# Patient Record
Sex: FEMALE | Race: BLACK OR AFRICAN AMERICAN | NOT HISPANIC OR LATINO | Employment: OTHER | URBAN - METROPOLITAN AREA
[De-identification: names, ages, dates, MRNs, and addresses within clinical notes are randomized per-mention and may not be internally consistent; named-entity substitution may affect disease eponyms.]

---

## 2017-04-03 ENCOUNTER — OFFICE VISIT (OUTPATIENT)
Dept: MEDICAL GROUP | Facility: CLINIC | Age: 57
End: 2017-04-03
Payer: COMMERCIAL

## 2017-04-03 VITALS
OXYGEN SATURATION: 97 % | DIASTOLIC BLOOD PRESSURE: 90 MMHG | RESPIRATION RATE: 16 BRPM | SYSTOLIC BLOOD PRESSURE: 132 MMHG | HEART RATE: 86 BPM | TEMPERATURE: 96.8 F

## 2017-04-03 DIAGNOSIS — J40 BRONCHITIS: ICD-10-CM

## 2017-04-03 PROCEDURE — 99213 OFFICE O/P EST LOW 20 MIN: CPT | Performed by: NURSE PRACTITIONER

## 2017-04-03 RX ORDER — IBUPROFEN 200 MG
200 TABLET ORAL EVERY 6 HOURS PRN
COMMUNITY
End: 2018-03-09

## 2017-04-03 RX ORDER — AZITHROMYCIN 250 MG/1
TABLET, FILM COATED ORAL
Qty: 6 TAB | Refills: 0 | Status: SHIPPED | OUTPATIENT
Start: 2017-04-03 | End: 2017-10-25

## 2017-04-03 ASSESSMENT — ENCOUNTER SYMPTOMS
COUGH: 1
CHILLS: 1
SHORTNESS OF BREATH: 0
WHEEZING: 0
SORE THROAT: 0
VOMITING: 0
NAUSEA: 0
SPUTUM PRODUCTION: 1
FEVER: 1
MYALGIAS: 0

## 2017-04-03 NOTE — PROGRESS NOTES
Chief Complaint   Patient presents with   • URI     dry cough/ chest congestion/ SOB/ chest pressure/ X 5 days       HISTORY OF PRESENT ILLNESS: Patient is a 57 y.o. female established patient who presents today due to five days hx of chest congestion, sob, dry coughing, chest congestion, chills and subjective fever. Pt is taking OTC dayquil, nyquil but not helping her symptoms. She feels symptoms are getting worse. She is not smoking. No sob or wheezing. No chronic lung disease.       Patient Active Problem List    Diagnosis Date Noted   • Obesity (BMI 35.0-39.9 without comorbidity) ([facility]) 11/22/2016   • History of colorectal cancer 03/02/2016   • Epigastric pain 04/04/2014   • Other and unspecified derangement of medial meniscus 03/08/2013   • Internal hemorrhoid 03/31/2011   • Vitamin d deficiency 08/05/2010   • History of anemia 08/05/2010   • Proteinuria 08/05/2010       Allergies:Morphine    Current Outpatient Prescriptions   Medication Sig Dispense Refill   • ibuprofen (MOTRIN) 200 MG Tab Take 200 mg by mouth every 6 hours as needed.     • maalox plus-benadryl-visc lidocaine (MAGIC MOUTHWASH) Take 5 mL by mouth every 6 hours as needed. 80 mL 0   • esomeprazole (NEXIUM) 40 MG delayed-release capsule      • guaifenesin DM sugar free (DIABETIC TUSSIN DM)  MG/5ML Liquid soln Take 10 mL by mouth every four hours as needed for Cough.     • hydrocodone-acetaminophen (NORCO) 5-325 MG Tab per tablet Take 1-2 Tabs by mouth every four hours as needed. 14 Tab 0   • alprazolam (XANAX) 0.25 MG Tab Take 1 Tab by mouth 2 times a day as needed for Sleep or Anxiety. 30 Tab 0   • ondansetron (ZOFRAN) 4 MG Tab tablet Take 1 Tab by mouth every 8 hours as needed for Nausea/Vomiting. 20 Tab 0   • loratadine (CLARITIN) 10 MG TABS Take 10 mg by mouth every day.       No current facility-administered medications for this visit.       Social History   Substance Use Topics   • Smoking status: Never Smoker    • Smokeless tobacco:  Never Used      Comment: significant 2nd hand exposure    • Alcohol Use: 0.6 oz/week     1 Standard drinks or equivalent per week       Family Status   Relation Status Death Age   • Father     • Maternal Grandmother     • Maternal Grandfather     • Paternal Grandmother     • Paternal Grandfather       Family History   Problem Relation Age of Onset   • Stroke Father      68yo   • Hypertension Father    • Heart Disease Father      70 yo   • Psychiatry Sister      Schizophrenia   • Alcohol/Drug Sister      Self medication   • Hypertension Brother    • Psychiatry Brother      Schizophrenia   • Cancer Paternal Aunt      Bone, liver   • Cancer Maternal Grandfather      Lung   • Diabetes Paternal Grandmother    • Hypertension Paternal Grandmother    • Stroke Paternal Grandmother      70 s         ROS:  Review of Systems   Constitutional: Positive for fever ( subjective fever) and chills.   HENT: Positive for congestion. Negative for ear pain and sore throat.    Respiratory: Positive for cough and sputum production. Negative for shortness of breath and wheezing.    Gastrointestinal: Negative for nausea and vomiting.   Musculoskeletal: Negative for myalgias.        Objective:     Blood pressure 132/90, pulse 86, temperature 36 °C (96.8 °F), resp. rate 16, SpO2 97 %.     Physical Exam:  Physical Exam   Constitutional: She is oriented to person, place, and time and well-developed, well-nourished, and in no distress.   HENT:   Head: Normocephalic and atraumatic.   Right Ear: Hearing, tympanic membrane and external ear normal.   Left Ear: Hearing, tympanic membrane and external ear normal.   Nose: Rhinorrhea present. Right sinus exhibits no maxillary sinus tenderness and no frontal sinus tenderness. Left sinus exhibits no maxillary sinus tenderness and no frontal sinus tenderness.   Mouth/Throat: No oropharyngeal exudate, posterior oropharyngeal edema or tonsillar abscesses.   Eyes:  Conjunctivae are normal.   Neck: Neck supple. No JVD present.   Cardiovascular: Normal rate.    Pulmonary/Chest: Effort normal. No respiratory distress. She has no wheezes. She has no rales.   Neurological: She is alert and oriented to person, place, and time.   Vitals reviewed.        Assessment/Plan:  1. Bronchitis  - can start taking abx if still not feeling better in the next 1-2 days: azithromycin (ZITHROMAX) 250 MG Tab; As directed  Dispense: 6 Tab; Refill: 0  - can continue OTC prn medications for symptoms management.   - Call if worsening symptoms. Pt verbalized understanding.

## 2017-05-30 ENCOUNTER — OFFICE VISIT (OUTPATIENT)
Dept: MEDICAL GROUP | Facility: PHYSICIAN GROUP | Age: 57
End: 2017-05-30
Payer: COMMERCIAL

## 2017-05-30 VITALS
SYSTOLIC BLOOD PRESSURE: 128 MMHG | RESPIRATION RATE: 16 BRPM | HEART RATE: 86 BPM | HEIGHT: 66 IN | WEIGHT: 260 LBS | BODY MASS INDEX: 41.78 KG/M2 | DIASTOLIC BLOOD PRESSURE: 70 MMHG | OXYGEN SATURATION: 96 % | TEMPERATURE: 98.4 F

## 2017-05-30 DIAGNOSIS — R10.13 EPIGASTRIC PAIN: ICD-10-CM

## 2017-05-30 PROCEDURE — 99213 OFFICE O/P EST LOW 20 MIN: CPT | Performed by: NURSE PRACTITIONER

## 2017-05-30 NOTE — Clinical Note
[facility]  Sofie Mathew M.D.  [address]  [place] [address]  Fax: [phone]   Authorization for Release/Disclosure of   Protected Health Information   Name: ADILENE GALLOWAY : 1960 SSN: [id number]   Address: [address] [place] [address]  [place] [address] Phone:    [phone] (home)    I authorize the entity listed below to release/disclose the PHI below to:   [facility]/Sofie Mathew M.D. and SLOANE Nix   Provider or Entity Name:   [facility]    Address   City, State, [facility]   Phone:      Fax:     Reason for request: continuity of care   Information to be released:    [  ] LAST COLONOSCOPY,  including any PATH REPORT and follow-up  [  ] LAST FIT/COLOGUARD RESULT [  ] LAST DEXA  [  ] LAST MAMMOGRAM  [  ] LAST PAP  [  ] LAST LABS [  ] RETINA EXAM REPORT  [  ] IMMUNIZATION RECORDS  [x] Release all info      [  ] Check here and initial the line next to each item to release ALL health information INCLUDING  _____ Care and treatment for drug and / or alcohol abuse  _____ HIV testing, infection status, or AIDS  _____ Genetic Testing    DATES OF SERVICE OR TIME PERIOD TO BE DISCLOSED: _____________  I understand and acknowledge that:  * This Authorization may be revoked at any time by you in writing, except if your health information has already been used or disclosed.  * Your health information that will be used or disclosed as a result of you signing this authorization could be re-disclosed by the recipient. If this occurs, your re-disclosed health information may no longer be protected by State or Federal laws.  * You may refuse to sign this Authorization. Your refusal will not affect your ability to obtain treatment.  * This Authorization becomes effective upon signing and will  on (date) __________.      If no date is indicated, this Authorization will  one (1) year from the signature date.    Name: Adilene Galloway    Signature:   Date:          5/30/2017       PLEASE FAX REQUESTED RECORDS BACK TO: [phone]

## 2017-06-02 NOTE — ASSESSMENT & PLAN NOTE
Has had issues with epigastric pain.  Had her lap band removed because of complications.  Is still having problems.  Recently had episodes of nausea, vomiting, and diarrhea.  Zofran didn't seem to help.  Is feeling much better today.  Discussed plan to avoid dairy and to gradually increase diet as tolerated.

## 2017-06-05 NOTE — PROGRESS NOTES
Chief Complaint   Patient presents with   • Hospital Follow-up       HISTORY OF PRESENT ILLNESS: Patient is a 57 y.o. female established patient who presents today to discuss the following issues:    Epigastric pain  Has had issues with epigastric pain.  Had her lap band removed because of complications.  Is still having problems.  Recently had episodes of nausea, vomiting, and diarrhea.  Zofran didn't seem to help.  Is feeling much better today.  Discussed plan to avoid dairy and to gradually increase diet as tolerated.      Patient Active Problem List    Diagnosis Date Noted   • Obesity (BMI 35.0-39.9 without comorbidity) ([facility]) 11/22/2016   • History of colorectal cancer 03/02/2016   • Epigastric pain 04/04/2014   • Other and unspecified derangement of medial meniscus 03/08/2013   • Internal hemorrhoid 03/31/2011   • Vitamin d deficiency 08/05/2010   • History of anemia 08/05/2010   • Proteinuria 08/05/2010       Allergies:Morphine    Current Outpatient Prescriptions   Medication Sig Dispense Refill   • ibuprofen (MOTRIN) 200 MG Tab Take 200 mg by mouth every 6 hours as needed.     • esomeprazole (NEXIUM) 40 MG delayed-release capsule      • guaifenesin DM sugar free (DIABETIC TUSSIN DM)  MG/5ML Liquid soln Take 10 mL by mouth every four hours as needed for Cough.     • ondansetron (ZOFRAN) 4 MG Tab tablet Take 1 Tab by mouth every 8 hours as needed for Nausea/Vomiting. 20 Tab 0   • loratadine (CLARITIN) 10 MG TABS Take 10 mg by mouth every day.     • azithromycin (ZITHROMAX) 250 MG Tab As directed 6 Tab 0   • maalox plus-benadryl-visc lidocaine (MAGIC MOUTHWASH) Take 5 mL by mouth every 6 hours as needed. 80 mL 0   • hydrocodone-acetaminophen (NORCO) 5-325 MG Tab per tablet Take 1-2 Tabs by mouth every four hours as needed. 14 Tab 0   • alprazolam (XANAX) 0.25 MG Tab Take 1 Tab by mouth 2 times a day as needed for Sleep or Anxiety. 30 Tab 0     No current facility-administered medications for this visit.        Social History   Substance Use Topics   • Smoking status: Never Smoker    • Smokeless tobacco: Never Used      Comment: significant 2nd hand exposure    • Alcohol Use: No       Family Status   Relation Status Death Age   • Father     • Maternal Grandmother     • Maternal Grandfather     • Paternal Grandmother     • Paternal Grandfather       Family History   Problem Relation Age of Onset   • Stroke Father      68yo   • Hypertension Father    • Heart Disease Father      70 yo   • Psychiatry Sister      Schizophrenia   • Alcohol/Drug Sister      Self medication   • Hypertension Brother    • Psychiatry Brother      Schizophrenia   • Cancer Paternal Aunt      Bone, liver   • Cancer Maternal Grandfather      Lung   • Diabetes Paternal Grandmother    • Hypertension Paternal Grandmother    • Stroke Paternal Grandmother      70 s       Review of Systems:   Constitutional: Negative for fever, chills, weight loss and malaise/fatigue.   HENT: Negative for ear pain, nosebleeds, congestion, sore throat and neck pain.    Eyes: Negative for blurred vision.   Respiratory: Negative for cough, sputum production, shortness of breath and wheezing.    Cardiovascular: Negative for chest pain, palpitations, orthopnea and leg swelling.   Gastrointestinal: Negative for heartburn, nausea, vomiting and abdominal pain.  Positive for resolving epigastric pain.  Genitourinary: Negative for dysuria, urgency and frequency.   Musculoskeletal: Negative for myalgias, joint pain, and back pain.  Skin: Negative for rash and itching.   Neurological: Negative for dizziness, tingling, tremors, sensory change, focal weakness and headaches.   Endo/Heme/Allergies: Does not bruise/bleed easily.   Psychiatric/Behavioral: Negative for depression, suicidal ideas and memory loss.  The patient is not nervous/anxious and does not have insomnia.    All other systems reviewed and are negative except as in  "HPI.    Exam:  Blood pressure 128/70, pulse 86, temperature 36.9 °C (98.4 °F), resp. rate 16, height 1.689 m (5' 6.5\"), weight 117.935 kg (260 lb), SpO2 96 %.  General:  Well nourished, well developed female in NAD  Head: Grossly normal.  Neck: Supple without JVD or bruit. Thyroid is not enlarged.  Pulmonary: Clear to ausculation. Normal effort. No rales, ronchi, or wheezing.  Cardiovascular: Regular rate and rhythm without murmur.   Extremities: No clubbing, cyanosis, or edema.  Skin: Intact with no obvious rashes or lesions.  Neuro: Grossly intact.  Psych: Alert and oriented x 3.  Mood and affect appropriate.    Medical decision-making and discussion: Evita is here today for follow-up of stomach issues.  A referral was sent to GI for further evaluation.  She will plan to follow-up here as needed.          Assessment/Plan:  1. Epigastric pain  REFERRAL TO GASTROENTEROLOGY       Return if symptoms worsen or fail to improve.    Please note that this dictation was created using voice recognition software. I have made every reasonable attempt to correct obvious errors, but I expect that there are errors of grammar and possibly content that I did not discover before finalizing the note.    "

## 2017-10-13 ENCOUNTER — APPOINTMENT (OUTPATIENT)
Dept: MEDICAL GROUP | Facility: PHYSICIAN GROUP | Age: 57
End: 2017-10-13
Payer: COMMERCIAL

## 2017-10-25 ENCOUNTER — OFFICE VISIT (OUTPATIENT)
Dept: MEDICAL GROUP | Facility: PHYSICIAN GROUP | Age: 57
End: 2017-10-25
Payer: COMMERCIAL

## 2017-10-25 VITALS
SYSTOLIC BLOOD PRESSURE: 122 MMHG | HEART RATE: 90 BPM | OXYGEN SATURATION: 97 % | RESPIRATION RATE: 16 BRPM | HEIGHT: 67 IN | BODY MASS INDEX: 34.69 KG/M2 | DIASTOLIC BLOOD PRESSURE: 72 MMHG | WEIGHT: 221 LBS | TEMPERATURE: 97 F

## 2017-10-25 DIAGNOSIS — Z13.6 SCREENING FOR CARDIOVASCULAR CONDITION: ICD-10-CM

## 2017-10-25 DIAGNOSIS — E66.9 OBESITY (BMI 30-39.9): ICD-10-CM

## 2017-10-25 DIAGNOSIS — Z12.39 SCREENING FOR BREAST CANCER: ICD-10-CM

## 2017-10-25 DIAGNOSIS — Z13.29 SCREENING FOR ENDOCRINE DISORDER: ICD-10-CM

## 2017-10-25 DIAGNOSIS — D64.9 ANEMIA, UNSPECIFIED TYPE: ICD-10-CM

## 2017-10-25 DIAGNOSIS — K31.9 GASTRIC DISORDER: ICD-10-CM

## 2017-10-25 PROCEDURE — 99214 OFFICE O/P EST MOD 30 MIN: CPT | Performed by: FAMILY MEDICINE

## 2017-10-25 ASSESSMENT — PATIENT HEALTH QUESTIONNAIRE - PHQ9: CLINICAL INTERPRETATION OF PHQ2 SCORE: 0

## 2017-10-25 NOTE — PROGRESS NOTES
Chief Complaint   Patient presents with   • Follow-Up     abdominal pain   • Labs Only       HISTORY OF PRESENT ILLNESS: Patient is a 57 y.o. female established patient here today for the following concerns:    1. Anemia, unspecified type  Due for follow up on anemia.  No dizziness or light headedness.      2. Screening for endocrine disorder  3. Screening for cardiovascular condition  Due for labs.     4. Gastric disorder  Here today for continued intermittent epigastric abdominal pain related to adhesions from the lap band and severe reflux.  Had endoscopy last year which showed no evidence of ulcer.  She has found small frequent meals that are simple to digest work better than high fiber or high protein.  This has been a struggle with her weight, but now has been able to lose 60 lbs.     5. Obesity (BMI 30-39.9)  Counseled.     6. Screening for breast cancer  Due for mammogram.      Past Medical, Social, and Family history reviewed and updated in EPIC    Allergies:Morphine    Current Outpatient Prescriptions   Medication Sig Dispense Refill   • ibuprofen (MOTRIN) 200 MG Tab Take 200 mg by mouth every 6 hours as needed.     • esomeprazole (NEXIUM) 40 MG delayed-release capsule      • alprazolam (XANAX) 0.25 MG Tab Take 1 Tab by mouth 2 times a day as needed for Sleep or Anxiety. 30 Tab 0   • ondansetron (ZOFRAN) 4 MG Tab tablet Take 1 Tab by mouth every 8 hours as needed for Nausea/Vomiting. 20 Tab 0   • loratadine (CLARITIN) 10 MG TABS Take 10 mg by mouth every day.       No current facility-administered medications for this visit.          ROS:  Review of Systems   Constitutional: Negative for fever, chills, weight loss and malaise/fatigue.   HENT: Negative for ear pain, nosebleeds, congestion, sore throat and neck pain.    Eyes: Negative for blurred vision.   Respiratory: Negative for cough, sputum production, shortness of breath and wheezing.    Cardiovascular: Negative for chest pain, palpitations,  and leg  "swelling.   Gastrointestinal: Negative for heartburn, nausea, vomiting, diarrhea and abdominal pain.   Genitourinary: Negative for dysuria, urgency and frequency.   Musculoskeletal: Negative for myalgias, back pain and joint pain.   Skin: Negative for rash and itching.   Neurological: Negative for dizziness, tingling, tremors, sensory change, focal weakness and headaches.   Endo/Heme/Allergies: Does not bruise/bleed easily.   Psychiatric/Behavioral: Negative for depression, anxiety, suicidal ideas, insomnia and memory loss.      Exam:  Blood pressure 122/72, pulse 90, temperature 36.1 °C (97 °F), resp. rate 16, height 1.689 m (5' 6.5\"), weight 100.2 kg (221 lb), SpO2 97 %.    General:  Well nourished, well developed in NAD  Head is grossly normal.  Neck: Supple without JVD   Pulmonary:  Normal effort.   Cardiovascular: Regular rate  Extremities: no clubbing, cyanosis, or edema.  Psych: affect appropriate      Please note that this dictation was created using voice recognition software. I have made every reasonable attempt to correct obvious errors, but I expect that there are errors of grammar and possibly content that I did not discover before finalizing the note.    Assessment/Plan:  1. Anemia, unspecified type  Check   - CBC WITH DIFFERENTIAL; Future  - IRON/TOTAL IRON BIND; Future  - FERRITIN; Future    2. Screening for endocrine disorder  Check   - TSH WITH REFLEX TO FT4; Future  - VITAMIN D,25 HYDROXY; Future    3. Screening for cardiovascular condition    - COMP METABOLIC PANEL; Future  - LIPID PROFILE; Future    4. Gastric disorder  Small frequent meals, continue PPI, follow up with GI as planned.     5. Obesity (BMI 30-39.9)    - Patient identified as having weight management issue.  Appropriate orders and counseling given.    6. Screening for breast cancer    - MA-MAMMO SCREENING BILAT W/SOFYA W/O CAD; Future    3-6 month follow up        "

## 2017-12-29 ENCOUNTER — OFFICE VISIT (OUTPATIENT)
Dept: MEDICAL GROUP | Facility: PHYSICIAN GROUP | Age: 57
End: 2017-12-29
Payer: COMMERCIAL

## 2017-12-29 VITALS
BODY MASS INDEX: 34.53 KG/M2 | DIASTOLIC BLOOD PRESSURE: 74 MMHG | HEART RATE: 82 BPM | SYSTOLIC BLOOD PRESSURE: 130 MMHG | WEIGHT: 220 LBS | RESPIRATION RATE: 12 BRPM | OXYGEN SATURATION: 97 % | TEMPERATURE: 98.2 F | HEIGHT: 67 IN

## 2017-12-29 DIAGNOSIS — J06.9 VIRAL URI WITH COUGH: ICD-10-CM

## 2017-12-29 LAB
FLUAV+FLUBV AG SPEC QL IA: NEGATIVE
INT CON NEG: NORMAL
INT CON POS: NORMAL

## 2017-12-29 PROCEDURE — 99213 OFFICE O/P EST LOW 20 MIN: CPT | Performed by: NURSE PRACTITIONER

## 2017-12-29 PROCEDURE — 87804 INFLUENZA ASSAY W/OPTIC: CPT | Performed by: NURSE PRACTITIONER

## 2017-12-29 RX ORDER — PROMETHAZINE HYDROCHLORIDE AND CODEINE PHOSPHATE 6.25; 1 MG/5ML; MG/5ML
5 SYRUP ORAL 4 TIMES DAILY PRN
Qty: 120 ML | Refills: 0 | Status: SHIPPED
Start: 2017-12-29 | End: 2018-01-08

## 2017-12-29 RX ORDER — AZITHROMYCIN 250 MG/1
TABLET, FILM COATED ORAL
Qty: 6 TAB | Refills: 0 | Status: SHIPPED | OUTPATIENT
Start: 2017-12-29 | End: 2018-01-03

## 2017-12-30 NOTE — PROGRESS NOTES
Subjective:     Chief Complaint   Patient presents with   • URI     x 1 week cough, fever, body aches       HPI  Evita Mckenna is a 57 y.o. female here today for persistent URI.   Symptoms started over a week ago and she is just not getting better.   She is coughing frequently, without sputum.   Body aches are present with weakness and dizziness.   Headache present.   Nasal congestion was so bad she was having a tough time breathing, but Sudafed has helped with this.   Sore throat has resolved.   Mild sob and dyspnea. No purulent sputum or wheezing    +chills, but no known fever.   She does have hx of cancer with decreased immune system   Treatments tried: Mucinex, DayQuil, NyQuil, Sudafed    The encounter diagnosis was Viral URI with cough.    Allergies: Morphine  Current medicines (including changes today)  Current Outpatient Prescriptions   Medication Sig Dispense Refill   • azithromycin (ZITHROMAX) 250 MG Tab 2 tabs by mouth day 1, 1 tab by mouth days 2-5 6 Tab 0   • promethazine-codeine (PHENERGAN-CODEINE) 6.25-10 MG/5ML Syrup Take 5 mL by mouth 4 times a day as needed for up to 10 days. 120 mL 0   • esomeprazole (NEXIUM) 40 MG delayed-release capsule      • ibuprofen (MOTRIN) 200 MG Tab Take 200 mg by mouth every 6 hours as needed.     • alprazolam (XANAX) 0.25 MG Tab Take 1 Tab by mouth 2 times a day as needed for Sleep or Anxiety. 30 Tab 0   • loratadine (CLARITIN) 10 MG TABS Take 10 mg by mouth every day.       No current facility-administered medications for this visit.        She  has a past medical history of Allergy; Anemia; Colorectal cancer (CMS-HCC) (April 2011); GERD (gastroesophageal reflux disease); and Migraine. She also has no past medical history of Addisons disease (CMS-HCC); Adrenal disorder (CMS-HCC); Anxiety; Blood transfusion; Breast cancer (CMS-HCC); CHF (congestive heart failure) (CMS-HCC); Clotting disorder (CMS-HCC); COPD; Cushings syndrome (CMS-HCC); Depression; Goiter; Heart  "attack; HIV (human immunodeficiency virus infection); Hyperlipidemia; IBD (inflammatory bowel disease); Kidney disease; Muscle disorder; Parathyroid disorder (CMS-HCC); Pituitary disease (CMS-[facility]); Substance abuse; Thyroid disease; or Ulcer (CMS-[facility]).      ROS  As stated in HPI and additionally  General: +chills, fatigue, myalgias.  no fever  HEENT: see HPI  Pulm: see HPI      Objective:     Blood pressure 130/74, pulse 82, temperature 36.8 °C (98.2 °F), resp. rate 12, height 1.689 m (5' 6.5\"), weight 99.8 kg (220 lb), SpO2 97 %. Body mass index is 34.98 kg/m².  Physical Exam:  General: Alert, oriented, appears fatigued and   Eye contact is good, speech goal directed, affect calm  CNs grossly intact.  HEENT: conjunctiva non-injected, sclera non-icteric, EOMs intact. No lid edema or eye drainage.   Pinna normal without skin lesions. TM pearly marc. Gross hearing intact.  Nasal turbinates without edema or drainage.   Oral mucous membranes pink and moist with no lesions. Oropharynx with erythema. No exudate.   Neck: Supple. No adenopathy or masses in the cervical or supraclavicular regions  Lungs: unlabored. clear to auscultation bilaterally with good excursion. Frequent dry cough present  CV: regular rate and rhythm.  Gait steady.     Influenza A/B negative     Assessment and Plan:   Assessment/Plan:  1. Viral URI with cough  Discussed likelihood of viral syndrome. Offered Medrol pack, but she has declined. Z-pack given on contingency basis that sx do not start to improve in 2 days. Continue OTC tx plus may use cough syrup.   - POCT Influenza A/B  - promethazine-codeine (PHENERGAN-CODEINE) 6.25-10 MG/5ML Syrup; Take 5 mL by mouth 4 times a day as needed for up to 10 days.  Dispense: 120 mL; Refill: 0       Follow up:  Return if symptoms worsen or fail to improve.    Educated in proper administration of medication(s) ordered today including safety, possible SE, risks, benefits, rationale and alternatives to therapy. "   Supportive care, differential diagnoses, and indications for immediate follow-up discussed with patient.    Pathogenesis of diagnosis discussed including typical length and natural progression.    Instructed to return to clinic or nearest emergency department for any change in condition, further concerns, or worsening of symptoms.  Patient states understanding of the plan of care and discharge instructions.      Please note that this dictation was created using voice recognition software. I have made every reasonable attempt to correct obvious errors, but I expect that there are errors of grammar and possibly content that I did not discover before finalizing the note.    Followup: Return if symptoms worsen or fail to improve. sooner should new symptoms or problems arise.

## 2018-03-07 ENCOUNTER — OFFICE VISIT (OUTPATIENT)
Dept: MEDICAL GROUP | Facility: MEDICAL CENTER | Age: 58
End: 2018-03-07
Payer: COMMERCIAL

## 2018-03-07 VITALS
DIASTOLIC BLOOD PRESSURE: 76 MMHG | TEMPERATURE: 98.7 F | RESPIRATION RATE: 16 BRPM | HEART RATE: 97 BPM | SYSTOLIC BLOOD PRESSURE: 118 MMHG | OXYGEN SATURATION: 97 % | HEIGHT: 67 IN | WEIGHT: 227 LBS | BODY MASS INDEX: 35.63 KG/M2

## 2018-03-07 DIAGNOSIS — R05.9 COUGH: ICD-10-CM

## 2018-03-07 PROCEDURE — 99213 OFFICE O/P EST LOW 20 MIN: CPT | Performed by: INTERNAL MEDICINE

## 2018-03-07 RX ORDER — PROMETHAZINE HYDROCHLORIDE AND CODEINE PHOSPHATE 6.25; 1 MG/5ML; MG/5ML
5 SYRUP ORAL 4 TIMES DAILY PRN
Qty: 120 ML | Refills: 0 | Status: SHIPPED | OUTPATIENT
Start: 2018-03-07 | End: 2018-03-09

## 2018-03-08 NOTE — ASSESSMENT & PLAN NOTE
This patient comes in today reporting that she had an upper respiratory infection in December. Since then she has had a persistent cough and has had occasional mucus. She denies pharyngitis or fever or myalgias. She denies significant dyspnea or wheezing.

## 2018-03-08 NOTE — PROGRESS NOTES
Subjective:     Chief Complaint   Patient presents with   • Cough     for 10 days     Evita Mckenna is a 58 y.o. female here today for evaluation of a persistent cough.    Cough  This patient comes in today reporting that she had an upper respiratory infection in December. Since then she has had a persistent cough and has had occasional mucus. She denies pharyngitis or fever or myalgias. She denies significant dyspnea or wheezing.       The encounter diagnosis was Cough.    Allergies: Morphine  Current medicines (including changes today)  Current Outpatient Prescriptions   Medication Sig Dispense Refill   • promethazine-codeine (PHENERGAN-CODEINE) 6.25-10 MG/5ML Syrup Take 5 mL by mouth 4 times a day as needed for up to 5 days. 120 mL 0   • ibuprofen (MOTRIN) 200 MG Tab Take 200 mg by mouth every 6 hours as needed.     • esomeprazole (NEXIUM) 40 MG delayed-release capsule      • alprazolam (XANAX) 0.25 MG Tab Take 1 Tab by mouth 2 times a day as needed for Sleep or Anxiety. 30 Tab 0   • loratadine (CLARITIN) 10 MG TABS Take 10 mg by mouth every day.       No current facility-administered medications for this visit.        She  has a past medical history of Allergy; Anemia; Colorectal cancer (CMS-HCC) (April 2011); GERD (gastroesophageal reflux disease); and Migraine. She also has no past medical history of Addisons disease (CMS-HCC); Adrenal disorder (CMS-HCC); Anxiety; Blood transfusion; Breast cancer (CMS-HCC); CHF (congestive heart failure) (CMS-HCC); Clotting disorder (CMS-HCC); COPD; Cushings syndrome (CMS-HCC); Depression; Goiter; Heart attack; HIV (human immunodeficiency virus infection); Hyperlipidemia; IBD (inflammatory bowel disease); Kidney disease; Muscle disorder; Parathyroid disorder (CMS-HCC); Pituitary disease (CMS-HCC); Substance abuse; Thyroid disease; or Ulcer (CMS-HCC).    ROS    Patient denies significant change in strength, weight or appetite.  No significant lightheadedness or  "headaches.  No change in vision, hearing, or swallowing.  No new dyspnea, coughing, chest pain, or palpitations other than the cough and congestion noted above.  No indigestion, abdominal pain, or change in bowel habits.  No change in urinating.  No new ankle swelling.       Objective:     PE:  /76   Pulse 97   Temp 37.1 °C (98.7 °F)   Resp 16   Ht 1.689 m (5' 6.5\")   Wt 103 kg (227 lb)   SpO2 97%   BMI 36.09 kg/m²    Neck is supple without significant lymphadenopathy or masses. Posterior pharynx is unremarkable. There is no percussion tenderness over the sinuses.  Lungs are clear with normal breath sounds without wheezes or rales .  Cardiovascular: peripheral circulation is satisfactory, heart sounds are unchanged and unremarkable.  Abdomen is soft, without masses or tenderness, with normal bowel sounds.  Extremities are without significant edema, cyanosis or deformity.      Assessment and Plan:   The following treatment plan was discussed  1. Cough  promethazine-codeine (PHENERGAN-CODEINE) 6.25-10 MG/5ML Syrup    Fluids, Mucinex, Delsym during the day, Phenergan with codeine at night for cough. Report any lack of improvement or worsening of symptoms. I think this is an irritative bronchitis leftover from the upper respiratory infection.        Followup: She will follow up regularly with Dr. Mathew and will report any lack of improvement or worsening of symptoms.         "

## 2018-03-09 ENCOUNTER — OFFICE VISIT (OUTPATIENT)
Dept: MEDICAL GROUP | Facility: PHYSICIAN GROUP | Age: 58
End: 2018-03-09
Payer: COMMERCIAL

## 2018-03-09 VITALS
HEART RATE: 94 BPM | DIASTOLIC BLOOD PRESSURE: 76 MMHG | RESPIRATION RATE: 18 BRPM | TEMPERATURE: 98.1 F | SYSTOLIC BLOOD PRESSURE: 120 MMHG | BODY MASS INDEX: 35.47 KG/M2 | HEIGHT: 67 IN | WEIGHT: 226 LBS | OXYGEN SATURATION: 97 %

## 2018-03-09 DIAGNOSIS — Z12.39 SCREENING FOR BREAST CANCER: ICD-10-CM

## 2018-03-09 DIAGNOSIS — J40 BRONCHITIS: ICD-10-CM

## 2018-03-09 PROCEDURE — 99214 OFFICE O/P EST MOD 30 MIN: CPT | Performed by: FAMILY MEDICINE

## 2018-03-09 RX ORDER — AZITHROMYCIN 250 MG/1
TABLET, FILM COATED ORAL
Qty: 6 TAB | Refills: 0 | Status: SHIPPED | OUTPATIENT
Start: 2018-03-09 | End: 2018-10-11

## 2018-03-09 NOTE — PROGRESS NOTES
Chief Complaint   Patient presents with   • Cough   • Nasal Congestion   • Body Aches     x 2 wks       HISTORY OF PRESENT ILLNESS: Patient is a 58 y.o. female established patient here today for the following concerns:    1. Bronchitis  Here today for follow up for cough, chest congestion, body aches and chills with subjective fevers for the last 2 weeks, not improving with OTC treatment.  Reports the codeine cough syrup makes her feel SOB so she stopped using it.  Taking dayquil.  No wheezing.  No hx of asthma or lung disease.     2. Screening for breast cancer  Due for mammogram.    Past Medical, Social, and Family history reviewed and updated in EPIC    Allergies:Morphine    Current Outpatient Prescriptions   Medication Sig Dispense Refill   • azithromycin (ZITHROMAX) 250 MG Tab As directed 6 Tab 0   • alprazolam (XANAX) 0.25 MG Tab Take 1 Tab by mouth 2 times a day as needed for Sleep or Anxiety. 30 Tab 0     No current facility-administered medications for this visit.          ROS:  Review of Systems   Constitutional: Negative for fever, chills, weight loss and malaise/fatigue.   HENT: Negative for ear pain, nosebleeds, congestion, sore throat and neck pain.    Eyes: Negative for blurred vision.   Respiratory: Negative for cough, sputum production, shortness of breath and wheezing.    Cardiovascular: Negative for chest pain, palpitations,  and leg swelling.   Gastrointestinal: Negative for heartburn, nausea, vomiting, diarrhea and abdominal pain.   Genitourinary: Negative for dysuria, urgency and frequency.   Musculoskeletal: Negative for myalgias, back pain and joint pain.   Skin: Negative for rash and itching.   Neurological: Negative for dizziness, tingling, tremors, sensory change, focal weakness and headaches.   Endo/Heme/Allergies: Does not bruise/bleed easily.   Psychiatric/Behavioral: Negative for depression, anxiety, suicidal ideas, insomnia and memory loss.      Exam:  Blood pressure 120/76, pulse 94,  "temperature 36.7 °C (98.1 °F), resp. rate 18, height 1.689 m (5' 6.5\"), weight 102.5 kg (226 lb), SpO2 97 %.    General:  Well nourished, well developed in NAD  Head is grossly normal.  Neck: Supple without JVD   Pulmonary:  Normal effort. CTAB no w/r/c  Cardiovascular: Regular rate  Extremities: no clubbing, cyanosis, or edema.  Psych: affect appropriate      Please note that this dictation was created using voice recognition software. I have made every reasonable attempt to correct obvious errors, but I expect that there are errors of grammar and possibly content that I did not discover before finalizing the note.    Assessment/Plan:  1. Bronchitis  start  - azithromycin (ZITHROMAX) 250 MG Tab; As directed  Dispense: 6 Tab; Refill: 0    2. Screening for breast cancer    - MA-SCREEN MAMMO W/CAD-BILAT; Future    Follow up if not improving.         "

## 2018-09-04 ENCOUNTER — OFFICE VISIT (OUTPATIENT)
Dept: URGENT CARE | Facility: PHYSICIAN GROUP | Age: 58
End: 2018-09-04
Payer: COMMERCIAL

## 2018-09-04 VITALS
WEIGHT: 240 LBS | RESPIRATION RATE: 16 BRPM | HEART RATE: 89 BPM | TEMPERATURE: 97.4 F | HEIGHT: 67 IN | DIASTOLIC BLOOD PRESSURE: 94 MMHG | BODY MASS INDEX: 37.67 KG/M2 | OXYGEN SATURATION: 98 % | SYSTOLIC BLOOD PRESSURE: 162 MMHG

## 2018-09-04 DIAGNOSIS — R00.2 HEART PALPITATIONS: ICD-10-CM

## 2018-09-04 DIAGNOSIS — I10 HTN (HYPERTENSION), BENIGN: ICD-10-CM

## 2018-09-04 PROCEDURE — 99214 OFFICE O/P EST MOD 30 MIN: CPT | Performed by: FAMILY MEDICINE

## 2018-09-04 RX ORDER — HYDROCHLOROTHIAZIDE 25 MG/1
25 TABLET ORAL DAILY
Qty: 30 TAB | Refills: 0 | Status: SHIPPED | OUTPATIENT
Start: 2018-09-04 | End: 2018-10-11

## 2018-09-04 RX ORDER — ACETAMINOPHEN 325 MG/1
650 TABLET ORAL EVERY 4 HOURS PRN
COMMUNITY
End: 2018-10-11

## 2018-09-04 ASSESSMENT — PAIN SCALES - GENERAL: PAINLEVEL: 5=MODERATE PAIN

## 2018-09-05 NOTE — PROGRESS NOTES
"Subjective:     Chief Complaint   Patient presents with   • Palpitations     chest [id number]              Palpitations   This is a recurrent problem. The current episode started 2 weeks ago. Episode frequency: daily.  The problem has been unchanged. On average, each episode lasts 5 minutes. The symptoms are aggravated by - nothing. Pertinent negatives include no chest fullness, chest pain, sob or coughing.  Pt  has tried rest for the symptoms. The treatment provided mild relief.   She denies depression or anxiety    Past Medical History:   Diagnosis Date   • Colorectal cancer (HCC) April 2011    Dr. Garcia, chemo and radiation.    • Allergy    • Anemia     Vaginal bleeding.   • GERD (gastroesophageal reflux disease)    • Migraine        Social History   Substance Use Topics   • Smoking status: Never Smoker   • Smokeless tobacco: Never Used      Comment: significant 2nd hand exposure    • Alcohol use No         Review of Systems:  Review of Systems   Constitutional: Negative for fever, chills.   HENT: no neck pain, headache or dizziness  Eyes: denies vision changes or discharge  Respiratory: no cough, congestion, SOB  Cardiovascular: denies   chest pain   Gastrointestinal: denies diarrhea, abdominal pain or constipation.  No blood in stool.  Musculoskeletal: denies back pain or joint pain    Skin: no itching or rash  Neurological: No numbness or tingling.   Psych - denies depression, anxiety   - Denies dysuria, frequency or discharge  10 point ROS otherwise negative, except per HPI         Objective:     Blood pressure (!) 162/94, pulse 89, temperature 36.3 °C (97.4 °F), resp. rate 16, height 1.689 m (5' 6.5\"), weight 108.9 kg (240 lb), SpO2 98 %.      Physical Exam   Constitutional: pt is oriented to person, place, and time. Pt appears well-developed. No distress.   HENT:   Head: Normocephalic and atraumatic.   Eyes: Conjunctivae and EOM are normal. Pupils are equal, round, and reactive to light. Right " conjunctiva is not injected. Left conjunctiva is not injected.      Cardiovascular: Normal rate, regular rhythm and normal heart sounds.  Exam reveals no friction rub.    No murmur heard.  Pulmonary/Chest: Effort normal and breath sounds normal. No respiratory distress. Pt has no wheezes or rales.   Neurological: pt is alert and oriented to person, place, and time. No cranial nerve deficit.   Skin: Skin is warm. Pt is not diaphoretic. No erythema.   Psychiatric: pt's mood appears normal.  Affect - euthymic  Nursing note and vitals reviewed.       EKG interpretation - normal sinus rhythm. No ST or QRS morphology changes. QT interval is normal. Axis is positive. No signs of ischemia.         Assessment/Plan:     1. Heart palpitations  EKG reviewed and was completely normal.   Labs ordered:    - CBC WITH DIFFERENTIAL; Future  - COMP METABOLIC PANEL; Future  - TSH; Future  - REFERRAL TO CARDIOLOGY    2. HTN (hypertension), benign   not at goal    Will start HCTZ    F/u with PCP in 1 wk      - CBC WITH DIFFERENTIAL; Future  - COMP METABOLIC PANEL; Future  - TSH; Future  - REFERRAL TO CARDIOLOGY  - hydroCHLOROthiazide (HYDRODIURIL) 25 MG Tab; Take 1 Tab by mouth every day.  Dispense: 30 Tab; Refill: 0

## 2018-09-05 NOTE — PATIENT INSTRUCTIONS
Hypertension  Hypertension, commonly called high blood pressure, is when the force of blood pumping through your arteries is too strong. Your arteries are the blood vessels that carry blood from your heart throughout your body. A blood pressure reading consists of a higher number over a lower number, such as 110/72. The higher number (systolic) is the pressure inside your arteries when your heart pumps. The lower number (diastolic) is the pressure inside your arteries when your heart relaxes. Ideally you want your blood pressure below 120/80.  Hypertension forces your heart to work harder to pump blood. Your arteries may become narrow or stiff. Having untreated or uncontrolled hypertension can cause heart attack, stroke, kidney disease, and other problems.  What increases the risk?  Some risk factors for high blood pressure are controllable. Others are not.  Risk factors you cannot control include:  · Race. You may be at higher risk if you are .  · Age. Risk increases with age.  · Gender. Men are at higher risk than women before age 45 years. After age 65, women are at higher risk than men.  Risk factors you can control include:  · Not getting enough exercise or physical activity.  · Being overweight.  · Getting too much fat, sugar, calories, or salt in your diet.  · Drinking too much alcohol.  What are the signs or symptoms?  Hypertension does not usually cause signs or symptoms. Extremely high blood pressure (hypertensive crisis) may cause headache, anxiety, shortness of breath, and nosebleed.  How is this diagnosed?  To check if you have hypertension, your health care provider will measure your blood pressure while you are seated, with your arm held at the level of your heart. It should be measured at least twice using the same arm. Certain conditions can cause a difference in blood pressure between your right and left arms. A blood pressure reading that is higher than normal on one occasion does  not mean that you need treatment. If it is not clear whether you have high blood pressure, you may be asked to return on a different day to have your blood pressure checked again. Or, you may be asked to monitor your blood pressure at home for 1 or more weeks.  How is this treated?  Treating high blood pressure includes making lifestyle changes and possibly taking medicine. Living a healthy lifestyle can help lower high blood pressure. You may need to change some of your habits.  Lifestyle changes may include:  · Following the DASH diet. This diet is high in fruits, vegetables, and whole grains. It is low in salt, red meat, and added sugars.  · Keep your sodium intake below 2,300 mg per day.  · Getting at least 30-45 minutes of aerobic exercise at least 4 times per week.  · Losing weight if necessary.  · Not smoking.  · Limiting alcoholic beverages.  · Learning ways to reduce stress.  Your health care provider may prescribe medicine if lifestyle changes are not enough to get your blood pressure under control, and if one of the following is true:  · You are 18-59 years of age and your systolic blood pressure is above 140.  · You are 60 years of age or older, and your systolic blood pressure is above 150.  · Your diastolic blood pressure is above 90.  · You have diabetes, and your systolic blood pressure is over 140 or your diastolic blood pressure is over 90.  · You have kidney disease and your blood pressure is above 140/90.  · You have heart disease and your blood pressure is above 140/90.  Your personal target blood pressure may vary depending on your medical conditions, your age, and other factors.  Follow these instructions at home:  · Have your blood pressure rechecked as directed by your health care provider.  · Take medicines only as directed by your health care provider. Follow the directions carefully. Blood pressure medicines must be taken as prescribed. The medicine does not work as well when you skip  doses. Skipping doses also puts you at risk for problems.  · Do not smoke.  · Monitor your blood pressure at home as directed by your health care provider.  Contact a health care provider if:  · You think you are having a reaction to medicines taken.  · You have recurrent headaches or feel dizzy.  · You have swelling in your ankles.  · You have trouble with your vision.  Get help right away if:  · You develop a severe headache or confusion.  · You have unusual weakness, numbness, or feel faint.  · You have severe chest or abdominal pain.  · You vomit repeatedly.  · You have trouble breathing.  This information is not intended to replace advice given to you by your health care provider. Make sure you discuss any questions you have with your health care provider.  Document Released: 12/18/2006 Document Revised: 05/25/2017 Document Reviewed: 10/10/2014  [employer] Interactive Patient Education © 2017 [employer]

## 2018-09-06 LAB
ALBUMIN SERPL-MCNC: 4 G/DL (ref 3.5–5.5)
ALBUMIN/GLOB SERPL: 1.1 {RATIO} (ref 1.2–2.2)
ALP SERPL-CCNC: 158 IU/L (ref 39–117)
ALT SERPL-CCNC: 20 IU/L (ref 0–32)
AST SERPL-CCNC: 18 IU/L (ref 0–40)
BASOPHILS # BLD AUTO: 0 X10E3/UL (ref 0–0.2)
BASOPHILS NFR BLD AUTO: 0 %
BILIRUB SERPL-MCNC: 0.5 MG/DL (ref 0–1.2)
BUN SERPL-MCNC: 13 MG/DL (ref 6–24)
BUN/CREAT SERPL: 20 (ref 9–23)
CALCIUM SERPL-MCNC: 9.7 MG/DL (ref 8.7–10.2)
CHLORIDE SERPL-SCNC: 103 MMOL/L (ref 96–106)
CO2 SERPL-SCNC: 23 MMOL/L (ref 20–29)
CREAT SERPL-MCNC: 0.64 MG/DL (ref 0.57–1)
EOSINOPHIL # BLD AUTO: 0 X10E3/UL (ref 0–0.4)
EOSINOPHIL NFR BLD AUTO: 1 %
ERYTHROCYTE [DISTWIDTH] IN BLOOD BY AUTOMATED COUNT: 13.5 % (ref 12.3–15.4)
GLOBULIN SER CALC-MCNC: 3.6 G/DL (ref 1.5–4.5)
GLUCOSE SERPL-MCNC: 88 MG/DL (ref 65–99)
HCT VFR BLD AUTO: 37.6 % (ref 34–46.6)
HGB BLD-MCNC: 12.7 G/DL (ref 11.1–15.9)
IF AFRICAN AMERICAN  100797: 115 ML/MIN/1.73
IF NON AFRICAN AMER 100791: 99 ML/MIN/1.73
IMM GRANULOCYTES # BLD: 0 X10E3/UL (ref 0–0.1)
IMM GRANULOCYTES NFR BLD: 0 %
IMMATURE CELLS  115398: ABNORMAL
LYMPHOCYTES # BLD AUTO: 1.2 X10E3/UL (ref 0.7–3.1)
LYMPHOCYTES NFR BLD AUTO: 41 %
MCH RBC QN AUTO: 26.5 PG (ref 26.6–33)
MCHC RBC AUTO-ENTMCNC: 33.8 G/DL (ref 31.5–35.7)
MCV RBC AUTO: 78 FL (ref 79–97)
MONOCYTES # BLD AUTO: 0.6 X10E3/UL (ref 0.1–0.9)
MONOCYTES NFR BLD AUTO: 20 %
MORPHOLOGY BLD-IMP: ABNORMAL
NEUTROPHILS # BLD AUTO: 1 X10E3/UL (ref 1.4–7)
NEUTROPHILS NFR BLD AUTO: 38 %
NRBC BLD AUTO-RTO: ABNORMAL %
PLATELET # BLD AUTO: 244 X10E3/UL (ref 150–379)
POTASSIUM SERPL-SCNC: 3.8 MMOL/L (ref 3.5–5.2)
PROT SERPL-MCNC: 7.6 G/DL (ref 6–8.5)
RBC # BLD AUTO: 4.8 X10E6/UL (ref 3.77–5.28)
SODIUM SERPL-SCNC: 140 MMOL/L (ref 134–144)
TSH SERPL DL<=0.005 MIU/L-ACNC: <0.006 UIU/ML (ref 0.45–4.5)
WBC # BLD AUTO: 2.8 X10E3/UL (ref 3.4–10.8)

## 2018-09-07 ENCOUNTER — TELEPHONE (OUTPATIENT)
Dept: URGENT CARE | Facility: PHYSICIAN GROUP | Age: 58
End: 2018-09-07

## 2018-09-18 ENCOUNTER — OFFICE VISIT (OUTPATIENT)
Dept: MEDICAL GROUP | Facility: PHYSICIAN GROUP | Age: 58
End: 2018-09-18
Payer: COMMERCIAL

## 2018-09-18 VITALS
BODY MASS INDEX: 38.45 KG/M2 | TEMPERATURE: 96.5 F | WEIGHT: 245 LBS | HEART RATE: 80 BPM | OXYGEN SATURATION: 98 % | HEIGHT: 67 IN | DIASTOLIC BLOOD PRESSURE: 88 MMHG | RESPIRATION RATE: 16 BRPM | SYSTOLIC BLOOD PRESSURE: 130 MMHG

## 2018-09-18 DIAGNOSIS — I10 ESSENTIAL HYPERTENSION: ICD-10-CM

## 2018-09-18 DIAGNOSIS — Z13.6 SCREENING FOR CARDIOVASCULAR CONDITION: ICD-10-CM

## 2018-09-18 DIAGNOSIS — E05.90 HYPERTHYROIDISM: ICD-10-CM

## 2018-09-18 DIAGNOSIS — R00.2 PALPITATIONS: ICD-10-CM

## 2018-09-18 PROCEDURE — 99214 OFFICE O/P EST MOD 30 MIN: CPT | Performed by: FAMILY MEDICINE

## 2018-09-21 ENCOUNTER — TELEPHONE (OUTPATIENT)
Dept: MEDICAL GROUP | Facility: PHYSICIAN GROUP | Age: 58
End: 2018-09-21

## 2018-09-21 ENCOUNTER — HOSPITAL ENCOUNTER (OUTPATIENT)
Dept: RADIOLOGY | Facility: MEDICAL CENTER | Age: 58
End: 2018-09-21
Attending: FAMILY MEDICINE
Payer: COMMERCIAL

## 2018-09-21 DIAGNOSIS — E05.90 HYPERTHYROIDISM: ICD-10-CM

## 2018-09-21 LAB
CHOLEST SERPL-MCNC: 166 MG/DL (ref 100–199)
HDLC SERPL-MCNC: 75 MG/DL
LABORATORY COMMENT REPORT: NORMAL
LDLC SERPL CALC-MCNC: 81 MG/DL (ref 0–99)
T3FREE SERPL-MCNC: 4.8 PG/ML (ref 2–4.4)
T4 FREE SERPL-MCNC: 1.51 NG/DL (ref 0.82–1.77)
THYROGLOB AB SERPL-ACNC: <1 IU/ML (ref 0–0.9)
THYROGLOB SERPL-MCNC: 23 NG/ML (ref 1.5–38.5)
THYROPEROXIDASE AB SERPL-ACNC: 15 IU/ML (ref 0–34)
TRIGL SERPL-MCNC: 48 MG/DL (ref 0–149)
TSH SERPL DL<=0.005 MIU/L-ACNC: <0.006 UIU/ML (ref 0.45–4.5)
VLDLC SERPL CALC-MCNC: 10 MG/DL (ref 5–40)

## 2018-09-21 PROCEDURE — 76536 US EXAM OF HEAD AND NECK: CPT

## 2018-09-21 RX ORDER — METHIMAZOLE 5 MG/1
10 TABLET ORAL 3 TIMES DAILY
Qty: 90 TAB | Refills: 1 | Status: SHIPPED | OUTPATIENT
Start: 2018-09-21 | End: 2018-11-20

## 2018-09-21 NOTE — TELEPHONE ENCOUNTER
1. Caller Name: Pt                      Call Back Number: [phone] (home)     2. Message: Pt is wondering which Thyroid abnormality you are referring to, since she would have to wait for a referral to find out more information. She has made a follow up with you it's not until 10/23/18.     3. Patient approves office to leave a detailed voicemail/MyChart message:** Able to leave a detailed message and or my-chart.

## 2018-09-21 NOTE — TELEPHONE ENCOUNTER
----- Message from Evita Mckenna sent at 9/21/2018  2:15 PM PDT -----  Regarding: Test Result Question  Contact: [phone]  Please leave me a message. What did you find about my thyroid? Please call and leave message  [phone].

## 2018-09-21 NOTE — TELEPHONE ENCOUNTER
she is hyperthyroid (overactive thyroid), I think this is causing her symptoms.  I am starting the methimazole

## 2018-09-21 NOTE — TELEPHONE ENCOUNTER
----- Message from Evita Mckenna sent at 9/21/2018  2:15 PM PDT -----  Regarding: Test Result Question  Contact: [phone]  Please leave me a message. What did you find about my thyroid? Please call and leave message  [phone].

## 2018-09-21 NOTE — TELEPHONE ENCOUNTER
----- Message from Sofie Mathew M.D. sent at 9/21/2018 11:42 AM PDT -----  Thyroid levels are abnormal.  I am placing a referral to endocrinology and will start methimazole to help block the thyroid a bit

## 2018-10-11 ENCOUNTER — OFFICE VISIT (OUTPATIENT)
Dept: ENDOCRINOLOGY | Facility: MEDICAL CENTER | Age: 58
End: 2018-10-11
Payer: COMMERCIAL

## 2018-10-11 VITALS
SYSTOLIC BLOOD PRESSURE: 122 MMHG | OXYGEN SATURATION: 100 % | HEART RATE: 90 BPM | DIASTOLIC BLOOD PRESSURE: 72 MMHG | WEIGHT: 242 LBS | BODY MASS INDEX: 38.89 KG/M2 | HEIGHT: 66 IN

## 2018-10-11 DIAGNOSIS — E05.90 HYPERTHYROIDISM: ICD-10-CM

## 2018-10-11 PROCEDURE — 99204 OFFICE O/P NEW MOD 45 MIN: CPT | Performed by: PHYSICIAN ASSISTANT

## 2018-10-11 NOTE — PROGRESS NOTES
New Patient Consult Note  Referred by: Sofie Mathew M.D.      HPI:  Evita Mkcenna is a  58 y.o. old patient who comes in today for evaluation and management of the followin. Hyperthyroidism: Patient was being evaluated for 2018 for hypertension and symptoms of palpations. Patient had symptoms off and on since . In  she was hospitalized for 24 hours to rule out chest pain and discharged without any further follow up.     In August when she was diagnosed with hyperthyroid she complained of symptoms of severe palpations, anterior chest pressure, shortness of breath and anxiety/panic attacks.  She was started on methimazole after undergoing thyroid ultrasound.  Since that time she has felt better with regards to her palpitations and anxiety.    She admits over the last several years to have thinning of her hair and recalls her hairdresser telling her to have her thyroid checked approximately 1 year ago.  She complains of symptoms of extreme fatigue and difficulty falling asleep and staying asleep.  She also has looser stools several times a day over the last year.  She complains of intermittent discomfort in the right side of her neck with swelling extending all the way into her ear.  She denies any difficulty wearing necklace and or collared shirts. Intermittent symptoms of tremors worse with resting tremors especially on the right side    Last year she lost 60 lbs and this year re gained 20 lbs. according to the patient she was making a conscious effort to lose weight by walking and drinking more fluids.  She had also decreased her beef/red meat intake which has improved her cholesterol.        Denies swelling, or joint pain. Patient has chronic knee and hip pain. No change in bladder function.   Always hunger but without change.     Patient denies symptoms of sensation of a throat mass, voice changes, hoarseness,  or difficulty swallowing, dysphonia, cough, enlarged cervical lymph nodes,  sweating, heat intolerance.      Patient denies any history of child head or neck radiation (before the age of 17) a family history of thyroid cancer (or familial syndromes associated with thryroid cancer such as MEN2, familial adenomatous polyposis, or Cowden's syndrome).     Endocrinology history:     Subclinical hyperthyroidism:   10/2018 methimazole 30 mg daily (started 9/21/2018)  Ultrasound 9/21/2018 the thyroid is heterogeneous vascularity is normal.  The right lobe of the thyroid measures 1.78 cm x 4.56 cm x 1.8 cm.  There is no discrete nodules.  The left thyroid lobe is not visualized.  The isthmus is measuring 0.35.  Mildly predominant node in the left of the neck.  There is absent and/or hypoplastic left lower lobe.    Labs 9/18/2018 TSH less than 0.006 free T4 1.51 free T3 4.8 thyroglobulin antibody less than 1.0 thyroglobulin 23.0  8/18/2016 TSH 1.840  9/29/2015 TSH 2.1-0 free T4 0.93  12/29/2012 TSH 1.530  8/2010 TSH 2.680    Borderline vitamin D deficiency: Patient is currently not on any vitamin D supplementation.    9/2015- 34.5  12/2012-13 0.4  [id number] 0.2    Obesity: Patient has been successful in the past of losing 60 pounds despite regaining 20.  We will continue to monitor her weight and discuss further options in the future after stabilization of her thyroid.    Family history of diabetes: Patient has family history of diabetes with her sisters and also her father.  Most recent laboratory work from 2016 and 2018 did not display any signs of impaired fasting glucose at this time.    Labs: 9/5/2018 glucose 88, total cholesterol 166, triglycerides 48, HDL 75, LDL 81,   8/18/2016 glucose 91, total cholesterol 191, triglycerides 59, HDL 74,           ROS:  Constitutional: No weight loss,  fever  HEENT: No difficulty with swallowing, change in voice,   Cardiac: See above  Resp: See above  GI: No abdominal pain, nausea, vomiting,   Neuro: No numbness or tinging in feet  Endo: No heat  or cold intolerance, no polyuria or polydipsia  All other systems were reviewed and were negative.    Past Medical History:  Patient Active Problem List    Diagnosis Date Noted   • Cough 03/07/2018   • Obesity (BMI 30-39.9) 10/25/2017   • Anemia    • Obesity (BMI 35.0-39.9 without comorbidity) 11/22/2016   • History of colorectal cancer 03/02/2016   • Epigastric pain 04/04/2014   • Other and unspecified derangement of medial meniscus 03/08/2013   • Internal hemorrhoid 03/31/2011   • Vitamin d deficiency 08/05/2010   • History of anemia 08/05/2010   • Proteinuria 08/05/2010       Past Surgical History:  Past Surgical History:   Procedure Laterality Date   • KNEE ARTHROSCOPY  3/8/2013    Performed by Kenji Meek M.D. at SUR[facility]ERY [facility]   • MEDIAL MENISCECTOMY  3/8/2013    Performed by Kenji Meek M.D. at [facility]   • JOINT INJECTION DIAGNOSTIC  3/8/2013    Performed by Kenji Meek M.D. at [facility]   • GASTRIC BANDING LAPAROSCOPIC  2005   • ABDOMINAL HYSTERECTOMY TOTAL  2003    For menorrhagia, no BSO   • HERNIA REPAIR      child       Allergies:  Morphine    Social History:  Social History     Social History   • Marital status: Single     Spouse name: N/A   • Number of children: N/A   • Years of education: N/A     Occupational History   • Not on file.     Social History Main Topics   • Smoking status: Never Smoker   • Smokeless tobacco: Never Used      Comment: significant 2nd hand exposure    • Alcohol use No   • Drug use: No   • Sexual activity: Yes     Partners: Female     Other Topics Concern   • Not on file     Social History Narrative    , no children.        Family History: Without family history of thyroid thyroid cancer  Family History   Problem Relation Age of Onset   • Stroke Father         70yo   • Hypertension Father    • Heart Disease Father         70 yo   • Cancer Maternal Grandfather         Lung   • Diabetes Paternal  "Grandmother    • Hypertension Paternal Grandmother    • Stroke Paternal Grandmother         70 s   • Psychiatry Sister         Schizophrenia   • Psychiatry Brother         Schizophrenia   • Cancer Paternal Aunt         Bone, liver   • Psychiatry Mother         dental / mental illness    • Lung Disease Mother         Copd    • Psychiatry Sister         schizoprenia    • Diabetes Sister    • Hypertension Sister    • Psychiatry Brother    • Heart Disease Brother    • Hypertension Brother    • Hypertension Brother    • Diabetes Brother        Medications:    Current Outpatient Prescriptions:   •  methimazole (TAPAZOLE) 5 MG Tab, Take 2 Tabs by mouth 3 times a day., Disp: 90 Tab, Rfl: 1  •  acetaminophen (TYLENOL) 325 MG Tab, Take 650 mg by mouth every four hours as needed., Disp: , Rfl:   •  hydroCHLOROthiazide (HYDRODIURIL) 25 MG Tab, Take 1 Tab by mouth every day., Disp: 30 Tab, Rfl: 0  •  azithromycin (ZITHROMAX) 250 MG Tab, As directed, Disp: 6 Tab, Rfl: 0  •  alprazolam (XANAX) 0.25 MG Tab, Take 1 Tab by mouth 2 times a day as needed for Sleep or Anxiety., Disp: 30 Tab, Rfl: 0    Labs: Reviewed (as above)     Physical Examination:  Vital signs: /72   Pulse 90   Ht 1.676 m (5' 6\")   Wt 109.8 kg (242 lb)   SpO2 100%   BMI 39.06 kg/m²  Body mass index is 39.06 kg/m².  General: No apparent distress, cooperative, no facial hair    Eyes: No scleral icterus or discharge, no nystagmus  ENMT: Normal on external inspection of nose, lips, normal thyroid exam  Neck: No abnormal masses on inspection or palpations. no bruits noted   Resp: Normal effort, clear to auscultation bilaterally without wheezes, rales or rhonchi  CVS: Regular rate and rhythm, S1 S2 normal, no murmur, rubs or gallops    Extremities: No edema.   Abdomen: abdominal obesity present  Neuro: Alert and oriented  Skin: No rash  Psych: Normal mood and affect, intact memory and able to make informed decisions    Assessment and Plan:    Subclinical " hyperthyroidism: We reviewed the etiology and pathophysiology of hyperthyroidism.  At this time I would like her to undergo a nuclear medicine thyroid uptake and scan.  She was instructed to get her labs done around the same time of her scan and follow-up in approximately 4 weeks time.  She will continue on the methimazole up until 5 days prior to the study.  All labs and ultrasounds were reviewed with the patient in great detail.    Borderline vitamin D deficiency: Patient is currently not on any vitamin D supplementation.  I will recheck a vitamin D level and also a B12 level to rule out etiologies pertaining to fatigue.  9/2015-34.5  12/2012-13 0.4  [id number] 0.2    Obesity: Patient has been successful in the past of losing 60 pounds despite regaining 20.  We will continue to monitor her weight and discuss further options in the future after stabilization of her thyroid.    Family history of diabetes: Patient has family history of diabetes with her sisters and also her father.  Most recent laboratory work from 2016 and 2018 did not display any signs of impaired fasting glucose at this time.    Labs: 9/5/2018 glucose 88, total cholesterol 166, triglycerides 48, HDL 75, LDL 81,   8/18/2016 glucose 91, total cholesterol 191, triglycerides 59, HDL 74,             Return in about 4 weeks (around 11/8/2018).     Thank you for allowing me to participate in the care of this patient.  If you have any questions or concerns please do not hesitate to contact me.    Ashley Santos P.A.-C.  10/11/18    CC:   Sofie Mathew M.D.    This note was created using voice recognition software (Dragon). The accuracy of the dictation is limited by the abilities of the software. I have reviewed the note prior to signing, however some errors in grammar and context are still possible. If you have any questions related to this note please do not hesitate to contact our office.

## 2018-10-22 ENCOUNTER — HOSPITAL ENCOUNTER (OUTPATIENT)
Dept: RADIOLOGY | Facility: MEDICAL CENTER | Age: 58
End: 2018-10-22
Attending: PHYSICIAN ASSISTANT
Payer: COMMERCIAL

## 2018-10-22 DIAGNOSIS — E05.90 HYPERTHYROIDISM: ICD-10-CM

## 2018-10-22 PROCEDURE — 78014 THYROID IMAGING W/BLOOD FLOW: CPT

## 2018-10-22 PROCEDURE — A9529 I131 IODIDE SOL, DX: HCPCS

## 2018-10-23 LAB
25(OH)D3+25(OH)D2 SERPL-MCNC: 20 NG/ML (ref 30–100)
T3 SERPL-MCNC: 231 NG/DL (ref 71–180)
T3FREE SERPL-MCNC: 4.9 PG/ML (ref 2–4.4)
T4 FREE SERPL-MCNC: 1.24 NG/DL (ref 0.82–1.77)
TSH RECEP AB SER-ACNC: 1.19 IU/L (ref 0–1.75)
VIT B12 SERPL-MCNC: >2000 PG/ML (ref 232–1245)

## 2018-10-24 ENCOUNTER — PATIENT MESSAGE (OUTPATIENT)
Dept: MEDICAL GROUP | Facility: PHYSICIAN GROUP | Age: 58
End: 2018-10-24

## 2018-10-25 ENCOUNTER — TELEPHONE (OUTPATIENT)
Dept: ENDOCRINOLOGY | Facility: MEDICAL CENTER | Age: 58
End: 2018-10-25

## 2018-10-26 NOTE — TELEPHONE ENCOUNTER
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     Discussed decreasing to 10 mg BID with food to see if that helps.     Will check with the pharmacy to see if can go back on old      Ashley

## 2018-11-06 ENCOUNTER — OFFICE VISIT (OUTPATIENT)
Dept: MEDICAL GROUP | Facility: PHYSICIAN GROUP | Age: 58
End: 2018-11-06
Payer: COMMERCIAL

## 2018-11-06 ENCOUNTER — HOSPITAL ENCOUNTER (OUTPATIENT)
Facility: MEDICAL CENTER | Age: 58
End: 2018-11-06
Attending: NURSE PRACTITIONER
Payer: COMMERCIAL

## 2018-11-06 VITALS
WEIGHT: 240 LBS | TEMPERATURE: 98.4 F | BODY MASS INDEX: 38.57 KG/M2 | SYSTOLIC BLOOD PRESSURE: 126 MMHG | HEART RATE: 84 BPM | DIASTOLIC BLOOD PRESSURE: 84 MMHG | HEIGHT: 66 IN | OXYGEN SATURATION: 96 %

## 2018-11-06 DIAGNOSIS — J02.9 ACUTE PHARYNGITIS, UNSPECIFIED ETIOLOGY: ICD-10-CM

## 2018-11-06 DIAGNOSIS — R05.9 COUGH: ICD-10-CM

## 2018-11-06 LAB
INT CON NEG: NEGATIVE
INT CON POS: POSITIVE
S PYO AG THROAT QL: NORMAL

## 2018-11-06 PROCEDURE — 87880 STREP A ASSAY W/OPTIC: CPT | Performed by: NURSE PRACTITIONER

## 2018-11-06 PROCEDURE — 87070 CULTURE OTHR SPECIMN AEROBIC: CPT

## 2018-11-06 PROCEDURE — 99214 OFFICE O/P EST MOD 30 MIN: CPT | Performed by: NURSE PRACTITIONER

## 2018-11-06 RX ORDER — BENZONATATE 100 MG/1
100 CAPSULE ORAL 3 TIMES DAILY PRN
Qty: 90 CAP | Refills: 0 | Status: SHIPPED | OUTPATIENT
Start: 2018-11-06 | End: 2018-12-22 | Stop reason: CLARIF

## 2018-11-06 RX ORDER — AMOXICILLIN 500 MG/1
500 CAPSULE ORAL 2 TIMES DAILY
Qty: 20 CAP | Refills: 0 | Status: SHIPPED | OUTPATIENT
Start: 2018-11-06 | End: 2018-11-16

## 2018-11-06 ASSESSMENT — PATIENT HEALTH QUESTIONNAIRE - PHQ9: CLINICAL INTERPRETATION OF PHQ2 SCORE: 0

## 2018-11-06 NOTE — ASSESSMENT & PLAN NOTE
This is a new problem for this patient that started about 2 weeks ago with chest congestion, and a dry productive cough with thick green/brown mucus.  She denies a sore throat, ear pain, or body aches. The patient reports having subjective fevers and chills last week.  The patient reports that yesterday she started having abdominal pain and vomiting x 1, but her stomach is very sensitive to medications.  She reports a decreased appetite and she is not sleeping well due to coughing and over-the-counter cough medicine wearing off after about 3-4 hours.  She has been exposed to strep throat at work with multiple coworkers positive.  She has been taking over-the-counter Mucinex DM, DayQuil, NyQuil, Echinacea, emergen-c with minimal symptom relief.

## 2018-11-06 NOTE — PROGRESS NOTES
CC:   Chief Complaint   Patient presents with   • Cough     congestion, sinus pressure  x 2 weeks       HISTORY OF THE PRESENT ILLNESS: Patient is a 58 y.o. female. This pleasant patient is here today to discuss congestion, cough, and sinus pressure times 2 weeks.    Health Maintenance: Deferred    Cough  This is a new problem for this patient that started about 2 weeks ago with chest congestion, and a dry productive cough with thick green/brown mucus.  She denies a sore throat, ear pain, or body aches. The patient reports having subjective fevers and chills last week.  The patient reports that yesterday she started having abdominal pain and vomiting x 1, but her stomach is very sensitive to medications.  She reports a decreased appetite and she is not sleeping well due to coughing and over-the-counter cough medicine wearing off after about 3-4 hours.  She has been exposed to strep throat at work with multiple coworkers positive.  She has been taking over-the-counter Mucinex DM, DayQuil, NyQuil, Echinacea, emergen-c with minimal symptom relief.     Allergies: Iodine and Morphine    Current Outpatient Prescriptions Ordered in [facility]   Medication Sig Dispense Refill   • amoxicillin (AMOXIL) 500 MG Cap Take 1 Cap by mouth 2 times a day for 10 days. 20 Cap 0   • benzonatate (TESSALON) 100 MG Cap Take 1 Cap by mouth 3 times a day as needed for Cough. 90 Cap 0   • methimazole (TAPAZOLE) 5 MG Tab Take 2 Tabs by mouth 3 times a day. (Patient not taking: Reported on 11/6/2018) 90 Tab 1     No current Epic-ordered facility-administered medications on file.        Past Medical History:   Diagnosis Date   • Allergy    • Anemia     Vaginal bleeding.   • Colorectal cancer (HCC) April 2011    Dr. Garcia, chemo and radiation.    • GERD (gastroesophageal reflux disease)    • Migraine        Past Surgical History:   Procedure Laterality Date   • KNEE ARTHROSCOPY  3/8/2013    Performed by Kenji Meek M.D. at [facility]   • MEDIAL MENISCECTOMY  3/8/2013    Performed by Kenji Meek M.D. at SURGERY [facility]   • JOINT INJECTION DIAGNOSTIC  3/8/2013    Performed by Kenji Meek M.D. at SURGERY [facility]   • GASTRIC BANDING LAPAROSCOPIC  2005   • ABDOMINAL HYSTERECTOMY TOTAL  2003    For menorrhagia, no BSO   • HERNIA REPAIR      child       Social History   Substance Use Topics   • Smoking status: Never Smoker   • Smokeless tobacco: Never Used      Comment: significant 2nd hand exposure    • Alcohol use No       Social History     Social History Narrative    , no children.        Family History   Problem Relation Age of Onset   • Stroke Father         70yo   • Hypertension Father    • Heart Disease Father         68 yo   • Cancer Maternal Grandfather         Lung   • Diabetes Paternal Grandmother    • Hypertension Paternal Grandmother    • Stroke Paternal Grandmother         70 s   • Psychiatry Sister         Schizophrenia   • Psychiatry Brother         Schizophrenia   • Cancer Paternal Aunt         Bone, liver   • Psychiatry Mother         dental / mental illness    • Lung Disease Mother         Copd    • Psychiatry Sister         schizoprenia    • Diabetes Sister    • Hypertension Sister    • Psychiatry Brother    • Heart Disease Brother    • Hypertension Brother    • Hypertension Brother    • Diabetes Brother        ROS:     - Constitutional: Subjective fever and chills last week.  Positive for sleep issues and fatigue.  Negative for childhood.  He does have unexpected weight change, night sweats, body aches, generalized weakness.     - HEENT: Positive for headaches, sinus congestion.  Negative for vision changes, hearing changes, ear pain, tinnitus, ear discharge, rhinorrhea, sneezing, sore throat, and neck pain.      - Respiratory: Positive for cough, sputum production, chest congestion.  Negative for shortness of breath, hemoptysis, dyspnea, wheezing, and crackles.      -  "Cardiovascular:  Negative for chest pain, palpitations, RUTLEDGE, paroxsymal nocturnal dyspnea, orthopnea, and bilateral lower extremity edema.     - Gastrointestinal: Positive for abdominal pain and nausea x1 yesterday.  Negative for heartburn, nausea, vomiting, abdominal pain, hematochezia, melena, diarrhea, constipation, and greasy/foul-smelling stools.     - Genitourinary:  Negative for dysuria, nocturia, polyuria, hematuria, pyuria, urinary urgency, urinary frequency, and urinary incontinence.     - Musculoskeletal:  Negative for myalgias, back pain, and joint pain.     - Skin:  Negative for rash, sores, lumps, itching, cyanotic skin color change.     - Neurological:  Negative for dizziness, tingling, tremors, focal sensory deficit, focal weakness and headaches.     - Endo/Heme/Allergies:  Does not bruise/bleed easily. Denies cold/heat intolerance.     - Psychiatric/Behavioral: Negative for depression, suicidal/homicidal ideation and memory loss.        Exam: Blood pressure 126/84, pulse 84, temperature 36.9 °C (98.4 °F), temperature source Temporal, height 1.676 m (5' 6\"), weight 108.9 kg (240 lb), SpO2 96 %. Body mass index is 38.74 kg/m².    General: Ill appearing mild distress and anxiety.  Normal appearing. No distress.  HEENT: Bilateral tonsils 3+, erythematous, no exudates.  Normocephalic. Eyes conjunctiva clear lids without ptosis, pupils equal and reactive to light accommodation, ears normal shape and contour, left ear canal clear right ear with cerumen impaction, tympanic membrane on the left is benign right tympanic membrane not visualized due to cerumen impaction, nasal mucosa erythematous, oropharynx is without erythema, edema or exudates.   Neck:  Supple without JVD or bruit. Thyroid is not enlarged.  Pulmonary:  Clear to ausculation.  Normal effort. No rales, ronchi, or wheezing.  Cardiovascular:  Regular rate and rhythm without murmur. Carotid and radial pulses are intact and equal " bilaterally.  Neurologic:  Grossly nonfocal  Lymph:  No cervical, supraclavicular or axillary lymph nodes are palpable  Skin:  Warm and dry.  No obvious lesions.  Musculoskeletal:  Normal gait. No extremity cyanosis, clubbing, or edema.  Psych:  Normal mood and affect. Alert and oriented x3. Judgment and insight is normal.    Assessment/Plan  1. Acute pharyngitis, unspecified etiology  2. Cough  The patient presents today with a 2-week history of chest congestion, dry productive cough with thick green/brown mucus, and one episode of vomiting.  She does report that she has been exposed to multiple people at work who are positive with strep.  She was reporting subjective fevers and chills last week but none this week.  POCT rapid strep was negative, will send throat culture.  Prescribed amoxicillin 500 mg caplets to be taken 2 times a day for 10 days and benzonatate 100 mg to be taken 3 times a day as needed for cough.  Advised the patient to use warm salt water gargles for throat pain and tonsil edema, get rest and stay hydrated, and use over-the-counter Tylenol/ibuprofen as needed for fever or body aches.  Advised her to return if symptoms worsen or do not get better.  - POCT Rapid Strep A  - CULTURE THROAT; Future  - amoxicillin (AMOXIL) 500 MG Cap; Take 1 Cap by mouth 2 times a day for 10 days.  Dispense: 20 Cap; Refill: 0  - benzonatate (TESSALON) 100 MG Cap; Take 1 Cap by mouth 3 times a day as needed for Cough.  Dispense: 90 Cap; Refill: 0  - Stay hydrated and get plenty of rest.  - Warm salt water gargles for throat pain.  - Use over-the-counter Tylenol/ibuprofen as needed for fever or body aches.    Educated in proper administration of medication(s) ordered today including safety, possible SE, risks, benefits, rationale and alternatives to therapy.   Supportive care, differential diagnoses, and indications for immediate follow-up discussed with patient.    Pathogenesis of diagnosis discussed including typical  length and natural progression.    Instructed to return to clinic or nearest emergency department for any change in condition, further concerns, or worsening of symptoms.  Patient states understanding of the plan of care and discharge instructions.    Return if symptoms worsen or fail to improve.    I have placed the below orders and discussed them with an approved delegating provider. The MA is performing the below orders under the direction of Dr. Castro.    Please note that this dictation was created using voice recognition software. I have made every reasonable attempt to correct obvious errors, but I expect that there are errors of grammar and possibly content that I did not discover before finalizing the note.

## 2018-11-08 LAB
BACTERIA SPEC RESP CULT: NORMAL
SIGNIFICANT IND 70042: NORMAL
SITE SITE: NORMAL
SOURCE SOURCE: NORMAL

## 2018-11-20 ENCOUNTER — OFFICE VISIT (OUTPATIENT)
Dept: ENDOCRINOLOGY | Facility: MEDICAL CENTER | Age: 58
End: 2018-11-20
Payer: COMMERCIAL

## 2018-11-20 VITALS
BODY MASS INDEX: 39.37 KG/M2 | HEART RATE: 91 BPM | WEIGHT: 245 LBS | SYSTOLIC BLOOD PRESSURE: 128 MMHG | OXYGEN SATURATION: 97 % | DIASTOLIC BLOOD PRESSURE: 84 MMHG | HEIGHT: 66 IN

## 2018-11-20 DIAGNOSIS — E55.9 VITAMIN D DEFICIENCY: ICD-10-CM

## 2018-11-20 DIAGNOSIS — E05.90 HYPERTHYROIDISM: ICD-10-CM

## 2018-11-20 PROCEDURE — 99214 OFFICE O/P EST MOD 30 MIN: CPT | Performed by: PHYSICIAN ASSISTANT

## 2018-11-20 RX ORDER — METHIMAZOLE 5 MG/1
5 TABLET ORAL 2 TIMES DAILY
Qty: 30 TAB | Refills: 3
Start: 2018-11-20 | End: 2018-12-18 | Stop reason: SDUPTHER

## 2018-11-20 NOTE — PROGRESS NOTES
Endocrinology Clinic Progress Note    CC:     HPI:  Evita Mckenna is a 58 y.o. old patient who comes in today for routine follow up of her labs and thyroid uptake and scan.  Unfortunately the last several months has not been very good for Evita.  Her mother has passed and she has been having increase in stress unable to sleep, increase in tremor, increase in sadness and irregular mood swings.    After her last evaluation she was prescribed methimazole 10 mg 3 times a day however was unable to tolerate the medication secondary to increase in reflux.  Patient has symptoms of difficulty with foods and digestion after lap band. After her mother's death she decided to discontinue medications completely.  She has not resume medications.     She was seen in urgent care on the 6 secondary to pharyngitis and upper respiratory illness.  She was started on amoxicillin however her strep was negative.  Chart was reviewed     1. Hyperthyroidism  2. Vitamin D deficiency    ROS:  Constitutional: see above.  Denies chest pain, palpitations, shortness of breath, lightheadedness and/or dizziness.    Subclinical hyperthyroidism:   11/201/18- start on Methimazole 5 mg TID repeat labs now and then again 4 weeks   10/2018- unable to tolerate Methimazole at 10 mg TID.   10/2018 methimazole 30 mg daily (started 9/21/2018)    Thyroid Ultrasound   9/21/2018 the thyroid is heterogeneous vascularity is normal.  The right lobe of the thyroid measures 1.78 cm x 4.56 cm x 1.8 cm.  There is no discrete nodules.  The left thyroid lobe is not visualized.  The isthmus is measuring 0.35.  Mildly predominant node in the left of the neck.  There is absent and/or hypoplastic left lower lobe.     Labs   10/19/2018- FT3 4.9, T3 231, Ft4- 1.24   9/18/2018 TSH less than 0.006 free T4 1.51 free T3 4.8 thyroglobulin antibody less than 1.0 thyroglobulin 23.0  8/18/2016 TSH 1.840  9/29/2015 TSH 2.1-0 free T4 0.93  12/29/2012 TSH 1.530  8/2010 TSH  "2.680    Thyroid Uptake and Scan: 10/22/18-   1.  Abnormally high iodine uptake being 40.3% at 5 hours  2.  Homogeneous uptake throughout the right lobe  3.  Left lobe is probably absent and there is probably hypertrophy of the isthmus with associated uptake       Borderline vitamin D deficiency: Patient is currently not on any vitamin D supplementation.    10/19/2018- Vitamin D 20.0 - started on OTC Vitamin D gtt 5000 IU daily if tolerated encouraged dietary supplementation.     9/2015- 34.5  12/2012-13 0.4  8/[id number] 0.2     Obesity: Patient has been successful in the past of losing 60 pounds despite regaining 20.  We will continue to monitor her weight and discuss further options in the future after stabilization of her thyroid.     Family history of diabetes: Patient has family history of diabetes with her sisters and also her father.  Most recent laboratory work from 2016 and 2018 did not display any signs of impaired fasting glucose at this time.     Labs: 9/5/2018 glucose 88, total cholesterol 166, triglycerides 48, HDL 75, LDL 81,              8/18/2016 glucose 91, total cholesterol 191, triglycerides 59, HDL 74,     Medications:    Current Outpatient Prescriptions:   •  benzonatate, 100 mg, Oral, TID PRN (Patient not taking: Reported on 11/20/2018), Not Taking  •  methimazole, 10 mg, Oral, TID (Patient not taking: Reported on 11/6/2018), Not Taking    EXAM:  Vital signs: /84 (BP Location: Right arm, Patient Position: Sitting, BP Cuff Size: Adult)   Pulse 91   Ht 1.676 m (5' 6\")   Wt 111.1 kg (245 lb)   SpO2 97%   BMI 39.54 kg/m²   General: No apparent distress, cooperative  Eyes: No scleral icterus, no discharge  Resp: Normal effort  Extremities: No lower extremity edema + tremor bilateral  Psych: Alert and oriented, normal mood and affect    Assessment and Plan:    1. Hyperthyroidism  Reviewed Thyroid uptake and scan. Last lab draw was from 10/119/2018. Unable to tolerated Tapazole " 10 mg TID. D/c all together secondary to her death of her mother.  She will restart methimazole at 5 mg daily and increase if tolerated to 10 mg daily.  She will repeat labs in approximately 4 weeks time.     We discussed alternatives to treatment with methimazole including PT and surgery.    2. Vitamin D deficiency  Is not currently on vitamin D replacement.  Would recommend oral vitamin D drops sublingual.  She was also encouraged to take vitamin D by dietary supplementation.      Return in about 4 weeks (around 12/18/2018).    Thank you for allowing me to participate in the care of this patient.    Ashley Santos P.A.-C.    CC:   Sofie Mathew M.D.    This note was created using voice recognition software (Dragon). The accuracy of the dictation is limited by the abilities of the software. I have reviewed the note prior to signing, however some errors in grammar and context are still possible. If you have any questions related to this note please do not hesitate to contact our office.

## 2018-11-27 LAB
T3FREE SERPL-MCNC: 4.8 PG/ML (ref 2–4.4)
T4 FREE SERPL-MCNC: 1.31 NG/DL (ref 0.82–1.77)
TSH SERPL DL<=0.005 MIU/L-ACNC: <0.006 UIU/ML (ref 0.45–4.5)

## 2018-12-18 ENCOUNTER — OFFICE VISIT (OUTPATIENT)
Dept: ENDOCRINOLOGY | Facility: MEDICAL CENTER | Age: 58
End: 2018-12-18
Payer: COMMERCIAL

## 2018-12-18 VITALS
HEIGHT: 66 IN | WEIGHT: 245 LBS | DIASTOLIC BLOOD PRESSURE: 78 MMHG | HEART RATE: 88 BPM | SYSTOLIC BLOOD PRESSURE: 121 MMHG | BODY MASS INDEX: 39.37 KG/M2 | OXYGEN SATURATION: 99 %

## 2018-12-18 DIAGNOSIS — E05.90 HYPERTHYROIDISM: ICD-10-CM

## 2018-12-18 DIAGNOSIS — E66.9 OBESITY (BMI 30-39.9): ICD-10-CM

## 2018-12-18 DIAGNOSIS — E55.9 VITAMIN D DEFICIENCY: ICD-10-CM

## 2018-12-18 PROCEDURE — 99214 OFFICE O/P EST MOD 30 MIN: CPT | Performed by: PHYSICIAN ASSISTANT

## 2018-12-18 RX ORDER — METHIMAZOLE 5 MG/1
5 TABLET ORAL 2 TIMES DAILY
Qty: 30 TAB | Refills: 3 | Status: SHIPPED | OUTPATIENT
Start: 2018-12-18 | End: 2018-12-27

## 2018-12-19 NOTE — PROGRESS NOTES
Endocrinology Clinic Progress Note  PCP: Sofie Mathew M.D.    HPI:  Evita Mckenna is a 58 y.o. old patient who comes in today for review of endocrine problems.    1. Hyperthyroidism    2. Vitamin D deficiency    3. Obesity (BMI 30-39.9)    Evita is feeling much better than previous with regards to grieving after the loss of her mother.  She however has not been very good with regards to taking her methimazole and or her vitamin D supplementation.  On average she is taking methimazole once daily.  She continues with intermittent symptoms of palpitations however without any lightheadedness and/or dizziness.  She admits to decrease in appetite however is drinking plenty of fluids.  She has not noticed any weight loss however is frustrated with her weight gain.    Previously she was on methimazole initially and felt well it was when they changed the  that she noticed increased symptoms of GI distress.  After last visit she was going to try small dose and titrate up.     She is requesting a new prescription to take to a different pharmacy to see if they have another brand.      Endocrine history to date:   Subclinical hyperthyroidism:   12/18/18- on average Methimazole 1 Tablet daily  11/201/18- start on Methimazole 5 mg TID repeat labs now and then again 4 weeks   10/2018- unable to tolerate Methimazole at 10 mg TID.   10/2018 methimazole 30 mg daily (started 9/21/2018)     Thyroid Ultrasound   9/21/2018 the thyroid is heterogeneous vascularity is normal.  The right lobe of the thyroid measures 1.78 cm x 4.56 cm x 1.8 cm.  There is no discrete nodules.  The left thyroid lobe is not visualized.  The isthmus is measuring 0.35.  Mildly predominant node in the left of the neck.  There is absent and/or hypoplastic left lower lobe.     Labs:  11/26/2018- <0.006 FT3 4.8, FT4 1.31 (Methimazole 5mg daily)   10/19/2018- FT3 4.9, T3 231, Ft4- 1.24   9/18/2018 TSH less than 0.006 free T4 1.51 free T3 4.8  thyroglobulin antibody less than 1.0 thyroglobulin 23.0  8/18/2016 TSH 1.840  9/29/2015 TSH 2.1-0 free T4 0.93  12/29/2012 TSH 1.530  8/2010 TSH 2.680     Thyroid Uptake and Scan: 10/22/18-   1.  Abnormally high iodine uptake being 40.3% at 5 hours  2.  Homogeneous uptake throughout the right lobe  3.  Left lobe is probably absent and there is probably hypertrophy of the isthmus with associated uptake        Borderline vitamin D deficiency: Patient is currently not on any vitamin D supplementation.    12/18-has not been taking vitamin D supplementation  10/19/2018- Vitamin D 20.0 - started on OTC Vitamin D gtt 5000 IU daily if tolerated encouraged dietary supplementation.    9/2015- 34.5  12/2012-13 0.4  8/[id number] 0.2     Obesity: Patient has been successful in the past of losing 60 pounds despite regaining 20.  We will continue to monitor her weight and discuss further options in the future after stabilization of her thyroid.  1218 weight 245 pounds     Family history of diabetes: Patient has family history of diabetes with her sisters and also her father.  Most recent laboratory work from 2016 and 2018 did not display any signs of impaired fasting glucose at this time.     Labs: 9/5/2018 glucose 88, total cholesterol 166, triglycerides 48, HDL 75, LDL 81,              8/18/2016 glucose 91, total cholesterol 191, triglycerides 59, HDL 74,        ROS:  Constitutional: No weight loss,  fever  HEENT: No difficulty with swallowing, change in voice, or swelling in throat area   Cardiac: No chest pain,   Resp: No shortness of breath  GI: No abdominal pain, positive GERD  Neuro: No numbness or tinging in feet  Endo: No heat or cold intolerance, no polyuria or polydipsia      Past Medical History:  Patient Active Problem List    Diagnosis Date Noted   • Hyperthyroidism 12/18/2018   • Cough 03/07/2018   • Obesity (BMI 30-39.9) 10/25/2017   • Anemia    • Obesity (BMI 35.0-39.9 without comorbidity) 11/22/2016   •  "History of colorectal cancer 03/02/2016   • Epigastric pain 04/04/2014   • Other and unspecified derangement of medial meniscus 03/08/2013   • Internal hemorrhoid 03/31/2011   • Vitamin D deficiency 08/05/2010   • History of anemia 08/05/2010   • Proteinuria 08/05/2010       Medications:    Current Outpatient Prescriptions:   •  methimazole (TAPAZOLE) 5 MG Tab, Take 1 Tab by mouth 2 Times a Day., Disp: 30 Tab, Rfl: 3  •  benzonatate (TESSALON) 100 MG Cap, Take 1 Cap by mouth 3 times a day as needed for Cough., Disp: 90 Cap, Rfl: 0    Labs: Reviewed as above     Physical Examination:  Vital signs: /78 (BP Location: Right arm, Patient Position: Sitting)   Pulse 88   Ht 1.676 m (5' 6\")   Wt 111.1 kg (245 lb)   SpO2 99%   BMI 39.54 kg/m²  Body mass index is 39.54 kg/m².  General: No apparent distress, cooperative, laughing more than previous  Eyes: No scleral icterus, no discharge, normal eyelids  Neck: No abnormal masses on inspection, normal thyroid exam  Resp: Normal effort, clear to auscultation bilaterally  CVS: Regular rate and rhythm, S1 S2 normal, no murmur  Extremities: No lower extremity edema  Abdomen: abdominal obesity present  Musculoskeletal: Normal digits and nails  Skin: No rash on visible skin  Psych: Alert and oriented, normal mood and affect, intact memory and able to make informed decisions.    Assessment and Plan:    1. Hyperthyroidism  Again we reviewed treatment options with regards to hyperthyroidism.  She would like to be more focused on her therapy over the next several months.  She is agreed to take the prescription to a different pharmacy along with her previous prescription to see if she can get a different .  We also discussed referral to a surgeon and also radioactive ablation with regards to hyperthyroidism.  - methimazole (TAPAZOLE) 5 MG Tab; Take 1 Tab by mouth 2 Times a Day.  Dispense: 30 Tab; Refill: 3    2. Vitamin D deficiency  I have encouraged vitamin D " supplementation    3. Obesity (BMI 30-39.9)  Continue to work on the above.      Return in about 4 weeks (around 1/15/2019).    Thank you for allowing me to participate in the care of this patient.  If you have any questions or concerns please do not hesitate to contact me.    Ashley Santos P.A.-C.    CC:   Sofie Mathew M.D.    This note was created using voice recognition software (Dragon). The accuracy of the dictation is limited by the abilities of the software. I have reviewed the note prior to signing, however some errors in grammar and context are still possible. If you have any questions related to this note please do not hesitate to contact our office.

## 2018-12-22 ENCOUNTER — APPOINTMENT (OUTPATIENT)
Dept: RADIOLOGY | Facility: MEDICAL CENTER | Age: 58
End: 2018-12-22
Attending: EMERGENCY MEDICINE
Payer: COMMERCIAL

## 2018-12-22 ENCOUNTER — HOSPITAL ENCOUNTER (EMERGENCY)
Facility: MEDICAL CENTER | Age: 58
End: 2018-12-22
Attending: EMERGENCY MEDICINE
Payer: COMMERCIAL

## 2018-12-22 VITALS
HEART RATE: 90 BPM | HEIGHT: 66 IN | WEIGHT: 246.91 LBS | BODY MASS INDEX: 39.68 KG/M2 | TEMPERATURE: 98.1 F | DIASTOLIC BLOOD PRESSURE: 68 MMHG | SYSTOLIC BLOOD PRESSURE: 142 MMHG | OXYGEN SATURATION: 95 % | RESPIRATION RATE: 19 BRPM

## 2018-12-22 DIAGNOSIS — R11.0 NAUSEA: ICD-10-CM

## 2018-12-22 DIAGNOSIS — R10.13 EPIGASTRIC PAIN: ICD-10-CM

## 2018-12-22 DIAGNOSIS — T88.7XXA MEDICATION SIDE EFFECTS: ICD-10-CM

## 2018-12-22 LAB
ALBUMIN SERPL BCP-MCNC: 3.9 G/DL (ref 3.2–4.9)
ALBUMIN/GLOB SERPL: 0.9 G/DL
ALP SERPL-CCNC: 189 U/L (ref 30–99)
ALT SERPL-CCNC: 28 U/L (ref 2–50)
ANION GAP SERPL CALC-SCNC: 7 MMOL/L (ref 0–11.9)
APPEARANCE UR: CLEAR
AST SERPL-CCNC: 46 U/L (ref 12–45)
BACTERIA #/AREA URNS HPF: ABNORMAL /HPF
BASOPHILS # BLD AUTO: 0 % (ref 0–1.8)
BASOPHILS # BLD: 0 K/UL (ref 0–0.12)
BILIRUB SERPL-MCNC: 0.6 MG/DL (ref 0.1–1.5)
BILIRUB UR QL STRIP.AUTO: NEGATIVE
BUN SERPL-MCNC: 11 MG/DL (ref 8–22)
CALCIUM SERPL-MCNC: 9.5 MG/DL (ref 8.4–10.2)
CHLORIDE SERPL-SCNC: 102 MMOL/L (ref 96–112)
CO2 SERPL-SCNC: 26 MMOL/L (ref 20–33)
COLOR UR: YELLOW
CREAT SERPL-MCNC: 0.53 MG/DL (ref 0.5–1.4)
EOSINOPHIL # BLD AUTO: 0.02 K/UL (ref 0–0.51)
EOSINOPHIL NFR BLD: 0.4 % (ref 0–6.9)
EPI CELLS #/AREA URNS HPF: ABNORMAL /HPF
ERYTHROCYTE [DISTWIDTH] IN BLOOD BY AUTOMATED COUNT: 39.9 FL (ref 35.9–50)
GLOBULIN SER CALC-MCNC: 4.5 G/DL (ref 1.9–3.5)
GLUCOSE SERPL-MCNC: 92 MG/DL (ref 65–99)
GLUCOSE UR STRIP.AUTO-MCNC: NEGATIVE MG/DL
HCT VFR BLD AUTO: 42.1 % (ref 37–47)
HGB BLD-MCNC: 13.3 G/DL (ref 12–16)
IMM GRANULOCYTES # BLD AUTO: 0.01 K/UL (ref 0–0.11)
IMM GRANULOCYTES NFR BLD AUTO: 0.2 % (ref 0–0.9)
KETONES UR STRIP.AUTO-MCNC: NEGATIVE MG/DL
LEUKOCYTE ESTERASE UR QL STRIP.AUTO: NEGATIVE
LIPASE SERPL-CCNC: 28 U/L (ref 7–58)
LYMPHOCYTES # BLD AUTO: 0.99 K/UL (ref 1–4.8)
LYMPHOCYTES NFR BLD: 21.7 % (ref 22–41)
MCH RBC QN AUTO: 26 PG (ref 27–33)
MCHC RBC AUTO-ENTMCNC: 31.6 G/DL (ref 33.6–35)
MCV RBC AUTO: 82.2 FL (ref 81.4–97.8)
MICRO URNS: ABNORMAL
MONOCYTES # BLD AUTO: 0.42 K/UL (ref 0–0.85)
MONOCYTES NFR BLD AUTO: 9.2 % (ref 0–13.4)
MUCOUS THREADS #/AREA URNS HPF: ABNORMAL /HPF
NEUTROPHILS # BLD AUTO: 3.12 K/UL (ref 2–7.15)
NEUTROPHILS NFR BLD: 68.5 % (ref 44–72)
NITRITE UR QL STRIP.AUTO: NEGATIVE
NRBC # BLD AUTO: 0 K/UL
NRBC BLD-RTO: 0 /100 WBC
PH UR STRIP.AUTO: 6 [PH]
PLATELET # BLD AUTO: 238 K/UL (ref 164–446)
PMV BLD AUTO: 8.7 FL (ref 9–12.9)
POTASSIUM SERPL-SCNC: 3.3 MMOL/L (ref 3.6–5.5)
PROT SERPL-MCNC: 8.4 G/DL (ref 6–8.2)
PROT UR QL STRIP: NEGATIVE MG/DL
RBC # BLD AUTO: 5.12 M/UL (ref 4.2–5.4)
RBC # URNS HPF: ABNORMAL /HPF
RBC UR QL AUTO: ABNORMAL
SODIUM SERPL-SCNC: 135 MMOL/L (ref 135–145)
SP GR UR STRIP.AUTO: 1.01
UNIDENT CRYS URNS QL MICRO: ABNORMAL /HPF
WBC # BLD AUTO: 4.6 K/UL (ref 4.8–10.8)

## 2018-12-22 PROCEDURE — 81001 URINALYSIS AUTO W/SCOPE: CPT

## 2018-12-22 PROCEDURE — 74177 CT ABD & PELVIS W/CONTRAST: CPT

## 2018-12-22 PROCEDURE — 80053 COMPREHEN METABOLIC PANEL: CPT

## 2018-12-22 PROCEDURE — 83690 ASSAY OF LIPASE: CPT

## 2018-12-22 PROCEDURE — 85025 COMPLETE CBC W/AUTO DIFF WBC: CPT

## 2018-12-22 PROCEDURE — 36415 COLL VENOUS BLD VENIPUNCTURE: CPT

## 2018-12-22 PROCEDURE — 700117 HCHG RX CONTRAST REV CODE 255: Performed by: EMERGENCY MEDICINE

## 2018-12-22 PROCEDURE — 700111 HCHG RX REV CODE 636 W/ 250 OVERRIDE (IP): Performed by: EMERGENCY MEDICINE

## 2018-12-22 PROCEDURE — 96375 TX/PRO/DX INJ NEW DRUG ADDON: CPT

## 2018-12-22 PROCEDURE — 96374 THER/PROPH/DIAG INJ IV PUSH: CPT

## 2018-12-22 PROCEDURE — 99284 EMERGENCY DEPT VISIT MOD MDM: CPT

## 2018-12-22 RX ORDER — HYDROMORPHONE HYDROCHLORIDE 1 MG/ML
1 INJECTION, SOLUTION INTRAMUSCULAR; INTRAVENOUS; SUBCUTANEOUS ONCE
Status: COMPLETED | OUTPATIENT
Start: 2018-12-22 | End: 2018-12-22

## 2018-12-22 RX ORDER — ONDANSETRON 2 MG/ML
4 INJECTION INTRAMUSCULAR; INTRAVENOUS ONCE
Status: COMPLETED | OUTPATIENT
Start: 2018-12-22 | End: 2018-12-22

## 2018-12-22 RX ADMIN — IOHEXOL 50 ML: 240 INJECTION, SOLUTION INTRATHECAL; INTRAVASCULAR; INTRAVENOUS; ORAL at 12:15

## 2018-12-22 RX ADMIN — ONDANSETRON 4 MG: 2 INJECTION INTRAMUSCULAR; INTRAVENOUS at 12:05

## 2018-12-22 RX ADMIN — IOHEXOL 100 ML: 350 INJECTION, SOLUTION INTRAVENOUS at 13:20

## 2018-12-22 RX ADMIN — HYDROMORPHONE HYDROCHLORIDE 1 MG: 1 INJECTION, SOLUTION INTRAMUSCULAR; INTRAVENOUS; SUBCUTANEOUS at 12:05

## 2018-12-22 ASSESSMENT — PAIN SCALES - GENERAL: PAINLEVEL_OUTOF10: 4

## 2018-12-22 NOTE — ED PROVIDER NOTES
ED Provider Note    CHIEF COMPLAINT  Chief Complaint   Patient presents with   • Abdominal Pain   • Nausea       HPI  Evita Mckenna is a 58 y.o. female who presents with abdominal pain epigastric pain.  The patient states that she has hyperthyroidism and they been started on a medication called Tapazole.  She took it previously it seemed to irritate her stomach some.  So she changed pharmacies at a different brand name.  She took a pill that gave her severe epigastric burning pain through her whole abdomen.  Quite severe doubled over when I saw her.  She has had a abdominal surgeries in the past including hysterectomy cancer and upper abdominal surgery as well.  Nothing really made her better or worse.  The pain initially severe.  However seems to be getting better with time    REVIEW OF SYSTEMS  CONSTITUTIONAL:  Denies fever, chills  EYES:  Denies photophobia or discharge   ENT:  Denies sore throat,   CARDIOVASCULAR:  Denies chest pain, palpitations or swelling  RESPIRATORY:  Denies cough or shortness of breath or difficulty breathing  GI: Positive epigastric and bilateral right upper and left upper abdominal pains.  Nausea but no vomiting diarrhea mUSCULOSKELETAL:  Denies weakness joint swelling or back pain  SKIN:  No rash  ALLERGIC: No itchy rashes.  NEUROLOGIC:  Denies headache  PSYCHIATRIC:  Denies depression    PAST MEDICAL HISTORY  Past Medical History:   Diagnosis Date   • Allergy    • Anemia     Vaginal bleeding.   • Colorectal cancer (HCC) April 2011    Dr. Garcia, chemo and radiation.    • GERD (gastroesophageal reflux disease)    • Hyperthyroidism 12/18/2018   • Migraine        FAMILY HISTORY  Family History   Problem Relation Age of Onset   • Stroke Father         70yo   • Hypertension Father    • Heart Disease Father         68 yo   • Cancer Maternal Grandfather         Lung   • Diabetes Paternal Grandmother    • Hypertension Paternal Grandmother    • Stroke Paternal Grandmother         70 s   •  "Psychiatry Sister         Schizophrenia   • Psychiatry Brother         Schizophrenia   • Cancer Paternal Aunt         Bone, liver   • Psychiatry Mother         dental / mental illness    • Lung Disease Mother         Copd    • Psychiatry Sister         schizoprenia    • Diabetes Sister    • Hypertension Sister    • Psychiatry Brother    • Heart Disease Brother    • Hypertension Brother    • Hypertension Brother    • Diabetes Brother        SOCIAL HISTORY   reports that she has never smoked. She has never used smokeless tobacco. She reports that she does not drink alcohol or use drugs.    SURGICAL HISTORY  Past Surgical History:   Procedure Laterality Date   • KNEE ARTHROSCOPY  3/8/2013    Performed by Kenji Meek M.D. at SURGERY [facility]   • MEDIAL MENISCECTOMY  3/8/2013    Performed by Kenji Meek M.D. at [facility]   • JOINT INJECTION DIAGNOSTIC  3/8/2013    Performed by Kenji Meek M.D. at [facility]   • GASTRIC BANDING LAPAROSCOPIC  2005   • ABDOMINAL HYSTERECTOMY TOTAL  2003    For menorrhagia, no BSO   • HERNIA REPAIR      child       CURRENT MEDICATIONS  Home Medications     Reviewed by Shyanne Ramirez (Pharmacy Tech) on 12/22/18 at 1121  Med List Status: Complete   Medication Last Dose Status   methimazole (TAPAZOLE) 5 MG Tab 12/21/2018 Active                ALLERGIES  Allergies   Allergen Reactions   • Iodine    • Morphine Itching       PHYSICAL EXAM  VITAL SIGNS: /68   Pulse 90   Temp 36.7 °C (98.1 °F) (Temporal)   Resp 19   Ht 1.676 m (5' 6\")   Wt 112 kg (246 lb 14.6 oz)   SpO2 95%   BMI 39.85 kg/m²      Constitutional: Patient is awake alert person place and time. No acute respiratory distress Well developed, Well nourished, Non-toxic appearance.   HENT: Normocephalic, Atraumatic,  Bilateral external ears normal, Oropharynx pink moist with no exudates, Nose patent.   Eyes: Sclera and conjunctiva clear, No discharge.   Neck: "  Supple no nuchal rigidity, no thyromegaly or mass. Non-tender  Lymphatic: No supraclavicular, axillary or inguinal lymph nodes.   Cardiovascular: Heart is regular rate and rhythm no murmur, rub or thrill.   Thorax & Lungs: Chest is symmetrical, with good breath sounds. No wheezing or crackles. No respiratory distress, No chest tenderness.   Abdomen:  Soft, positive tenderness epigastric left and right upper quadrants.  Multiple well-healed scars.  Skin: Warm, Dry, no petechia, purpura or rash.   Back: Non tender with palpation, No CVA tenderness.   Extremities: No  edema.  Non tender.   Musculoskeletal: Good range of motion upper and lower extremities.    Neurologic: Alert & oriented  Strength is symmetrical in the upper lower extremities.       LABS:  Lab Results   Component Value Date    WBC 4.6 (L) 12/22/2018    WBC 3.5 (L) 12/29/2012    HEMOGLOBIN 13.3 12/22/2018    HEMATOCRIT 42.1 12/22/2018    PLATELETCT 238 12/22/2018    SODIUM 135 12/22/2018    POTASSIUM 3.3 (L) 12/22/2018    CHLORIDE 102 12/22/2018    CO2 26 12/22/2018    BUN 11 12/22/2018    GLUCOSE 92 12/22/2018    CHOLSTRLTOT 166 09/18/2018    CHOLSTRLTOT 178 04/01/2014    LDL 81 09/18/2018    LDL 98 04/01/2014    ALTSGPT 28 12/22/2018    ASTSGOT 46 (H) 12/22/2018    TSH <0.006 (L) 11/26/2018    INR 1.05 03/31/2014    HBA1C 6.0 (H) 12/29/2012       RADIOLOGY/PROCEDURES  CT-ABDOMEN-PELVIS WITH   Final Result         1. No acute abnormality identified in the abdomen or pelvis.                  COURSE & MEDICAL DECISION MAKING  Pertinent Labs & Imaging studies reviewed. (See chart for details)    1405.  Recheck.  She is feeling fine.  No more abdominal pain.  A long discussion with her.  I believe at this time that it certainly could be the medication she takes since when she takes it and gives her the severe pain within an hour.  She will stop the Tapazole at this time and call her endocrinologist for close follow-up as an outpatient.  She states that  since she cannot tolerate the medicine she may require thyroid surgery.  These kinds of problems with her thyroid been going on for several months.  They have been trying the Tapazole but is obvious to me that she cannot tolerate this well.  This time I do not believe she had a surgical abdomen.  Close follow-up is very important.    Patient states that every time she takes 1 of her Tapazole pills she gets severe epigastric and upper abdominal pain.  This time she came in with much worse pain.  Give some pain medicines here.  CAT scan of the abdomen shows no obvious abnormalities.  Her blood work looks pretty good.  She has hyperthyroid previously.  This point time I believe she stable to go home she will need close follow-up with her endocrinologist and primary care doctors.  He needs to call them today to talk to them about what medicine she might be using if the Tapazole does not work.    Stable for discharge    FINAL IMPRESSION    1. Nausea    2. Epigastric pain    3. Medication side effects            PLAN  1.  Follow-up primary care physicians within 3 days  2.  Call your endocrinologist for any more pain  3. Return to the emergency department for increased pains, fevers, vomiting or change in condition.  Electronically signed by: Salazar Chan, 12/22/2018 2:12 PM

## 2018-12-22 NOTE — ED TRIAGE NOTES
Pt presents complaining of acute onset of epigastric pain accompanied by recurring nausea since approximately 0200 this AM.

## 2018-12-22 NOTE — ED NOTES
Reviewed discharge instructions w/ pt, verbalized understanding to information provided including follow up care, denied questions/concerns.  Pt ambulated from ED w/ family member.

## 2018-12-27 ENCOUNTER — OFFICE VISIT (OUTPATIENT)
Dept: ENDOCRINOLOGY | Facility: MEDICAL CENTER | Age: 58
End: 2018-12-27
Payer: COMMERCIAL

## 2018-12-27 VITALS
DIASTOLIC BLOOD PRESSURE: 70 MMHG | WEIGHT: 245 LBS | HEART RATE: 91 BPM | BODY MASS INDEX: 39.37 KG/M2 | HEIGHT: 66 IN | SYSTOLIC BLOOD PRESSURE: 118 MMHG | OXYGEN SATURATION: 98 %

## 2018-12-27 DIAGNOSIS — E66.9 OBESITY (BMI 35.0-39.9 WITHOUT COMORBIDITY): ICD-10-CM

## 2018-12-27 DIAGNOSIS — E66.9 OBESITY (BMI 30-39.9): ICD-10-CM

## 2018-12-27 DIAGNOSIS — E05.90 HYPERTHYROIDISM: ICD-10-CM

## 2018-12-27 PROCEDURE — 99213 OFFICE O/P EST LOW 20 MIN: CPT | Performed by: PHYSICIAN ASSISTANT

## 2018-12-27 RX ORDER — PROPYLTHIOURACIL 50 MG/1
50 TABLET ORAL 3 TIMES DAILY
Qty: 90 TAB | Refills: 0 | Status: SHIPPED | OUTPATIENT
Start: 2018-12-27 | End: 2019-04-17 | Stop reason: SDUPTHER

## 2018-12-27 RX ORDER — PROPRANOLOL HYDROCHLORIDE 10 MG/1
10 TABLET ORAL 3 TIMES DAILY
Qty: 90 TAB | Refills: 11 | Status: SHIPPED | OUTPATIENT
Start: 2018-12-27 | End: 2019-05-28

## 2018-12-27 NOTE — PROGRESS NOTES
Endocrinology Clinic Progress Note    CC:     HPI:  Evita Mckenna is a 58 y.o. old patient who comes in today for routine follow up.     1. Hyperthyroidism  Patient has had a rough couple of weeks.  According the patient she started taking methimazole 5 mg after getting it at a different pharmacy as previously she tolerated a different .  She noted later that evening severe GI pain and gastritis.  She was evaluated by the emergency room and had a CT of her belly.  CT was negative and reviewed today.  She is currently is feeling better with regards to gastritis but has intermittent symptoms of tremor and anxiety.  She is tearful today during evaluation and she is frustrated and is fearful that she will have every rare side effects related to medications as this is what occurred during her chemotherapy with regards to her breast cancer.        ROS:  Constitutional: No further fevers chills abdominal pain diarrhea and/or vomiting.  See above positive: palpitations, tremor, anxiety, irritability, emotional lability.      Subclinical hyperthyroidism:   12/27/18- replaced methimazole with PTU side effects discussed   12/22/18- went to ER secondary to Gastritis related to Methimazole.   12/18/18- on average Methimazole 1 Tablet daily  11/201/18- start on Methimazole 5 mg TID repeat labs now and then again 4 weeks   10/2018- unable to tolerate Methimazole at 10 mg TID.   10/2018 methimazole 30 mg daily (started 9/21/2018)     Thyroid Ultrasound   9/21/2018 the thyroid is heterogeneous vascularity is normal.  The right lobe of the thyroid measures 1.78 cm x 4.56 cm x 1.8 cm.  There is no discrete nodules.  The left thyroid lobe is not visualized.  The isthmus is measuring 0.35.  Mildly predominant node in the left of the neck.  There is absent and/or hypoplastic left lower lobe.     Labs:  ER labs 12/22/2018 WBC within normal limits, Chemistry within normal limits, ALT 28, AST slightly elevated at 46, TSH  "less than 0.006 free T4 1.31 free T3 4.8    11/26/2018- <0.006 FT3 4.8, FT4 1.31 (Methimazole 5mg daily)   10/19/2018- FT3 4.9, T3 231, Ft4- 1.24   9/18/2018 TSH less than 0.006 free T4 1.51 free T3 4.8 thyroglobulin antibody less than 1.0 thyroglobulin 23.0  8/18/2016 TSH 1.840  9/29/2015 TSH 2.1-0 free T4 0.93  12/29/2012 TSH 1.530  8/2010 TSH 2.680     Thyroid Uptake and Scan: 10/22/18-   1.  Abnormally high iodine uptake being 40.3% at 5 hours  2.  Homogeneous uptake throughout the right lobe  3.  Left lobe is probably absent and there is probably hypertrophy of the isthmus with associated uptake        Borderline vitamin D deficiency: Patient is currently not on any vitamin D supplementation.    12/18-has not been taking vitamin D supplementation  10/19/2018- Vitamin D 20.0 - started on OTC Vitamin D gtt 5000 IU daily if tolerated encouraged dietary supplementation.    9/2015- 34.5  12/2012-13 0.4  [id number] 0.2     Obesity: Patient has been successful in the past of losing 60 pounds despite regaining 20.  We will continue to monitor her weight and discuss further options in the future after stabilization of her thyroid.  1218 weight 245 pounds     Family history of diabetes: Patient has family history of diabetes with her sisters and also her father.  Most recent laboratory work from 2016 and 2018 did not display any signs of impaired fasting glucose at this time.     Labs: 9/5/2018 glucose 88, total cholesterol 166, triglycerides 48, HDL 75, LDL 81,              8/18/2016 glucose 91, total cholesterol 191, triglycerides 59, HDL 74,   Medications:    Current Outpatient Prescriptions:   •  propylthiouracil, 50 mg, Oral, TID  •  propranolol, 10 mg, Oral, TID    EXAM:  Vital signs: /70 (BP Location: Left arm, Patient Position: Sitting)   Pulse 91   Ht 1.676 m (5' 6\")   Wt 111.1 kg (245 lb)   SpO2 98%   BMI 39.54 kg/m²   General: No apparent distress, cooperative, tearful at times     Eyes: " No scleral icterus, no discharge  Resp: Normal effort  Extremities: No lower extremity edema  Psych: Alert and oriented, normal mood and affect    Assessment and Plan:    1. Hyperthyroidism  We discussed and reviewed etiology and treatment with regards to hyperthyroidism.  At this time she is unable to tolerate even 5 mg of methimazole without increase in symptoms.  She is currently not taking a beta-blocker therefore I will give her one to control symptomatology.  We did review and discuss side effects with regards to beta-blocker therapy.  She is also willing to try PTU but is very reluctant as she seem to have a lot of side effects with alternative treatments with regards to her breast cancer.  I do believe this brings back a lot of memories with regards to her breast cancer and she has had a rough year with regards to losing her mother and lack of support system.  I have also put a referral in to have her evaluated by the surgeon just for consultation and will discuss case with Dr. Bearden if she is unable to tolerate PTU.       - propylthiouracil (PTU) 50 MG Tab; Take 1 Tab by mouth 3 times a day.  Dispense: 90 Tab; Refill: 0  - REFERRAL TO GENERAL SURGERY    No Follow-up on file.    Thank you for allowing me to participate in the care of this patient.    Ashley Santos P.A.-C.    CC:   Sofie Mathew M.D.    This note was created using voice recognition software (Dragon). The accuracy of the dictation is limited by the abilities of the software. I have reviewed the note prior to signing, however some errors in grammar and context are still possible. If you have any questions related to this note please do not hesitate to contact our office.

## 2019-01-22 ENCOUNTER — TELEPHONE (OUTPATIENT)
Dept: ENDOCRINOLOGY | Facility: MEDICAL CENTER | Age: 59
End: 2019-01-22

## 2019-01-22 ENCOUNTER — APPOINTMENT (OUTPATIENT)
Dept: ENDOCRINOLOGY | Facility: MEDICAL CENTER | Age: 59
End: 2019-01-22
Payer: COMMERCIAL

## 2019-01-22 NOTE — TELEPHONE ENCOUNTER
----- Message from Evita Mckenna sent at 1/21/2019  9:28 PM PST -----  Regarding: Non-Urgent Medical Question  Contact: [phone]  Hello,    I called to re-schedule. I have a meeting and need to change. Please call [phone].    Thank you

## 2019-01-22 NOTE — TELEPHONE ENCOUNTER
Phone Number Called: [phone] (home)       Message: lm to call to reschedule    Left Message for patient to call back: yes

## 2019-01-31 ENCOUNTER — OFFICE VISIT (OUTPATIENT)
Dept: ENDOCRINOLOGY | Facility: MEDICAL CENTER | Age: 59
End: 2019-01-31
Payer: COMMERCIAL

## 2019-01-31 VITALS
DIASTOLIC BLOOD PRESSURE: 62 MMHG | HEIGHT: 66 IN | SYSTOLIC BLOOD PRESSURE: 110 MMHG | RESPIRATION RATE: 16 BRPM | WEIGHT: 245 LBS | BODY MASS INDEX: 39.37 KG/M2 | OXYGEN SATURATION: 100 % | HEART RATE: 99 BPM

## 2019-01-31 DIAGNOSIS — E55.9 VITAMIN D DEFICIENCY: ICD-10-CM

## 2019-01-31 DIAGNOSIS — E66.9 OBESITY (BMI 30-39.9): ICD-10-CM

## 2019-01-31 DIAGNOSIS — E05.90 HYPERTHYROIDISM: ICD-10-CM

## 2019-01-31 PROCEDURE — 99213 OFFICE O/P EST LOW 20 MIN: CPT | Performed by: PHYSICIAN ASSISTANT

## 2019-02-01 NOTE — PROGRESS NOTES
Endocrinology Clinic Progress Note    CC:     HPI:  Evita Mckenna is a 59 y.o. old patient who comes in today for routine follow up.     1. Hyperthyroidism  Since her last evaluation she has been followed by Dr. Austin and is scheduled to be seen in the next 3 months.  According to the patient she underwent evaluation and repeat ultrasound.  He was not impressed and did express that she is not a surgical candidate at this time and he would recommend another trial of PTU and/or methimazole.  At this time I do not have his records but will attempt to get them.    She continues with symptoms of fatigue and emotional lability, GI distress palpitations and tremor at times.    Currently not on PTU but would like to start on the weekend when she is off.    Patient has not had any recent laboratory results.    2. Vitamin D deficiency  Patient has not been consistent with taking her vitamin D replacement.    3. Obesity (BMI 30-39.9)    Endocrinology history   subclinical hyperthyroidism:   According to the patient she has not tried PTU yet.  She will start on 1 dose daily and titrate up 1/2019 12/27/18- replaced methimazole with PTU side effects discussed   12/22/18- went to ER secondary to Gastritis related to Methimazole.   12/18/18- on average Methimazole 1 Tablet daily  11/201/18- start on Methimazole 5 mg TID repeat labs now and then again 4 weeks   10/2018- unable to tolerate Methimazole at 10 mg TID.   10/2018 methimazole 30 mg daily (started 9/21/2018)     Thyroid Ultrasound   9/21/2018 the thyroid is heterogeneous vascularity is normal.  The right lobe of the thyroid measures 1.78 cm x 4.56 cm x 1.8 cm.  There is no discrete nodules.  The left thyroid lobe is not visualized.  The isthmus is measuring 0.35.  Mildly predominant node in the left of the neck.  There is absent and/or hypoplastic left lower lobe.     Labs:  ER labs 12/22/2018 WBC within normal limits, Chemistry within normal limits, ALT 28, AST  "slightly elevated at 46, TSH less than 0.006 free T4 1.31 free T3 4.8     11/26/2018- <0.006 FT3 4.8, FT4 1.31 (Methimazole 5mg daily)   10/19/2018- FT3 4.9, T3 231, Ft4- 1.24   9/18/2018 TSH less than 0.006 free T4 1.51 free T3 4.8 thyroglobulin antibody less than 1.0 thyroglobulin 23.0  8/18/2016 TSH 1.840  9/29/2015 TSH 2.1-0 free T4 0.93  12/29/2012 TSH 1.530  8/2010 TSH 2.680     Thyroid Uptake and Scan: 10/22/18-   1.  Abnormally high iodine uptake being 40.3% at 5 hours  2.  Homogeneous uptake throughout the right lobe  3.  Left lobe is probably absent and there is probably hypertrophy of the isthmus with associated uptake        Borderline vitamin D deficiency: Patient is currently not on any vitamin D supplementation.    12/18-has not been taking vitamin D supplementation  10/19/2018- Vitamin D 20.0 - started on OTC Vitamin D gtt 5000 IU daily if tolerated encouraged dietary supplementation.    9/2015- 34.5  12/2012-13 0.4  8/[id number] 0.2     Obesity: Patient has been successful in the past of losing 60 pounds despite regaining 20.  We will continue to monitor her weight and discuss further options in the future after stabilization of her thyroid.  1218 weight 245 pounds     Family history of diabetes: Patient has family history of diabetes with her sisters and also her father.  Most recent laboratory work from 2016 and 2018 did not display any signs of impaired fasting glucose at this time.     Labs: 9/5/2018 glucose 88, total cholesterol 166, triglycerides 48, HDL 75, LDL 81,              8/18/2016 glucose 91, total cholesterol 191, triglycerides 59, HDL 74,   Medications:    ROS:  Constitutional: Patient denies any fevers chills lightheadedness and/or dizziness.    Medications: Currently not taking PTU    EXAM:  Vital signs: /62 (BP Location: Right arm, Patient Position: Sitting, BP Cuff Size: Large adult)   Pulse 99   Resp 16   Ht 1.676 m (5' 6\")   Wt 111.1 kg (245 lb)   SpO2 100%  "  BMI 39.54 kg/m²   General: No apparent distress, cooperative, she appears rushed today  Eyes: No scleral icterus, no discharge  Resp: Normal effort  Psych: Alert and oriented, normal mood and affect    Assessment and Plan:    1. Hyperthyroidism  Unfortunately I do not have the consultation from Dr. Austin the general surgeon.  He did repeat an ultrasound in his office which also I do not have in my position today.  At this time we did review again the treatment options with regards to hyperthyroidism.  We discussed methimazole which unfortunately she is unable to tolerate, PTU (side effects were discussed in detail), radioactive ablation, and or thyroidectomy.    She is scheduled to follow-up with Dr. Austin in 3 months.  I will look forward to reading his consultation in the next few days.  In the interim she would like to try use of PTU but do it on her terms on the weekend when should she experience side effects she is not at work.    2. Vitamin D deficiency  We will continue to monitor    3. Obesity (BMI 30-39.9)  We will continue to monitor.  Current weight is 245 pounds    Return in about 4 weeks (around 2/28/2019).    Thank you for allowing me to participate in the care of this patient.    Ashley Santos P.A.-C.    CC:   Sofie Mathew M.D.    This note was created using voice recognition software (Dragon). The accuracy of the dictation is limited by the abilities of the software. I have reviewed the note prior to signing, however some errors in grammar and context are still possible. If you have any questions related to this note please do not hesitate to contact our office.

## 2019-03-25 ENCOUNTER — OFFICE VISIT (OUTPATIENT)
Dept: MEDICAL GROUP | Facility: PHYSICIAN GROUP | Age: 59
End: 2019-03-25
Payer: COMMERCIAL

## 2019-03-25 VITALS
OXYGEN SATURATION: 99 % | DIASTOLIC BLOOD PRESSURE: 86 MMHG | SYSTOLIC BLOOD PRESSURE: 130 MMHG | TEMPERATURE: 98.6 F | RESPIRATION RATE: 16 BRPM | HEART RATE: 90 BPM

## 2019-03-25 DIAGNOSIS — M25.569 KNEE PAIN, UNSPECIFIED CHRONICITY, UNSPECIFIED LATERALITY: ICD-10-CM

## 2019-03-25 PROCEDURE — 99214 OFFICE O/P EST MOD 30 MIN: CPT | Performed by: INTERNAL MEDICINE

## 2019-03-25 NOTE — ASSESSMENT & PLAN NOTE
She has been having pain behind her right knee radiating down to her heel. It makes her feel like she's stepping on something with her heel. Has throbbing of her right calf at night. Pain starts anteriomedial of her knee and radiates down her leg. Denies recent trauma to her knee, long car rides, plane trips. She has tried any medications for this. Has a sharp shooting pain when she applies pressure to her heel that radiates up her leg.

## 2019-03-25 NOTE — PROGRESS NOTES
PRIMARY CARE CLINIC ACUTE VISIT  Chief Complaint   Patient presents with   • Knee Pain     Rt x 2 weeks      History of Present Illness     Evita is a pleasant 58 yo female patient of Dr. Mathew presenting for the following:     Knee pain  She has been having pain behind her right knee radiating down to her heel. It makes her feel like she's stepping on something with her heel. Has throbbing of her right calf at night. Pain starts anteriomedial of her knee and radiates down her leg. Denies recent trauma to her knee, long car rides, plane trips. She has tried any medications for this. Has a sharp shooting pain when she applies pressure to her heel that radiates up her leg.     Current Outpatient Prescriptions   Medication Sig Dispense Refill   • propylthiouracil (PTU) 50 MG Tab Take 1 Tab by mouth 3 times a day. 90 Tab 0   • propranolol (INDERAL) 10 MG Tab Take 1 Tab by mouth 3 times a day. 90 Tab 11     No current facility-administered medications for this visit.      Past Medical History:   Diagnosis Date   • Allergy    • Anemia     Vaginal bleeding.   • Colorectal cancer (HCC) April 2011    Dr. Garcia, chemo and radiation.    • GERD (gastroesophageal reflux disease)    • Hyperthyroidism 12/18/2018   • Migraine      Past Surgical History:   Procedure Laterality Date   • KNEE ARTHROSCOPY  3/8/2013    Performed by Kenji Meek M.D. at SUR[facility]ERY [facility]   • MEDIAL MENISCECTOMY  3/8/2013    Performed by Kenji Meek M.D. at [facility]   • JOINT INJECTION DIAGNOSTIC  3/8/2013    Performed by Kenji Meek M.D. at [facility]   • GASTRIC BANDING LAPAROSCOPIC  2005   • ABDOMINAL HYSTERECTOMY TOTAL  2003    For menorrhagia, no BSO   • HERNIA REPAIR      child     Social History   Substance Use Topics   • Smoking status: Never Smoker   • Smokeless tobacco: Never Used      Comment: significant 2nd hand exposure    • Alcohol use No     Social History     Social  History Narrative    , no children.      Family History   Problem Relation Age of Onset   • Stroke Father         68yo   • Hypertension Father    • Heart Disease Father         70 yo   • Cancer Maternal Grandfather         Lung   • Diabetes Paternal Grandmother    • Hypertension Paternal Grandmother    • Stroke Paternal Grandmother         70 s   • Psychiatry Sister         Schizophrenia   • Psychiatry Brother         Schizophrenia   • Cancer Paternal Aunt         Bone, liver   • Psychiatry Mother         dental / mental illness    • Lung Disease Mother         Copd    • Psychiatry Sister         schizoprenia    • Diabetes Sister    • Hypertension Sister    • Psychiatry Brother    • Heart Disease Brother    • Hypertension Brother    • Hypertension Brother    • Diabetes Brother      Family Status   Relation Status   • Fa    • MGMo    • MGFa    • PGMo    • PGFa    • Sis Alive   • Bro Alive   • PAunt (Not Specified)   • Mo Alive   • Sis Alive   • Bro Alive   • Bro Alive     Allergies: Iodine and Morphine    ROS  As per HPI above. All other systems reviewed and negative.        Objective   Blood pressure 130/86, pulse 90, temperature 37 °C (98.6 °F), resp. rate 16, SpO2 99 %. There is no height or weight on file to calculate BMI.    General: alert and oriented, pleasant, cooperative  HEENT: Normocephalic, atraumatic.   Extremities: pain with pressure to right lower extremity. Negative anterior/posterior drawer and varus/valgus testing of right lower extremity   Psychiatric: appropriate mood and affect. Good insight and appropriate judgment     Assessment and Plan   The following treatment plan was discussed     1. Knee pain, unspecified chronicity, unspecified laterality  Rule out DVT first, advised to try ibuprofen and mild heat to see if this helps relieve her symptoms.   - US-EXTREMITY VENOUS LOWER UNILAT RIGHT; Future    Healthcare maintenance     Health Maintenance Due    Topic Date Due   • IMM DTaP/Tdap/Td Vaccine (1 - Tdap) 01/05/1980   • IMM ZOSTER VACCINES (1 of 2) 01/05/2011   • MAMMOGRAM  10/13/2016   • IMM INFLUENZA (1) 09/01/2018       Return if symptoms worsen or fail to improve.    Neri Solano MD  Internal Medicine  [facility]

## 2019-03-28 ENCOUNTER — HOSPITAL ENCOUNTER (OUTPATIENT)
Dept: RADIOLOGY | Facility: MEDICAL CENTER | Age: 59
End: 2019-03-28
Attending: INTERNAL MEDICINE
Payer: COMMERCIAL

## 2019-03-28 ENCOUNTER — PATIENT MESSAGE (OUTPATIENT)
Dept: MEDICAL GROUP | Facility: PHYSICIAN GROUP | Age: 59
End: 2019-03-28

## 2019-03-28 DIAGNOSIS — M25.569 KNEE PAIN, UNSPECIFIED CHRONICITY, UNSPECIFIED LATERALITY: ICD-10-CM

## 2019-03-28 DIAGNOSIS — M25.561 RIGHT KNEE PAIN, UNSPECIFIED CHRONICITY: ICD-10-CM

## 2019-03-28 DIAGNOSIS — M79.604 RIGHT LEG PAIN: ICD-10-CM

## 2019-03-28 PROCEDURE — 93971 EXTREMITY STUDY: CPT | Mod: RT

## 2019-04-05 ENCOUNTER — HOSPITAL ENCOUNTER (OUTPATIENT)
Dept: RADIOLOGY | Facility: MEDICAL CENTER | Age: 59
End: 2019-04-05
Attending: INTERNAL MEDICINE
Payer: COMMERCIAL

## 2019-04-05 DIAGNOSIS — M25.561 RIGHT KNEE PAIN, UNSPECIFIED CHRONICITY: ICD-10-CM

## 2019-04-05 DIAGNOSIS — M79.604 RIGHT LEG PAIN: ICD-10-CM

## 2019-04-05 PROCEDURE — 73562 X-RAY EXAM OF KNEE 3: CPT | Mod: RT

## 2019-04-05 PROCEDURE — 73610 X-RAY EXAM OF ANKLE: CPT | Mod: RT

## 2019-04-10 ENCOUNTER — OFFICE VISIT (OUTPATIENT)
Dept: URGENT CARE | Facility: PHYSICIAN GROUP | Age: 59
End: 2019-04-10
Payer: COMMERCIAL

## 2019-04-10 VITALS
DIASTOLIC BLOOD PRESSURE: 82 MMHG | BODY MASS INDEX: 38.25 KG/M2 | OXYGEN SATURATION: 96 % | SYSTOLIC BLOOD PRESSURE: 130 MMHG | HEART RATE: 104 BPM | TEMPERATURE: 98.3 F | RESPIRATION RATE: 18 BRPM | WEIGHT: 237 LBS

## 2019-04-10 DIAGNOSIS — J02.9 SORE THROAT: ICD-10-CM

## 2019-04-10 DIAGNOSIS — R52 GENERALIZED BODY ACHES: ICD-10-CM

## 2019-04-10 DIAGNOSIS — J02.9 PHARYNGITIS, UNSPECIFIED ETIOLOGY: ICD-10-CM

## 2019-04-10 DIAGNOSIS — R50.9 FEVER, UNSPECIFIED FEVER CAUSE: ICD-10-CM

## 2019-04-10 LAB
FLUAV+FLUBV AG SPEC QL IA: NORMAL
INT CON NEG: NEGATIVE
INT CON NEG: NEGATIVE
INT CON POS: POSITIVE
INT CON POS: POSITIVE
S PYO AG THROAT QL: NORMAL

## 2019-04-10 PROCEDURE — 99214 OFFICE O/P EST MOD 30 MIN: CPT | Performed by: NURSE PRACTITIONER

## 2019-04-10 PROCEDURE — 87880 STREP A ASSAY W/OPTIC: CPT | Performed by: NURSE PRACTITIONER

## 2019-04-10 PROCEDURE — 87804 INFLUENZA ASSAY W/OPTIC: CPT | Performed by: NURSE PRACTITIONER

## 2019-04-10 RX ORDER — AMOXICILLIN 500 MG/1
500 CAPSULE ORAL 2 TIMES DAILY
Qty: 20 CAP | Refills: 0 | Status: SHIPPED | OUTPATIENT
Start: 2019-04-10 | End: 2019-04-17

## 2019-04-11 ASSESSMENT — ENCOUNTER SYMPTOMS
CHILLS: 0
DIZZINESS: 0
TINGLING: 0
WEAKNESS: 0
COUGH: 0
FEVER: 0
NERVOUS/ANXIOUS: 0
CONSTIPATION: 0
HEADACHES: 0
ABDOMINAL PAIN: 0
ORTHOPNEA: 0
MYALGIAS: 1
PALPITATIONS: 0
NECK PAIN: 1
SENSORY CHANGE: 0
SHORTNESS OF BREATH: 0
VOMITING: 0
SORE THROAT: 1
DIARRHEA: 0
WHEEZING: 0
EYE DISCHARGE: 0
EYE REDNESS: 0
NAUSEA: 0

## 2019-04-11 NOTE — PROGRESS NOTES
Subjective:      Evita Mckenna is a 59 y.o. female who presents with Sore Throat (sore throat, fevers, bodyaches x2 days)            HPI  Sore throat, dry scratchy throat, hurts to swallow, no cough, body aches. Chills today. H/o hyperthyroid, no meds, cannot tolerate meds.     PMH:  has a past medical history of Allergy; Anemia; Colorectal cancer (HCC) (April 2011); GERD (gastroesophageal reflux disease); Hyperthyroidism (12/18/2018); and Migraine. She also has no past medical history of Addisons disease (HCC); Adrenal disorder (HCC); Anxiety; Blood transfusion; Breast cancer (HCC); CHF (congestive heart failure) (HCC); Clotting disorder (HCC); COPD; Cushings syndrome (HCC); Depression; Goiter; Heart attack (HCC); HIV (human immunodeficiency virus infection); Hyperlipidemia; IBD (inflammatory bowel disease); Kidney disease; Muscle disorder; Parathyroid disorder (HCC); Pituitary disease (HCC); Substance abuse (HCC); or Ulcer.  MEDS:   Current Outpatient Prescriptions:   •  propylthiouracil (PTU) 50 MG Tab, Take 1 Tab by mouth 3 times a day., Disp: 90 Tab, Rfl: 0  •  propranolol (INDERAL) 10 MG Tab, Take 1 Tab by mouth 3 times a day., Disp: 90 Tab, Rfl: 11  ALLERGIES:   Allergies   Allergen Reactions   • Iodine    • Morphine Itching     SURGHX:   Past Surgical History:   Procedure Laterality Date   • KNEE ARTHROSCOPY  3/8/2013    Performed by Kenji Meek M.D. at [facility]   • MEDIAL MENISCECTOMY  3/8/2013    Performed by Kenji Meek M.D. at [facility]   • JOINT INJECTION DIAGNOSTIC  3/8/2013    Performed by Kenji Meek M.D. at [facility]   • GASTRIC BANDING LAPAROSCOPIC  2005   • ABDOMINAL HYSTERECTOMY TOTAL  2003    For menorrhagia, no BSO   • HERNIA REPAIR      child     SOCHX:  reports that she has never smoked. She has never used smokeless tobacco. She reports that she does not drink alcohol or use drugs.  FH: Family history was  reviewed, no pertinent findings to report    Review of Systems   Constitutional: Negative for chills, fever and malaise/fatigue.   HENT: Positive for sore throat. Negative for congestion and ear pain.    Eyes: Negative for discharge and redness.   Respiratory: Negative for cough, shortness of breath and wheezing.    Cardiovascular: Negative for chest pain, palpitations and orthopnea.   Gastrointestinal: Negative for abdominal pain, constipation, diarrhea, nausea and vomiting.   Musculoskeletal: Positive for myalgias and neck pain.   Skin: Negative for itching and rash.   Neurological: Negative for dizziness, tingling, sensory change, weakness and headaches.   Endo/Heme/Allergies: Negative for environmental allergies.   Psychiatric/Behavioral: The patient is not nervous/anxious.    All other systems reviewed and are negative.         Objective:     /82 (BP Location: Left arm, Patient Position: Sitting, BP Cuff Size: Adult)   Pulse (!) 104   Temp 36.8 °C (98.3 °F) (Temporal)   Resp 18   Wt 107.5 kg (237 lb)   SpO2 96%   BMI 38.25 kg/m²      Physical Exam   Constitutional: She is oriented to person, place, and time. Vital signs are normal. She appears well-developed and well-nourished. She is active and cooperative.  Non-toxic appearance. She does not have a sickly appearance. She does not appear ill. No distress.   HENT:   Head: Normocephalic.   Right Ear: Tympanic membrane, external ear and ear canal normal.   Left Ear: Tympanic membrane, external ear and ear canal normal.   Nose: No mucosal edema, rhinorrhea or sinus tenderness.   Mouth/Throat: Uvula is midline. Mucous membranes are dry. No uvula swelling. Posterior oropharyngeal edema and posterior oropharyngeal erythema present. Tonsils are 1+ on the right. Tonsils are 1+ on the left. No tonsillar exudate.   Eyes: Pupils are equal, round, and reactive to light. Conjunctivae and EOM are normal.   Neck: Trachea normal and normal range of motion. Neck  supple. No spinous process tenderness and no muscular tenderness present. No neck rigidity. No edema, no erythema and normal range of motion present. No thyroid mass and no thyromegaly present.   Cardiovascular: Normal rate, regular rhythm and normal heart sounds.    Pulmonary/Chest: Effort normal and breath sounds normal. No accessory muscle usage. No tachypnea. No respiratory distress.   Musculoskeletal: Normal range of motion.   Neurological: She is alert and oriented to person, place, and time. She has normal strength. No cranial nerve deficit or sensory deficit. Coordination and gait normal. GCS eye subscore is 4. GCS verbal subscore is 5. GCS motor subscore is 6.   Skin: Skin is warm and dry. She is not diaphoretic.   Psychiatric: She has a normal mood and affect. Her speech is normal and behavior is normal. Judgment and thought content normal. She is not actively hallucinating. Cognition and memory are normal. She is attentive.   Vitals reviewed.              Assessment/Plan:     1. Sore throat    - POCT Rapid Strep A  - amoxicillin (AMOXIL) 500 MG Cap; Take 1 Cap by mouth 2 times a day for 10 days.  Dispense: 20 Cap; Refill: 0  - POCT Influenza A/B    2. Pharyngitis, unspecified etiology    3. Fever, unspecified fever cause    - amoxicillin (AMOXIL) 500 MG Cap; Take 1 Cap by mouth 2 times a day for 10 days.  Dispense: 20 Cap; Refill: 0  - POCT Influenza A/B    4. Generalized body aches    - amoxicillin (AMOXIL) 500 MG Cap; Take 1 Cap by mouth 2 times a day for 10 days.  Dispense: 20 Cap; Refill: 0  - POCT Influenza A/B    Increase water intake  May use Ibuprofen/Tylenol prn for any fever, body aches or throat pain  May take long acting antihistamine for seasonal allergy symptoms prn  May use saline nasal spray/shey pot for nasal congestion prn  May use Nasacort/Flonase prn for nasal congestion  May use throat lozenges for throat discomfort  May gargle with salt water prn for throat discomfort  May drink  smoothies for nutrition if too painful to swallow solid foods  Monitor for any sinus pain/pressure with sinus congestion with thick mucus production and HA, cough, SOB, fever- need re-evaluation  Keep appt with PCP next week for hyperthyroid evaluation  MyChart for flu test result

## 2019-04-17 ENCOUNTER — OFFICE VISIT (OUTPATIENT)
Dept: MEDICAL GROUP | Facility: PHYSICIAN GROUP | Age: 59
End: 2019-04-17
Payer: COMMERCIAL

## 2019-04-17 VITALS
TEMPERATURE: 96.6 F | HEIGHT: 66 IN | DIASTOLIC BLOOD PRESSURE: 72 MMHG | OXYGEN SATURATION: 96 % | SYSTOLIC BLOOD PRESSURE: 120 MMHG | RESPIRATION RATE: 16 BRPM | WEIGHT: 230 LBS | HEART RATE: 96 BPM | BODY MASS INDEX: 36.96 KG/M2

## 2019-04-17 DIAGNOSIS — E05.90 HYPERTHYROIDISM: ICD-10-CM

## 2019-04-17 DIAGNOSIS — E87.6 HYPOKALEMIA: ICD-10-CM

## 2019-04-17 DIAGNOSIS — M54.16 LUMBAR RADICULOPATHY, RIGHT: ICD-10-CM

## 2019-04-17 PROCEDURE — 99214 OFFICE O/P EST MOD 30 MIN: CPT | Performed by: FAMILY MEDICINE

## 2019-04-17 RX ORDER — MELOXICAM 15 MG/1
15 TABLET ORAL DAILY
Qty: 30 TAB | Refills: 0 | Status: SHIPPED | OUTPATIENT
Start: 2019-04-17 | End: 2019-05-28

## 2019-04-17 RX ORDER — METHIMAZOLE 5 MG/1
TABLET ORAL
COMMUNITY
Start: 2019-01-18 | End: 2019-01-31

## 2019-04-17 RX ORDER — PROPYLTHIOURACIL 50 MG/1
50 TABLET ORAL 3 TIMES DAILY
Qty: 90 TAB | Refills: 0 | Status: SHIPPED | OUTPATIENT
Start: 2019-04-17 | End: 2019-05-28 | Stop reason: SDUPTHER

## 2019-04-17 ASSESSMENT — PATIENT HEALTH QUESTIONNAIRE - PHQ9: CLINICAL INTERPRETATION OF PHQ2 SCORE: 0

## 2019-04-17 NOTE — PROGRESS NOTES
Chief Complaint   Patient presents with   • Leg Pain     rt leg pain       HISTORY OF PRESENT ILLNESS: Patient is a 59 y.o. female established patient here today for the following concerns:    1. Hyperthyroidism  Hx of hyperthyroidism, she has not started the PTU because when she took the Methimazole it caused nausea and abdominal pains.  Still getting palpitations.      2. Lumbar radiculopathy, right  She is here today for follow up on right leg pain that is described as burning that starts just below the right knee and radiates down laterally and into the calf.  There is no associated swelling.  She finds that when she flexes the calf muscle the pain increases.  She reports no new back pains.  No injuries.  She did have xrays done and US to r/o DVT.  They did identify a needle in the foot that she has known about for a couple decades now, but does not find the pain to be in that location.  She does have hx of hypokalemia.  She is due for recheck and reports she has been trying to eat foods high in potassium.   Reports that the pain also gets worse when she tries to exercise on her elliptical .     3. Hypokalemia  Due for recheck.  Denies muscle cramping, but still gets pain in the leg with flexion of the right calf.       Past Medical, Social, and Family history reviewed and updated in EPIC    Allergies:Iodine and Morphine    Current Outpatient Prescriptions   Medication Sig Dispense Refill   • meloxicam (MOBIC) 15 MG tablet Take 1 Tab by mouth every day. 30 Tab 0   • propylthiouracil (PTU) 50 MG Tab Take 1 Tab by mouth 3 times a day. 90 Tab 0   • propranolol (INDERAL) 10 MG Tab Take 1 Tab by mouth 3 times a day. (Patient not taking: Reported on 4/17/2019) 90 Tab 11     No current facility-administered medications for this visit.          ROS:  Review of Systems   Constitutional: Negative for fever, chills, weight loss and malaise/fatigue.   HENT: Negative for ear pain, nosebleeds, congestion, sore throat  "and neck pain.    Eyes: Negative for blurred vision.   Respiratory: Negative for cough, sputum production, shortness of breath and wheezing.    Cardiovascular: Negative for chest pain, palpitations,  and leg swelling.   Gastrointestinal: Negative for heartburn, nausea, vomiting, diarrhea and abdominal pain.   Genitourinary: Negative for dysuria, urgency and frequency.   Musculoskeletal: Negative for myalgias, back pain and joint pain.   Skin: Negative for rash and itching.   Neurological: Negative for dizziness, tingling, tremors, +sensory change,no focal weakness and headaches.   Endo/Heme/Allergies: Does not bruise/bleed easily.   Psychiatric/Behavioral: Negative for depression, anxiety, suicidal ideas, insomnia and memory loss.      Exam:  /72   Pulse 96   Temp 35.9 °C (96.6 °F)   Resp 16   Ht 1.676 m (5' 6\")   Wt 104.3 kg (230 lb)   SpO2 96%     General:  Well nourished, well developed in NAD  Head is grossly normal.  Neck: Supple without JVD   Pulmonary:  Normal effort.   Cardiovascular: Regular rate  Extremities: no clubbing, cyanosis, or edema, non-tender to palpation.  Sensation is \"different\" in the affected spot.    MSK: full ROM of the lumbar spine.  Strength 5/5 in bilateral lower legs.  Psych: affect appropriate      Please note that this dictation was created using voice recognition software. I have made every reasonable attempt to correct obvious errors, but I expect that there are errors of grammar and possibly content that I did not discover before finalizing the note.    Assessment/Plan:  1. Hyperthyroidism  Advised to recheck labs, and start PTU per recs from endo.  Keep follow up appointment with them next month.    Discussed the reason to treat hyperthyroidism and possible adverse effects of untreated hyperthyroidism.   - TSH; Future  - FREE THYROXINE; Future  - TRIIDOTHYRONINE; Future  - propylthiouracil (PTU) 50 MG Tab; Take 1 Tab by mouth 3 times a day.  Dispense: 90 Tab; Refill: " 0    2. Lumbar radiculopathy, right  Suspect the leg pain is actually radicular   - REFERRAL TO PHYSICAL THERAPY Reason for Therapy: Eval/Treat/Report  - meloxicam (MOBIC) 15 MG tablet; Take 1 Tab by mouth every day.  Dispense: 30 Tab; Refill: 0    3. Hypokalemia  - Basic Metabolic Panel; Future    1 month follow up

## 2019-04-18 LAB
BUN SERPL-MCNC: 9 MG/DL (ref 6–24)
BUN/CREAT SERPL: 16 (ref 9–23)
CALCIUM SERPL-MCNC: 10.1 MG/DL (ref 8.7–10.2)
CHLORIDE SERPL-SCNC: 103 MMOL/L (ref 96–106)
CO2 SERPL-SCNC: 23 MMOL/L (ref 20–29)
CREAT SERPL-MCNC: 0.55 MG/DL (ref 0.57–1)
GLUCOSE SERPL-MCNC: 89 MG/DL (ref 65–99)
POTASSIUM SERPL-SCNC: 3.9 MMOL/L (ref 3.5–5.2)
SODIUM SERPL-SCNC: 140 MMOL/L (ref 134–144)
T3 SERPL-MCNC: 331 NG/DL (ref 71–180)
T4 FREE SERPL-MCNC: 2.12 NG/DL (ref 0.82–1.77)
TSH SERPL DL<=0.005 MIU/L-ACNC: <0.006 UIU/ML (ref 0.45–4.5)

## 2019-04-24 ENCOUNTER — HOSPITAL ENCOUNTER (OUTPATIENT)
Dept: RADIOLOGY | Facility: MEDICAL CENTER | Age: 59
End: 2019-04-24
Attending: FAMILY MEDICINE
Payer: COMMERCIAL

## 2019-04-24 DIAGNOSIS — Z12.31 VISIT FOR SCREENING MAMMOGRAM: ICD-10-CM

## 2019-04-24 PROCEDURE — 77063 BREAST TOMOSYNTHESIS BI: CPT

## 2019-05-01 ENCOUNTER — PATIENT MESSAGE (OUTPATIENT)
Dept: MEDICAL GROUP | Facility: PHYSICIAN GROUP | Age: 59
End: 2019-05-01

## 2019-05-09 ENCOUNTER — OFFICE VISIT (OUTPATIENT)
Dept: MEDICAL GROUP | Facility: PHYSICIAN GROUP | Age: 59
End: 2019-05-09
Payer: COMMERCIAL

## 2019-05-09 VITALS
HEIGHT: 66 IN | BODY MASS INDEX: 37.12 KG/M2 | OXYGEN SATURATION: 98 % | DIASTOLIC BLOOD PRESSURE: 78 MMHG | RESPIRATION RATE: 16 BRPM | SYSTOLIC BLOOD PRESSURE: 118 MMHG | TEMPERATURE: 97.3 F | HEART RATE: 88 BPM | WEIGHT: 231 LBS

## 2019-05-09 DIAGNOSIS — Z13.29 SCREENING FOR ENDOCRINE DISORDER: ICD-10-CM

## 2019-05-09 DIAGNOSIS — E05.00 GRAVES DISEASE: ICD-10-CM

## 2019-05-09 DIAGNOSIS — J01.90 SUBACUTE SINUSITIS, UNSPECIFIED LOCATION: ICD-10-CM

## 2019-05-09 DIAGNOSIS — R05.9 COUGH: ICD-10-CM

## 2019-05-09 DIAGNOSIS — J30.9 ALLERGIC RHINITIS, UNSPECIFIED SEASONALITY, UNSPECIFIED TRIGGER: ICD-10-CM

## 2019-05-09 DIAGNOSIS — Z13.0 SCREENING FOR DEFICIENCY ANEMIA: ICD-10-CM

## 2019-05-09 DIAGNOSIS — Z13.6 SCREENING FOR CARDIOVASCULAR CONDITION: ICD-10-CM

## 2019-05-09 PROCEDURE — 99214 OFFICE O/P EST MOD 30 MIN: CPT | Performed by: FAMILY MEDICINE

## 2019-05-09 RX ORDER — FLUTICASONE PROPIONATE 50 MCG
2 SPRAY, SUSPENSION (ML) NASAL DAILY
Qty: 16 G | Refills: 11 | Status: ON HOLD | OUTPATIENT
Start: 2019-05-09 | End: 2020-01-08

## 2019-05-09 RX ORDER — TRIAMCINOLONE ACETONIDE 40 MG/ML
40 INJECTION, SUSPENSION INTRA-ARTICULAR; INTRAMUSCULAR ONCE
Status: COMPLETED | OUTPATIENT
Start: 2019-05-09 | End: 2019-05-10

## 2019-05-09 RX ORDER — CEFDINIR 300 MG/1
300 CAPSULE ORAL 2 TIMES DAILY
Qty: 20 CAP | Refills: 0 | Status: SHIPPED | OUTPATIENT
Start: 2019-05-09 | End: 2019-06-04 | Stop reason: SDUPTHER

## 2019-05-09 NOTE — PROGRESS NOTES
Chief Complaint   Patient presents with   • Cough       HISTORY OF PRESENT ILLNESS: Patient is a 59 y.o. female established patient here today for the following concerns:      Here today with concerns over cough that is been ongoing for the last several weeks.  Not associated with any fevers or chills.  Initially treated last month for possible sinus infection with amoxicillin.  She has been using over-the-counter cough syrups, Mucinex without much relief.  She reports tickling cough very rarely productive.  Some hoarseness of her voice.  Not using any allergy medicines at this time.    In addition reports that she has been successfully using the PTU 3 times per day tolerating it without much side effect.  She is due for repeat lab work and also requests that we check her other labs including cholesterol and vitamin levels that she is previously been deficient.        Past Medical, Social, and Family history reviewed and updated in EPIC    Allergies:Iodine and Morphine    Current Outpatient Prescriptions   Medication Sig Dispense Refill   • fluticasone (FLONASE) 50 MCG/ACT nasal spray Spray 2 Sprays in nose every day. Each Nostril 16 g 11   • cefdinir (OMNICEF) 300 MG Cap Take 1 Cap by mouth 2 times a day for 10 days. 20 Cap 0   • propylthiouracil (PTU) 50 MG Tab Take 1 Tab by mouth 3 times a day. 90 Tab 0   • meloxicam (MOBIC) 15 MG tablet Take 1 Tab by mouth every day. (Patient not taking: Reported on 5/9/2019) 30 Tab 0   • propranolol (INDERAL) 10 MG Tab Take 1 Tab by mouth 3 times a day. (Patient not taking: Reported on 4/17/2019) 90 Tab 11     Current Facility-Administered Medications   Medication Dose Route Frequency Provider Last Rate Last Dose   • triamcinolone acetonide (KENALOG-40) injection 40 mg  40 mg Intramuscular Once Sofie Mathew M.D.             ROS:  Review of Systems   Constitutional: Negative for fever, chills, weight loss and malaise/fatigue.   HENT: Negative for ear pain, nosebleeds,  "congestion, sore throat and neck pain.    Eyes: Negative for blurred vision.   Respiratory: Negative for cough, sputum production, shortness of breath and wheezing.    Cardiovascular: Negative for chest pain, palpitations,  and leg swelling.   Gastrointestinal: Negative for heartburn, nausea, vomiting, diarrhea and abdominal pain.   Genitourinary: Negative for dysuria, urgency and frequency.   Musculoskeletal: Negative for myalgias, back pain and joint pain.   Skin: Negative for rash and itching.   Neurological: Negative for dizziness, tingling, tremors, sensory change, focal weakness and headaches.   Endo/Heme/Allergies: Does not bruise/bleed easily.   Psychiatric/Behavioral: Negative for depression, anxiety, suicidal ideas, insomnia and memory loss.      Exam:  /78   Pulse 88   Temp 36.3 °C (97.3 °F)   Resp 16   Ht 1.676 m (5' 6\")   Wt 104.8 kg (231 lb)   SpO2 98%     General:  Well nourished, well developed in NAD  Head is grossly normal.  HEENT: Right TM erythematous, left TM clear, significant PND. Nasal mucosa edematous with purulent nasal discharge.  Sinus tenderness on palpation.   Neck: Supple without JVD   Pulmonary:  Normal effort.  Clear to oscillation bilaterally no wheezing rhonchi rales  Cardiovascular: Regular rate and rhythm no murmurs  Extremities: no clubbing, cyanosis, or edema.  Psych: affect appropriate      Please note that this dictation was created using voice recognition software. I have made every reasonable attempt to correct obvious errors, but I expect that there are errors of grammar and possibly content that I did not discover before finalizing the note.    Assessment/Plan:  1. Cough  Suspect secondary to allergic rhinitis and postnasal drainage.  - triamcinolone acetonide (KENALOG-40) injection 40 mg; 1 mL by Intramuscular route Once.  - fluticasone (FLONASE) 50 MCG/ACT nasal spray; Spray 2 Sprays in nose every day. Each Nostril  Dispense: 16 g; Refill: 11  - cefdinir " (OMNICEF) 300 MG Cap; Take 1 Cap by mouth 2 times a day for 10 days.  Dispense: 20 Cap; Refill: 0    2. Allergic rhinitis, unspecified seasonality, unspecified trigger  - triamcinolone acetonide (KENALOG-40) injection 40 mg; 1 mL by Intramuscular route Once.  - fluticasone (FLONASE) 50 MCG/ACT nasal spray; Spray 2 Sprays in nose every day. Each Nostril  Dispense: 16 g; Refill: 11    3. Subacute sinusitis, unspecified location  - cefdinir (OMNICEF) 300 MG Cap; Take 1 Cap by mouth 2 times a day for 10 days.  Dispense: 20 Cap; Refill: 0    4. Screening for endocrine disorder  - HEMOGLOBIN A1C; Future  - VITAMIN D,25 HYDROXY; Future  - VITAMIN B12; Future  - IRON/TOTAL IRON BIND; Future  - FERRITIN; Future    5. Screening for deficiency anemia  - CBC WITH DIFFERENTIAL; Future    6. Screening for cardiovascular condition  - Comp Metabolic Panel; Future  - Lipid Profile; Future    7. Graves disease  Continue PTU recheck levels follow-up with endocrinology as planned  - TSH; Future  - FREE THYROXINE; Future  - TRIIDOTHYRONINE; Future    No Follow-up on file.

## 2019-05-10 RX ADMIN — TRIAMCINOLONE ACETONIDE 40 MG: 40 INJECTION, SUSPENSION INTRA-ARTICULAR; INTRAMUSCULAR at 11:00

## 2019-05-11 LAB
25(OH)D3+25(OH)D2 SERPL-MCNC: 46.1 NG/ML (ref 30–100)
ALBUMIN SERPL-MCNC: 3.8 G/DL (ref 3.5–5.5)
ALBUMIN/GLOB SERPL: 0.9 {RATIO} (ref 1.2–2.2)
ALP SERPL-CCNC: 153 IU/L (ref 39–117)
ALT SERPL-CCNC: 14 IU/L (ref 0–32)
AST SERPL-CCNC: 17 IU/L (ref 0–40)
BASOPHILS # BLD AUTO: 0 X10E3/UL (ref 0–0.2)
BASOPHILS NFR BLD AUTO: 0 %
BILIRUB SERPL-MCNC: 0.4 MG/DL (ref 0–1.2)
BUN SERPL-MCNC: 10 MG/DL (ref 6–24)
BUN/CREAT SERPL: 18 (ref 9–23)
CALCIUM SERPL-MCNC: 9.7 MG/DL (ref 8.7–10.2)
CHLORIDE SERPL-SCNC: 104 MMOL/L (ref 96–106)
CHOLEST SERPL-MCNC: 188 MG/DL (ref 100–199)
CO2 SERPL-SCNC: 22 MMOL/L (ref 20–29)
CREAT SERPL-MCNC: 0.57 MG/DL (ref 0.57–1)
EOSINOPHIL # BLD AUTO: 0 X10E3/UL (ref 0–0.4)
EOSINOPHIL NFR BLD AUTO: 1 %
ERYTHROCYTE [DISTWIDTH] IN BLOOD BY AUTOMATED COUNT: 14.1 % (ref 12.3–15.4)
FERRITIN SERPL-MCNC: 420 NG/ML (ref 15–150)
GLOBULIN SER CALC-MCNC: 4.2 G/DL (ref 1.5–4.5)
GLUCOSE SERPL-MCNC: 90 MG/DL (ref 65–99)
HBA1C MFR BLD: 5.6 % (ref 4.8–5.6)
HCT VFR BLD AUTO: 36.9 % (ref 34–46.6)
HDLC SERPL-MCNC: 77 MG/DL
HGB BLD-MCNC: 12.1 G/DL (ref 11.1–15.9)
IMM GRANULOCYTES # BLD AUTO: 0 X10E3/UL (ref 0–0.1)
IMM GRANULOCYTES NFR BLD AUTO: 0 %
IMMATURE CELLS  115398: ABNORMAL
IRON SATN MFR SERPL: 35 % (ref 15–55)
IRON SERPL-MCNC: 77 UG/DL (ref 27–159)
LABORATORY COMMENT REPORT: ABNORMAL
LDLC SERPL CALC-MCNC: 102 MG/DL (ref 0–99)
LYMPHOCYTES # BLD AUTO: 1.5 X10E3/UL (ref 0.7–3.1)
LYMPHOCYTES NFR BLD AUTO: 41 %
MCH RBC QN AUTO: 26.5 PG (ref 26.6–33)
MCHC RBC AUTO-ENTMCNC: 32.8 G/DL (ref 31.5–35.7)
MCV RBC AUTO: 81 FL (ref 79–97)
MONOCYTES # BLD AUTO: 0.5 X10E3/UL (ref 0.1–0.9)
MONOCYTES NFR BLD AUTO: 14 %
MORPHOLOGY BLD-IMP: ABNORMAL
NEUTROPHILS # BLD AUTO: 1.6 X10E3/UL (ref 1.4–7)
NEUTROPHILS NFR BLD AUTO: 44 %
NRBC BLD AUTO-RTO: ABNORMAL %
PLATELET # BLD AUTO: 273 X10E3/UL (ref 150–379)
POTASSIUM SERPL-SCNC: 3.8 MMOL/L (ref 3.5–5.2)
PROT SERPL-MCNC: 8 G/DL (ref 6–8.5)
RBC # BLD AUTO: 4.56 X10E6/UL (ref 3.77–5.28)
SODIUM SERPL-SCNC: 138 MMOL/L (ref 134–144)
T3 SERPL-MCNC: 174 NG/DL (ref 71–180)
T4 FREE SERPL-MCNC: 1.25 NG/DL (ref 0.82–1.77)
TIBC SERPL-MCNC: 219 UG/DL (ref 250–450)
TRIGL SERPL-MCNC: 46 MG/DL (ref 0–149)
TSH SERPL DL<=0.005 MIU/L-ACNC: <0.006 UIU/ML (ref 0.45–4.5)
UIBC SERPL-MCNC: 142 UG/DL (ref 131–425)
VIT B12 SERPL-MCNC: 258 PG/ML (ref 232–1245)
VLDLC SERPL CALC-MCNC: 9 MG/DL (ref 5–40)
WBC # BLD AUTO: 3.7 X10E3/UL (ref 3.4–10.8)

## 2019-05-15 ENCOUNTER — TELEPHONE (OUTPATIENT)
Dept: MEDICAL GROUP | Facility: PHYSICIAN GROUP | Age: 59
End: 2019-05-15

## 2019-05-15 NOTE — TELEPHONE ENCOUNTER
----- Message from Sofie Mathew M.D. sent at 5/14/2019  5:22 PM PDT -----  Improving numbers.  Continue PTU.  Will discuss remaining flags at follow up

## 2019-05-20 ENCOUNTER — OFFICE VISIT (OUTPATIENT)
Dept: ENDOCRINOLOGY | Facility: MEDICAL CENTER | Age: 59
End: 2019-05-20
Payer: COMMERCIAL

## 2019-05-20 VITALS
WEIGHT: 232 LBS | HEIGHT: 66 IN | OXYGEN SATURATION: 96 % | HEART RATE: 73 BPM | DIASTOLIC BLOOD PRESSURE: 80 MMHG | SYSTOLIC BLOOD PRESSURE: 110 MMHG | BODY MASS INDEX: 37.28 KG/M2

## 2019-05-20 DIAGNOSIS — E66.9 OBESITY (BMI 35.0-39.9 WITHOUT COMORBIDITY): ICD-10-CM

## 2019-05-20 DIAGNOSIS — E05.90 HYPERTHYROIDISM: ICD-10-CM

## 2019-05-20 DIAGNOSIS — E55.9 VITAMIN D DEFICIENCY: ICD-10-CM

## 2019-05-20 PROCEDURE — 99214 OFFICE O/P EST MOD 30 MIN: CPT | Performed by: PHYSICIAN ASSISTANT

## 2019-05-20 NOTE — PROGRESS NOTES
"Endocrinology Clinic Progress Note    CC:     HPI:  Evita Mckenna is a 59 y.o. old patient who comes in today for evaluation of the followin. Vitamin D deficiency  Patient is on Vitamin D replacement   5/10/19-vitamin D 46.1-on vitamin D supplementation with sublingual drops and multivitamin      2. Obesity (BMI 35.0-39.9 without comorbidity)  Weight 232 today   Patient is watching diet now.   She is down 10 lbs since last evaluation     3. Hyperthyroidism  PTU 50 mg TID  (started consist for the last 3-4 weeks). Patient has not had any side effects.     Denies palpations, shaking, dizziness.     Patient still complains of fatigue all the time \"but improved\". She is concentrating on weight. She feels better without above symptoms any longer. Since her last evaluation she has been seen multiple times secondary to upper respiratory infection otitis, bronchitis etc. she is now feeling better however continues with a dry cough on occasion.    5/10/2019- TSH <0.006, FT4 1.25, T3 174 - PTU 50mg TID     Endocrinology history to date:   Subclinical hyperthyroidism:   - started PTU 50 mg TID   18- replaced methimazole with PTU side effects discussed   18- went to ER secondary to Gastritis related to Methimazole.   18- on average Methimazole 1 Tablet daily  - start on Methimazole 5 mg TID repeat labs now and then again 4 weeks   10/2018- unable to tolerate Methimazole at 10 mg TID.   10/2018 methimazole 30 mg daily (started 2018)     Thyroid Ultrasound   2018 the thyroid is heterogeneous vascularity is normal.  The right lobe of the thyroid measures 1.78 cm x 4.56 cm x 1.8 cm.  There is no discrete nodules.  The left thyroid lobe is not visualized.  The isthmus is measuring 0.35.  Mildly predominant node in the left of the neck.  There is absent and/or hypoplastic left lower lobe.     Labs:  ER labs 2018 WBC within normal limits, Chemistry within normal limits, ALT " 28, AST slightly elevated at 46, TSH less than 0.006 free T4 1.31 free T3 4.8       5/10/2019- TSH <0.006, FT4 1.25, T3 174 - PTU 50mg TID   4/17/2019 TSH less than 0.006, free T4 2.12, T3 331 started PTU shortly after   11/26/2018- <0.006 FT3 4.8, FT4 1.31 (Methimazole 5mg daily)   10/19/2018- FT3 4.9, T3 231, Ft4- 1.24   9/18/2018 TSH less than 0.006 free T4 1.51 free T3 4.8 thyroglobulin antibody less than 1.0 thyroglobulin 23.0  8/18/2016 TSH 1.840  9/29/2015 TSH 2.1-0 free T4 0.93  12/29/2012 TSH 1.530  8/2010 TSH 2.680     Thyroid Uptake and Scan: 10/22/18-   1.  Abnormally high iodine uptake being 40.3% at 5 hours  2.  Homogeneous uptake throughout the right lobe  3.  Left lobe is probably absent and there is probably hypertrophy of the isthmus with associated uptake        Borderline vitamin D deficiency:   5/10/19-vitamin D 46.1-on vitamin D supplementation with sublingual drops and multivitamin  12/18-has not been taking vitamin D supplementation  10/19/2018- Vitamin D 20.0 - started on OTC Vitamin D gtt 5000 IU daily if tolerated encouraged dietary supplementation.    9/2015- 34.5  12/2012-13 0.4  [id number]/[id number] 0.2     Obesity: Patient has been successful in the past of losing 60 pounds despite regaining 20.  We will continue to monitor her weight and discuss further options in the future after stabilization of her thyroid.  5/19 weight 232 pounds  12/18 weight 245 pounds    5/10/2019 vitamin B 258- start supplement      Family history of diabetes: Patient has family history of diabetes with her sisters and also her father.  Most recent laboratory work from 2016 and 2018 did not display any signs of impaired fasting glucose at this time.     Labs:   5/10/2019 glucose 90, EGFR within normal limits,Total cholesterol 188, triglycerides 46, HDL 77, ,  9/5/2018 glucose 88, total cholesterol 166, triglycerides 48, HDL 75, LDL 81,  8/18/2016 glucose 91, total cholesterol 191, triglycerides 59, HDL 74,        Medications:  ROS:  Constitutional: No weight gain,  fever  HEENT: No difficulty with swallowing, change in voice, or swelling in throat area   Cardiac: No chest pain, palpitations, or racing heart  Resp: No shortness of breath  GI: No abdominal pain, nausea, vomiting, or diarrhea   Neuro: No numbness or tinging in feet  Endo: No heat or cold intolerance, no polyuria or polydipsia  All other detailed ROS is otherwise negative       Past Medical History:  Patient Active Problem List    Diagnosis Date Noted   • Knee pain 03/25/2019   • Hyperthyroidism 12/18/2018   • Obesity (BMI 30-39.9) 10/25/2017   • Anemia    • Obesity (BMI 35.0-39.9 without comorbidity) 11/22/2016   • History of colorectal cancer 03/02/2016   • Epigastric pain 04/04/2014   • Other and unspecified derangement of medial meniscus 03/08/2013   • Internal hemorrhoid 03/31/2011   • Vitamin D deficiency 08/05/2010   • History of anemia 08/05/2010   • Proteinuria 08/05/2010       Family Medical History:   Family History   Problem Relation Age of Onset   • Stroke Father         68yo   • Hypertension Father    • Heart Disease Father         70 yo   • Cancer Maternal Grandfather         Lung   • Diabetes Paternal Grandmother    • Hypertension Paternal Grandmother    • Stroke Paternal Grandmother         70 s   • Psychiatry Sister         Schizophrenia   • Psychiatry Brother         Schizophrenia   • Cancer Paternal Aunt         Bone, liver   • Psychiatry Mother         dental / mental illness    • Lung Disease Mother         Copd    • Psychiatry Sister         schizoprenia    • Diabetes Sister    • Hypertension Sister    • Psychiatry Brother    • Heart Disease Brother    • Hypertension Brother    • Hypertension Brother    • Diabetes Brother        Social History:   Social History     Social History   • Marital status: Single     Spouse name: N/A   • Number of children: N/A   • Years of education: N/A     Occupational History   • Not on file.  "    Social History Main Topics   • Smoking status: Never Smoker   • Smokeless tobacco: Never Used      Comment: significant 2nd hand exposure    • Alcohol use No   • Drug use: No   • Sexual activity: Yes     Partners: Female     Other Topics Concern   • Not on file     Social History Narrative    , no children.        Medications:    Current Outpatient Prescriptions:   •  fluticasone, 2 Spray, Nasal, DAILY, Taking  •  propylthiouracil, 50 mg, Oral, TID, Taking  •  meloxicam, 15 mg, Oral, DAILY (Patient not taking: Reported on 5/20/2019), Not Taking  •  propranolol, 10 mg, Oral, TID (Patient not taking: Reported on 5/20/2019), Not Taking    EXAM:  Vital signs: /80 (BP Location: Left arm, Patient Position: Sitting, BP Cuff Size: Large adult)   Pulse 73   Ht 1.676 m (5' 6\")   Wt 105.2 kg (232 lb)   SpO2 96%   BMI 37.45 kg/m²    General: No apparent distress, cooperative  Eyes: No scleral icterus, no discharge  Neck: Neg thyroid enlargement   Resp: Normal effort without any audible wheeze  Cardiology: RRR without murmur rub or gallop  Extremities: No lower extremity edema  Psych: Alert and oriented, normal mood and affect    Assessment and Plan:  1. Vitamin D deficiency  Chronic continue vitamin replacement    2. Obesity (BMI 35.0-39.9 without comorbidity)  Patient has been slowly losing weight.  She attributes this to diet and exercise she has been walking more.    3. Hyperthyroidism  I am happy to hear that she is feeling better.  Her numbers are improving slowly now that she is on the PTU and tolerating without any difficulties.  We again reviewed the side effect profile with regards to PTU including agranulocytosis and black box warning of liver failure.  Patient previously did not tolerate methimazole.   She is no longer needing to take the propanolol as previous secondary to decrease in symptoms.  At this point we will recheck her labs in approximately 4 weeks time.    Return in about 4 weeks " (around 6/17/2019).    Thank you for allowing me to participate in the care of this patient.    Ashley Santos P.A.-C.    CC:   Sofie Mathew M.D.    This note was created using voice recognition software (Dragon). The accuracy of the dictation is limited by the abilities of the software. I have reviewed the note prior to signing, however some errors in grammar and context are still possible. If you have any questions related to this note please do not hesitate to contact our office.

## 2019-05-28 ENCOUNTER — OFFICE VISIT (OUTPATIENT)
Dept: MEDICAL GROUP | Facility: PHYSICIAN GROUP | Age: 59
End: 2019-05-28
Payer: COMMERCIAL

## 2019-05-28 VITALS
HEIGHT: 66 IN | BODY MASS INDEX: 36.48 KG/M2 | HEART RATE: 90 BPM | RESPIRATION RATE: 16 BRPM | OXYGEN SATURATION: 97 % | DIASTOLIC BLOOD PRESSURE: 86 MMHG | WEIGHT: 227 LBS | TEMPERATURE: 97.8 F | SYSTOLIC BLOOD PRESSURE: 122 MMHG

## 2019-05-28 DIAGNOSIS — E05.90 HYPERTHYROIDISM: ICD-10-CM

## 2019-05-28 PROBLEM — E66.9 OBESITY (BMI 30-39.9): Status: RESOLVED | Noted: 2017-10-25 | Resolved: 2019-05-28

## 2019-05-28 PROCEDURE — 99214 OFFICE O/P EST MOD 30 MIN: CPT | Performed by: FAMILY MEDICINE

## 2019-05-28 RX ORDER — PROPYLTHIOURACIL 50 MG/1
50 TABLET ORAL 3 TIMES DAILY
Qty: 90 TAB | Refills: 1 | Status: SHIPPED | OUTPATIENT
Start: 2019-05-28 | End: 2019-08-23 | Stop reason: SDUPTHER

## 2019-05-28 NOTE — PROGRESS NOTES
Chief Complaint   Patient presents with   • Obesity     fv labs       HISTORY OF PRESENT ILLNESS: Patient is a 59 y.o. female established patient here today for the following concerns:    1. Hyperthyroidism  Here today for follow up on Graves disease.  She has bee on PTU for some time now.  She reports feeling more like herself lately.  Less palpitations, less hair loss and less anxiety.  She has been adherent to the PTU.  Most recent labs show return of her Free hormones to in range.  TSH is still suppressed and Ferritin is still quite high suggesting ongoing thyroid process.      2. BMI 36.0-36.9,adult  Continues to work on slow weight reduction.       Past Medical, Social, and Family history reviewed and updated in EPIC    Allergies:Iodine and Morphine    Current Outpatient Prescriptions   Medication Sig Dispense Refill   • propylthiouracil (PTU) 50 MG Tab Take 1 Tab by mouth 3 times a day. 90 Tab 1   • fluticasone (FLONASE) 50 MCG/ACT nasal spray Spray 2 Sprays in nose every day. Each Nostril 16 g 11     No current facility-administered medications for this visit.          ROS:  Review of Systems   Constitutional: Negative for fever, chills, weight loss and malaise/fatigue.   HENT: Negative for ear pain, nosebleeds, congestion, sore throat and neck pain.    Eyes: Negative for blurred vision.   Respiratory: Negative for cough, sputum production, shortness of breath and wheezing.    Cardiovascular: Negative for chest pain, palpitations,  and leg swelling.   Gastrointestinal: Negative for heartburn, nausea, vomiting, diarrhea and abdominal pain.   Genitourinary: Negative for dysuria, urgency and frequency.   Musculoskeletal: Negative for myalgias, back pain and joint pain.   Skin: Negative for rash and itching.   Neurological: Negative for dizziness, tingling, tremors, sensory change, focal weakness and headaches.   Endo/Heme/Allergies: Does not bruise/bleed easily.   Psychiatric/Behavioral: Negative for  "depression, anxiety, suicidal ideas, insomnia and memory loss.      Exam:  /86   Pulse 90   Temp 36.6 °C (97.8 °F)   Resp 16   Ht 1.676 m (5' 6\")   Wt 103 kg (227 lb)   SpO2 97%     General:  Well nourished, well developed in NAD  Head is grossly normal.  Neck: Supple without JVD   Pulmonary:  Normal effort.   Cardiovascular: Regular rate  Extremities: no clubbing, cyanosis, or edema.  Psych: affect appropriate      Please note that this dictation was created using voice recognition software. I have made every reasonable attempt to correct obvious errors, but I expect that there are errors of grammar and possibly content that I did not discover before finalizing the note.    Assessment/Plan:  1. Hyperthyroidism  Continue PTU, keep follow up with Endo, recheck labs just prior.   - TSH; Future  - FREE THYROXINE; Future  - TRIIDOTHYRONINE; Future  - propylthiouracil (PTU) 50 MG Tab; Take 1 Tab by mouth 3 times a day.  Dispense: 90 Tab; Refill: 1    2. BMI 36.0-36.9,adult  - Patient identified as having weight management issue.  Appropriate orders and counseling given.            "

## 2019-06-04 DIAGNOSIS — J01.90 SUBACUTE SINUSITIS, UNSPECIFIED LOCATION: ICD-10-CM

## 2019-06-04 DIAGNOSIS — R05.9 COUGH: ICD-10-CM

## 2019-06-04 RX ORDER — CEFDINIR 300 MG/1
300 CAPSULE ORAL 2 TIMES DAILY
Qty: 20 CAP | Refills: 0 | Status: SHIPPED | OUTPATIENT
Start: 2019-06-04 | End: 2019-06-14

## 2019-06-15 ENCOUNTER — OFFICE VISIT (OUTPATIENT)
Dept: URGENT CARE | Facility: CLINIC | Age: 59
End: 2019-06-15
Payer: COMMERCIAL

## 2019-06-15 ENCOUNTER — APPOINTMENT (OUTPATIENT)
Dept: RADIOLOGY | Facility: MEDICAL CENTER | Age: 59
End: 2019-06-15
Attending: EMERGENCY MEDICINE
Payer: COMMERCIAL

## 2019-06-15 ENCOUNTER — HOSPITAL ENCOUNTER (EMERGENCY)
Facility: MEDICAL CENTER | Age: 59
End: 2019-06-15
Attending: EMERGENCY MEDICINE
Payer: COMMERCIAL

## 2019-06-15 VITALS
HEART RATE: 112 BPM | HEIGHT: 66 IN | TEMPERATURE: 97.2 F | BODY MASS INDEX: 36.48 KG/M2 | SYSTOLIC BLOOD PRESSURE: 114 MMHG | DIASTOLIC BLOOD PRESSURE: 70 MMHG | WEIGHT: 227 LBS | OXYGEN SATURATION: 97 %

## 2019-06-15 VITALS
HEART RATE: 91 BPM | OXYGEN SATURATION: 96 % | HEIGHT: 66 IN | DIASTOLIC BLOOD PRESSURE: 84 MMHG | SYSTOLIC BLOOD PRESSURE: 124 MMHG | BODY MASS INDEX: 37.2 KG/M2 | RESPIRATION RATE: 18 BRPM | TEMPERATURE: 97.7 F | WEIGHT: 231.48 LBS

## 2019-06-15 DIAGNOSIS — S86.911A STRAIN OF RIGHT KNEE AND LEG, INITIAL ENCOUNTER: ICD-10-CM

## 2019-06-15 DIAGNOSIS — M79.661 RIGHT CALF PAIN: ICD-10-CM

## 2019-06-15 DIAGNOSIS — M25.561 ACUTE PAIN OF RIGHT KNEE: ICD-10-CM

## 2019-06-15 DIAGNOSIS — M79.89 PAIN AND SWELLING OF RIGHT LOWER LEG: ICD-10-CM

## 2019-06-15 DIAGNOSIS — M79.661 PAIN AND SWELLING OF RIGHT LOWER LEG: ICD-10-CM

## 2019-06-15 PROCEDURE — 99284 EMERGENCY DEPT VISIT MOD MDM: CPT

## 2019-06-15 PROCEDURE — 99213 OFFICE O/P EST LOW 20 MIN: CPT | Performed by: PHYSICIAN ASSISTANT

## 2019-06-15 PROCEDURE — 93971 EXTREMITY STUDY: CPT | Mod: RT

## 2019-06-15 PROCEDURE — 73562 X-RAY EXAM OF KNEE 3: CPT | Mod: RT

## 2019-06-15 PROCEDURE — 700102 HCHG RX REV CODE 250 W/ 637 OVERRIDE(OP): Performed by: EMERGENCY MEDICINE

## 2019-06-15 PROCEDURE — A9270 NON-COVERED ITEM OR SERVICE: HCPCS | Performed by: EMERGENCY MEDICINE

## 2019-06-15 RX ORDER — ACETAMINOPHEN 500 MG
1000 TABLET ORAL ONCE
Status: COMPLETED | OUTPATIENT
Start: 2019-06-15 | End: 2019-06-15

## 2019-06-15 RX ADMIN — ACETAMINOPHEN 1000 MG: 500 TABLET, FILM COATED ORAL at 19:14

## 2019-06-15 ASSESSMENT — ENCOUNTER SYMPTOMS
SORE THROAT: 0
TINGLING: 0
NAUSEA: 0
VOMITING: 0
SHORTNESS OF BREATH: 0
SENSORY CHANGE: 0
NUMBNESS: 0
BLURRED VISION: 0
WEAKNESS: 0
FEVER: 0
PALPITATIONS: 0
CHILLS: 0

## 2019-06-16 ENCOUNTER — PATIENT MESSAGE (OUTPATIENT)
Dept: MEDICAL GROUP | Facility: PHYSICIAN GROUP | Age: 59
End: 2019-06-16

## 2019-06-16 DIAGNOSIS — R29.898 WEAKNESS OF LOWER EXTREMITY, UNSPECIFIED LATERALITY: ICD-10-CM

## 2019-06-16 NOTE — PROGRESS NOTES
Subjective:      Evita Mckenna is a 59 y.o. female who presents with Knee Pain (x1 day. Rt knee pain.)      Knee Pain   This is a new problem. The current episode started today. The problem occurs constantly. The problem has been gradually worsening. Pertinent negatives include no chest pain, chills, fever, nausea, numbness, sore throat, vomiting or weakness. The symptoms are aggravated by standing and walking. She has tried nothing for the symptoms.       Review of Systems   Constitutional: Negative for chills and fever.   HENT: Negative for sore throat.    Eyes: Negative for blurred vision.   Respiratory: Negative for shortness of breath.    Cardiovascular: Negative for chest pain and palpitations.   Gastrointestinal: Negative for nausea and vomiting.   Musculoskeletal: Positive for joint pain (Right knee/calf).   Neurological: Negative for tingling, sensory change, weakness and numbness.       PMH:  has a past medical history of Allergy; Anemia; Colorectal cancer (HCC) (April 2011); GERD (gastroesophageal reflux disease); Hyperthyroidism (12/18/2018); and Migraine. She also has no past medical history of Addisons disease (HCC); Adrenal disorder (HCC); Anxiety; Blood transfusion; Breast cancer (HCC); CHF (congestive heart failure) (HCC); Clotting disorder (HCC); COPD; Cushings syndrome (HCC); Depression; Goiter; Heart attack (HCC); HIV (human immunodeficiency virus infection); Hyperlipidemia; IBD (inflammatory bowel disease); Kidney disease; Muscle disorder; Parathyroid disorder (HCC); Pituitary disease (HCC); Substance abuse (HCC); or Ulcer.  MEDS:   Current Outpatient Prescriptions:   •  Multiple Vitamin (MULTIVITAMINS PO), Take  by mouth., Disp: , Rfl:   •  propylthiouracil (PTU) 50 MG Tab, Take 1 Tab by mouth 3 times a day., Disp: 90 Tab, Rfl: 1  •  fluticasone (FLONASE) 50 MCG/ACT nasal spray, Spray 2 Sprays in nose every day. Each Nostril, Disp: 16 g, Rfl: 11  ALLERGIES:   Allergies   Allergen Reactions  "  • Iodine    • Morphine Itching     SURGHX:   Past Surgical History:   Procedure Laterality Date   • KNEE ARTHROSCOPY  3/8/2013    Performed by Kenji Meek M.D. at SURGERY [facility]   • MEDIAL MENISCECTOMY  3/8/2013    Performed by Kenji Meek M.D. at SURGERY [facility]   • JOINT INJECTION DIAGNOSTIC  3/8/2013    Performed by Kenji Meek M.D. at [facility]   • GASTRIC BANDING LAPAROSCOPIC  2005   • ABDOMINAL HYSTERECTOMY TOTAL  2003    For menorrhagia, no BSO   • HERNIA REPAIR      child     SOCHX:  reports that she has never smoked. She has never used smokeless tobacco. She reports that she does not drink alcohol or use drugs.  FH: Family history was reviewed, no pertinent findings to report     Objective:     /70   Pulse (!) 112   Temp 36.2 °C (97.2 °F)   Ht 1.676 m (5' 6\")   Wt 103 kg (227 lb)   SpO2 97%   BMI 36.64 kg/m²      Physical Exam   Constitutional: She is oriented to person, place, and time. She appears well-developed and well-nourished.   HENT:   Head: Normocephalic and atraumatic.   Right Ear: External ear normal.   Left Ear: External ear normal.   Eyes: Pupils are equal, round, and reactive to light. Conjunctivae are normal.   Cardiovascular: Normal rate, regular rhythm and normal heart sounds.    No murmur heard.  Pulmonary/Chest: Effort normal and breath sounds normal. She has no wheezes.   Musculoskeletal:   Right calf is moderately to severely TTP especially proximally.  Right knee exhibits minimal TTP in the anterior aspect.  Limited AROM of right knee due to pain.  Right lower leg is mildly swollen as compared to the left lower leg.   Neurological: She is alert and oriented to person, place, and time.   Skin: Skin is warm and dry. Capillary refill takes less than 2 seconds.   Psychiatric: She has a normal mood and affect. Her behavior is normal. Judgment normal.          Assessment/Plan:     1. Right calf pain    2. Acute " pain of right knee    3. Pain and swelling of right lower leg      Discussed with patient that while this could be some, muscle tear the fact that she has tenderness to palpation in her calf and the fact that the pain in her calf increases with walking means that I cannot rule out a blood clot.  Advised her to go to the ER for ultrasound of her right lower extremity.  Patient agrees, her friend is going to drive her.

## 2019-06-16 NOTE — ED NOTES
Discharge instructions given and discussed. Pt educated to come back to ER for new or worsening symptoms and follow up with PCP and ortho as instructed. Pt verbalized understanding. VSS. Pt  Discharged in stable condition.

## 2019-06-16 NOTE — ED TRIAGE NOTES
Pt ambulates to triage with slow limping gait, alert and oriented.    Chief Complaint   Patient presents with   • Leg Pain     pt c/o continued left leg pain that radiated from knee to lower leg as she reached for something at the grocery store a few hours ago. UC directed her to ED.       Pt states she took 2 ibuprofen at home about an hour ago with no relief. Pt states she has had similar pain in left leg recently and had imaging done.

## 2019-06-16 NOTE — ED PROVIDER NOTES
"ED Provider Note    CHIEF COMPLAINT  Chief Complaint   Patient presents with   • Leg Pain     pt c/o continued left leg pain that radiated from knee to lower leg as she reached for something at the grocery store a few hours ago. UC directed her to ED.       HPI  Evita Mckenna is a 59 y.o. female who presents from urgent care for evaluation of right leg pain, sent here to rule out DVT.  She injured her leg, she states that she stepped on her leg awkwardly and has pain from her knee down to her ankle.  No history of DVT.  No swelling, no weakness or numbness or tingling.  No other injuries and no other complaints.    REVIEW OF SYSTEMS  Negative for fever, weakness, numbness.    PAST MEDICAL HISTORY   has a past medical history of Allergy; Anemia; Colorectal cancer (HCC) (April 2011); GERD (gastroesophageal reflux disease); Hyperthyroidism (12/18/2018); and Migraine.    SOCIAL HISTORY  Social History     Social History Main Topics   • Smoking status: Never Smoker   • Smokeless tobacco: Never Used      Comment: significant 2nd hand exposure    • Alcohol use No   • Drug use: No   • Sexual activity: Yes     Partners: Female       SURGICAL HISTORY   has a past surgical history that includes abdominal hysterectomy total (2003); gastric banding laparoscopic (2005); hernia repair; knee arthroscopy (3/8/2013); medial meniscectomy (3/8/2013); and joint injection diagnostic (3/8/2013).    CURRENT MEDICATIONS  I personally reviewed the medication list in the charting documentation.     ALLERGIES  Allergies   Allergen Reactions   • Iodine    • Morphine Itching       PHYSICAL EXAM  VITAL SIGNS: /84   Pulse (!) 107   Temp 37.1 °C (98.7 °F) (Temporal)   Resp 18   Ht 1.676 m (5' 6\")   Wt 105 kg (231 lb 7.7 oz)   SpO2 96%   BMI 37.36 kg/m²   Constitutional: Alert in no apparent distress.  HENT: No signs of trauma.   Eyes: Conjunctiva normal, Non-icteric.   Chest: Normal nonlabored respirations  Skin: No erythema, No " rash.   Musculoskeletal: Inspection of lower extremities reveals no asymmetry, there is no asymmetric edema or erythema.  She does have tenderness of the right calf but neurovascularly intact distally.  Neurologic: Alert, No focal deficits noted.   Psychiatric: Affect normal, Judgment normal.    DIAGNOSTIC STUDIES / PROCEDURES    RADIOLOGY  US-EXTREMITY VENOUS LOWER UNILAT RIGHT   Final Result         1. No evidence of right lower extremity deep venous thrombosis.      DX-KNEE 3 VIEWS RIGHT   Final Result      No evidence of acute fracture or dislocation.      Moderate degenerative changes as above described.                  COURSE & MEDICAL DECISION MAKING  Pertinent Labs & Imaging studies reviewed. (See chart for details)    Encounter Summary: This is a 59 y.o. female with a right leg injury sustained earlier today, sent here by urgent care for a rule out of a DVT, this seems unlikely but will rule it out with a duplex ultrasound.  Additionally when x-ray her knee at the seems of the pain began at the lateral aspect of the knee.  In the absence of any abnormalities on the x-ray or duplex study, the patient will be discharged to follow-up with her primary care physician I will go over strict return instructions.      DISPOSITION: Discharge Home      FINAL IMPRESSION  1. Strain of right knee and leg, initial encounter        This dictation was created using voice recognition software. The accuracy of the dictation is limited to the abilities of the software. I expect there may be some errors of grammar and possibly content. The nursing notes were reviewed and certain aspects of this information were incorporated into this note.    Electronically signed by: Gil Huffman, 6/15/2019 7:00 PM

## 2019-06-19 ENCOUNTER — PATIENT MESSAGE (OUTPATIENT)
Dept: MEDICAL GROUP | Facility: PHYSICIAN GROUP | Age: 59
End: 2019-06-19

## 2019-06-19 DIAGNOSIS — F40.240 CLAUSTROPHOBIA: ICD-10-CM

## 2019-06-19 RX ORDER — DIAZEPAM 10 MG/1
10 TABLET ORAL
Qty: 1 TAB | Refills: 0 | Status: SHIPPED
Start: 2019-06-19 | End: 2019-06-20

## 2019-06-19 NOTE — TELEPHONE ENCOUNTER
From: Evita Mckenna  To: Sofie Mathew M.D.  Sent: 6/19/2019 2:47 PM PDT  Subject: Test Result Question     I am scheduled for an MRI on July 1st. I am also claustrophobic. They told me I will need some Xanax or Valium to calm me. Ok please prescribe. And can I take this with my PTU? I don't want any issues. They told me I have to get my whole body in the machine. I hope I can do this. I am very nervous thinking about this. Am I able to do the CT? Will that work ? It's open right? Sorry just get anxious since I had a PET scan years ago.     ----- Message -----  From: Sofie Mathew M.D.  Sent: 6/18/19, 2:38 PM  To: Evita Mckenna  Subject: RE: Test Result Question    Mason Jama. Lets get an MRI on the lumbar back. This is a nerve issue coming from the low back. Please call [phone] to get it scheduled.     Sofie    ----- Message -----   From: Evita Mckenna   Sent: 6/16/2019 5:20 AM PDT   To: Sofie Mathew M.D.  Subject: Test Result Question    My leg gave out while shopping. I couldn't put any weight on it. Went to emergency . They did same test...ultrasound & X-ray . ok we know what it is not. I left there in more pain then when I came in. I can't really put weight on it. I have sharp pains going up right outside and middle back of leg from thigh to ankle. Emergency  gave me 1000 mg of Tylenol and sent me on my way. I had to get a wheel chair out. This is not good. Thoughts or recommendations? Appreciate it.

## 2019-06-24 ENCOUNTER — PATIENT MESSAGE (OUTPATIENT)
Dept: MEDICAL GROUP | Facility: PHYSICIAN GROUP | Age: 59
End: 2019-06-24

## 2019-06-25 NOTE — PATIENT COMMUNICATION
Team please call Radiology and see what is holding up authorization.  In my request for MRI - I ordered MRI of the lumbar spine for leg weakness and pain.

## 2019-06-25 NOTE — PATIENT COMMUNICATION
Patient was seen 4/17/19 for the same issue and conservative measures were being instituted.  Please inform Kaitlin that she can use those notes for authorization as well.    Sofie

## 2019-06-25 NOTE — PATIENT COMMUNICATION
I called scheduling for imaging.  Was transferred to Kaitlin ([phone]), who is working on the authorization for the MRI.  She states that the MRI's usually take about 3-7 business days for authorizations.  The insurances usually want clinicals.  They usually also look that the patient has had 6 weeks of consecutive care.  Physical therapy, etc...  Kaitlin is waiting for the notes for her upcoming visit so she can sent the clinical notes to the insurance.  Depending on what the insurance says, they might approve it right away, or they might ask for a peer to peer.  She will not know until the insurance responds.  Once the patient has been seen and notes have been completed, please advise.  We will need to contact Kaitlin at ext: [phone] and let her know so she can send the clinicals over to the insurance.  Thank you.

## 2019-06-25 NOTE — TELEPHONE ENCOUNTER
From: Evita Mckenna  To: Sofie Mathew M.D.  Sent: 6/24/2019 2:38 PM PDT  Subject: Test Result Question    SHAHAB Solomon [facility] and am being told I may not get MRI cause the emergency Dr coded this as knee pain chief complaint. And the test showed no issues and you want them to look at my back and not the leg or knee. I told the Dr on staff I was having pain from up and down my leg from my ankle to my thigh. He didn't listen. So now you have to see me and all over again about this same issue. They said my insurance will then need 5 days for approval so it may be postponed or denied. Hmm I not sure what else to do. I have seen Dr MERCER, you, urgent care and emc with no resolution. Just venting something wrong with the system.    ----- Message -----  From: Sofie Mathew M.D.  Sent: 6/19/19, 2:55 PM  To: Evita Mckenna  Subject: RE: Test Result Question    You got it. I'm sending in a valium for it. You can take it with the PTU.    Sofie    ----- Message -----   From: Evita Mckenna   Sent: 6/19/2019 2:47 PM PDT   To: Sofie Mathew M.D.  Subject: Test Result Question     I am scheduled for an MRI on July 1st. I am also claustrophobic. They told me I will need some Xanax or Valium to calm me. Ok please prescribe. And can I take this with my PTU? I don't want any issues. They told me I have to get my whole body in the machine. I hope I can do this. I am very nervous thinking about this. Am I able to do the CT? Will that work ? It's open right? Sorry just get anxious since I had a PET scan years ago.     ----- Message -----  From: Sofie Mathew M.D.  Sent: 6/18/19, 2:38 PM  To: Evita Mckenna  Subject: RE: Test Result Question    Mason Jama. Lets get an MRI on the lumbar back. This is a nerve issue coming from the low back. Please call [phone] to get it scheduled.     Sofie    ----- Message -----   From: Evita Mckenna   Sent: 6/16/2019 5:20 AM PDT   To: Sofie Mathew M.D.  Subject: Test Result  Question    My leg gave out while shopping. I couldn't put any weight on it. Went to emergency . They did same test...ultrasound & X-ray . ok we know what it is not. I left there in more pain then when I came in. I can't really put weight on it. I have sharp pains going up right outside and middle back of leg from thigh to ankle. Emergency Dr gave me 1000 mg of Tylenol and sent me on my way. I had to get a wheel chair out. This is not good. Thoughts or recommendations? Appreciate it.

## 2019-06-27 ENCOUNTER — OFFICE VISIT (OUTPATIENT)
Dept: MEDICAL GROUP | Facility: PHYSICIAN GROUP | Age: 59
End: 2019-06-27
Payer: COMMERCIAL

## 2019-06-27 ENCOUNTER — TELEPHONE (OUTPATIENT)
Dept: MEDICAL GROUP | Facility: PHYSICIAN GROUP | Age: 59
End: 2019-06-27

## 2019-06-27 VITALS
OXYGEN SATURATION: 95 % | BODY MASS INDEX: 36.48 KG/M2 | TEMPERATURE: 97.3 F | SYSTOLIC BLOOD PRESSURE: 114 MMHG | RESPIRATION RATE: 16 BRPM | HEIGHT: 66 IN | DIASTOLIC BLOOD PRESSURE: 62 MMHG | WEIGHT: 227 LBS | HEART RATE: 92 BPM

## 2019-06-27 DIAGNOSIS — M54.16 LUMBAR RADICULOPATHY, CHRONIC: ICD-10-CM

## 2019-06-27 DIAGNOSIS — M79.604 PAIN OF RIGHT LOWER EXTREMITY: ICD-10-CM

## 2019-06-27 DIAGNOSIS — E05.90 HYPERTHYROIDISM: ICD-10-CM

## 2019-06-27 DIAGNOSIS — M79.604 RIGHT LEG PAIN: ICD-10-CM

## 2019-06-27 DIAGNOSIS — M54.16 LUMBAR RADICULOPATHY: ICD-10-CM

## 2019-06-27 PROCEDURE — 99214 OFFICE O/P EST MOD 30 MIN: CPT | Performed by: FAMILY MEDICINE

## 2019-06-27 NOTE — TELEPHONE ENCOUNTER
Kaitlin (ext: [phone]) at imaging notified.  She states that it is pending. The records from today have been submitted and she is hoping to hear something back today.  Just an FYI, it can take up to 5 business days.

## 2019-06-27 NOTE — PROGRESS NOTES
Chief Complaint   Patient presents with   • Leg Pain     rt leg pain       HISTORY OF PRESENT ILLNESS: Patient is a 59 y.o. female established patient here today for the following concerns:    1. Right leg pain  2. Lumbar radiculopathy, chronic    Here today with nearly 1 year of right sided leg pain described as aching, burning and shooting in the posterior thigh, lateral lower leg and down to the ankle.  Some right sided low back pain at times.  Feels like the leg will give out.  No specific injuries to it.  Had US down twice now to r/o DVT, which has been ruled out.  She had xray of the knee which has been shown to have some mild OA.  She reports the right leg has been feeling weak.  Worsens going from sitting to standing.  Now getting some cramping sensation into the medial thigh on the right as well.  No saddle anesthesia or bowel or bladder incontinence/changes.  Taking ibuprofen with minimal relief.     Still working on hyperthyroidism.  On PTU, last check numbers were coming back down.      Past Medical, Social, and Family history reviewed and updated in EPIC    Allergies:Iodine and Morphine    Current Outpatient Prescriptions   Medication Sig Dispense Refill   • Multiple Vitamin (MULTIVITAMINS PO) Take  by mouth.     • propylthiouracil (PTU) 50 MG Tab Take 1 Tab by mouth 3 times a day. 90 Tab 1   • fluticasone (FLONASE) 50 MCG/ACT nasal spray Spray 2 Sprays in nose every day. Each Nostril 16 g 11     No current facility-administered medications for this visit.          ROS:  Review of Systems   Constitutional: Negative for fever, chills, weight loss and malaise/fatigue.   HENT: Negative for ear pain, nosebleeds, congestion, sore throat and neck pain.    Eyes: Negative for blurred vision.   Respiratory: Negative for cough, sputum production, shortness of breath and wheezing.    Cardiovascular: Negative for chest pain, palpitations,  and leg swelling.   Gastrointestinal: Negative for heartburn, nausea,  "vomiting, diarrhea and abdominal pain.   Genitourinary: Negative for dysuria, urgency and frequency.   Musculoskeletal: Negative for myalgias, +back pain and + joint pain.   Skin: Negative for rash and itching.   Neurological: Negative for dizziness, tingling, tremors, sensory change, +focal weakness and no headaches.   Endo/Heme/Allergies: Does not bruise/bleed easily.   Psychiatric/Behavioral: Negative for depression, anxiety, suicidal ideas, insomnia and memory loss.      Exam:  /62   Pulse 92   Temp 36.3 °C (97.3 °F)   Resp 16   Ht 1.676 m (5' 6\")   Wt 103 kg (227 lb)   SpO2 95%     General:  Well nourished, well developed in NAD  Head is grossly normal.  Neck: Supple without JVD   Pulmonary:  Normal effort.   Cardiovascular: Regular rate  Extremities: no clubbing, cyanosis, or edema.  Psych: affect appropriate  MSK: non-tender to palpation of the right leg.  No swelling appreciated.  No palpable cords.  + straight leg testing on the right.      Please note that this dictation was created using voice recognition software. I have made every reasonable attempt to correct obvious errors, but I expect that there are errors of grammar and possibly content that I did not discover before finalizing the note.    Assessment/Plan:  1. Right leg pain  2. Lumbar radiculopathy, chronic  Suspect lumbar radiculopathy, possible L4-L5 disc herniation  Will obtain MRI of the lumbar spine as she has failed conservative management with anti-inflammatories, home PT, and time.   Offered gabapentin, politely declines until we understand better where the issue might be.     3. Hyperthyroidism  Recheck thyroid levels in the next 4-6 weeks.         "

## 2019-06-27 NOTE — TELEPHONE ENCOUNTER
----- Message from Sofie Mathew M.D. sent at 6/27/2019  7:50 AM PDT -----  Please forward today's notes to imaging auth for her MRI of the lumbar spine.

## 2019-07-01 ENCOUNTER — APPOINTMENT (OUTPATIENT)
Dept: RADIOLOGY | Facility: MEDICAL CENTER | Age: 59
End: 2019-07-01
Attending: FAMILY MEDICINE
Payer: COMMERCIAL

## 2019-07-02 NOTE — TELEPHONE ENCOUNTER
1. Caller Name: Evita       Call Back Number: [phone] (home)         Patient approves a detailed voicemail message: N\A    Spoke with pt, notified them that they are being referred to physiatry since insurance denied MRI. Pt stated they are perfectly fine with that as they get claustrophobic while in the MRI machine.

## 2019-07-11 LAB
T3 SERPL-MCNC: 144 NG/DL (ref 71–180)
T4 FREE SERPL-MCNC: 0.9 NG/DL (ref 0.82–1.77)
TSH SERPL DL<=0.005 MIU/L-ACNC: 0.01 UIU/ML (ref 0.45–4.5)

## 2019-07-12 ENCOUNTER — APPOINTMENT (OUTPATIENT)
Dept: ENDOCRINOLOGY | Facility: MEDICAL CENTER | Age: 59
End: 2019-07-12
Payer: COMMERCIAL

## 2019-07-12 NOTE — PROGRESS NOTES
"Endocrinology Clinic Progress Note    CC:     HPI:  Evita Mckenna is a 59 y.o. old patient who comes in today for evaluation of the followin. Vitamin D deficiency  Patient is on Vitamin D replacement   5/10/19-vitamin D 46.1-on vitamin D supplementation with sublingual drops and multivitamin      2. Obesity (BMI 35.0-39.9 without comorbidity)  Weight 232 today   Patient is watching diet now.   She is down 10 lbs since last evaluation     3. Hyperthyroidism  PTU 50 mg TID  (started consist for the last 3-4 weeks). Patient has not had any side effects.     Denies palpations, shaking, dizziness.     Patient still complains of fatigue all the time \"but improved\". She is concentrating on weight. She feels better without above symptoms any longer. Since her last evaluation she has been seen multiple times secondary to upper respiratory infection otitis, bronchitis etc. she is now feeling better however continues with a dry cough on occasion.    5/10/2019- TSH <0.006, FT4 1.25, T3 174 - PTU 50mg TID     Endocrinology history to date:   Subclinical hyperthyroidism:   - started PTU 50 mg TID   18- replaced methimazole with PTU side effects discussed   18- went to ER secondary to Gastritis related to Methimazole.   18- on average Methimazole 1 Tablet daily  - start on Methimazole 5 mg TID repeat labs now and then again 4 weeks   10/2018- unable to tolerate Methimazole at 10 mg TID.   10/2018 methimazole 30 mg daily (started 2018)     Thyroid Ultrasound   2018 the thyroid is heterogeneous vascularity is normal.  The right lobe of the thyroid measures 1.78 cm x 4.56 cm x 1.8 cm.  There is no discrete nodules.  The left thyroid lobe is not visualized.  The isthmus is measuring 0.35.  Mildly predominant node in the left of the neck.  There is absent and/or hypoplastic left lower lobe.     Labs:  ER labs 2018 WBC within normal limits, Chemistry within normal limits, ALT " 28, AST slightly elevated at 46, TSH less than 0.006 free T4 1.31 free T3 4.8       5/10/2019- TSH <0.006, FT4 1.25, T3 174 - PTU 50mg TID   4/17/2019 TSH less than 0.006, free T4 2.12, T3 331 started PTU shortly after   11/26/2018- <0.006 FT3 4.8, FT4 1.31 (Methimazole 5mg daily)   10/19/2018- FT3 4.9, T3 231, Ft4- 1.24   9/18/2018 TSH less than 0.006 free T4 1.51 free T3 4.8 thyroglobulin antibody less than 1.0 thyroglobulin 23.0  8/18/2016 TSH 1.840  9/29/2015 TSH 2.1-0 free T4 0.93  12/29/2012 TSH 1.530  8/2010 TSH 2.680     Thyroid Uptake and Scan: 10/22/18-   1.  Abnormally high iodine uptake being 40.3% at 5 hours  2.  Homogeneous uptake throughout the right lobe  3.  Left lobe is probably absent and there is probably hypertrophy of the isthmus with associated uptake        Borderline vitamin D deficiency:   5/10/19-vitamin D 46.1-on vitamin D supplementation with sublingual drops and multivitamin  12/18-has not been taking vitamin D supplementation  10/19/2018- Vitamin D 20.0 - started on OTC Vitamin D gtt 5000 IU daily if tolerated encouraged dietary supplementation.    9/2015- 34.5  12/2012-13 0.4  8/[id number] 0.2     Obesity: Patient has been successful in the past of losing 60 pounds despite regaining 20.  We will continue to monitor her weight and discuss further options in the future after stabilization of her thyroid.  5/19 weight 232 pounds  12/18 weight 245 pounds    5/10/2019 vitamin B 258- start supplement      Family history of diabetes: Patient has family history of diabetes with her sisters and also her father.  Most recent laboratory work from 2016 and 2018 did not display any signs of impaired fasting glucose at this time.     Labs:   5/10/2019 glucose 90, EGFR within normal limits,Total cholesterol 188, triglycerides 46, HDL 77, ,  9/5/2018 glucose 88, total cholesterol 166, triglycerides 48, HDL 75, LDL 81,  8/18/2016 glucose 91, total cholesterol 191, triglycerides 59, HDL 74,        Medications:  ROS:  Constitutional: No weight gain,  fever  HEENT: No difficulty with swallowing, change in voice, or swelling in throat area   Cardiac: No chest pain, palpitations, or racing heart  Resp: No shortness of breath  GI: No abdominal pain, nausea, vomiting, or diarrhea   Neuro: No numbness or tinging in feet  Endo: No heat or cold intolerance, no polyuria or polydipsia  All other detailed ROS is otherwise negative       Past Medical History:  Patient Active Problem List    Diagnosis Date Noted   • Knee pain 03/25/2019   • Hyperthyroidism 12/18/2018   • History of colorectal cancer 03/02/2016   • Epigastric pain 04/04/2014   • Other and unspecified derangement of medial meniscus 03/08/2013   • Internal hemorrhoid 03/31/2011   • Vitamin D deficiency 08/05/2010   • History of anemia 08/05/2010   • Proteinuria 08/05/2010       Family Medical History:   Family History   Problem Relation Age of Onset   • Stroke Father         70yo   • Hypertension Father    • Heart Disease Father         68 yo   • Cancer Maternal Grandfather         Lung   • Diabetes Paternal Grandmother    • Hypertension Paternal Grandmother    • Stroke Paternal Grandmother         70 s   • Psychiatry Sister         Schizophrenia   • Psychiatry Brother         Schizophrenia   • Cancer Paternal Aunt         Bone, liver   • Psychiatry Mother         dental / mental illness    • Lung Disease Mother         Copd    • Psychiatry Sister         schizoprenia    • Diabetes Sister    • Hypertension Sister    • Psychiatry Brother    • Heart Disease Brother    • Hypertension Brother    • Hypertension Brother    • Diabetes Brother        Social History:   Social History     Social History   • Marital status: Single     Spouse name: N/A   • Number of children: N/A   • Years of education: N/A     Occupational History   • Not on file.     Social History Main Topics   • Smoking status: Never Smoker   • Smokeless tobacco: Never Used      Comment:  significant 2nd hand exposure    • Alcohol use No   • Drug use: No   • Sexual activity: Yes     Partners: Female     Other Topics Concern   • Not on file     Social History Narrative    , no children.        Medications:    Current Outpatient Prescriptions:   •  Multiple Vitamin (MULTIVITAMINS PO), Take  by mouth., Taking  •  propylthiouracil, 50 mg, Oral, TID, Taking  •  fluticasone, 2 Spray, Nasal, DAILY, Taking    EXAM:  Vital signs: There were no vitals taken for this visit.  General: No apparent distress, cooperative  Eyes: No scleral icterus, no discharge  Neck: Neg thyroid enlargement   Resp: Normal effort without any audible wheeze  Cardiology: RRR without murmur rub or gallop  Extremities: No lower extremity edema  Psych: Alert and oriented, normal mood and affect    Assessment and Plan:  1. Vitamin D deficiency  Chronic continue vitamin replacement    2. Obesity (BMI 35.0-39.9 without comorbidity)  Patient has been slowly losing weight.  She attributes this to diet and exercise she has been walking more.    3. Hyperthyroidism  I am happy to hear that she is feeling better.  Her numbers are improving slowly now that she is on the PTU and tolerating without any difficulties.  We again reviewed the side effect profile with regards to PTU including agranulocytosis and black box warning of liver failure.  Patient previously did not tolerate methimazole.   She is no longer needing to take the propanolol as previous secondary to decrease in symptoms.  At this point we will recheck her labs in approximately 4 weeks time.    No Follow-up on file.    Thank you for allowing me to participate in the care of this patient.    Ashley Santos P.A.-C.    CC:   Sofie Mathew M.D.    This note was created using voice recognition software (Dragon). The accuracy of the dictation is limited by the abilities of the software. I have reviewed the note prior to signing, however some errors in grammar and context are  still possible. If you have any questions related to this note please do not hesitate to contact our office.

## 2019-07-19 ENCOUNTER — TELEPHONE (OUTPATIENT)
Dept: MEDICAL GROUP | Facility: PHYSICIAN GROUP | Age: 59
End: 2019-07-19

## 2019-07-19 DIAGNOSIS — M54.16 LUMBAR RADICULOPATHY, RIGHT: ICD-10-CM

## 2019-07-19 NOTE — TELEPHONE ENCOUNTER
----- Message from Evita Mckenna sent at 2019 10:20 PM PDT -----  Regarding: Procedure Question  Contact: [phone]  Andrew Solomon will you resubmit my physical therapy referral. Per the office it has . Thank you.

## 2019-07-22 ENCOUNTER — OFFICE VISIT (OUTPATIENT)
Dept: PHYSICAL MEDICINE AND REHAB | Facility: MEDICAL CENTER | Age: 59
End: 2019-07-22
Payer: COMMERCIAL

## 2019-07-22 VITALS
HEIGHT: 66 IN | SYSTOLIC BLOOD PRESSURE: 122 MMHG | BODY MASS INDEX: 37.63 KG/M2 | TEMPERATURE: 97.2 F | WEIGHT: 234.13 LBS | OXYGEN SATURATION: 97 % | DIASTOLIC BLOOD PRESSURE: 68 MMHG | HEART RATE: 82 BPM

## 2019-07-22 DIAGNOSIS — M25.571 RIGHT ANKLE PAIN, UNSPECIFIED CHRONICITY: ICD-10-CM

## 2019-07-22 DIAGNOSIS — M54.50 LUMBOSACRAL PAIN: ICD-10-CM

## 2019-07-22 DIAGNOSIS — G62.9 NEUROPATHY: ICD-10-CM

## 2019-07-22 DIAGNOSIS — M79.2 NERVE PAIN: ICD-10-CM

## 2019-07-22 DIAGNOSIS — M79.5 FOREIGN BODY (FB) IN SOFT TISSUE: ICD-10-CM

## 2019-07-22 DIAGNOSIS — M79.671 RIGHT FOOT PAIN: ICD-10-CM

## 2019-07-22 DIAGNOSIS — M25.561 RIGHT KNEE PAIN, UNSPECIFIED CHRONICITY: ICD-10-CM

## 2019-07-22 DIAGNOSIS — M79.18 MYOFASCIAL PAIN: ICD-10-CM

## 2019-07-22 DIAGNOSIS — M54.16 LUMBAR RADICULITIS: ICD-10-CM

## 2019-07-22 DIAGNOSIS — M17.10 ARTHRITIS OF KNEE: ICD-10-CM

## 2019-07-22 PROCEDURE — 99204 OFFICE O/P NEW MOD 45 MIN: CPT | Performed by: PHYSICAL MEDICINE & REHABILITATION

## 2019-07-22 RX ORDER — IBUPROFEN 200 MG
200 TABLET ORAL EVERY 6 HOURS PRN
Status: ON HOLD | COMMUNITY
End: 2020-01-08

## 2019-07-22 ASSESSMENT — ENCOUNTER SYMPTOMS
PHOTOPHOBIA: 0
CHILLS: 0
ORTHOPNEA: 0
VOMITING: 0
PALPITATIONS: 0
EYE PAIN: 0
BACK PAIN: 1
FEVER: 0
TINGLING: 1
SENSORY CHANGE: 1
HEMOPTYSIS: 0
MYALGIAS: 1
SPUTUM PRODUCTION: 0

## 2019-07-22 ASSESSMENT — PATIENT HEALTH QUESTIONNAIRE - PHQ9: CLINICAL INTERPRETATION OF PHQ2 SCORE: 0

## 2019-07-22 NOTE — PROGRESS NOTES
Subjective:      Evita Mckenna is a 59 y.o. female who presents with New Patient    Chief complaint: Right lower limb pain      HPI Ms. Mckenna notes the onset of right lower limb pain in approximately 2/2019 without antecedent trauma or event.  The patient has a remote injury to the right foot, needle injury to the calcaneal region, retained foreign body, residual pain in the region    Regarding today's visit:    The patient notes ongoing pain in the right knee, right leg, right foot/ankle region, neuropathic component, worse with activity    The patient notes intermittent lumbosacral pain, without overt radicular component    The patient notes intermittent right hip area pain, controlled    The patient has had prior treatment with medications, including NSAIDs.  She has tried exercise and activity modification.  No acute changes with bowel/bladder noted.  No acute changes with strength noted.  The ongoing pain limits her ability to function, including walking tolerance.  She is inquiring about additional treatment options      MEDICAL RECORDS REVIEW/DATA REVIEW: Reviewed in epic.    Records Reviewed: Reviewed referring provider notes.     I reviewed medications.  Tried ibuprofen/NSAIDs    I reviewed  profile 7/22/2019    I reviewed diagnostic studies:     I reviewed radiographs.     Reviewed right ankle x-ray 4/2019, I reviewed images and report, see below, showed mild arthritis/tendinopathy, linear metallic density plantar calcaneal region    Reviewed right knee x-rays 6/2019, I reviewed images and report, showed moderate knee arthritis    Review right lower extremity venous ultrasound 6/2019, unremarkable.    Reviewed CT abdomen/pelvis 12/2018, unremarkable    I reviewed lab studies.  Reviewed labs 7/2019, including TFTs.  Reviewed labs 5/2019, CMP, CBC, vitamin D, A1c 5.6, B12, unremarkable    I reviewed medical issues.     I reviewed family history: No neuromuscular disorders noted.    I reviewed social  issues.  Credit       4/5/2019 5:32 PM    HISTORY/REASON FOR EXAM:  Atraumatic Pain/Swelling/Deformity.  RIGHT apical pain.  Known foreign body in the heel.    TECHNIQUE/EXAM DESCRIPTION AND NUMBER OF VIEWS:  3 views of the RIGHT ankle.    COMPARISON: None.    FINDINGS:  No focal soft tissue swelling.  Mortise is congruent.  No fracture or dislocation.  Linear metallic density projecting over the plantar aspect of calcaneus and adjacent soft tissues.  Plantar calcaneal enthesophyte present.  No soft tissue gas demonstrated.   Impression       1.  No fracture or dislocation of RIGHT ankle.  2.  Linear metallic density in the heel, potentially partially embedded in the calcaneus, consistent with known foreign body.       PAST MEDICAL HISTORY:   Past Medical History:   Diagnosis Date   • Allergy    • Anemia     Vaginal bleeding.   • Colorectal cancer (HCC) April 2011    Dr. Garcia, chemo and radiation.    • GERD (gastroesophageal reflux disease)    • Hyperthyroidism 12/18/2018   • Migraine        PAST SURGICAL HISTORY:    Past Surgical History:   Procedure Laterality Date   • KNEE ARTHROSCOPY  3/8/2013    Performed by Kenji Meek M.D. at [facility]   • MEDIAL MENISCECTOMY  3/8/2013    Performed by Kenji Meek M.D. at [facility]   • JOINT INJECTION DIAGNOSTIC  3/8/2013    Performed by Kenji Meek M.D. at [facility]   • GASTRIC BANDING LAPAROSCOPIC  2005   • ABDOMINAL HYSTERECTOMY TOTAL  2003    For menorrhagia, no BSO   • HERNIA REPAIR      child       ALLERGIES:  Iodine and Morphine    MEDICATIONS:    Outpatient Encounter Prescriptions as of 7/22/2019   Medication Sig Dispense Refill   • ibuprofen (MOTRIN) 200 MG Tab Take 200 mg by mouth every 6 hours as needed.     • Multiple Vitamin (MULTIVITAMINS PO) Take  by mouth.     • propylthiouracil (PTU) 50 MG Tab Take 1 Tab by mouth 3 times a day. 90 Tab 1   • fluticasone (FLONASE)  "50 MCG/ACT nasal spray Spray 2 Sprays in nose every day. Each Nostril 16 g 11     No facility-administered encounter medications on file as of 7/22/2019.        SOCIAL HISTORY:    Social History     Social History   • Marital status: Single     Spouse name: N/A   • Number of children: N/A   • Years of education: N/A     Social History Main Topics   • Smoking status: Never Smoker   • Smokeless tobacco: Never Used      Comment: significant 2nd hand exposure    • Alcohol use No   • Drug use: No   • Sexual activity: Yes     Partners: Female     Other Topics Concern   •  Service No   • Blood Transfusions No   • Caffeine Concern No   • Occupational Exposure No   • Hobby Hazards No   • Sleep Concern Yes   • Stress Concern No   • Weight Concern Yes   • Special Diet No   • Back Care No   • Exercise Yes   • Bike Helmet No   • Seat Belt Yes   • Self-Exams Yes     Social History Narrative    , no children.        Review of Systems   Constitutional: Negative for chills and fever.   HENT: Negative for ear pain and tinnitus.    Eyes: Negative for photophobia and pain.   Respiratory: Negative for hemoptysis and sputum production.    Cardiovascular: Negative for palpitations and orthopnea.   Gastrointestinal: Negative for vomiting.   Genitourinary: Negative for frequency and urgency.   Musculoskeletal: Positive for back pain, joint pain and myalgias.   Skin: Negative.    Neurological: Positive for tingling and sensory change.   All other systems reviewed and are negative.        Objective:     /68   Pulse 82   Temp 36.2 °C (97.2 °F) (Temporal)   Ht 1.676 m (5' 6\") Comment: verbal  Wt 106.2 kg (234 lb 2.1 oz)   SpO2 97%   BMI 37.79 kg/m²      Physical Exam   Constitutional: Awake, alert, no acute distress  HEENT: Normocephalic atraumatic, neck supple, no JVD noted,  no meningeal signs noted  Lymphadenopathy: no cervical, supraclavicular, or inguinal lymphadenopathy noted  Cardiovascular: Intact distal " pulses, including at ankles, no lower limb swelling noted  Pulmonary: No tachypnea noted, no accessory muscle use noted, no dyspnea noted  Abdominal: Soft, nontender, exhibits no distension, no peritoneal signs, no HSM  Musculoskeletal:   Lumbosacral: Mild tenderness with palpation lumbosacral region, mild pain with range of motion testing, trigger points noted, negative straight leg testing  Hip: Minimal tenderness, minimal pain with range of motion testing  Knee/leg/ankle: Tender with palpation about right knee, including along joint line, tender with palpation right ankle, also plantar calcaneal region, no signs of infection, pain with range of motion testing  Neurological: oriented. Cranial nerves grossly intact, strength non-focal.  Normal tone.  Sensation intact distally. Reflexes 1+ in lower limbs, Gait mildly antalgic, reciprocal, no upper motor neuron signs evident  Skin: Skin is intact. no rashes or lesions noted  Psychiatric: normal mood and affect. speech is normal and behavior is normal.        Assessment/Plan:         ASSESSMENT:    1. Persistent right lower limb pain, nerve pain, right foot/ankle pain, sprain strain, arthritis/tendinopathy, history of right foot/calcaneal foreign body/needle, consider focal neuropathy versus lumbar radiculopathy.     - CT-FOOT W/O PLUS RECONS RIGHT; evaluate status of right foot/calcaneal foreign body/needle, associated soft tissue pathology contributing to the ongoing pain  - Request authorization for right lower limb electrodiagnostic testing, evaluate for neuropathy versus radiculopathy  - Reviewed orthotic/footwear    2.  Right knee pain, sprain strain, moderate knee arthritis    3.  Lumbosacral pain, sprain strain    - DX-LUMBAR SPINE-2 OR 3 VIEWS; Future      4.  Right hip pain, sprain strain, relatively symptomatically controlled    5.  Comorbid medical issues, including history of colorectal cancer, treated, thyroid disorder, with care per primary care  provider      DISCUSSION/PLAN:    - I discussed management options. I reviewed symptomatic care    - I reviewed home exercise program, with medical precautions    - Initiate physical therapy, ordered by primary care provider.    - The patient can consider complementary trials with acupuncture, superficial massage therapy, or TENS unit    - I reviewed medication monitoring.  I reviewed medication adjustments.    - The patient can consider trial with Lidoderm patch or over-the-counter equivalent, if no contraindication    - I reviewed risks, side effects, and interactions of medications, including over-the-counter medications.  I reviewed further symptomatic medications.    - I reviewed additional diagnostic options, including further/advanced imaging, electrodiagnostic testing, vascular studies, and further lab screen    - I reviewed additional therapeutic options, including injection/interventional therapy and additional consultative input    - Return to the department after the above-noted diagnostic studies or an as-needed basis      Please note that this dictation was created using voice recognition software. I have made every reasonable attempt to correct obvious errors but there may be errors of grammar and content that I may have overlooked prior to finalization of this note.

## 2019-07-25 ENCOUNTER — TELEPHONE (OUTPATIENT)
Dept: PHYSICAL MEDICINE AND REHAB | Facility: MEDICAL CENTER | Age: 59
End: 2019-07-25

## 2019-07-25 ENCOUNTER — OFFICE VISIT (OUTPATIENT)
Dept: PHYSICAL MEDICINE AND REHAB | Facility: MEDICAL CENTER | Age: 59
End: 2019-07-25
Payer: COMMERCIAL

## 2019-07-25 VITALS
BODY MASS INDEX: 37.88 KG/M2 | TEMPERATURE: 97.2 F | SYSTOLIC BLOOD PRESSURE: 128 MMHG | DIASTOLIC BLOOD PRESSURE: 68 MMHG | OXYGEN SATURATION: 95 % | WEIGHT: 235.67 LBS | HEIGHT: 66 IN | HEART RATE: 80 BPM

## 2019-07-25 DIAGNOSIS — G57.01 SCIATIC NEUROPATHY, RIGHT: ICD-10-CM

## 2019-07-25 DIAGNOSIS — R20.2 PARESTHESIA: ICD-10-CM

## 2019-07-25 DIAGNOSIS — M79.604 PAIN IN RIGHT LEG: ICD-10-CM

## 2019-07-25 PROCEDURE — 95909 NRV CNDJ TST 5-6 STUDIES: CPT | Performed by: PHYSICAL MEDICINE & REHABILITATION

## 2019-07-25 PROCEDURE — 95886 MUSC TEST DONE W/N TEST COMP: CPT | Performed by: PHYSICAL MEDICINE & REHABILITATION

## 2019-07-25 ASSESSMENT — PAIN SCALES - GENERAL: PAINLEVEL: 4=SLIGHT-MODERATE PAIN

## 2019-07-25 NOTE — TELEPHONE ENCOUNTER
I left msg to see if she wanted to reschedule with Dr. العلي after she has had CT and Xray done. I let her know she can keep appt for tomorrow to go over EMG with Dr. العلي, but it may be a more beneficial appt. To wait until she has had imaging done too. Either way is ok.

## 2019-07-25 NOTE — Clinical Note
This was a difficult study.  She tried, but we had trouble with tolerance of the study.  See comments.  I would be happy to discuss with you.

## 2019-07-25 NOTE — PROGRESS NOTES
"    [facility]  Sports and Spine, Physiatry, EMG  [facility]  [address]  [place] [address]    Test Date:  2019    Patient: Evita Mckenna : 1960 Physician: Josias Sarkar MD   Sex: Female Height: 5' 6\" Ref Phys: Kwame العلي MD   MRN#: [id number] Weight: 106.9 kg Technician: N/A     Patient Complaints:  Right leg pain    Evita presents for electrodiagnostic evaluation of right leg pain.  She reports that this started in 2019.  She started having some pain in the right calf and ankle.  This has made it difficult for her to exercise as much as she was, working on weight loss.  Symptoms are unchanged from Dr. العلي' visit on 2019     PMH/PSH/Meds/SH are unchanged from previous visit with Dr. العلي on 2019    Past medical history is negative for diabetes mellitus, thryoid disease, cervical or lumbar spine surgery, or known history of cancer.    FH is negative for known history of neuromuscular disease.    Exam: Unchanged from previous visit with Dr. العلي 2019      NCV & EMG Findings:  Evaluation of the right Dp Br Fibular motor nerve showed no response (Fib Head).  The right fibular motor nerve showed reduced amplitude (0.2 mV).  The right tibial motor nerve showed no response (Ankle).  The right superficial fibular sensory nerve showed no response (14 cm).  All remaining nerves (as indicated in the following tables) were within normal limits.      Needle evaluation of the right biceps femoris (short head) muscle showed slightly increased spontaneous activity and moderately increased polyphasic potentials.  All remaining muscles (as indicated in the following table) showed no evidence of electrical instability.      Impression/Recommendations:  Abnormal, limited study  1. Today's electrodiagnostic studies were limited by patient body habitus and tolerance.  2. Given the above, there are electrodiagnostic findings that could suggest sciatic neuropathy " with conduction block greater than axonal loss.  It is difficult to know how much technical challenges contribute to nerve conduction abnormalities.  3. Clinically, it is difficult to make other comments with confidence due to the limitations of the study.  Recommend that she follow-up with Dr. العلي and pursue other diagnostic studies to further evaluate patient's symptoms.    ___________________________  Josias Sarkar MD  Physical Medicine and Rehabilitation  Interventional Spine and Sports Physiatry  [facility]          Nerve Conduction Studies  Anti Sensory Summary Table     Stim Site NR Peak (ms) Norm Peak (ms) P-T Amp (µV) Norm P-T Amp Site1 Site2 Delta-P (ms) Dist (cm) Nico (m/s) Norm Nico (m/s)   Right Sup Fibular Anti Sensory (Ant Lat Mall)   14 cm NR  <4.4  >5.0 14 cm Ant Lat Mall  14.0  >32     Motor Summary Table     Stim Site NR Onset (ms) Norm Onset (ms) O-P Amp (mV) Norm O-P Amp Site1 Site2 Delta-0 (ms) Dist (cm) Nico (m/s) Norm Nico (m/s)   Right Dp Br Fibular Motor (AntTibialis)   Fib Head NR  <4.2           Right Fibular Motor (Ext Dig Brev)   Ankle    5.5 <6.1 0.2 >2.5         Left Tibial Motor (Abd Messina Brev)   Ankle    4.1 <6.1 5.6 >3.0         Right Tibial Motor (Abd Messina Brev)   Ankle NR  <6.1  >3.0           EMG+     Side Muscle Nerve Root Ins Act Fibs Psw Amp Dur Poly Recrt Int Pat Other Comment   Right AntTibialis Dp Br Fibular L4-5 Nml Nml Nml Nml Nml 0 Nml Nml None    Right Gastroc Tibial S1-2 Nml Nml Nml Nml Nml 0 Nml Nml None    Right VastusMed Femoral L2-4 Nml Nml Nml Nml Nml 0 Nml Nml None    Right RectFemoris Femoral L2-4 Nml Nml Nml Nml Nml 0 Nml Nml None    Right BicepsFemS Sciatic L5-S1 Nml 1+ 1+ Nml Nml 2+ Nml Nml None    Right Lumbo Parasp Up Rami L1-2 Nml Nml Nml      None    Right Lumbo Parasp Mid Rami L3-4 Nml Nml Nml      None    Right Lumbo Parasp Low Rami L5-S1 Nml Nml Nml      None    Right GluteusMed SupGluteal L5-S1 Nml Nml Nml Nml Nml 0 Nml Nml None                       Waveforms:

## 2019-07-31 ENCOUNTER — PHYSICAL THERAPY (OUTPATIENT)
Dept: PHYSICAL THERAPY | Facility: REHABILITATION | Age: 59
End: 2019-07-31
Attending: FAMILY MEDICINE
Payer: COMMERCIAL

## 2019-07-31 DIAGNOSIS — M54.16 LUMBAR RADICULOPATHY, RIGHT: ICD-10-CM

## 2019-07-31 PROCEDURE — 97161 PT EVAL LOW COMPLEX 20 MIN: CPT

## 2019-07-31 PROCEDURE — 97110 THERAPEUTIC EXERCISES: CPT

## 2019-07-31 SDOH — ECONOMIC STABILITY: GENERAL: QUALITY OF LIFE: FAIR

## 2019-07-31 ASSESSMENT — ENCOUNTER SYMPTOMS
PAIN SCALE: 2
PAIN SCALE AT LOWEST: 2
PAIN SCALE AT HIGHEST: 10

## 2019-07-31 NOTE — OP THERAPY EVALUATION
Outpatient Physical Therapy  INITIAL EVALUATION    [facility] [place]  [address], [address]  [place] [address]  Phone:  [phone]  Fax:  [phone]    Date of Evaluation: 2019    Patient: Evita Mckenna  YOB: 1960  MRN: [id number]     Referring Provider: Sofie Mathew M.D.  [address]  [address]  [place] [address]   Referring Diagnosis Lumbar radiculopathy, right [M54.16]     Time Calculation  Start time: 1100  Stop time: 1200 Time Calculation (min): 60 minutes       Physical Therapy Occurrence Codes    Date of onset of impairment:  19   Date physical therapy care plan established or reviewed:  19   Date physical therapy treatment started:  19          Chief Complaint: Back Problem and Leg Problem    Visit Diagnoses     ICD-10-CM   1. Lumbar radiculopathy, right M54.16         Subjective:   History of Present Illness:     Date of onset:  2019  Quality of life:  Fair  Prior level of function:  Ongoing pain since feb. but has noted of and on pain for years  Pain:     Current pain ratin    At best pain ratin    At worst pain rating:  10  Diagnostic Tests:     X-ray: abnormal    Activities of Daily Living:     Patient reported ADL status: Limited with fitness activities  Limited with ambulation tolerance  Limited with travel due to sitting tolerance    Patient Goals:     Patient goals for therapy:  Increased motion, decreased pain and increased strength    Patient is a 59 y.o. female that presents to therapy with R LE extremity pain. States that symptoms were insidious in onset. Reports the pain quality to be sharp/dull, constant and are primarily notes pain in the back of the R knee and into the leg in general. Reports that symptoms now worsening. States that aggravating factors are turning, prolonged upright activity, stepping down. States that easng factors are meds, supine. Denies red flags.    Past Medical History:    Diagnosis Date   • Allergy    • Anemia     Vaginal bleeding.   • Colorectal cancer (HCC) April 2011    Dr. Garcia, chemo and radiation.    • GERD (gastroesophageal reflux disease)    • Hyperthyroidism 12/18/2018   • Migraine      Past Surgical History:   Procedure Laterality Date   • KNEE ARTHROSCOPY  3/8/2013    Performed by Kenji Meek M.D. at SURGERY [facility]   • MEDIAL MENISCECTOMY  3/8/2013    Performed by Kenji Meek M.D. at [facility]   • JOINT INJECTION DIAGNOSTIC  3/8/2013    Performed by Kenji Meek M.D. at [facility]   • GASTRIC BANDING LAPAROSCOPIC  2005   • ABDOMINAL HYSTERECTOMY TOTAL  2003    For menorrhagia, no BSO   • HERNIA REPAIR      child     Social History     Tobacco Use   • Smoking status: Never Smoker   • Smokeless tobacco: Never Used   • Tobacco comment: significant 2nd hand exposure    Substance Use Topics   • Alcohol use: No     Alcohol/week: 0.6 oz     Types: 1 Standard drinks or equivalent per week     Family and Occupational History     Socioeconomic History   • Marital status: Single     Spouse name: Not on file   • Number of children: Not on file   • Years of education: Not on file   • Highest education level: Not on file   Occupational History   • Not on file       Objective     Neurological Testing     Reflexes   Left   Patellar (L4): normal (2+)  Achilles (S1): normal (2+)  Ankle clonus reflex: negative  Babinski sign: negative    Right   Patellar (L4): normal (2+)  Achilles (S1): normal (2+)  Ankle clonus reflex: negative  Babinski sign: negative    Myotome testing   Lumbar (left)   L1 (hip flexors): 5  L2 (hip flexors): 5  L3 (knee extensors): 5  L4 (ankle dorsiflexors): 5  L5 (great toe extension): 5  S1 (ankle plantar flexors): 5    Lumbar (right)   L1 (hip flexors): 3  L2 (hip flexors): 3  L3 (knee extensors): 3+ (P)  L4 (ankle dorsiflexors): 4  L5 (great toe extension): 4  S1 (ankle plantar flexors): 2+  (P)    Dermatome testing   Lumbar (left)   All left lumbar dermatomes intact    Lumbar (right)   All right lumbar dermatomes intact    Active Range of Motion     Lumbar   Flexion: Lumbar active flexion: 105deg.  Extension: Lumbar active extension: 23deg.  Left lateral flexion: Left lateral lumbar spine flexion: 23deg.  Right lateral flexion: Right lateral lumbar spine flexion: 26deg.  Left Knee   Flexion: 115 degrees   Extension: 0 degrees     Right Knee   Flexion: 98 degrees   Extension: 0 degrees     Passive Range of Motion   Left Knee   Flexion: 120 degrees   Extension: 0 degrees     Right Knee   Flexion: 101 degrees   Extension: 0 degrees     Joint Play   Spine     Central PA [place]        T12: hypomobile       L1: hypomobile       L2: hypomobile       L3: hypomobile       L4: hypomobile       L5: hypomobile        Strength:      Left Hip   Planes of Motion   Flexion: 5  Extension: 5  Abduction: 4  Adduction: 4+    Right Hip   Planes of Motion   Flexion: 4-  Extension: 4-  Abduction: 3+  Adduction: 4-    Left Knee   Flexion: 5    Right Knee   Flexion: 3+    Tests     Left Pelvic Girdle/Sacrum   Negative: sacral rotation.     Right Pelvic Girdle/Sacrum   Negative: sacral rotation.     Left Hip   Negative SI distraction.   SLR: Negative.     Right Hip   Negative SI compression and SI distraction.   SLR: Negative.     Left Knee   Negative anterior Lachman, posterior drawer, valgus stress test at 0 degrees, valgus stress test at 30 degrees, varus stress test at 0 degrees and varus stress test at 30 degrees.     Right Knee   Negative anterior Lachman, posterior drawer, valgus stress test at 0 degrees, valgus stress test at 30 degrees, varus stress test at 0 degrees and varus stress test at 30 degrees.     Additional Tests Details  Rep motion testing not done  Testing limited due to sensitivity on the lateral aspect of R knee    General Comments     Knee Comments  Significant hypersensitivity on the lateral aspect of  the R knee        Therapeutic Exercises (CPT 88761):     1. Basic TrA handout issued    2. Basic STM edu on lateral aspect of knee      Time-based treatments/modalities:  Therapeutic exercise minutes (CPT 97290): 10 minutes       Assessment, Response and Plan:   Impairments: abnormal muscle tone, abnormal or restricted ROM, activity intolerance, impaired functional mobility, impaired physical strength and pain with function    Assessment details:  Patient presents with signs and symptoms consistent with a possible lumbar radicular patten with ongoing knee issues secondary to OA and poor biomechancis. Patient limitations include weakness, decreased ROM, and pain. Patient demonstrated significant sensitivity to non-noxious stimulation on the outer aspect of the R knee. Patient will benefit from skilled therapy to improve the aforementioned deficits and decrease further functional decline. Further assessment will be done next visit on LE mechanics.   Prognosis comment:  Fair to poor  Goals:   Short Term Goals:   1) Patient's hip abd strength will improve by a half muscle grade to facilitate improved gait.  2) Patient's condition will improve ambulation >500ft without an increase in symptoms.  Short term goal time span:  2-4 weeks      Long Term Goals:    1) Patient's symptoms will improve to allow for improved sitting tolerance >40min.  2) Patient's LBDI will improve by 10 to demonstrate functional improvement  Long term goal time span:  6-8 weeks    Plan:   Therapy options:  Physical therapy treatment to continue  Planned therapy interventions:  E Stim Unattended (CPT 12633), Manual Therapy (CPT 60488), Neuromuscular Re-education (CPT 07477), Therapeutic Exercise (CPT 78565) and Hot or Cold Pack Therapy (CPT 78223)  Frequency:  2x week  Duration in weeks:  8  Discussed with:  Patient      Functional Assessment Used  PT Functional Assessment Tool Used: LBDI  PT Functional Assessment Score: 38     Referring provider  co-signature:  I have reviewed this plan of care and my co-signature certifies the need for services.  Certification Dates:   From 07/31/19   To 09/25/19    Physician Signature: ________________________________ Date: ______________

## 2019-08-02 ENCOUNTER — HOSPITAL ENCOUNTER (OUTPATIENT)
Dept: RADIOLOGY | Facility: MEDICAL CENTER | Age: 59
End: 2019-08-02
Attending: PHYSICAL MEDICINE & REHABILITATION
Payer: COMMERCIAL

## 2019-08-02 DIAGNOSIS — M79.2 NERVE PAIN: ICD-10-CM

## 2019-08-02 DIAGNOSIS — M79.5 FOREIGN BODY (FB) IN SOFT TISSUE: ICD-10-CM

## 2019-08-02 DIAGNOSIS — M25.571 RIGHT ANKLE PAIN, UNSPECIFIED CHRONICITY: ICD-10-CM

## 2019-08-02 DIAGNOSIS — M79.671 RIGHT FOOT PAIN: ICD-10-CM

## 2019-08-02 DIAGNOSIS — M54.50 LUMBOSACRAL PAIN: ICD-10-CM

## 2019-08-02 PROCEDURE — 72100 X-RAY EXAM L-S SPINE 2/3 VWS: CPT

## 2019-08-02 PROCEDURE — 73700 CT LOWER EXTREMITY W/O DYE: CPT | Mod: RT

## 2019-08-05 ENCOUNTER — OFFICE VISIT (OUTPATIENT)
Dept: PHYSICAL MEDICINE AND REHAB | Facility: MEDICAL CENTER | Age: 59
End: 2019-08-05
Payer: COMMERCIAL

## 2019-08-05 VITALS
TEMPERATURE: 97.7 F | DIASTOLIC BLOOD PRESSURE: 65 MMHG | BODY MASS INDEX: 37.77 KG/M2 | WEIGHT: 235 LBS | HEART RATE: 89 BPM | OXYGEN SATURATION: 79 % | HEIGHT: 66 IN | SYSTOLIC BLOOD PRESSURE: 118 MMHG

## 2019-08-05 DIAGNOSIS — M79.671 RIGHT FOOT PAIN: ICD-10-CM

## 2019-08-05 DIAGNOSIS — G62.9 NEUROPATHY: ICD-10-CM

## 2019-08-05 DIAGNOSIS — R26.9 IMPAIRED GAIT: ICD-10-CM

## 2019-08-05 DIAGNOSIS — M47.816 LUMBAR SPONDYLOSIS: ICD-10-CM

## 2019-08-05 DIAGNOSIS — M54.50 LUMBOSACRAL PAIN: ICD-10-CM

## 2019-08-05 DIAGNOSIS — S90.851D FOREIGN BODY IN RIGHT FOOT, SUBSEQUENT ENCOUNTER: ICD-10-CM

## 2019-08-05 DIAGNOSIS — M25.559 ARTHRALGIA OF HIP, UNSPECIFIED LATERALITY: ICD-10-CM

## 2019-08-05 PROCEDURE — 99214 OFFICE O/P EST MOD 30 MIN: CPT | Performed by: PHYSICAL MEDICINE & REHABILITATION

## 2019-08-05 ASSESSMENT — ENCOUNTER SYMPTOMS
SENSORY CHANGE: 1
CHILLS: 0
BACK PAIN: 1
TINGLING: 1
MYALGIAS: 1
FEVER: 0
PALPITATIONS: 0
VOMITING: 0
SPUTUM PRODUCTION: 0
ORTHOPNEA: 0
PHOTOPHOBIA: 0
EYE PAIN: 0
HEMOPTYSIS: 0

## 2019-08-05 ASSESSMENT — PATIENT HEALTH QUESTIONNAIRE - PHQ9: CLINICAL INTERPRETATION OF PHQ2 SCORE: 0

## 2019-08-05 NOTE — PROGRESS NOTES
Subjective:      Evita Mckenna presents with Follow-Up        HPI Ms. Mckenna returns to the office today for follow-up evaluation of spinal/joint/musculoskeletal/nerve pain.    Regarding patient's history:    The patient notes the onset of right lower limb pain in approximately 2/2019 without antecedent trauma or event.      The patient has a remote injury to the right foot, needle injury to the calcaneal region, retained foreign body, residual pain in the region, associated impaired gait, chronically    Regarding today's visit:    Since I last saw her, the patient has started physical therapy, reviewed records, further sessions remaining.    The patient notes ongoing pain on the plantar calcaneal aspect of the right foot, worse with activities, limiting walking tolerance     The patient notes right knee area pain, also right leg area pain, neuropathic component    The patient notes intermittent right hip area pain    The patient notes intermittent lumbosacral pain, without overt radicular component    The patient has had prior treatment with medications, including NSAIDs.  She is working with physical therapy.  No acute changes with bowel/bladder noted.  No acute changes with strength noted.  The ongoing pain limits her ability to function, including walking tolerance.  She returns today to review results from recent diagnostic studies and to further discuss management options      MEDICAL RECORDS REVIEW/DATA REVIEW: Reviewed in epic.    I reviewed medications.  Tried ibuprofen/NSAIDs    I reviewed  profile 7/22/2019    I reviewed diagnostic studies:     I reviewed radiographs.     Reviewed CT right foot 8/2019, I reviewed images and report, showed linear metallic foreign body plantar calcaneal aspect of right foot    Reviewed right ankle x-ray 4/2019, see below, showed mild arthritis/tendinopathy, linear metallic density plantar calcaneal region    Reviewed right knee x-rays 6/2019, showed moderate knee  arthritis    Reviewed lumbar spine x-rays 8/2019, I reviewed images and report, showed degenerative disc disease, spondylosis    Reviewed right lower limb electrodiagnostic testing 7/2019:  Impression/Recommendations:  Abnormal, limited study  1. Today's electrodiagnostic studies were limited by patient body habitus and tolerance.  2. Given the above, there are electrodiagnostic findings that could suggest sciatic neuropathy with conduction block greater than axonal loss.  It is difficult to know how much technical challenges contribute to nerve conduction abnormalities.    Review right lower extremity venous ultrasound 6/2019, unremarkable.    Reviewed CT abdomen/pelvis 12/2018, unremarkable    I reviewed lab studies.  Reviewed labs 7/2019, including TFTs.  Reviewed labs 5/2019, CMP, CBC, vitamin D, A1c 5.6, B12, unremarkable    I reviewed medical issues.     I reviewed family history: No neuromuscular disorders noted.    I reviewed social issues.  Credit       4/5/2019 5:32 PM    HISTORY/REASON FOR EXAM:  Atraumatic Pain/Swelling/Deformity.  RIGHT apical pain.  Known foreign body in the heel.    TECHNIQUE/EXAM DESCRIPTION AND NUMBER OF VIEWS:  3 views of the RIGHT ankle.    COMPARISON: None.    FINDINGS:  No focal soft tissue swelling.  Mortise is congruent.  No fracture or dislocation.  Linear metallic density projecting over the plantar aspect of calcaneus and adjacent soft tissues.  Plantar calcaneal enthesophyte present.  No soft tissue gas demonstrated.   Impression       1.  No fracture or dislocation of RIGHT ankle.  2.  Linear metallic density in the heel, potentially partially embedded in the calcaneus, consistent with known foreign body.       PAST MEDICAL HISTORY:   Past Medical History:   Diagnosis Date   • Allergy    • Anemia     Vaginal bleeding.   • Colorectal cancer (HCC) April 2011    Dr. Garcia, chemo and radiation.    • GERD (gastroesophageal reflux disease)    •  Hyperthyroidism 12/18/2018   • Migraine        PAST SURGICAL HISTORY:    Past Surgical History:   Procedure Laterality Date   • KNEE ARTHROSCOPY  3/8/2013    Performed by Kenji Meek M.D. at SURGERY [facility]   • MEDIAL MENISCECTOMY  3/8/2013    Performed by Kenji Meek M.D. at SURGERY [facility]   • JOINT INJECTION DIAGNOSTIC  3/8/2013    Performed by Kenji Meek M.D. at SURGERY [facility]   • GASTRIC BANDING LAPAROSCOPIC  2005   • ABDOMINAL HYSTERECTOMY TOTAL  2003    For menorrhagia, no BSO   • HERNIA REPAIR      child       ALLERGIES:  Iodine and Morphine    MEDICATIONS:    Outpatient Encounter Medications as of 8/5/2019   Medication Sig Dispense Refill   • ibuprofen (MOTRIN) 200 MG Tab Take 200 mg by mouth every 6 hours as needed.     • Multiple Vitamin (MULTIVITAMINS PO) Take  by mouth.     • propylthiouracil (PTU) 50 MG Tab Take 1 Tab by mouth 3 times a day. 90 Tab 1   • fluticasone (FLONASE) 50 MCG/ACT nasal spray Spray 2 Sprays in nose every day. Each Nostril 16 g 11     No facility-administered encounter medications on file as of 8/5/2019.        SOCIAL HISTORY:    Social History     Socioeconomic History   • Marital status: Single     Spouse name: Not on file   • Number of children: Not on file   • Years of education: Not on file   • Highest education level: Not on file   Occupational History   • Not on file   Social Needs   • Financial resource strain: Not on file   • Food insecurity:     Worry: Not on file     Inability: Not on file   • Transportation needs:     Medical: Not on file     Non-medical: Not on file   Tobacco Use   • Smoking status: Never Smoker   • Smokeless tobacco: Never Used   • Tobacco comment: significant 2nd hand exposure    Substance and Sexual Activity   • Alcohol use: No     Alcohol/week: 0.6 oz     Types: 1 Standard drinks or equivalent per week   • Drug use: No   • Sexual activity: Yes     Partners: Female   Lifestyle   • Physical  "activity:     Days per week: Not on file     Minutes per session: Not on file   • Stress: Not on file   Relationships   • Social connections:     Talks on phone: Not on file     Gets together: Not on file     Attends Restorationist service: Not on file     Active member of club or organization: Not on file     Attends meetings of clubs or organizations: Not on file     Relationship status: Not on file   • Intimate partner violence:     Fear of current or ex partner: Not on file     Emotionally abused: Not on file     Physically abused: Not on file     Forced sexual activity: Not on file   Other Topics Concern   •  Service No   • Blood Transfusions No   • Caffeine Concern No   • Occupational Exposure No   • Hobby Hazards No   • Sleep Concern Yes   • Stress Concern No   • Weight Concern Yes   • Special Diet No   • Back Care No   • Exercise Yes   • Bike Helmet No   • Seat Belt Yes   • Self-Exams Yes   Social History Narrative    , no children.        Review of Systems   Constitutional: Negative for chills and fever.   HENT: Negative for ear pain and tinnitus.    Eyes: Negative for photophobia and pain.   Respiratory: Negative for hemoptysis and sputum production.    Cardiovascular: Negative for palpitations and orthopnea.   Gastrointestinal: Negative for vomiting.   Genitourinary: Negative for frequency and urgency.   Musculoskeletal: Positive for back pain, joint pain and myalgias.   Skin: Negative.    Neurological: Positive for tingling and sensory change.   All other systems reviewed and are negative.  Reviewed, no changes noted       Objective:     /65   Pulse 89   Temp 36.5 °C (97.7 °F) (Temporal)   Ht 1.676 m (5' 6\")   Wt 106.6 kg (235 lb)   SpO2 (!) 79%   BMI 37.93 kg/m²      Physical Exam   Constitutional: Awake, alert, no acute distress  HEENT: Normocephalic atraumatic, neck supple, no JVD noted,  no meningeal signs noted  Lymphadenopathy: no cervical, supraclavicular, or inguinal " lymphadenopathy noted  Cardiovascular: Intact distal pulses, including at ankles, no limb swelling noted  Pulmonary: No tachypnea noted, no accessory muscle use noted, no dyspnea noted  Abdominal: Soft, nontender, exhibits no distension, no peritoneal signs, no HSM  Musculoskeletal:   Lumbosacral: Mild tenderness, mild pain with range of motion testing, trigger points noted, negative straight leg testing  Hip: Only mild tenderness, only mild pain with range of motion testing  Knee: Tender with palpation about right knee, mild swelling noted, crepitus noted, pain with range of motion testing, no signs of infection  Foot/ankle: Tender with palpation plantar calcaneal aspect right foot, no signs of infection  Neurological: oriented. Cranial nerves grossly intact, strength non-focal.  Normal tone.  Sensation intact distally. Reflexes 1+ in lower limbs, Gait antalgic, reciprocal  Skin: Skin is intact. no rashes or lesions noted  Psychiatric: normal mood and affect. speech is normal and behavior is normal.        Assessment/Plan:         ASSESSMENT:    1.  Persistent right foot pain, sprain strain, arthritis/tendinopathy, chronically retained right foot/calcaneal foreign body/needle    - Submitted orthopedic surgery consultation, evaluate surgical options  - Reviewed orthotic/footwear    2.  Right knee pain, sprain strain, moderate knee arthritis    3.  Lumbosacral pain, myofascial pain, degenerative disc disease, spondylosis    4.  Right lower limb nerve pain, suspect sciatic neuropathy, not localized on recent electrodiagnostic testing    5.  Comorbid medical issues, including history of colorectal cancer, treated, thyroid disorder, with care per primary care provider      DISCUSSION/PLAN:    - I discussed management options. I reviewed symptomatic care    - Continue with physical therapy, recently started. I reviewed home exercise program, with medical precautions    - The patient can consider complementary trials with  acupuncture, superficial massage therapy, or TENS unit    - I reviewed medication monitoring.  I reviewed medication adjustments.    - The patient can consider trial with Lidoderm patch or over-the-counter equivalent, if no contraindication    - I reviewed risks, side effects, and interactions of medications, including over-the-counter medications.  I reviewed further symptomatic medications.    - I reviewed additional diagnostic options, including further/advanced imaging, electrodiagnostic testing, vascular studies, and further lab screen    - I reviewed additional therapeutic options, including injection/interventional therapy and additional consultative input    - Return to the department after the orthopedic surgery consultation, after the physical therapy treatment trial, or an as-needed basis      Please note that this dictation was created using voice recognition software. I have made every reasonable attempt to correct obvious errors but there may be errors of grammar and content that I may have overlooked prior to finalization of this note.

## 2019-08-12 ENCOUNTER — PHYSICAL THERAPY (OUTPATIENT)
Dept: PHYSICAL THERAPY | Facility: REHABILITATION | Age: 59
End: 2019-08-12
Attending: FAMILY MEDICINE
Payer: COMMERCIAL

## 2019-08-12 DIAGNOSIS — M54.16 LUMBAR RADICULOPATHY, RIGHT: ICD-10-CM

## 2019-08-12 PROCEDURE — 97110 THERAPEUTIC EXERCISES: CPT

## 2019-08-12 NOTE — OP THERAPY DAILY TREATMENT
Outpatient Physical Therapy  DAILY TREATMENT     [facility] [place]  [address], [address]  [place] [address]  Phone:  [phone]  Fax:  [phone]    Date: 08/12/2019    Patient: Evita Mckenna  YOB: 1960  MRN: [id number]     Time Calculation  Start time: 0830  Stop time: 0908 Time Calculation (min): 38 minutes       Chief Complaint: Back Problem and Knee Problem    Visit #: 2    SUBJECTIVE:  Patient reports no real change in symptoms from first visit.     OBJECTIVE:  Current objective measures:   1cm manjeet drop B  Knee adduction to midline with gait          Therapeutic Exercises (CPT 17826):     1. Basic TrA, x5min    2. Basic TrA with LE lift, x20    3. Basic TrA with BKFO, x20    4. Basic TrA with ball roll, x3min    5. Clams, x15    6. Seated hip abd L2, x20    7. Nu Step, x10min    8. Trial prone on elbows, x1min, NC:NE trial HEP      Time-based treatments/modalities:  Therapeutic exercise minutes (CPT 32583): 38 minutes       Pain rating before treatment: 2  Pain rating after treatment: 2    ASSESSMENT:   Response to treatment: Patient responded fair to therapy with an overall increase in fatigue with no increase in pain.     PLAN/RECOMMENDATIONS:   Plan for treatment: therapy treatment to continue next visit.  Planned interventions for next visit: continue with current treatment.

## 2019-08-22 ENCOUNTER — PHYSICAL THERAPY (OUTPATIENT)
Dept: PHYSICAL THERAPY | Facility: REHABILITATION | Age: 59
End: 2019-08-22
Attending: FAMILY MEDICINE
Payer: COMMERCIAL

## 2019-08-22 DIAGNOSIS — M54.16 LUMBAR RADICULOPATHY, RIGHT: ICD-10-CM

## 2019-08-22 PROCEDURE — 97110 THERAPEUTIC EXERCISES: CPT

## 2019-08-22 NOTE — OP THERAPY DAILY TREATMENT
Outpatient Physical Therapy  DAILY TREATMENT     [facility] [place]  [address] [place] [address], [address]  [place] [facility] [place] [address]  Phone:  [phone]  Fax:  [phone]    Date: 08/22/2019    Patient: Evita Mckenna  YOB: 1960  MRN: [id number]     Time Calculation  Start time: 0800  Stop time: 0830 Time Calculation (min): 30 minutes       Chief Complaint: Back Problem    Visit #: 3    SUBJECTIVE:  Patient notes that she is overall feeling better. States she had an injection in the knee.     OBJECTIVE:  Current objective measures:   Prone noted minor decrease in symptoms.           Therapeutic Exercises (CPT 31646):     1. Basic TrA, x5min    2. Basic TrA with LE lift, x20    3. Corner balance, x10    4. Bridge, x10    5. Clams, x15    6. Seated hip abd L2, x20    7. Nu Step, x5min, L5    8. Trial prone on elbows as HEP, x1min    9. Corner balance, x20    10. Foam pad balance, x3min      Time-based treatments/modalities:  Therapeutic exercise minutes (CPT 39323): 30 minutes       Pain rating before treatment: 3  Pain rating after treatment: 1    ASSESSMENT:   Response to treatment: Patient responded well to therapy with an improvement in back symptoms. Patient will continue with HEP and report back next session.     PLAN/RECOMMENDATIONS:   Plan for treatment: therapy treatment to continue next visit.  Planned interventions for next visit: continue with current treatment.

## 2019-08-23 DIAGNOSIS — E05.90 HYPERTHYROIDISM: ICD-10-CM

## 2019-08-23 NOTE — TELEPHONE ENCOUNTER
Was the patient seen in the last year in this department? Yes    Does patient have an active prescription for medications requested? No     Received Request Via: Pharmacy      Pt met protocol?: Yes    LAST OV 06/27/2019    No results found for: TSHULTRASEN  Lab Results   Component Value Date/Time    TSH 0.008 (L) 07/10/2019 0822

## 2019-08-26 RX ORDER — PROPYLTHIOURACIL 50 MG/1
TABLET ORAL
Qty: 90 TAB | Refills: 0 | Status: SHIPPED | OUTPATIENT
Start: 2019-08-26 | End: 2019-10-02 | Stop reason: SDUPTHER

## 2019-08-27 ENCOUNTER — OFFICE VISIT (OUTPATIENT)
Dept: ENDOCRINOLOGY | Facility: MEDICAL CENTER | Age: 59
End: 2019-08-27
Payer: COMMERCIAL

## 2019-08-27 VITALS
BODY MASS INDEX: 37.93 KG/M2 | SYSTOLIC BLOOD PRESSURE: 114 MMHG | HEIGHT: 66 IN | DIASTOLIC BLOOD PRESSURE: 62 MMHG | HEART RATE: 100 BPM | OXYGEN SATURATION: 99 % | WEIGHT: 236 LBS

## 2019-08-27 DIAGNOSIS — E05.90 HYPERTHYROIDISM: ICD-10-CM

## 2019-08-27 PROCEDURE — 99213 OFFICE O/P EST LOW 20 MIN: CPT | Performed by: PHYSICIAN ASSISTANT

## 2019-08-27 NOTE — PROGRESS NOTES
Endocrinology Clinic Progress Note    CC:     HPI:  Evita Mckenna is a 59 y.o. old patient who comes in today for evaluation of the followin. Vitamin D deficiency  Patient is on Vitamin D replacement   5/10/19-vitamin D 46.1-on vitamin D supplementation with sublingual drops and multivitamin      2. Obesity (BMI 35.0-39.9 without comorbidity)  2019- 236 lbs.   Weight 232 today   Patient is watching diet now.   She is down 10 lbs since last evaluation     3. Hyperthyroidism  PTU 50 mg TID  (started consist for the last 3-4 weeks). Patient has not had any side effects.   Patient complaining of increased fatigue over the last several weeks. Patient attributes this to increase stress. Her father in law is dying. She is sleeping better than prior.     Denies palpations, shaking, dizziness.     7/10/2019- TSH 0.008, FT4 0.90, T3 144   5/10/2019- TSH <0.006, FT4 1.25, T3 174 - PTU 50mg TID     Endocrinology history to date:   Subclinical hyperthyroidism:   - started PTU 50 mg TID   18- replaced methimazole with PTU side effects discussed   18- went to ER secondary to Gastritis related to Methimazole.   18- on average Methimazole 1 Tablet daily  - start on Methimazole 5 mg TID repeat labs now and then again 4 weeks   10/2018- unable to tolerate Methimazole at 10 mg TID.   10/2018 methimazole 30 mg daily (started 2018)     Thyroid Ultrasound   2018 the thyroid is heterogeneous vascularity is normal.  The right lobe of the thyroid measures 1.78 cm x 4.56 cm x 1.8 cm.  There is no discrete nodules.  The left thyroid lobe is not visualized.  The isthmus is measuring 0.35.  Mildly predominant node in the left of the neck.  There is absent and/or hypoplastic left lower lobe.     Labs:  ER labs 2018 WBC within normal limits, Chemistry within normal limits, ALT 28, AST slightly elevated at 46, TSH less than 0.006 free T4 1.31 free T3 4.8       5/10/2019- TSH <0.006, FT4  1.25, T3 174 - PTU 50mg TID   4/17/2019 TSH less than 0.006, free T4 2.12, T3 331 started PTU shortly after   11/26/2018- <0.006 FT3 4.8, FT4 1.31 (Methimazole 5mg daily)   10/19/2018- FT3 4.9, T3 231, Ft4- 1.24   9/18/2018 TSH less than 0.006 free T4 1.51 free T3 4.8 thyroglobulin antibody less than 1.0 thyroglobulin 23.0  8/18/2016 TSH 1.840  9/29/2015 TSH 2.1-0 free T4 0.93  12/29/2012 TSH 1.530  8/2010 TSH 2.680     Thyroid Uptake and Scan: 10/22/18-   1.  Abnormally high iodine uptake being 40.3% at 5 hours  2.  Homogeneous uptake throughout the right lobe  3.  Left lobe is probably absent and there is probably hypertrophy of the isthmus with associated uptake        Borderline vitamin D deficiency:   5/10/19-vitamin D 46.1-on vitamin D supplementation with sublingual drops and multivitamin  12/18-has not been taking vitamin D supplementation  10/19/2018- Vitamin D 20.0 - started on OTC Vitamin D gtt 5000 IU daily if tolerated encouraged dietary supplementation.    9/2015- 34.5  12/2012-13 0.4  8/[id number] 0.2     Obesity: Patient has been successful in the past of losing 60 pounds despite regaining 20.  We will continue to monitor her weight and discuss further options in the future after stabilization of her thyroid.  5/19 weight 232 pounds  12/18 weight 245 pounds    5/10/2019 vitamin B 258- start supplement      Family history of diabetes: Patient has family history of diabetes with her sisters and also her father.  Most recent laboratory work from 2016 and 2018 did not display any signs of impaired fasting glucose at this time.     Labs:   5/10/2019 glucose 90, EGFR within normal limits,Total cholesterol 188, triglycerides 46, HDL 77, ,  9/5/2018 glucose 88, total cholesterol 166, triglycerides 48, HDL 75, LDL 81,  8/18/2016 glucose 91, total cholesterol 191, triglycerides 59, HDL 74,       Medications:  ROS:  Constitutional: No weight gain,  fever  HEENT: No difficulty with swallowing,  change in voice, or swelling in throat area   Cardiac: No chest pain, palpitations, or racing heart  Resp: No shortness of breath  GI: No abdominal pain, nausea, vomiting, or diarrhea   Neuro: No numbness or tinging in feet  Endo: No heat or cold intolerance, no polyuria or polydipsia  All other detailed ROS is otherwise negative       Past Medical History:  Patient Active Problem List    Diagnosis Date Noted   • Knee pain 03/25/2019   • Hyperthyroidism 12/18/2018   • History of colorectal cancer 03/02/2016   • Epigastric pain 04/04/2014   • Other and unspecified derangement of medial meniscus 03/08/2013   • Internal hemorrhoid 03/31/2011   • Vitamin D deficiency 08/05/2010   • History of anemia 08/05/2010   • Proteinuria 08/05/2010       Family Medical History:   Family History   Problem Relation Age of Onset   • Stroke Father         68yo   • Hypertension Father    • Heart Disease Father         70 yo   • Cancer Maternal Grandfather         Lung   • Diabetes Paternal Grandmother    • Hypertension Paternal Grandmother    • Stroke Paternal Grandmother         70 s   • Psychiatric Illness Sister         Schizophrenia   • Psychiatric Illness Brother         Schizophrenia   • Cancer Paternal Aunt         Bone, liver   • Psychiatric Illness Mother         dental / mental illness    • Lung Disease Mother         Copd    • Psychiatric Illness Sister         schizoprenia    • Diabetes Sister    • Hypertension Sister    • Psychiatric Illness Brother    • Heart Disease Brother    • Hypertension Brother    • Hypertension Brother    • Diabetes Brother        Social History:   Social History     Socioeconomic History   • Marital status: Single     Spouse name: Not on file   • Number of children: Not on file   • Years of education: Not on file   • Highest education level: Not on file   Occupational History   • Not on file   Social Needs   • Financial resource strain: Not on file   • Food insecurity:     Worry: Not on file      "Inability: Not on file   • Transportation needs:     Medical: Not on file     Non-medical: Not on file   Tobacco Use   • Smoking status: Never Smoker   • Smokeless tobacco: Never Used   • Tobacco comment: significant 2nd hand exposure    Substance and Sexual Activity   • Alcohol use: No     Alcohol/week: 0.6 oz     Types: 1 Standard drinks or equivalent per week   • Drug use: No   • Sexual activity: Yes     Partners: Female   Lifestyle   • Physical activity:     Days per week: Not on file     Minutes per session: Not on file   • Stress: Not on file   Relationships   • Social connections:     Talks on phone: Not on file     Gets together: Not on file     Attends Taoism service: Not on file     Active member of club or organization: Not on file     Attends meetings of clubs or organizations: Not on file     Relationship status: Not on file   • Intimate partner violence:     Fear of current or ex partner: Not on file     Emotionally abused: Not on file     Physically abused: Not on file     Forced sexual activity: Not on file   Other Topics Concern   •  Service No   • Blood Transfusions No   • Caffeine Concern No   • Occupational Exposure No   • Hobby Hazards No   • Sleep Concern Yes   • Stress Concern No   • Weight Concern Yes   • Special Diet No   • Back Care No   • Exercise Yes   • Bike Helmet No   • Seat Belt Yes   • Self-Exams Yes   Social History Narrative    , no children.        Medications:    Current Outpatient Medications:   •  propylthiouracil, TAKE 1 TABLET BY MOUTH THREE TIMES A DAY, Taking  •  ibuprofen, 200 mg, Oral, Q6HRS PRN, Taking  •  Multiple Vitamin (MULTIVITAMINS PO), Take  by mouth., Taking  •  fluticasone, 2 Spray, Nasal, DAILY, Taking    EXAM:  Vital signs: /62 (BP Location: Right arm, Patient Position: Sitting, BP Cuff Size: Large adult)   Pulse 100   Ht 1.676 m (5' 6\")   Wt 107 kg (236 lb)   SpO2 99%   BMI 38.09 kg/m²   General: No apparent distress, " cooperative  Eyes: No scleral icterus, no discharge  Neck: Neg thyroid enlargement   Resp: Normal effort without any audible wheeze  Cardiology: RRR without murmur rub or gallop  Extremities: No lower extremity edema  Psych: Alert and oriented, normal mood and affect    Assessment and Plan:  1. Vitamin D deficiency  Chronic continue vitamin replacement    2. Obesity (BMI 35.0-39.9 without comorbidity)  Stable   Weight 236.       3. Hyperthyroidism  Continue PTU 50 mg TID   Patient will get labs today or tomorrow and contact office. Discussed and reviewed T4 finding        Return in about 4 weeks (around 9/24/2019).    Thank you for allowing me to participate in the care of this patient.    Ashley Santos P.A.-C.    CC:   Sofie Mathew M.D.    This note was created using voice recognition software (Dragon). The accuracy of the dictation is limited by the abilities of the software. I have reviewed the note prior to signing, however some errors in grammar and context are still possible. If you have any questions related to this note please do not hesitate to contact our office.

## 2019-08-28 ENCOUNTER — PHYSICAL THERAPY (OUTPATIENT)
Dept: PHYSICAL THERAPY | Facility: REHABILITATION | Age: 59
End: 2019-08-28
Attending: FAMILY MEDICINE
Payer: COMMERCIAL

## 2019-08-28 DIAGNOSIS — M54.16 LUMBAR RADICULOPATHY, RIGHT: ICD-10-CM

## 2019-08-28 PROCEDURE — 97110 THERAPEUTIC EXERCISES: CPT

## 2019-08-28 NOTE — OP THERAPY DAILY TREATMENT
Outpatient Physical Therapy  DAILY TREATMENT     [facility] [place]  [address], [address]  [place] [address]  Phone:  [phone]  Fax:  [phone]    Date: 08/28/2019    Patient: Evita Mckenna  YOB: 1960  MRN: [id number]     Time Calculation  Start time: 0730  Stop time: 0800 Time Calculation (min): 30 minutes       Chief Complaint: Knee Problem and Back Problem    Visit #: 4    SUBJECTIVE:  Patient reports that she is doing better. Notes no back pain. States that symptoms are still about the knee.     OBJECTIVE:  Current objective measures:   Pain with terminal knee flexion          Therapeutic Exercises (CPT 51050):     1. Nu step, x8min L3      Therapeutic Exercise Summary: Access Code: [email or web address]         Exercises  · Supine Heel Slides - 10 reps - 2 sets - 1x daily - 7x weekly  · Supine Quad Set on Towel Roll - 10 reps - 2 sets - 1x daily - 7x weekly  · Small Range Straight Leg Raise - 10 reps - 2 sets - 1x daily - 7x weekly  · Gastroc Stretch on Wall - 3 reps - 1 sets - 15 hold - 3x daily - 7x weekly  · Hooklying Transversus Abdominis Palpation - 10 reps - 2 sets - 1x daily - 7x weekly  · Supine Bridge - 10 reps - 2 sets - 1x daily - 7x weekly  · Tandem Stance in Corner - 1x daily - 7x weekly  · Clamshell - 10 reps - 2 sets - 1x daily - 7x weekly          Time-based treatments/modalities:  Therapeutic exercise minutes (CPT 18072): 30 minutes         Pain rating before treatment: 3  Pain rating after treatment: 2    ASSESSMENT:   Response to treatment: Patient demonstrated increased fatigue with basic knee exercise. Patient will benefit from continued exercise about the knee.     PLAN/RECOMMENDATIONS:   Plan for treatment: therapy treatment to continue next visit.  Planned interventions for next visit: continue with current treatment.

## 2019-08-29 LAB
T3 SERPL-MCNC: 197 NG/DL (ref 71–180)
T3FREE SERPL-MCNC: 4.8 PG/ML (ref 2–4.4)
T4 FREE SERPL-MCNC: 1.31 NG/DL (ref 0.82–1.77)
TSH RECEP AB SER-ACNC: 1.32 IU/L (ref 0–1.75)
TSH SERPL DL<=0.005 MIU/L-ACNC: <0.006 UIU/ML (ref 0.45–4.5)

## 2019-08-30 ENCOUNTER — APPOINTMENT (OUTPATIENT)
Dept: PHYSICAL THERAPY | Facility: REHABILITATION | Age: 59
End: 2019-08-30
Attending: FAMILY MEDICINE
Payer: COMMERCIAL

## 2019-09-03 ENCOUNTER — PHYSICAL THERAPY (OUTPATIENT)
Dept: PHYSICAL THERAPY | Facility: REHABILITATION | Age: 59
End: 2019-09-03
Attending: FAMILY MEDICINE
Payer: COMMERCIAL

## 2019-09-03 DIAGNOSIS — M54.16 LUMBAR RADICULOPATHY, RIGHT: ICD-10-CM

## 2019-09-03 PROCEDURE — 97110 THERAPEUTIC EXERCISES: CPT

## 2019-09-03 NOTE — OP THERAPY DAILY TREATMENT
Outpatient Physical Therapy  DAILY TREATMENT     [facility] [place]  [address], [address]  [place] [facility] [facility] [address]  Phone:  [phone]  Fax:  [phone]    Date: 09/03/2019    Patient: Evita Mckenna  YOB: 1960  MRN: [id number]     Time Calculation  Start time: 0830  Stop time: 0900 Time Calculation (min): 30 minutes       Chief Complaint: Back Problem and Knee Problem    Visit #: 5    SUBJECTIVE:  Patient reports that her back is doing great. Notes less banding on her knee.     OBJECTIVE:  Current objective measures:   2deg quad lag          Therapeutic Exercises (CPT 14639):     1. Nu-Step L5, x8min    2. Heel slides, x20    3. Quad sets, x20    4. SLR, x20    5. SAQ, x20    6. March/weight shift, x20    7. Squat backs at bar, x20      Time-based treatments/modalities:  Therapeutic exercise minutes (CPT 85802): 30 minutes       Pain rating before treatment: 0  Pain rating after treatment: 0    ASSESSMENT:   Response to treatment: Patient reported increased leg fatigue. Patient will continue to benefit from R LE strength training. Plan to advance ex as able.     PLAN/RECOMMENDATIONS:   Plan for treatment: therapy treatment to continue next visit.  Planned interventions for next visit: continue with current treatment.

## 2019-09-04 ENCOUNTER — OFFICE VISIT (OUTPATIENT)
Dept: MEDICAL GROUP | Facility: PHYSICIAN GROUP | Age: 59
End: 2019-09-04
Payer: COMMERCIAL

## 2019-09-04 VITALS
HEART RATE: 84 BPM | WEIGHT: 236.8 LBS | OXYGEN SATURATION: 97 % | BODY MASS INDEX: 38.22 KG/M2 | DIASTOLIC BLOOD PRESSURE: 70 MMHG | SYSTOLIC BLOOD PRESSURE: 112 MMHG | TEMPERATURE: 97 F | RESPIRATION RATE: 16 BRPM

## 2019-09-04 DIAGNOSIS — E05.90 HYPERTHYROIDISM: ICD-10-CM

## 2019-09-04 DIAGNOSIS — M25.561 CHRONIC PAIN OF RIGHT KNEE: ICD-10-CM

## 2019-09-04 DIAGNOSIS — G89.29 CHRONIC PAIN OF RIGHT KNEE: ICD-10-CM

## 2019-09-04 DIAGNOSIS — R25.2 MUSCLE CRAMPS: ICD-10-CM

## 2019-09-04 DIAGNOSIS — E87.6 HYPOKALEMIA: ICD-10-CM

## 2019-09-04 PROCEDURE — 99214 OFFICE O/P EST MOD 30 MIN: CPT | Performed by: FAMILY MEDICINE

## 2019-09-04 NOTE — PROGRESS NOTES
Chief Complaint   Patient presents with   • Leg Cramps   • Hand Pain       HISTORY OF PRESENT ILLNESS: Patient is a 59 y.o. female established patient here today for the following concerns:    1. Muscle cramps  2. Hypokalemia  Has been getting some cramping in the hands and feet.  Hx of hypokalemia in the past.  Has had a lot of stress lately too.      3. Hyperthyroidism  Has had hx of hyperthyroidism on PTU.  Last labs show that her thyroid is still running a bit high.  She admits that with her father-in-law passing she had missed several doses.      4. Chronic pain of right knee  Also continues with PT and underwent cortisone shot for the knee.  Getting some improvement.  Continuing to work on it.        Past Medical, Social, and Family history reviewed and updated in EPIC    Allergies:Iodine and Morphine    Current Outpatient Medications   Medication Sig Dispense Refill   • propylthiouracil (PTU) 50 MG Tab TAKE 1 TABLET BY MOUTH THREE TIMES A DAY 90 Tab 0   • ibuprofen (MOTRIN) 200 MG Tab Take 200 mg by mouth every 6 hours as needed.     • Multiple Vitamin (MULTIVITAMINS PO) Take  by mouth.     • fluticasone (FLONASE) 50 MCG/ACT nasal spray Spray 2 Sprays in nose every day. Each Nostril 16 g 11     No current facility-administered medications for this visit.          ROS:  Review of Systems   Constitutional: Negative for fever, chills, weight loss and malaise/fatigue.   HENT: Negative for ear pain, nosebleeds, congestion, sore throat and neck pain.    Eyes: Negative for blurred vision.   Respiratory: Negative for cough, sputum production, shortness of breath and wheezing.    Cardiovascular: Negative for chest pain, palpitations,  and leg swelling.   Gastrointestinal: Negative for heartburn, nausea, vomiting, diarrhea and abdominal pain.   Genitourinary: Negative for dysuria, urgency and frequency.   Musculoskeletal: Negative for myalgias, back pain and joint pain.   Skin: Negative for rash and itching.    Neurological: Negative for dizziness, tingling, tremors, sensory change, focal weakness and headaches.   Endo/Heme/Allergies: Does not bruise/bleed easily.   Psychiatric/Behavioral: Negative for depression, anxiety, suicidal ideas, insomnia and memory loss.      Exam:  /70 (BP Location: Right arm, Patient Position: Sitting, BP Cuff Size: Large adult long)   Pulse 84   Temp 36.1 °C (97 °F)   Resp 16   Wt 107.4 kg (236 lb 12.8 oz)   SpO2 97%     General:  Well nourished, well developed in NAD  Head is grossly normal.  Neck: Supple without JVD   Pulmonary:  Normal effort.   Cardiovascular: Regular rate  Extremities: no clubbing, cyanosis, or edema.  Psych: affect appropriate      Please note that this dictation was created using voice recognition software. I have made every reasonable attempt to correct obvious errors, but I expect that there are errors of grammar and possibly content that I did not discover before finalizing the note.    Assessment/Plan:  1. Muscle cramps  - Basic Metabolic Panel; Future  - MAGNESIUM; Future    2. Hypokalemia  - Basic Metabolic Panel; Future  - MAGNESIUM; Future    3. Hyperthyroidism  Continue PTU    4. Chronic pain of right knee  Continue PT, follow up with ortho.

## 2019-09-05 ENCOUNTER — APPOINTMENT (OUTPATIENT)
Dept: PHYSICAL THERAPY | Facility: REHABILITATION | Age: 59
End: 2019-09-05
Attending: FAMILY MEDICINE
Payer: COMMERCIAL

## 2019-09-05 LAB
BUN SERPL-MCNC: 12 MG/DL (ref 6–24)
BUN/CREAT SERPL: 20 (ref 9–23)
CALCIUM SERPL-MCNC: 9.8 MG/DL (ref 8.7–10.2)
CHLORIDE SERPL-SCNC: 100 MMOL/L (ref 96–106)
CO2 SERPL-SCNC: 26 MMOL/L (ref 20–29)
CREAT SERPL-MCNC: 0.6 MG/DL (ref 0.57–1)
GLUCOSE SERPL-MCNC: 88 MG/DL (ref 65–99)
MAGNESIUM SERPL-MCNC: 1.7 MG/DL (ref 1.6–2.3)
POTASSIUM SERPL-SCNC: 4 MMOL/L (ref 3.5–5.2)
SODIUM SERPL-SCNC: 139 MMOL/L (ref 134–144)

## 2019-09-10 ENCOUNTER — PHYSICAL THERAPY (OUTPATIENT)
Dept: PHYSICAL THERAPY | Facility: REHABILITATION | Age: 59
End: 2019-09-10
Attending: FAMILY MEDICINE
Payer: COMMERCIAL

## 2019-09-10 DIAGNOSIS — M54.16 LUMBAR RADICULOPATHY, RIGHT: ICD-10-CM

## 2019-09-10 PROCEDURE — 97110 THERAPEUTIC EXERCISES: CPT

## 2019-09-10 NOTE — OP THERAPY DAILY TREATMENT
Outpatient Physical Therapy  DAILY TREATMENT     [facility] Outpatient Physical Therapy [place]  [address]  [place] [address]  Phone:  [phone]  Fax:  [phone]    Date: 09/10/2019    Patient: Evita Mckenna  YOB: 1960  MRN: [id number]     Time Calculation  Start time: 1500  Stop time: 1530 Time Calculation (min): 30 minutes       Chief Complaint: Back Problem and Knee Problem    Visit #: 6    SUBJECTIVE:  Patient overall notes about 65% improvement in symptoms. States that she still has pain but is semi compliant with her HEP.     OBJECTIVE:  Current objective measures:   Patient unable to perform SL abduction          Therapeutic Exercises (CPT 26231):     1. Nu-Step L5, x10min    2. Heel slides, x20    3. Quad sets, x20    4. Calf stretch, [id number] B    5. SAQ, x20    6. Supine hip abd with slider, x20 B    7. Supine bridge mini, x10, with L2 abd    8. Shuttle, x3min, L4    9. SL abd, DNT    10. Heel raises, x20      Time-based treatments/modalities:  Therapeutic exercise minutes (CPT 19638): 30 minutes       Pain rating before treatment: 3  Pain rating after treatment: 3    ASSESSMENT:   Response to treatment: Patient responded fair to therapy demonstrated a decreased tolerance to more advanced exercise. Patient will still benefit from therapy to increase strength and LE control.     PLAN/RECOMMENDATIONS:   Plan for treatment: therapy treatment to continue next visit.  Planned interventions for next visit: continue with current treatment.

## 2019-09-18 ENCOUNTER — APPOINTMENT (OUTPATIENT)
Dept: PHYSICAL THERAPY | Facility: REHABILITATION | Age: 59
End: 2019-09-18
Attending: FAMILY MEDICINE
Payer: COMMERCIAL

## 2019-09-23 ENCOUNTER — PHYSICAL THERAPY (OUTPATIENT)
Dept: PHYSICAL THERAPY | Facility: REHABILITATION | Age: 59
End: 2019-09-23
Attending: FAMILY MEDICINE
Payer: COMMERCIAL

## 2019-09-23 DIAGNOSIS — M54.16 LUMBAR RADICULOPATHY, RIGHT: ICD-10-CM

## 2019-09-23 PROCEDURE — 97110 THERAPEUTIC EXERCISES: CPT

## 2019-09-23 NOTE — OP THERAPY DAILY TREATMENT
Outpatient Physical Therapy  DAILY TREATMENT     [facility] Out[facility] Physical Therapy [place]  [address]  [place] [address]  Phone:  [phone]  Fax:  [phone]    Date: 09/23/2019    Patient: Evita Mckenna  YOB: 1960  MRN: [id number]     Time Calculation   Start time: 0730  Stop time: 0800 Time Calculation (min): 30 minutes       Chief Complaint: Knee Problem    Visit #: 7    SUBJECTIVE:  Patient reports that this will be her last visit, due to out of pocket costs.     OBJECTIVE:  Current objective measures:   Hip abd 5/5  PT Functional Assessment Tool Used: LBDI/LEFS  PT Functional Assessment Score: 14/53       Therapeutic Exercises (CPT 14270):     1. Nu-Step L5, x10min    2. Aqua therapy instruction, x5min    8. Shuttle, x3min, L4      Therapeutic Exercise Summary: Access Code: [email or web address]         Exercises  · Supine Heel Slides - 10 reps - 2 sets - 1x daily - 7x weekly  · Supine Quad Set on Towel Roll - 10 reps - 2 sets - 1x daily - 7x weekly  · Small Range Straight Leg Raise - 10 reps - 2 sets - 1x daily - 7x weekly  · Gastroc Stretch on Wall - 3 reps - 1 sets - 15 hold - 3x daily - 7x weekly  · Hooklying Transversus Abdominis Palpation - 10 reps - 2 sets - 1x daily - 7x weekly  · Supine Bridge - 10 reps - 2 sets - 1x daily - 7x weekly  · Tandem Stance in Corner - 1x daily - 7x weekly  · Clamshell - 10 reps - 2 sets - 1x daily - 7x weekly  · Full Leg Press with Resistance Around Knees - 10 reps - 3 sets - 1x daily - 7x weekly          Time-based treatments/modalities:  Therapeutic exercise minutes (CPT 58720): 30 minutes       Pain rating before treatment: 1  Pain rating after treatment: 1    ASSESSMENT:   Response to treatment: Patient responded well to therapy overall with a decrease in symptoms and improvement in function. Patient to continue with HEP at this time.     PLAN/RECOMMENDATIONS:   Plan for treatment: Goals partially met.  Planned interventions for next visit:  Goals partially met.

## 2019-09-23 NOTE — OP THERAPY DISCHARGE SUMMARY
Outpatient Physical Therapy  DISCHARGE SUMMARY NOTE      [facility] [place]  [address]  [place] [address]  Phone:  [phone]  Fax:  [phone]    Date of Visit: 09/23/2019    Patient: Evita Mckenna  YOB: 1960  MRN: [id number]     Referring Provider: Sofie Mathew M.D.  [address] [place] [address]  [address]  [place] [address]   Referring Diagnosis Radiculopathy, lumbar region [M54.16]     Physical Therapy Occurrence Codes    Date of onset of impairment:  2/1/19   Date physical therapy care plan established or reviewed:  7/31/19   Date physical therapy treatment started:  7/31/19          Functional Assessment Used  PT Functional Assessment Tool Used: LBDI/LEFS  PT Functional Assessment Score: 14/53     Your patient is being discharged from Physical Therapy with the following comments:   · Goals partially met    Comments:  Patient will now discharge to [facility] due to out of pocket costs.      Limitations Remaining:  Continued R knee pain    Recommendations:  Discharge to [facility].     Robert Daniels, PT, DPT    Date: 9/23/2019

## 2019-10-02 ENCOUNTER — OFFICE VISIT (OUTPATIENT)
Dept: ENDOCRINOLOGY | Facility: MEDICAL CENTER | Age: 59
End: 2019-10-02
Payer: COMMERCIAL

## 2019-10-02 VITALS
BODY MASS INDEX: 37.85 KG/M2 | DIASTOLIC BLOOD PRESSURE: 70 MMHG | HEIGHT: 66 IN | WEIGHT: 235.5 LBS | HEART RATE: 111 BPM | OXYGEN SATURATION: 98 % | SYSTOLIC BLOOD PRESSURE: 112 MMHG

## 2019-10-02 DIAGNOSIS — E05.90 HYPERTHYROIDISM: ICD-10-CM

## 2019-10-02 DIAGNOSIS — E55.9 VITAMIN D DEFICIENCY: ICD-10-CM

## 2019-10-02 PROCEDURE — 99214 OFFICE O/P EST MOD 30 MIN: CPT | Performed by: PHYSICIAN ASSISTANT

## 2019-10-02 RX ORDER — PROPYLTHIOURACIL 50 MG/1
TABLET ORAL
Qty: 90 TAB | Refills: 0 | Status: SHIPPED | OUTPATIENT
Start: 2019-10-02 | End: 2019-10-03 | Stop reason: SDUPTHER

## 2019-10-02 NOTE — PROGRESS NOTES
"Endocrinology Clinic Progress Note    CC:     HPI:  Evita Mckenna is a 59 y.o. old patient who comes in today for evaluation of the followin. Vitamin D deficiency  Patient is on Vitamin D replacement   Patient has not been compliant secondary to her father n laws death.     5/10/19-vitamin D 46.1-on vitamin D supplementation with sublingual drops and multivitamin    2. Obesity (BMI 35.0-39.9 without comorbidity)  10/1/19- 235.5 lbs  2019- 236 lbs.   Weight 232 today     3. Hyperthyroidism  PTU 50 mg TID  (started consist for the last 3-4 weeks).   Notes intermittent symptoms \"burning\" with increased She has been taking more Tums     Patient states symptoms of intermittent palpations. She has noticed an improvement in emotional shift.     Patients father n law  in August and very it has been very stressfull.  Patient has not had any side effects.     2019-TSH <0.066, FT4 1.31, FT3 4.8, TRA 1.32   - PTU 50 TID   7/10/2019- TSH 0.008, FT4 0.90, T3 144   5/10/2019- TSH <0.006, FT4 1.25, T3 174 - PTU 50mg TID     Endocrinology history to date:   Subclinical hyperthyroidism:   - started PTU 50 mg TID   18- replaced methimazole with PTU side effects discussed   18- went to ER secondary to Gastritis related to Methimazole.   18- on average Methimazole 1 Tablet daily  - start on Methimazole 5 mg TID repeat labs now and then again 4 weeks   10/2018- unable to tolerate Methimazole at 10 mg TID.   10/2018 methimazole 30 mg daily (started 2018)     Thyroid Ultrasound   2018 the thyroid is heterogeneous vascularity is normal.  The right lobe of the thyroid measures 1.78 cm x 4.56 cm x 1.8 cm.  There is no discrete nodules.  The left thyroid lobe is not visualized.  The isthmus is measuring 0.35.  Mildly predominant node in the left of the neck.  There is absent and/or hypoplastic left lower lobe.     Labs:  ER labs 2018 WBC within normal limits, Chemistry " within normal limits, ALT 28, AST slightly elevated at 46, TSH less than 0.006 free T4 1.31 free T3 4.8       5/10/2019- TSH <0.006, FT4 1.25, T3 174 - PTU 50mg TID   4/17/2019 TSH less than 0.006, free T4 2.12, T3 331 started PTU shortly after   11/26/2018- <0.006 FT3 4.8, FT4 1.31 (Methimazole 5mg daily)   10/19/2018- FT3 4.9, T3 231, Ft4- 1.24   9/18/2018 TSH less than 0.006 free T4 1.51 free T3 4.8 thyroglobulin antibody less than 1.0 thyroglobulin 23.0  8/18/2016 TSH 1.840  9/29/2015 TSH 2.1-0 free T4 0.93  12/29/2012 TSH 1.530  8/2010 TSH 2.680     Thyroid Uptake and Scan: 10/22/18-   1.  Abnormally high iodine uptake being 40.3% at 5 hours  2.  Homogeneous uptake throughout the right lobe  3.  Left lobe is probably absent and there is probably hypertrophy of the isthmus with associated uptake        Borderline vitamin D deficiency:   5/10/19-vitamin D 46.1-on vitamin D supplementation with sublingual drops and multivitamin  12/18-has not been taking vitamin D supplementation  10/19/2018- Vitamin D 20.0 - started on OTC Vitamin D gtt 5000 IU daily if tolerated encouraged dietary supplementation.    9/2015- 34.5  12/2012-13 0.4  8/[id number] 0.2     Obesity: Patient has been successful in the past of losing 60 pounds despite regaining 20.  We will continue to monitor her weight and discuss further options in the future after stabilization of her thyroid.  5/19 weight 232 pounds  12/18 weight 245 pounds    5/10/2019 vitamin B 258- start supplement      Family history of diabetes: Patient has family history of diabetes with her sisters and also her father.  Most recent laboratory work from 2016 and 2018 did not display any signs of impaired fasting glucose at this time.     Labs:   5/10/2019 glucose 90, EGFR within normal limits,Total cholesterol 188, triglycerides 46, HDL 77, ,  9/5/2018 glucose 88, total cholesterol 166, triglycerides 48, HDL 75, LDL 81,  8/18/2016 glucose 91, total cholesterol 191,  triglycerides 59, HDL 74,       Medications:  ROS:  Constitutional: No weight gain,  fever  HEENT: No difficulty with swallowing, change in voice, or swelling in throat area   Cardiac: No chest pain, palpitations, or racing heart  Resp: No shortness of breath  GI: No abdominal pain, nausea, vomiting, or diarrhea   Neuro: No numbness or tinging in feet  Endo: No heat or cold intolerance, no polyuria or polydipsia  All other detailed ROS is otherwise negative       Past Medical History:  Patient Active Problem List    Diagnosis Date Noted   • Knee pain 03/25/2019   • Hyperthyroidism 12/18/2018   • History of colorectal cancer 03/02/2016   • Epigastric pain 04/04/2014   • Chronic pain of right knee 03/08/2013   • Internal hemorrhoid 03/31/2011   • Vitamin D deficiency 08/05/2010   • History of anemia 08/05/2010   • Proteinuria 08/05/2010       Family Medical History:   Family History   Problem Relation Age of Onset   • Stroke Father         70yo   • Hypertension Father    • Heart Disease Father         68 yo   • Cancer Maternal Grandfather         Lung   • Diabetes Paternal Grandmother    • Hypertension Paternal Grandmother    • Stroke Paternal Grandmother         70 s   • Psychiatric Illness Sister         Schizophrenia   • Psychiatric Illness Brother         Schizophrenia   • Cancer Paternal Aunt         Bone, liver   • Psychiatric Illness Mother         dental / mental illness    • Lung Disease Mother         Copd    • Psychiatric Illness Sister         schizoprenia    • Diabetes Sister    • Hypertension Sister    • Psychiatric Illness Brother    • Heart Disease Brother    • Hypertension Brother    • Hypertension Brother    • Diabetes Brother        Social History:   Social History     Socioeconomic History   • Marital status: Single     Spouse name: Not on file   • Number of children: Not on file   • Years of education: Not on file   • Highest education level: Not on file   Occupational History   • Not on  file   Social Needs   • Financial resource strain: Not on file   • Food insecurity:     Worry: Not on file     Inability: Not on file   • Transportation needs:     Medical: Not on file     Non-medical: Not on file   Tobacco Use   • Smoking status: Never Smoker   • Smokeless tobacco: Never Used   • Tobacco comment: significant 2nd hand exposure    Substance and Sexual Activity   • Alcohol use: No     Alcohol/week: 0.6 oz     Types: 1 Standard drinks or equivalent per week   • Drug use: No   • Sexual activity: Yes     Partners: Female   Lifestyle   • Physical activity:     Days per week: Not on file     Minutes per session: Not on file   • Stress: Not on file   Relationships   • Social connections:     Talks on phone: Not on file     Gets together: Not on file     Attends Hinduism service: Not on file     Active member of club or organization: Not on file     Attends meetings of clubs or organizations: Not on file     Relationship status: Not on file   • Intimate partner violence:     Fear of current or ex partner: Not on file     Emotionally abused: Not on file     Physically abused: Not on file     Forced sexual activity: Not on file   Other Topics Concern   •  Service No   • Blood Transfusions No   • Caffeine Concern No   • Occupational Exposure No   • Hobby Hazards No   • Sleep Concern Yes   • Stress Concern No   • Weight Concern Yes   • Special Diet No   • Back Care No   • Exercise Yes   • Bike Helmet No   • Seat Belt Yes   • Self-Exams Yes   Social History Narrative    , no children.        Medications:    Current Outpatient Medications:   •  propylthiouracil, TAKE 1 TABLET BY MOUTH THREE TIMES A DAY  •  Multiple Vitamin (MULTIVITAMINS PO), Take  by mouth., Taking  •  fluticasone, 2 Spray, Nasal, DAILY, PRN  •  ibuprofen, 200 mg, Oral, Q6HRS PRN, Not Taking    EXAM:  Vital signs: /70 (BP Location: Left arm, Patient Position: Sitting, BP Cuff Size: Large adult)   Pulse (!) 111   Ht 1.676 m  "(5' 6\")   Wt 106.8 kg (235 lb 8 oz)   SpO2 98%   BMI 38.01 kg/m²   General: No apparent distress, cooperative  Eyes: No scleral icterus, no discharge  Neck: Neg thyroid enlargement   Resp: Normal effort without any audible wheeze  Cardiology: RRR without murmur rub or gallop  Extremities: No lower extremity edema  Psych: Alert and oriented, normal mood and affect    Assessment and Plan:  1. Vitamin D deficiency  Chronic continue vitamin replacement    2. Obesity (BMI 35.0-39.9 without comorbidity)  Stable   Weight 235    3. Hyperthyroidism  Work on compliance of  PTU 50 mg TID   Labs in 4 weeks     Return in about 2 months (around 12/2/2019).    Thank you for allowing me to participate in the care of this patient.    Ashley Santos P.A.-C.    CC:   Sofie Mathew M.D.    This note was created using voice recognition software (Dragon). The accuracy of the dictation is limited by the abilities of the software. I have reviewed the note prior to signing, however some errors in grammar and context are still possible. If you have any questions related to this note please do not hesitate to contact our office.   "

## 2019-10-03 DIAGNOSIS — E05.90 HYPERTHYROIDISM: ICD-10-CM

## 2019-10-03 RX ORDER — PROPYLTHIOURACIL 50 MG/1
TABLET ORAL
Qty: 90 TAB | Refills: 0 | Status: ON HOLD | OUTPATIENT
Start: 2019-10-03 | End: 2020-01-08

## 2019-10-03 NOTE — TELEPHONE ENCOUNTER
Was the patient seen in the last year in this department? Yes    Does patient have an active prescription for medications requested? No     Received Request Via: Pharmacy     Last seen 10/02/2019

## 2019-11-12 ENCOUNTER — OFFICE VISIT (OUTPATIENT)
Dept: ENDOCRINOLOGY | Facility: MEDICAL CENTER | Age: 59
End: 2019-11-12
Payer: COMMERCIAL

## 2019-11-12 VITALS
HEIGHT: 66 IN | BODY MASS INDEX: 38.57 KG/M2 | WEIGHT: 240 LBS | HEART RATE: 73 BPM | OXYGEN SATURATION: 99 % | DIASTOLIC BLOOD PRESSURE: 70 MMHG | SYSTOLIC BLOOD PRESSURE: 110 MMHG

## 2019-11-12 DIAGNOSIS — E05.90 HYPERTHYROIDISM: ICD-10-CM

## 2019-11-12 DIAGNOSIS — E55.9 VITAMIN D DEFICIENCY: ICD-10-CM

## 2019-11-12 DIAGNOSIS — E66.9 OBESITY, UNSPECIFIED CLASSIFICATION, UNSPECIFIED OBESITY TYPE, UNSPECIFIED WHETHER SERIOUS COMORBIDITY PRESENT: ICD-10-CM

## 2019-11-12 PROCEDURE — 99214 OFFICE O/P EST MOD 30 MIN: CPT | Performed by: PHYSICIAN ASSISTANT

## 2019-11-12 NOTE — PROGRESS NOTES
Endocrinology Clinic Progress Note    HPI:  Evita Mckenna is a 59 y.o. old patient who comes in today for evaluation of the followin. Vitamin D deficiency  Patient is on Vitamin D replacement     5/10/19-vitamin D 46.1-on vitamin D supplementation with sublingual drops and multivitamin    2. Obesity (BMI 35.0-39.9 without comorbidity)  - 240 lbs - patient is thinking about getting the gastric bypass     10/1/19- 235.5 lbs  2019- 236 lbs.   Weight 232 today     3. Hyperthyroidism  PTU 50 mg TID   Patient has been more compliant with medications   Patient complains of fatigue and weight gain.   She continues to complain of palpations on occasion.     2019- TSH 0.245, FT4 0.78, TRA pending T3 120, F3 2.8 - PTU 50 TID   2019-TSH <0.066, FT4 1.31, FT3 4.8, TRA 1.32   - PTU 50 TID   7/10/2019- TSH 0.008, FT4 0.90, T3 144   5/10/2019- TSH <0.006, FT4 1.25, T3 174 - PTU 50mg TID     Endocrinology history to date:   Subclinical hyperthyroidism:   - started PTU 50 mg TID   18- replaced methimazole with PTU side effects discussed   18- went to ER secondary to Gastritis related to Methimazole.   18- on average Methimazole 1 Tablet daily  - start on Methimazole 5 mg TID repeat labs now and then again 4 weeks   10/2018- unable to tolerate Methimazole at 10 mg TID.   10/2018 methimazole 30 mg daily (started 2018)     Thyroid Ultrasound   2018 the thyroid is heterogeneous vascularity is normal.  The right lobe of the thyroid measures 1.78 cm x 4.56 cm x 1.8 cm.  There is no discrete nodules.  The left thyroid lobe is not visualized.  The isthmus is measuring 0.35.  Mildly predominant node in the left of the neck.  There is absent and/or hypoplastic left lower lobe.     Labs:  ER labs 2018 WBC within normal limits, Chemistry within normal limits, ALT 28, AST slightly elevated at 46, TSH less than 0.006 free T4 1.31 free T3 4.8       5/10/2019- TSH <0.006,  FT4 1.25, T3 174 - PTU 50mg TID   4/17/2019 TSH less than 0.006, free T4 2.12, T3 331 started PTU shortly after   11/26/2018- <0.006 FT3 4.8, FT4 1.31 (Methimazole 5mg daily)   10/19/2018- FT3 4.9, T3 231, Ft4- 1.24   9/18/2018 TSH less than 0.006 free T4 1.51 free T3 4.8 thyroglobulin antibody less than 1.0 thyroglobulin 23.0  8/18/2016 TSH 1.840  9/29/2015 TSH 2.1-0 free T4 0.93  12/29/2012 TSH 1.530  8/2010 TSH 2.680     Thyroid Uptake and Scan: 10/22/18-   1.  Abnormally high iodine uptake being 40.3% at 5 hours  2.  Homogeneous uptake throughout the right lobe  3.  Left lobe is probably absent and there is probably hypertrophy of the isthmus with associated uptake        Borderline vitamin D deficiency:   5/10/19-vitamin D 46.1-on vitamin D supplementation with sublingual drops and multivitamin  12/18-has not been taking vitamin D supplementation  10/19/2018- Vitamin D 20.0 - started on OTC Vitamin D gtt 5000 IU daily if tolerated encouraged dietary supplementation.    9/2015- 34.5  12/2012-13 0.4  [id number] 0.2     Obesity: Patient has been successful in the past of losing 60 pounds despite regaining 20.  We will continue to monitor her weight and discuss further options in the future after stabilization of her thyroid.  5/19 weight 232 pounds  12/18 weight 245 pounds    5/10/2019 vitamin B 258- start supplement      Family history of diabetes: Patient has family history of diabetes with her sisters and also her father.  Most recent laboratory work from 2016 and 2018 did not display any signs of impaired fasting glucose at this time.     Labs:   5/10/2019 glucose 90, EGFR within normal limits,Total cholesterol 188, triglycerides 46, HDL 77, ,  9/5/2018 glucose 88, total cholesterol 166, triglycerides 48, HDL 75, LDL 81,  8/18/2016 glucose 91, total cholesterol 191, triglycerides 59, HDL 74,       Medications:  ROS:  Constitutional: No weight gain,  fever  HEENT: No difficulty with  swallowing, change in voice, or swelling in throat area   Cardiac: No chest pain, palpitations, or racing heart  Resp: No shortness of breath  GI: No abdominal pain, nausea, vomiting, or diarrhea   Neuro: No numbness or tinging in feet  Endo: No heat or cold intolerance, no polyuria or polydipsia  All other detailed ROS is otherwise negative       Past Medical History:  Patient Active Problem List    Diagnosis Date Noted   • Knee pain 03/25/2019   • Hyperthyroidism 12/18/2018   • History of colorectal cancer 03/02/2016   • Epigastric pain 04/04/2014   • Chronic pain of right knee 03/08/2013   • Internal hemorrhoid 03/31/2011   • Vitamin D deficiency 08/05/2010   • History of anemia 08/05/2010   • Proteinuria 08/05/2010       Family Medical History:   Family History   Problem Relation Age of Onset   • Stroke Father         70yo   • Hypertension Father    • Heart Disease Father         68 yo   • Cancer Maternal Grandfather         Lung   • Diabetes Paternal Grandmother    • Hypertension Paternal Grandmother    • Stroke Paternal Grandmother         70 s   • Psychiatric Illness Sister         Schizophrenia   • Psychiatric Illness Brother         Schizophrenia   • Cancer Paternal Aunt         Bone, liver   • Psychiatric Illness Mother         dental / mental illness    • Lung Disease Mother         Copd    • Psychiatric Illness Sister         schizoprenia    • Diabetes Sister    • Hypertension Sister    • Psychiatric Illness Brother    • Heart Disease Brother    • Hypertension Brother    • Hypertension Brother    • Diabetes Brother        Social History:   Social History     Socioeconomic History   • Marital status: Single     Spouse name: Not on file   • Number of children: Not on file   • Years of education: Not on file   • Highest education level: Not on file   Occupational History   • Not on file   Social Needs   • Financial resource strain: Not on file   • Food insecurity:     Worry: Not on file     Inability: Not  "on file   • Transportation needs:     Medical: Not on file     Non-medical: Not on file   Tobacco Use   • Smoking status: Never Smoker   • Smokeless tobacco: Never Used   • Tobacco comment: significant 2nd hand exposure    Substance and Sexual Activity   • Alcohol use: No     Alcohol/week: 0.6 oz     Types: 1 Standard drinks or equivalent per week   • Drug use: No   • Sexual activity: Yes     Partners: Female   Lifestyle   • Physical activity:     Days per week: Not on file     Minutes per session: Not on file   • Stress: Not on file   Relationships   • Social connections:     Talks on phone: Not on file     Gets together: Not on file     Attends Christian service: Not on file     Active member of club or organization: Not on file     Attends meetings of clubs or organizations: Not on file     Relationship status: Not on file   • Intimate partner violence:     Fear of current or ex partner: Not on file     Emotionally abused: Not on file     Physically abused: Not on file     Forced sexual activity: Not on file   Other Topics Concern   •  Service No   • Blood Transfusions No   • Caffeine Concern No   • Occupational Exposure No   • Hobby Hazards No   • Sleep Concern Yes   • Stress Concern No   • Weight Concern Yes   • Special Diet No   • Back Care No   • Exercise Yes   • Bike Helmet No   • Seat Belt Yes   • Self-Exams Yes   Social History Narrative    , no children.        Medications:    Current Outpatient Medications:   •  propylthiouracil, TAKE 1 TABLET BY MOUTH THREE TIMES A DAY, Taking  •  ibuprofen, 200 mg, Oral, Q6HRS PRN, Taking  •  Multiple Vitamin (MULTIVITAMINS PO), Take  by mouth., Taking  •  fluticasone, 2 Spray, Nasal, DAILY, Taking    EXAM:  Vital signs: /70 (BP Location: Left arm, Patient Position: Sitting, BP Cuff Size: Large adult)   Pulse 73   Ht 1.676 m (5' 6\")   Wt 108.9 kg (240 lb) Comment: Per patient this morning.  SpO2 99%   BMI 38.74 kg/m²   General: No apparent " distress, cooperative  Eyes: No scleral icterus, no discharge  Neck: Neg thyroid enlargement   Resp: Normal effort without any audible wheeze  Cardiology: RRR without murmur rub or gallop  Extremities: No lower extremity edema  Psych: Alert and oriented, normal mood and affect    Assessment and Plan:  1. Vitamin D deficiency  Chronic continue vitamin replacement    2. Obesity (BMI 35.0-39.9 without comorbidity)  Increasing slowly. She is interested in undergoing gastric bypass she will be inquiring in the next month.     3. Hyperthyroidism  Continue:   PTU 50 mg TID   Discussed side effect profiles again.   She was previously seen by Dr. Austin in January 24, 2019 secondary to symptoms of difficulty swallowing.  At that time secondary to her abnormal thyroid labs she was not a candidate for surgery and he had request that she undergo iodine ablation.  At this time she is more euthyroid than she has been in prior months.  We will continue to follow and potential refer her to see Dr. Meza in the future.      Return in about 4 weeks (around 12/10/2019).    Thank you for allowing me to participate in the care of this patient.    Ashley Santos P.A.-C.    CC:   Sofie Mathew M.D.    This note was created using voice recognition software (Dragon). The accuracy of the dictation is limited by the abilities of the software. I have reviewed the note prior to signing, however some errors in grammar and context are still possible. If you have any questions related to this note please do not hesitate to contact our office.

## 2019-11-13 LAB
T3 SERPL-MCNC: 120 NG/DL (ref 71–180)
T3FREE SERPL-MCNC: 2.8 PG/ML (ref 2–4.4)
T4 FREE SERPL-MCNC: 0.78 NG/DL (ref 0.82–1.77)
TSH RECEP AB SER-ACNC: 1.46 IU/L (ref 0–1.75)
TSH SERPL DL<=0.005 MIU/L-ACNC: 0.24 UIU/ML (ref 0.45–4.5)

## 2019-12-03 ENCOUNTER — OFFICE VISIT (OUTPATIENT)
Dept: MEDICAL GROUP | Facility: PHYSICIAN GROUP | Age: 59
End: 2019-12-03
Payer: COMMERCIAL

## 2019-12-03 VITALS
TEMPERATURE: 98 F | OXYGEN SATURATION: 98 % | RESPIRATION RATE: 18 BRPM | BODY MASS INDEX: 38.65 KG/M2 | HEART RATE: 80 BPM | HEIGHT: 66 IN | SYSTOLIC BLOOD PRESSURE: 104 MMHG | WEIGHT: 240.5 LBS | DIASTOLIC BLOOD PRESSURE: 60 MMHG

## 2019-12-03 DIAGNOSIS — G89.29 CHRONIC PAIN OF RIGHT KNEE: ICD-10-CM

## 2019-12-03 DIAGNOSIS — M25.561 CHRONIC PAIN OF RIGHT KNEE: ICD-10-CM

## 2019-12-03 DIAGNOSIS — E05.90 HYPERTHYROIDISM: ICD-10-CM

## 2019-12-03 DIAGNOSIS — E66.9 OBESITY, CLASS II, BMI 35-39.9, ISOLATED (SEE ACTUAL BMI): ICD-10-CM

## 2019-12-03 PROBLEM — M25.569 KNEE PAIN: Status: RESOLVED | Noted: 2019-03-25 | Resolved: 2019-12-03

## 2019-12-03 PROCEDURE — 99214 OFFICE O/P EST MOD 30 MIN: CPT | Performed by: FAMILY MEDICINE

## 2019-12-03 NOTE — PROGRESS NOTES
Chief Complaint   Patient presents with   • Knee Pain     rt knee pain   • Referral Needed     weight       HISTORY OF PRESENT ILLNESS: Patient is a 59 y.o. female established patient here today for the following concerns:    1. Obesity, Class II, BMI 35-39.9, isolated (see actual BMI)  Here today with struggling on her weight.  This is not made easier as she is struggling with hyperthyroidism on PTU and her Free thyroxine is lowering now.  She reports she has been cutting calories but is limited on exercise due to chronic knee pain.  She has hx of lap band surgery that was complicated by hypertrophic scar leading to chronic abdominal pain and partial obstruction of the stomach.  She had this removed a couple years ago and has maintained about 40 lb weight loss on her own, but is interested if she may be a candidate for sleeve gastrectomy.        2. Chronic pain of right knee  As mentioned above has been struggling with right knee pain, lateral and anterior in nature described as aching.  Often giving way and locking.  She did get some very temporary relief with intra-articular steroid injection through [facility].  She is hoping to have additional imaging as the locking and giving way have been extremely bothersome and feels like her knee is unstable.     3. Hyperthyroidism  Continues to follow up with endocrinology on her thyroiditis/hyperthyroidism on PTU.  Reports that she is now feeling sluggish and last labs indicate now running more hypothyroid.  Has repeat levels due next week to see if dose reduction is indicated.       Past Medical, Social, and Family history reviewed and updated in EP[facility]    Allergies:Iodine and Morphine    Current Outpatient Medications   Medication Sig Dispense Refill   • propylthiouracil (PTU) 50 MG Tab TAKE 1 TABLET BY MOUTH THREE TIMES A DAY 90 Tab 0   • ibuprofen (MOTRIN) 200 MG Tab Take 200 mg by mouth every 6 hours as needed.     • Multiple Vitamin (MULTIVITAMINS PO) Take  by mouth.     •  "fluticasone (FLONASE) 50 MCG/ACT nasal spray Spray 2 Sprays in nose every day. Each Nostril 16 g 11     No current facility-administered medications for this visit.          ROS:  Review of Systems   Constitutional: Negative for fever, chills, weight loss and malaise/fatigue.   HENT: Negative for ear pain, nosebleeds, congestion, sore throat and neck pain.    Eyes: Negative for blurred vision.   Respiratory: Negative for cough, sputum production, shortness of breath and wheezing.    Cardiovascular: Negative for chest pain, palpitations,  and leg swelling.   Gastrointestinal: Negative for heartburn, nausea, vomiting, diarrhea and abdominal pain.   Genitourinary: Negative for dysuria, urgency and frequency.   Musculoskeletal: Negative for myalgias, back pain and +joint pain.   Skin: Negative for rash and itching.   Neurological: Negative for dizziness, tingling, tremors, sensory change, focal weakness and headaches.   Endo/Heme/Allergies: Does not bruise/bleed easily.   Psychiatric/Behavioral: Negative for depression, anxiety, suicidal ideas, insomnia and memory loss.      Exam:  /60 (BP Location: Right arm, Patient Position: Sitting, BP Cuff Size: Large adult long)   Pulse 80   Temp 36.7 °C (98 °F)   Resp 18   Ht 1.676 m (5' 6\")   Wt 109.1 kg (240 lb 8 oz)   SpO2 98%     General:  Well nourished, well developed in NAD  Head is grossly normal.  Neck: Supple without JVD   Pulmonary:  Normal effort.   Cardiovascular: Regular rate  Extremities: no clubbing, cyanosis, or edema.  Psych: affect appropriate      Please note that this dictation was created using voice recognition software. I have made every reasonable attempt to correct obvious errors, but I expect that there are errors of grammar and possibly content that I did not discover before finalizing the note.    Assessment/Plan:  1. Obesity, Class II, BMI 35-39.9, isolated (see actual BMI)  - REFERRAL TO BARIATRIC SURGERY    2. Chronic pain of right " knee  Recommend MRI of the knee.  Will follow up with ortho to see if they are able to get it authorized.    Continue Ibuprofen as needed.   Weight reduction strongly recommended.     3. Hyperthyroidism  Trending downward.  On PTU repeat labs next week.     3 month follow up

## 2019-12-12 ENCOUNTER — OFFICE VISIT (OUTPATIENT)
Dept: ENDOCRINOLOGY | Facility: MEDICAL CENTER | Age: 59
End: 2019-12-12
Payer: COMMERCIAL

## 2019-12-12 VITALS
WEIGHT: 249 LBS | DIASTOLIC BLOOD PRESSURE: 70 MMHG | BODY MASS INDEX: 40.02 KG/M2 | OXYGEN SATURATION: 91 % | HEIGHT: 66 IN | SYSTOLIC BLOOD PRESSURE: 128 MMHG | HEART RATE: 104 BPM

## 2019-12-12 DIAGNOSIS — E05.90 HYPERTHYROIDISM: ICD-10-CM

## 2019-12-12 DIAGNOSIS — R53.83 OTHER FATIGUE: ICD-10-CM

## 2019-12-12 DIAGNOSIS — E55.9 VITAMIN D DEFICIENCY: ICD-10-CM

## 2019-12-12 LAB
T3 SERPL-MCNC: 162 NG/DL (ref 71–180)
T3FREE SERPL-MCNC: 3.5 PG/ML (ref 2–4.4)
T4 FREE SERPL-MCNC: 0.78 NG/DL (ref 0.82–1.77)
TSH SERPL DL<=0.005 MIU/L-ACNC: 1.45 UIU/ML (ref 0.45–4.5)

## 2019-12-12 PROCEDURE — 99214 OFFICE O/P EST MOD 30 MIN: CPT | Performed by: PHYSICIAN ASSISTANT

## 2019-12-12 NOTE — PROGRESS NOTES
"Endocrinology Clinic Progress Note    HPI:  Evita Mckenna is a 59 y.o. old patient who comes in today for evaluation of the following:      Overall she is feeling better. This has been a \"long year\".     1. Vitamin D deficiency  Patient is on Vitamin D replacement     5/10/19-vitamin D 46.1-on vitamin D supplementation with sublingual drops and multivitamin    2. Obesity (BMI 35.0-39.9 without comorbidity)  12/19- 249 lbs - she is still frustrated re: weight. She continues to have a \"bad knee\" and is contemplating surgery     11/19- 240 lbs - patient is thinking about getting the gastric bypass   10/1/19- 235.5 lbs  8/2019- 236 lbs.   Weight 232 today     3. Hyperthyroidism/MNG  PTU 50 mg TID - she takes it 2-3 times a week at twice daily     Patient complains of intermittent GERD and bad taste in her mouth which she attributes to the PTU. She also has swelling on occasion and difficulty with swallowing right >Left.     She continues to complain of palpations on occasion.     12/11/2019- TSH 1.450, FT4 0.78, FT3 3.5   11/11/2019- TSH 0.245, FT4 0.78, TRA pending T3 120, F3 2.8 - PTU 50 TID   8/27/2019-TSH <0.066, FT4 1.31, FT3 4.8, TRA 1.32   - PTU 50 TID   7/10/2019- TSH 0.008, FT4 0.90, T3 144   5/10/2019- TSH <0.006, FT4 1.25, T3 174 - PTU 50mg TID     Endocrinology history to date:   Subclinical hyperthyroidism:   5/19- started PTU 50 mg TID   12/27/18- replaced methimazole with PTU side effects discussed   12/22/18- went to ER secondary to Gastritis related to Methimazole.   12/18/18- on average Methimazole 1 Tablet daily  11/201/18- start on Methimazole 5 mg TID repeat labs now and then again 4 weeks   10/2018- unable to tolerate Methimazole at 10 mg TID.   10/2018 methimazole 30 mg daily (started 9/21/2018)     Thyroid Ultrasound   9/21/2018 the thyroid is heterogeneous vascularity is normal.  The right lobe of the thyroid measures 1.78 cm x 4.56 cm x 1.8 cm.  There is no discrete nodules.  The left " thyroid lobe is not visualized.  The isthmus is measuring 0.35.  Mildly predominant node in the left of the neck.  There is absent and/or hypoplastic left lower lobe.     Labs:  ER labs 12/22/2018 WBC within normal limits, Chemistry within normal limits, ALT 28, AST slightly elevated at 46, TSH less than 0.006 free T4 1.31 free T3 4.8       5/10/2019- TSH <0.006, FT4 1.25, T3 174 - PTU 50mg TID   4/17/2019 TSH less than 0.006, free T4 2.12, T3 331 started PTU shortly after   11/26/2018- <0.006 FT3 4.8, FT4 1.31 (Methimazole 5mg daily)   10/19/2018- FT3 4.9, T3 231, Ft4- 1.24   9/18/2018 TSH less than 0.006 free T4 1.51 free T3 4.8 thyroglobulin antibody less than 1.0 thyroglobulin 23.0  8/18/2016 TSH 1.840  9/29/2015 TSH 2.1-0 free T4 0.93  12/29/2012 TSH 1.530  8/2010 TSH 2.680     Thyroid Uptake and Scan: 10/22/18-   1.  Abnormally high iodine uptake being 40.3% at 5 hours  2.  Homogeneous uptake throughout the right lobe  3.  Left lobe is probably absent and there is probably hypertrophy of the isthmus with associated uptake    FINDINGS:  The thyroid gland is heterogeneous.  Vascularity is normal.     The right lobe of the thyroid gland measures 1.78 cm x 4.56 cm x 1.82 cm. No discrete nodule.  LEFT thyroid lobe is is not visualized.  The isthmus measures 0.35 cm.  Mildly prominent lymph nodes in the LEFT neck.     IMPRESSION:     1.  Absent or hypoplastic LEFT lower lobe.  2.  RIGHT thyroid lobe is mildly prominent and heterogeneous, without discrete mass.  3.  Mildly prominent LEFT cervical lymph nodes, nonspecific.        Borderline vitamin D deficiency:   5/10/19-vitamin D 46.1-on vitamin D supplementation with sublingual drops and multivitamin  12/18-has not been taking vitamin D supplementation  10/19/2018- Vitamin D 20.0 - started on OTC Vitamin D gtt 5000 IU daily if tolerated encouraged dietary supplementation.     9/2015- 34.5  12/2012-13 0.4  8/2010-2010-18 0.2     Obesity: Patient has been successful  in the past of losing 60 pounds despite regaining 20.  We will continue to monitor her weight and discuss further options in the future after stabilization of her thyroid.    12/12/2019- 249 lbs   5/19 weight 232 pounds  12/18 weight 245 pounds    5/10/2019 vitamin B 258- start supplement      Family history of diabetes: Patient has family history of diabetes with her sisters and also her father.  Most recent laboratory work from 2016 and 2018 did not display any signs of impaired fasting glucose at this time.     Labs:   5/10/2019 glucose 90, EGFR within normal limits,Total cholesterol 188, triglycerides 46, HDL 77, ,  9/5/2018 glucose 88, total cholesterol 166, triglycerides 48, HDL 75, LDL 81,  8/18/2016 glucose 91, total cholesterol 191, triglycerides 59, HDL 74,       Medications:  ROS:  Constitutional: No weight gain,  fever  HEENT: No difficulty with swallowing, change in voice, or swelling in throat area   Cardiac: No chest pain, palpitations, or racing heart  Resp: No shortness of breath  GI: No abdominal pain, nausea, vomiting, or diarrhea   Neuro: No numbness or tinging in feet  Endo: No heat or cold intolerance, no polyuria or polydipsia  All other detailed ROS is otherwise negative       Past Medical History:  Patient Active Problem List    Diagnosis Date Noted   • Hyperthyroidism 12/18/2018   • History of colorectal cancer 03/02/2016   • Epigastric pain 04/04/2014   • Chronic pain of right knee 03/08/2013   • Internal hemorrhoid 03/31/2011   • Vitamin D deficiency 08/05/2010   • History of anemia 08/05/2010   • Proteinuria 08/05/2010       Family Medical History:   Family History   Problem Relation Age of Onset   • Stroke Father         70yo   • Hypertension Father    • Heart Disease Father         70 yo   • Cancer Maternal Grandfather         Lung   • Diabetes Paternal Grandmother    • Hypertension Paternal Grandmother    • Stroke Paternal Grandmother         70 s   • Psychiatric Illness  Sister         Schizophrenia   • Psychiatric Illness Brother         Schizophrenia   • Cancer Paternal Aunt         Bone, liver   • Psychiatric Illness Mother         dental / mental illness    • Lung Disease Mother         Copd    • Psychiatric Illness Sister         schizoprenia    • Diabetes Sister    • Hypertension Sister    • Psychiatric Illness Brother    • Heart Disease Brother    • Hypertension Brother    • Hypertension Brother    • Diabetes Brother        Social History:   Social History     Socioeconomic History   • Marital status: Single     Spouse name: Not on file   • Number of children: Not on file   • Years of education: Not on file   • Highest education level: Not on file   Occupational History   • Not on file   Social Needs   • Financial resource strain: Not on file   • Food insecurity:     Worry: Not on file     Inability: Not on file   • Transportation needs:     Medical: Not on file     Non-medical: Not on file   Tobacco Use   • Smoking status: Never Smoker   • Smokeless tobacco: Never Used   • Tobacco comment: significant 2nd hand exposure    Substance and Sexual Activity   • Alcohol use: No     Alcohol/week: 0.6 oz     Types: 1 Standard drinks or equivalent per week   • Drug use: No   • Sexual activity: Yes     Partners: Female   Lifestyle   • Physical activity:     Days per week: Not on file     Minutes per session: Not on file   • Stress: Not on file   Relationships   • Social connections:     Talks on phone: Not on file     Gets together: Not on file     Attends Religion service: Not on file     Active member of club or organization: Not on file     Attends meetings of clubs or organizations: Not on file     Relationship status: Not on file   • Intimate partner violence:     Fear of current or ex partner: Not on file     Emotionally abused: Not on file     Physically abused: Not on file     Forced sexual activity: Not on file   Other Topics Concern   •  Service No   • Blood  "Transfusions No   • Caffeine Concern No   • Occupational Exposure No   • Hobby Hazards No   • Sleep Concern Yes   • Stress Concern No   • Weight Concern Yes   • Special Diet No   • Back Care No   • Exercise Yes   • Bike Helmet No   • Seat Belt Yes   • Self-Exams Yes   Social History Narrative    , no children.        Medications:    Current Outpatient Medications:   •  propylthiouracil, TAKE 1 TABLET BY MOUTH THREE TIMES A DAY, Taking  •  ibuprofen, 200 mg, Oral, Q6HRS PRN, Taking  •  Multiple Vitamin (MULTIVITAMINS PO), Take  by mouth., Taking  •  fluticasone, 2 Spray, Nasal, DAILY, Taking    EXAM:  Vital signs: /70 (BP Location: Left arm, Patient Position: Sitting, BP Cuff Size: Large adult)   Pulse (!) 104   Ht 1.665 m (5' 5.55\")   Wt 112.9 kg (249 lb)   SpO2 91%   BMI 40.74 kg/m²   General: No apparent distress, cooperative  Eyes: No scleral icterus, no discharge  Neck: Neg thyroid enlargement   Resp: Normal effort without any audible wheeze  Cardiology: RRR without murmur rub or gallop  Extremities: No lower extremity edema  Psych: Alert and oriented, normal mood and affect    Assessment and Plan:  1. Vitamin D deficiency  Chronic continue vitamin replacement    2. Obesity (BMI 35.0-39.9 without comorbidity)  She is still thinking about gastric surgery      3. Hyperthyroidism  Continue:   PTU 50 mg TID   Will refer back to Dr. Meza for evaluation for thyroidectomy now that Graves is controlled. She previously was unable to tolerate Methimazole. She is currently on PTU but fearful of long term side effects on PTU. She is reluctant to undergo radioactive iodine as she is has had \"too much radiation with her cancer\".     Recheck in 6 weeks       Return in about 3 months (around 3/12/2020).    Thank you for allowing me to participate in the care of this patient.    Ashley Santos P.A.-C.    CC:   Sofie Mathew M.D.    This note was created using voice recognition software (Dragon). The " accuracy of the dictation is limited by the abilities of the software. I have reviewed the note prior to signing, however some errors in grammar and context are still possible. If you have any questions related to this note please do not hesitate to contact our office.

## 2020-01-03 LAB
25(OH)D3+25(OH)D2 SERPL-MCNC: 18.4 NG/ML (ref 30–100)
ALBUMIN SERPL-MCNC: 3.9 G/DL (ref 3.5–5.5)
ALBUMIN/GLOB SERPL: 1 {RATIO} (ref 1.2–2.2)
ALP SERPL-CCNC: 150 IU/L (ref 39–117)
ALT SERPL-CCNC: 18 IU/L (ref 0–32)
AST SERPL-CCNC: 21 IU/L (ref 0–40)
BILIRUB SERPL-MCNC: 0.3 MG/DL (ref 0–1.2)
BUN SERPL-MCNC: 12 MG/DL (ref 6–24)
BUN/CREAT SERPL: 18 (ref 9–23)
CALCIUM SERPL-MCNC: 9.4 MG/DL (ref 8.7–10.2)
CHLORIDE SERPL-SCNC: 101 MMOL/L (ref 96–106)
CO2 SERPL-SCNC: 21 MMOL/L (ref 20–29)
CREAT SERPL-MCNC: 0.66 MG/DL (ref 0.57–1)
GLOBULIN SER CALC-MCNC: 3.8 G/DL (ref 1.5–4.5)
GLUCOSE SERPL-MCNC: 91 MG/DL (ref 65–99)
POTASSIUM SERPL-SCNC: 3.7 MMOL/L (ref 3.5–5.2)
PROT SERPL-MCNC: 7.7 G/DL (ref 6–8.5)
SODIUM SERPL-SCNC: 136 MMOL/L (ref 134–144)
T3 SERPL-MCNC: 178 NG/DL (ref 71–180)
T3FREE SERPL-MCNC: 4.1 PG/ML (ref 2–4.4)
T4 FREE SERPL-MCNC: 0.81 NG/DL (ref 0.82–1.77)
TSH RECEP AB SER-ACNC: 1.37 IU/L (ref 0–1.75)
TSH SERPL DL<=0.005 MIU/L-ACNC: 0.87 UIU/ML (ref 0.45–4.5)
TSI SER-ACNC: 1.39 IU/L (ref 0–0.55)
VIT B12 SERPL-MCNC: 242 PG/ML (ref 232–1245)

## 2020-01-06 ENCOUNTER — APPOINTMENT (OUTPATIENT)
Dept: ADMISSIONS | Facility: MEDICAL CENTER | Age: 60
End: 2020-01-06
Payer: COMMERCIAL

## 2020-01-07 ENCOUNTER — ANESTHESIA EVENT (OUTPATIENT)
Dept: SURGERY | Facility: MEDICAL CENTER | Age: 60
End: 2020-01-07
Payer: COMMERCIAL

## 2020-01-07 DIAGNOSIS — Z01.812 PRE-OPERATIVE LABORATORY EXAMINATION: ICD-10-CM

## 2020-01-07 DIAGNOSIS — Z01.810 PRE-OPERATIVE CARDIOVASCULAR EXAMINATION: ICD-10-CM

## 2020-01-07 LAB
ANION GAP SERPL CALC-SCNC: 5 MMOL/L (ref 0–11.9)
BUN SERPL-MCNC: 11 MG/DL (ref 8–22)
CALCIUM SERPL-MCNC: 9.5 MG/DL (ref 8.5–10.5)
CHLORIDE SERPL-SCNC: 105 MMOL/L (ref 96–112)
CO2 SERPL-SCNC: 29 MMOL/L (ref 20–33)
CREAT SERPL-MCNC: 0.63 MG/DL (ref 0.5–1.4)
EKG IMPRESSION: NORMAL
ERYTHROCYTE [DISTWIDTH] IN BLOOD BY AUTOMATED COUNT: 45.8 FL (ref 35.9–50)
GLUCOSE SERPL-MCNC: 87 MG/DL (ref 65–99)
HCT VFR BLD AUTO: 41.4 % (ref 37–47)
HGB BLD-MCNC: 12.6 G/DL (ref 12–16)
MCH RBC QN AUTO: 26.7 PG (ref 27–33)
MCHC RBC AUTO-ENTMCNC: 30.4 G/DL (ref 33.6–35)
MCV RBC AUTO: 87.7 FL (ref 81.4–97.8)
PLATELET # BLD AUTO: 255 K/UL (ref 164–446)
PMV BLD AUTO: 8.6 FL (ref 9–12.9)
POTASSIUM SERPL-SCNC: 4.4 MMOL/L (ref 3.6–5.5)
RBC # BLD AUTO: 4.72 M/UL (ref 4.2–5.4)
SODIUM SERPL-SCNC: 139 MMOL/L (ref 135–145)
WBC # BLD AUTO: 3.7 K/UL (ref 4.8–10.8)

## 2020-01-07 PROCEDURE — 93010 ELECTROCARDIOGRAM REPORT: CPT | Performed by: INTERNAL MEDICINE

## 2020-01-07 PROCEDURE — 93005 ELECTROCARDIOGRAM TRACING: CPT

## 2020-01-07 PROCEDURE — 84432 ASSAY OF THYROGLOBULIN: CPT

## 2020-01-07 PROCEDURE — 36415 COLL VENOUS BLD VENIPUNCTURE: CPT

## 2020-01-07 PROCEDURE — 86800 THYROGLOBULIN ANTIBODY: CPT

## 2020-01-07 PROCEDURE — 80048 BASIC METABOLIC PNL TOTAL CA: CPT

## 2020-01-07 PROCEDURE — 85027 COMPLETE CBC AUTOMATED: CPT

## 2020-01-08 ENCOUNTER — ANESTHESIA (OUTPATIENT)
Dept: SURGERY | Facility: MEDICAL CENTER | Age: 60
End: 2020-01-08
Payer: COMMERCIAL

## 2020-01-08 ENCOUNTER — HOSPITAL ENCOUNTER (OUTPATIENT)
Facility: MEDICAL CENTER | Age: 60
End: 2020-01-09
Attending: SURGERY | Admitting: SURGERY
Payer: COMMERCIAL

## 2020-01-08 DIAGNOSIS — G89.18 POST-OPERATIVE PAIN: ICD-10-CM

## 2020-01-08 LAB
ALBUMIN SERPL BCP-MCNC: 3.7 G/DL (ref 3.2–4.9)
CALCIUM SERPL-MCNC: 9.3 MG/DL (ref 8.5–10.5)
PATHOLOGY CONSULT NOTE: NORMAL
THYROGLOB AB SERPL-ACNC: <0.9 IU/ML (ref 0–4)
THYROGLOB SERPL-MCNC: 18.6 NG/ML (ref 1.3–31.8)
THYROGLOB SERPL-MCNC: NORMAL NG/ML (ref 1.3–31.8)

## 2020-01-08 PROCEDURE — 160036 HCHG PACU - EA ADDL 30 MINS PHASE I: Performed by: SURGERY

## 2020-01-08 PROCEDURE — 36415 COLL VENOUS BLD VENIPUNCTURE: CPT

## 2020-01-08 PROCEDURE — 160002 HCHG RECOVERY MINUTES (STAT): Performed by: SURGERY

## 2020-01-08 PROCEDURE — 82310 ASSAY OF CALCIUM: CPT | Mod: 91

## 2020-01-08 PROCEDURE — 96376 TX/PRO/DX INJ SAME DRUG ADON: CPT

## 2020-01-08 PROCEDURE — 501838 HCHG SUTURE GENERAL: Performed by: SURGERY

## 2020-01-08 PROCEDURE — 700105 HCHG RX REV CODE 258: Performed by: SURGERY

## 2020-01-08 PROCEDURE — 88307 TISSUE EXAM BY PATHOLOGIST: CPT | Mod: 59

## 2020-01-08 PROCEDURE — A9270 NON-COVERED ITEM OR SERVICE: HCPCS | Performed by: SURGERY

## 2020-01-08 PROCEDURE — 700111 HCHG RX REV CODE 636 W/ 250 OVERRIDE (IP): Performed by: ANESTHESIOLOGY

## 2020-01-08 PROCEDURE — 160009 HCHG ANES TIME/MIN: Performed by: SURGERY

## 2020-01-08 PROCEDURE — 160048 HCHG OR STATISTICAL LEVEL 1-5: Performed by: SURGERY

## 2020-01-08 PROCEDURE — 160042 HCHG SURGERY MINUTES - EA ADDL 1 MIN LEVEL 5: Performed by: SURGERY

## 2020-01-08 PROCEDURE — G0378 HOSPITAL OBSERVATION PER HR: HCPCS

## 2020-01-08 PROCEDURE — A9270 NON-COVERED ITEM OR SERVICE: HCPCS | Performed by: ANESTHESIOLOGY

## 2020-01-08 PROCEDURE — 96374 THER/PROPH/DIAG INJ IV PUSH: CPT

## 2020-01-08 PROCEDURE — 82040 ASSAY OF SERUM ALBUMIN: CPT | Mod: 91

## 2020-01-08 PROCEDURE — 700102 HCHG RX REV CODE 250 W/ 637 OVERRIDE(OP): Performed by: SURGERY

## 2020-01-08 PROCEDURE — 502594 HCHG SCISSOR HANDLE, HARMONIC ACE: Performed by: SURGERY

## 2020-01-08 PROCEDURE — 700101 HCHG RX REV CODE 250: Performed by: ANESTHESIOLOGY

## 2020-01-08 PROCEDURE — 160035 HCHG PACU - 1ST 60 MINS PHASE I: Performed by: SURGERY

## 2020-01-08 PROCEDURE — 96375 TX/PRO/DX INJ NEW DRUG ADDON: CPT

## 2020-01-08 PROCEDURE — 700102 HCHG RX REV CODE 250 W/ 637 OVERRIDE(OP): Performed by: ANESTHESIOLOGY

## 2020-01-08 PROCEDURE — 160031 HCHG SURGERY MINUTES - 1ST 30 MINS LEVEL 5: Performed by: SURGERY

## 2020-01-08 RX ORDER — CELECOXIB 200 MG/1
200 CAPSULE ORAL ONCE
Status: COMPLETED | OUTPATIENT
Start: 2020-01-08 | End: 2020-01-08

## 2020-01-08 RX ORDER — LEVOTHYROXINE SODIUM 0.12 MG/1
125 TABLET ORAL
Status: DISCONTINUED | OUTPATIENT
Start: 2020-01-08 | End: 2020-01-09 | Stop reason: HOSPADM

## 2020-01-08 RX ORDER — DIPHENHYDRAMINE HYDROCHLORIDE 50 MG/ML
12.5 INJECTION INTRAMUSCULAR; INTRAVENOUS
Status: DISCONTINUED | OUTPATIENT
Start: 2020-01-08 | End: 2020-01-08 | Stop reason: HOSPADM

## 2020-01-08 RX ORDER — HYDROCODONE BITARTRATE AND ACETAMINOPHEN 5; 325 MG/1; MG/1
1-2 TABLET ORAL EVERY 6 HOURS PRN
Status: DISCONTINUED | OUTPATIENT
Start: 2020-01-08 | End: 2020-01-09 | Stop reason: HOSPADM

## 2020-01-08 RX ORDER — SODIUM CHLORIDE, SODIUM LACTATE, POTASSIUM CHLORIDE, CALCIUM CHLORIDE 600; 310; 30; 20 MG/100ML; MG/100ML; MG/100ML; MG/100ML
INJECTION, SOLUTION INTRAVENOUS CONTINUOUS
Status: DISCONTINUED | OUTPATIENT
Start: 2020-01-08 | End: 2020-01-09 | Stop reason: HOSPADM

## 2020-01-08 RX ORDER — ACETAMINOPHEN 500 MG
1000 TABLET ORAL ONCE
Status: COMPLETED | OUTPATIENT
Start: 2020-01-08 | End: 2020-01-08

## 2020-01-08 RX ORDER — MIDAZOLAM HYDROCHLORIDE 1 MG/ML
INJECTION INTRAMUSCULAR; INTRAVENOUS PRN
Status: DISCONTINUED | OUTPATIENT
Start: 2020-01-08 | End: 2020-01-08 | Stop reason: SURG

## 2020-01-08 RX ORDER — ROCURONIUM BROMIDE 10 MG/ML
INJECTION, SOLUTION INTRAVENOUS PRN
Status: DISCONTINUED | OUTPATIENT
Start: 2020-01-08 | End: 2020-01-08 | Stop reason: SURG

## 2020-01-08 RX ORDER — HALOPERIDOL 5 MG/ML
1 INJECTION INTRAMUSCULAR
Status: DISCONTINUED | OUTPATIENT
Start: 2020-01-08 | End: 2020-01-08 | Stop reason: HOSPADM

## 2020-01-08 RX ORDER — DIPHENHYDRAMINE HYDROCHLORIDE 50 MG/ML
25 INJECTION INTRAMUSCULAR; INTRAVENOUS EVERY 6 HOURS PRN
Status: DISCONTINUED | OUTPATIENT
Start: 2020-01-08 | End: 2020-01-09 | Stop reason: HOSPADM

## 2020-01-08 RX ORDER — GABAPENTIN 300 MG/1
300 CAPSULE ORAL ONCE
Status: COMPLETED | OUTPATIENT
Start: 2020-01-08 | End: 2020-01-08

## 2020-01-08 RX ORDER — OXYCODONE HCL 5 MG/5 ML
5 SOLUTION, ORAL ORAL
Status: DISCONTINUED | OUTPATIENT
Start: 2020-01-08 | End: 2020-01-08 | Stop reason: HOSPADM

## 2020-01-08 RX ORDER — ONDANSETRON 2 MG/ML
4 INJECTION INTRAMUSCULAR; INTRAVENOUS EVERY 4 HOURS PRN
Status: DISCONTINUED | OUTPATIENT
Start: 2020-01-08 | End: 2020-01-09 | Stop reason: HOSPADM

## 2020-01-08 RX ORDER — DEXAMETHASONE SODIUM PHOSPHATE 4 MG/ML
INJECTION, SOLUTION INTRA-ARTICULAR; INTRALESIONAL; INTRAMUSCULAR; INTRAVENOUS; SOFT TISSUE PRN
Status: DISCONTINUED | OUTPATIENT
Start: 2020-01-08 | End: 2020-01-08 | Stop reason: SURG

## 2020-01-08 RX ORDER — OXYCODONE HCL 5 MG/5 ML
10 SOLUTION, ORAL ORAL
Status: DISCONTINUED | OUTPATIENT
Start: 2020-01-08 | End: 2020-01-08 | Stop reason: HOSPADM

## 2020-01-08 RX ORDER — SODIUM CHLORIDE, SODIUM LACTATE, POTASSIUM CHLORIDE, CALCIUM CHLORIDE 600; 310; 30; 20 MG/100ML; MG/100ML; MG/100ML; MG/100ML
INJECTION, SOLUTION INTRAVENOUS CONTINUOUS
Status: DISPENSED | OUTPATIENT
Start: 2020-01-08 | End: 2020-01-08

## 2020-01-08 RX ORDER — ONDANSETRON 2 MG/ML
INJECTION INTRAMUSCULAR; INTRAVENOUS PRN
Status: DISCONTINUED | OUTPATIENT
Start: 2020-01-08 | End: 2020-01-08 | Stop reason: SURG

## 2020-01-08 RX ORDER — LABETALOL HYDROCHLORIDE 5 MG/ML
5 INJECTION, SOLUTION INTRAVENOUS
Status: DISCONTINUED | OUTPATIENT
Start: 2020-01-08 | End: 2020-01-08 | Stop reason: HOSPADM

## 2020-01-08 RX ORDER — HYDROMORPHONE HYDROCHLORIDE 1 MG/ML
0.4 INJECTION, SOLUTION INTRAMUSCULAR; INTRAVENOUS; SUBCUTANEOUS
Status: DISCONTINUED | OUTPATIENT
Start: 2020-01-08 | End: 2020-01-08 | Stop reason: HOSPADM

## 2020-01-08 RX ORDER — HYDROMORPHONE HYDROCHLORIDE 1 MG/ML
0.1 INJECTION, SOLUTION INTRAMUSCULAR; INTRAVENOUS; SUBCUTANEOUS
Status: DISCONTINUED | OUTPATIENT
Start: 2020-01-08 | End: 2020-01-08 | Stop reason: HOSPADM

## 2020-01-08 RX ORDER — SODIUM CHLORIDE, SODIUM LACTATE, POTASSIUM CHLORIDE, CALCIUM CHLORIDE 600; 310; 30; 20 MG/100ML; MG/100ML; MG/100ML; MG/100ML
INJECTION, SOLUTION INTRAVENOUS CONTINUOUS
Status: DISCONTINUED | OUTPATIENT
Start: 2020-01-08 | End: 2020-01-08 | Stop reason: HOSPADM

## 2020-01-08 RX ORDER — HYDROMORPHONE HYDROCHLORIDE 1 MG/ML
0.2 INJECTION, SOLUTION INTRAMUSCULAR; INTRAVENOUS; SUBCUTANEOUS
Status: DISCONTINUED | OUTPATIENT
Start: 2020-01-08 | End: 2020-01-08 | Stop reason: HOSPADM

## 2020-01-08 RX ORDER — LIDOCAINE HYDROCHLORIDE 20 MG/ML
INJECTION, SOLUTION EPIDURAL; INFILTRATION; INTRACAUDAL; PERINEURAL PRN
Status: DISCONTINUED | OUTPATIENT
Start: 2020-01-08 | End: 2020-01-08 | Stop reason: SURG

## 2020-01-08 RX ORDER — MEPERIDINE HYDROCHLORIDE 25 MG/ML
6.25 INJECTION INTRAMUSCULAR; INTRAVENOUS; SUBCUTANEOUS
Status: DISCONTINUED | OUTPATIENT
Start: 2020-01-08 | End: 2020-01-08 | Stop reason: HOSPADM

## 2020-01-08 RX ORDER — ONDANSETRON 2 MG/ML
4 INJECTION INTRAMUSCULAR; INTRAVENOUS
Status: COMPLETED | OUTPATIENT
Start: 2020-01-08 | End: 2020-01-08

## 2020-01-08 RX ORDER — SCOLOPAMINE TRANSDERMAL SYSTEM 1 MG/1
1 PATCH, EXTENDED RELEASE TRANSDERMAL
Status: DISCONTINUED | OUTPATIENT
Start: 2020-01-08 | End: 2020-01-09 | Stop reason: HOSPADM

## 2020-01-08 RX ADMIN — LIDOCAINE HYDROCHLORIDE 10 MG: 20 INJECTION, SOLUTION EPIDURAL; INFILTRATION; INTRACAUDAL; PERINEURAL at 08:17

## 2020-01-08 RX ADMIN — GABAPENTIN 300 MG: 300 CAPSULE ORAL at 07:16

## 2020-01-08 RX ADMIN — HYDROMORPHONE HYDROCHLORIDE 0.4 MG: 1 INJECTION, SOLUTION INTRAMUSCULAR; INTRAVENOUS; SUBCUTANEOUS at 09:55

## 2020-01-08 RX ADMIN — ONDANSETRON 4 MG: 2 INJECTION INTRAMUSCULAR; INTRAVENOUS at 10:38

## 2020-01-08 RX ADMIN — PROPOFOL 200 MG: 10 INJECTION, EMULSION INTRAVENOUS at 07:33

## 2020-01-08 RX ADMIN — FENTANYL CITRATE 50 MCG: 0.05 INJECTION, SOLUTION INTRAMUSCULAR; INTRAVENOUS at 08:41

## 2020-01-08 RX ADMIN — HALOPERIDOL LACTATE 1 MG: 5 INJECTION, SOLUTION INTRAMUSCULAR at 11:31

## 2020-01-08 RX ADMIN — ACETAMINOPHEN 1000 MG: 500 TABLET ORAL at 07:16

## 2020-01-08 RX ADMIN — FENTANYL CITRATE 100 MCG: 50 INJECTION, SOLUTION INTRAMUSCULAR; INTRAVENOUS at 07:27

## 2020-01-08 RX ADMIN — MIDAZOLAM 2 MG: 1 INJECTION INTRAMUSCULAR; INTRAVENOUS at 07:25

## 2020-01-08 RX ADMIN — SODIUM CHLORIDE, POTASSIUM CHLORIDE, SODIUM LACTATE AND CALCIUM CHLORIDE: 600; 310; 30; 20 INJECTION, SOLUTION INTRAVENOUS at 14:49

## 2020-01-08 RX ADMIN — SUGAMMADEX 200 MG: 100 INJECTION, SOLUTION INTRAVENOUS at 07:42

## 2020-01-08 RX ADMIN — SCOPALAMINE 1 PATCH: 1 PATCH, EXTENDED RELEASE TRANSDERMAL at 15:23

## 2020-01-08 RX ADMIN — LIDOCAINE HYDROCHLORIDE 100 MG: 20 INJECTION, SOLUTION EPIDURAL; INFILTRATION; INTRACAUDAL; PERINEURAL at 07:32

## 2020-01-08 RX ADMIN — SODIUM CHLORIDE, POTASSIUM CHLORIDE, SODIUM LACTATE AND CALCIUM CHLORIDE: 600; 310; 30; 20 INJECTION, SOLUTION INTRAVENOUS at 07:10

## 2020-01-08 RX ADMIN — FENTANYL CITRATE 50 MCG: 50 INJECTION, SOLUTION INTRAMUSCULAR; INTRAVENOUS at 08:16

## 2020-01-08 RX ADMIN — FENTANYL CITRATE 50 MCG: 50 INJECTION, SOLUTION INTRAMUSCULAR; INTRAVENOUS at 07:46

## 2020-01-08 RX ADMIN — ROCURONIUM BROMIDE 40 MG: 10 INJECTION, SOLUTION INTRAVENOUS at 07:33

## 2020-01-08 RX ADMIN — HYDROCODONE BITARTRATE AND ACETAMINOPHEN 1 TABLET: 5; 325 TABLET ORAL at 21:26

## 2020-01-08 RX ADMIN — ONDANSETRON 4 MG: 2 INJECTION INTRAMUSCULAR; INTRAVENOUS at 08:09

## 2020-01-08 RX ADMIN — FENTANYL CITRATE 50 MCG: 0.05 INJECTION, SOLUTION INTRAMUSCULAR; INTRAVENOUS at 08:59

## 2020-01-08 RX ADMIN — DEXAMETHASONE SODIUM PHOSPHATE 8 MG: 4 INJECTION, SOLUTION INTRA-ARTICULAR; INTRALESIONAL; INTRAMUSCULAR; INTRAVENOUS; SOFT TISSUE at 07:46

## 2020-01-08 RX ADMIN — CELECOXIB 200 MG: 200 CAPSULE ORAL at 07:16

## 2020-01-08 RX ADMIN — SODIUM CHLORIDE, POTASSIUM CHLORIDE, SODIUM LACTATE AND CALCIUM CHLORIDE: 600; 310; 30; 20 INJECTION, SOLUTION INTRAVENOUS at 08:11

## 2020-01-08 RX ADMIN — HYDROMORPHONE HYDROCHLORIDE 0.4 MG: 1 INJECTION, SOLUTION INTRAMUSCULAR; INTRAVENOUS; SUBCUTANEOUS at 09:12

## 2020-01-08 ASSESSMENT — LIFESTYLE VARIABLES
EVER_SMOKED: NEVER
HOW MANY TIMES IN THE PAST YEAR HAVE YOU HAD 5 OR MORE DRINKS IN A DAY: 0
TOTAL SCORE: 0
EVER FELT BAD OR GUILTY ABOUT YOUR DRINKING: NO
ON A TYPICAL DAY WHEN YOU DRINK ALCOHOL HOW MANY DRINKS DO YOU HAVE: 0
ALCOHOL_USE: NO
CONSUMPTION TOTAL: NEGATIVE
HAVE PEOPLE ANNOYED YOU BY CRITICIZING YOUR DRINKING: NO
EVER HAD A DRINK FIRST THING IN THE MORNING TO STEADY YOUR NERVES TO GET RID OF A HANGOVER: NO
AVERAGE NUMBER OF DAYS PER WEEK YOU HAVE A DRINK CONTAINING ALCOHOL: 0
DOES PATIENT WANT TO STOP DRINKING: NO
HAVE YOU EVER FELT YOU SHOULD CUT DOWN ON YOUR DRINKING: NO
TOTAL SCORE: 0
TOTAL SCORE: 0

## 2020-01-08 ASSESSMENT — COGNITIVE AND FUNCTIONAL STATUS - GENERAL
MOBILITY SCORE: 24
SUGGESTED CMS G CODE MODIFIER MOBILITY: CH
SUGGESTED CMS G CODE MODIFIER DAILY ACTIVITY: CH
DAILY ACTIVITIY SCORE: 24

## 2020-01-08 ASSESSMENT — PATIENT HEALTH QUESTIONNAIRE - PHQ9
SUM OF ALL RESPONSES TO PHQ9 QUESTIONS 1 AND 2: 0
2. FEELING DOWN, DEPRESSED, IRRITABLE, OR HOPELESS: NOT AT ALL
1. LITTLE INTEREST OR PLEASURE IN DOING THINGS: NOT AT ALL

## 2020-01-08 NOTE — OR NURSING
1215 pt resting quietly  With HOB up 30 degrees.  O2 6 L mask .  Dressing d/i to anterior neck.  Family at BS  Earlier c/o nausea are decreasing.  No c/o pain.  Pt wakes on command  1230 sips of water taken.  Resting with eyes closed  VS stable  1245 report to Vida WARD

## 2020-01-08 NOTE — OR NURSING
Pt states R Knee pain for one year, recently diagnosed with torn meniscus. Plans for surgery soon once recovered from todays procedure.

## 2020-01-08 NOTE — OR NURSING
0827 Arrived PACU  accompanied by anesthesia and OR RN. Airway patent, placed on cardiac monitor. Side rails up x 2. Arousing slightly,  Denies pain. Drsg to mid neck/throat clean and dry.    0841 Medicated for 8/10 pain.    0846 Sleeping. VSS.    0900 O2 changed to 10l oxymask per Dr Meza's orders.    0859 Re-medicated for pain.    0912 States pain is 7/10, medicated per MAR.    0945 Dozing, spouse at bedside.    1022 To bathroom, voids, slightly dizzy, returns to bed.    1038 Medicated for nausea.     1101 Dr Meza at bedside talking with patient and spouse.    1131 Re-medicated for nausea.     1215 Report to Rashida WARD.    1245 Care reassumed. States nausea and pain are mild. Sips of water given.    1315 Changed into cloth gown. Report called to Michaela WARD on GSU.     1332 Transfers to wheelchair, O2 in place 10 l oxy mask. To room by transport and spouse. Personal belongings carried by spouse.

## 2020-01-08 NOTE — ANESTHESIA PROCEDURE NOTES
Airway  Date/Time: 1/8/2020 7:34 AM  Performed by: Abdias Horner M.D.  Authorized by: Abdias Horner M.D.     Location:  OR  Urgency:  Elective  Difficult Airway: No    Indications for Airway Management:  Anesthesia  Spontaneous Ventilation: absent    Sedation Level:  Deep  Preoxygenated: Yes    Patient Position:  Sniffing  Mask Difficulty Assessment:  2 - vent by mask + OA or adjuvant +/- NMBA  Final Airway Type:  Endotracheal airway  Final Endotracheal Airway:  ETT  Cuffed: Yes    Technique Used for Successful ETT Placement:  Direct laryngoscopy  Devices/Methods Used in Placement:  Cricoid pressure  Insertion Site:  Oral  Blade Type:  Tiffany  Laryngoscope Blade/Videolaryngoscope Blade Size:  3  ETT Size (mm):  6.0  Measured from:  Teeth  ETT to Teeth (cm):  21  Placement Verified by: auscultation and capnometry    Cormack-Lehane Classification:  Grade IIb - view of arytenoids or posterior of glottis only  Number of Attempts at Approach:  1   Atraumatic DLx2, first look by mara Amaro MS4

## 2020-01-08 NOTE — ANESTHESIA POSTPROCEDURE EVALUATION
Patient: Evita Mckenna    Procedure Summary     Date:  01/08/20 Room / Location:  [facility] ROOM 23 / SURGERY SAME DAY [facility]    Anesthesia Start:  0725 Anesthesia Stop:  0827    Procedure:  THYROIDECTOMY, TOTAL- W/ NIMS (Bilateral Neck) Diagnosis:  (GRAVES DISEASE)    Surgeon:  Ebony Meza M.D. Responsible Provider:  Abdias Horner M.D.    Anesthesia Type:  general ASA Status:  3          Final Anesthesia Type: general  Last vitals  BP   Blood Pressure: 106/73    Temp   36.4 °C (97.5 °F)    Pulse   Pulse: 86   Resp   16    SpO2   97 %      Anesthesia Post Evaluation    Patient location during evaluation: PACU  Patient participation: complete - patient participated  Level of consciousness: awake and alert    Airway patency: patent  Anesthetic complications: no  Cardiovascular status: hemodynamically stable  Respiratory status: acceptable  Hydration status: euvolemic    PONV: none           Nurse Pain Score: 3 (NPRS)

## 2020-01-08 NOTE — CARE PLAN
Problem: Safety  Goal: Will remain free from injury  Outcome: PROGRESSING SLOWER THAN EXPECTED  Note:   Educated not to get out of bed without staff present     Problem: Pain Management  Goal: Pain level will decrease to patient's comfort goal  Outcome: PROGRESSING SLOWER THAN EXPECTED  Note:   Medicate per MAR prn

## 2020-01-08 NOTE — ANESTHESIA TIME REPORT
Anesthesia Start and Stop Event Times     Date Time Event    1/8/2020 0716 Ready for Procedure     0725 Anesthesia Start     0827 Anesthesia Stop        Responsible Staff  01/08/20    Name Role Begin End    Abdias Horner M.D. Anesth 0725 0827        Preop Diagnosis (Free Text):  Pre-op Diagnosis     GRAVES DISEASE        Preop Diagnosis (Codes):    Post op Diagnosis  Hyperparathyroidism      Premium Reason  Non-Premium    Comments:

## 2020-01-08 NOTE — OP REPORT
Operative Report    Date: 1/8/2020    Surgeon: Ebony Meza M.D.    Assistant: LITTLE Bedolla    Anesthesiologist: Sheree LEWIS    Pre-operative Diagnosis: E05.00 Thyrotoxicosis with diffuse goiter without thyrotoxic crisis or storm     Post-operative Diagnosis: same     Procedure: 66986 Thyroidectomy, total or complete , bilateral recurrent laryngeal nerve monitoring    Findings: diminutive hypoplastic left lobe.     Procedure in detail: The patient was identified in the pre-operative holding area and brought to the operating room. Correct side and site were identified.  GETA was smoothly induced. The patient was prepped and draped in the usual sterile fashion.    With the patient in the supine position, the head of the bed slightly elevated, the neck slightly extended, and the patient intubated with a NIM endotracheal tube, the precordial electrodes were placed by the surgical assistant, who then monitored the recurrent laryngeal nerves bilaterally throughout the procedure.     The neck was prepared with betadiene and draped in the usual fashion. The neck was entered through a lower transverse cervical incision and carried down through the platysma. Subplatysmal flaps were dissected superiorly and inferiorly down to the sternal notch. The midline cervical fascia was incised down to the capsule of the thyroid. The strap muscles were mobilized off the right thyroid lobe, and the superior pole vessels progressively divided with the Harmonic. The right thyroid lobe was hypervascular but otherwise appeared normal. The thyroid veins were divided with the Harmonic. The recurrent laryngeal nerve and both parathyroids were identified and protected. Branches of the inferior thyroid artery were divided on the capsule of the gland with the Harmonic. Smaller vessels were either divided with the Harmonic or, when near to the recurrent nerve, divided between clamps and suture ligated with 3-0 Vicryl suture as the gland was  dissected free from the nerve and off the trachea. The gland was transected at the medial border of the left thyroid lobe and the right thyroid lobe and the isthmus were sent for permanent pathologic exam.    The strap muscles were then mobilized off of the left thyroid lobe. There was only about a centimeter of left thyroid lobe past the isthmic margin.  The recurrent laryngeal nerve and both parathyroids were identified and protected. The small gland was dissected free of the nerve and off of the trachea. The left  thyroid lobe was sent for permanent pathology. Good hemostasis was assured.     The integrity of both recurrent laryngeal nerves was documented. Small pieces of Surgicel Fibrillar were placed in both sides of the neck. The midline cervical fascia was reapproximated with running 3-0 Vicryl. Platysma was reapproximated with interrupted 3-0 Vicryl. The skin was closed with interrupted 5-0 silk suture.     The patient was awakened from general anesthetic, and was taken to the recovery room in stable condition.    Sponge and needle counts were correct at the end of the case.     Specimen:   1. Right thyroid lobe and isthmus  2. Left thyroid lobe    EBL: minimal    Dispo: stable, extubated, to PACU    Ebony Meza M.D.  [facility]  [phone]

## 2020-01-08 NOTE — ANESTHESIA PREPROCEDURE EVALUATION
Graves disease    Relevant Problems   NEURO   (+) History of colorectal cancer      ENDO   (+) Hyperthyroidism   Obese      Physical Exam    Airway   Mallampati: II  TM distance: >3 FB  Neck ROM: full       Cardiovascular - normal exam  Rhythm: regular  Rate: normal  (-) murmur     Dental - normal exam         Pulmonary - normal exam  Breath sounds clear to auscultation     Abdominal    Neurological - normal exam                 Anesthesia Plan    ASA 3   ASA physical status 3 criteria: morbid obesity - BMI greater than or equal to 40    Plan - general       Airway plan will be ETT        Induction: intravenous    Postoperative Plan: Postoperative administration of opioids is intended.    Pertinent diagnostic labs and testing reviewed    Informed Consent:    Anesthetic plan and risks discussed with patient.    Use of blood products discussed with: patient whom consented to blood products.

## 2020-01-08 NOTE — PROGRESS NOTES
"Assumed care of patient from PACU RN.  Patient is alert and oriented times 4, states pain of 3/10, declines intervention at this time.  + nausea, will medicate per MAR.  VSS /82   Pulse 86   Temp 36.1 °C (97 °F) (Temporal)   Resp 18   Ht 1.676 m (5' 6\")   Wt 113.7 kg (250 lb 10.6 oz)   SpO2 99%   BMI 40.46 kg/m²   PIV in the LAC, patent and running LR at 83mL/hr.  On 10L oxygen per ERAS protocol.  Last BM PTA, urinating without difficulty.  Regular diet to start this afternoon.  Incision to the anterior neck with gauze and tape.  CDI.  Patient is a SBA, demonstrates steady gait, minimal assistance needed.  Patient and SO oriented to the unit, POC discussed for the day.  Bed is locked and in the lowest position, call light is within reach.  All needs are met at this time, hourly rounding is in place.  "

## 2020-01-09 VITALS
WEIGHT: 250.66 LBS | HEART RATE: 62 BPM | TEMPERATURE: 98.2 F | DIASTOLIC BLOOD PRESSURE: 61 MMHG | HEIGHT: 66 IN | RESPIRATION RATE: 17 BRPM | SYSTOLIC BLOOD PRESSURE: 105 MMHG | OXYGEN SATURATION: 95 % | BODY MASS INDEX: 40.28 KG/M2

## 2020-01-09 PROBLEM — E05.90 HYPERTHYROIDISM: Status: RESOLVED | Noted: 2018-12-18 | Resolved: 2020-01-09

## 2020-01-09 LAB
ALBUMIN SERPL BCP-MCNC: 3.4 G/DL (ref 3.2–4.9)
ALBUMIN SERPL BCP-MCNC: 3.4 G/DL (ref 3.2–4.9)
CALCIUM SERPL-MCNC: 9.2 MG/DL (ref 8.5–10.5)
CALCIUM SERPL-MCNC: 9.3 MG/DL (ref 8.5–10.5)

## 2020-01-09 PROCEDURE — 82040 ASSAY OF SERUM ALBUMIN: CPT

## 2020-01-09 PROCEDURE — G0378 HOSPITAL OBSERVATION PER HR: HCPCS

## 2020-01-09 PROCEDURE — 36415 COLL VENOUS BLD VENIPUNCTURE: CPT

## 2020-01-09 PROCEDURE — 82310 ASSAY OF CALCIUM: CPT

## 2020-01-09 PROCEDURE — 700105 HCHG RX REV CODE 258: Performed by: SURGERY

## 2020-01-09 PROCEDURE — 700102 HCHG RX REV CODE 250 W/ 637 OVERRIDE(OP): Performed by: SURGERY

## 2020-01-09 PROCEDURE — A9270 NON-COVERED ITEM OR SERVICE: HCPCS | Performed by: SURGERY

## 2020-01-09 RX ORDER — HYDROCODONE BITARTRATE AND ACETAMINOPHEN 5; 325 MG/1; MG/1
1-2 TABLET ORAL EVERY 6 HOURS PRN
Qty: 20 TAB | Refills: 0 | Status: SHIPPED | OUTPATIENT
Start: 2020-01-09 | End: 2020-01-16

## 2020-01-09 RX ORDER — LEVOTHYROXINE SODIUM 0.12 MG/1
125 TABLET ORAL
Qty: 30 TAB | Refills: 3 | Status: SHIPPED | OUTPATIENT
Start: 2020-01-10 | End: 2020-05-19

## 2020-01-09 RX ADMIN — HYDROCODONE BITARTRATE AND ACETAMINOPHEN 1 TABLET: 5; 325 TABLET ORAL at 04:31

## 2020-01-09 RX ADMIN — LEVOTHYROXINE SODIUM 125 MCG: 125 TABLET ORAL at 04:31

## 2020-01-09 RX ADMIN — SODIUM CHLORIDE, POTASSIUM CHLORIDE, SODIUM LACTATE AND CALCIUM CHLORIDE: 600; 310; 30; 20 INJECTION, SOLUTION INTRAVENOUS at 02:00

## 2020-01-09 NOTE — PROGRESS NOTES
"Assumed care of patient from night shift RN.  Patient is alert and oriented times 4, states pain of 3/10, declines intervention at this time.  VSS /63   Pulse 64   Temp 35.9 °C (96.7 °F) (Temporal)   Resp 17   Ht 1.676 m (5' 6\")   Wt 113.7 kg (250 lb 10.6 oz)   SpO2 94%   BMI 40.46 kg/m²   PIV in the LAC, patent and running LR at 83mL/hr.  On RA with saturations in the mid 90s.   in use.  Last BM 1/8, urinating without difficulty.  Regular diet, tolerating well.  Dressing to the anterior neck, CDI.  Scopolamine patch behind L ear.  Patient is a SBA, demonstrates steady gait, weakness to the R knee noted.  Low fall risk, no bed alarm in use.  POC discussed for the day, patient hopeful to go home.  Bed is locked and in the lowest position, call light is within reach.  All needs are met at this time, hourly rounding is in place.  "

## 2020-01-09 NOTE — PROGRESS NOTES
"Surgical Progress Note:    POD# 1  S/P TT     No acute events.     PE:  /63   Pulse 64   Temp 35.9 °C (96.7 °F) (Temporal)   Resp 17   Ht 1.676 m (5' 6\")   Wt 113.7 kg (250 lb 10.6 oz)   SpO2 94%   BMI 40.46 kg/m²     I/O:     Intake/Output Summary (Last 24 hours) at 1/9/2020 0745  Last data filed at 1/9/2020 0430  Gross per 24 hour   Intake 2951 ml   Output 25 ml   Net 2926 ml       NAD  Breathing comfortably  Voice strong  Incision c/d/i with sutures, no hematoma    Labs:  Calcium:   6p: 9.2   12a: 9.3   6a:9.3      A/P: POD# 1  S/P TT   - doing well  - will remove silk sutures from neck incision and place steri strips.  - d/c home, f/u one week      Ebony Meza M.D.  [facility]  [phone]          "

## 2020-01-09 NOTE — PROGRESS NOTES
"Assumed care of pt at shift change, report received from day shift RN  Pt A & O x 4  /66   Pulse 63   Temp 36 °C (96.8 °F) (Temporal)   Resp 17   Ht 1.676 m (5' 6\")   Wt 113.7 kg (250 lb 10.6 oz)   SpO2 93%   BMI 40.46 kg/m²    Pt on RA  Anterior neck dressing CDI  pt c/o 7/10 pain this evening, medicated per MAR  Pt c/o mild nausea this PM, declined medication at this time  Last BM 1/8, PTA  Pt voiding fine in toilet, ambulating self, tolerates well  20LAC PIV in place, infusing  Pt educated to call for assistance, call light within reach, bed locked in lowest position  "

## 2020-01-09 NOTE — PROGRESS NOTES
Discharging Patient home per physician order.  Discharged with family.  Demonstrated understanding of discharge instructions, follow up appointments, home medications, prescriptions, home care for surgical wound, and nursing care instructions for thryroidectomy.  Ambulating without assistance, voiding without difficulty, pain well controlled, tolerating oral medications, oxygen saturation greater than 90% , tolerating diet.   Educational handouts given and discussed.  Verbalized understanding of discharge instructions and educational handouts.  All questions answered.  Belongings with patient at time of discharge.

## 2020-01-09 NOTE — CARE PLAN
Problem: Safety  Goal: Will remain free from injury  Outcome: PROGRESSING AS EXPECTED  Note:   Educated patient not to get out of bed without staff present     Problem: Pain Management  Goal: Pain level will decrease to patient's comfort goal  Outcome: PROGRESSING AS EXPECTED  Note:   Medicate per MAR prn

## 2020-01-09 NOTE — DISCHARGE INSTRUCTIONS
Discharge Instructions    Discharged to home by car with relative. Discharged via wheelchair, hospital escort: Refused.  Special equipment needed: Not Applicable    Be sure to schedule a follow-up appointment with your primary care doctor or any specialists as instructed.     Discharge Plan:   Influenza Vaccine Indication: Not indicated: Previously immunized this influenza season and > 8 years of age    I understand that a diet low in cholesterol, fat, and sodium is recommended for good health. Unless I have been given specific instructions below for another diet, I accept this instruction as my diet prescription.   Other diet: regular    Special Instructions: None    · Is patient discharged on Warfarin / Coumadin?   No     Depression / Suicide Risk    As you are discharged from this [facility] facility, it is important to learn how to keep safe from harming yourself.    Recognize the warning signs:  · Abrupt changes in personality, positive or negative- including increase in energy   · Giving away possessions  · Change in eating patterns- significant weight changes-  positive or negative  · Change in sleeping patterns- unable to sleep or sleeping all the time   · Unwillingness or inability to communicate  · Depression  · Unusual sadness, discouragement and loneliness  · Talk of wanting to die  · Neglect of personal appearance   · Rebelliousness- reckless behavior  · Withdrawal from people/activities they love  · Confusion- inability to concentrate     If you or a loved one observes any of these behaviors or has concerns about self-harm, here's what you can do:  · Talk about it- your feelings and reasons for harming yourself  · Remove any means that you might use to hurt yourself (examples: pills, rope, extension cords, firearm)  · Get professional help from the community (Mental Health, Substance Abuse, psychological counseling)  · Do not be alone:Call your Safe Contact- someone whom you trust who will be there  for you.  · Call your local CRISIS HOTLINE 160-9301 or 874-820-5567  · Call your local Children's Mobile Crisis Response Team [place] (834) 794-6199 or www.[employer]  · Call the toll free National Suicide Prevention Hotlines   · National Suicide Prevention Lifeline 916-019-WXFU (1325)  · National Hope Line Network 800-SUICIDE (675-2399)        DISCHARGE INSTRUCTIONS:        Discharge instructions after thyroidectomy:     You had surgery called thyroidectomy. This means that part or all of your thyroid gland was removed. The main job of the thyroid gland is to make thyroid hormone. This hormone controls your body’s metabolism. This is the way your body creates and uses energy. Removing the thyroid gland removes your body’s source of thyroid hormone. So after the surgery, you will need to take thyroid hormone pills daily. This helps keep the level of thyroid hormone in your body steady. This sheet tells you more about how to care for yourself after surgery.     Recovering After Surgery:     Get plenty of rest.     Care for your incision as directed.     Avoid heavy lifting and strenuous activity for 3-5 weeks.     Walk a few times daily. But don’t push yourself too hard. Slowly increase your pace and distance, as you feel able.     Return to work when your doctor says it’s okay.     Taking Your Thyroid Medication:     Take your medication as directed.     Use a pillbox labeled with the days of the week. This will help you remember whether you’ve taken your medication each day.     Take your medication with a liquid. But avoid taking it with soy milk. Soy milk can affect how well your body absorbs the medication. The pill needs to reach your stomach and not dissolve in your throat.     Try to take your medication with the same types and amounts of food and liquid each day. This helps control the amount of thyroid hormone in your system.     After taking your medication:     Wait 4 hours before eating or  drinking anything that contains soy.     Wait 4 hours before taking certain medications. These include:                 Iron supplements                 Calcium supplements                 Antacids that contain either calcium or aluminum hydroxide                 Medications that lower your cholesterol     Do not stop taking your medication even if you become pregnant.     Never stop taking medications on your own.     Keeping Your Doctor’s Appointments:     See your doctor for regular visits. These are needed to monitor your health.     Have routine blood tests done. These check the level of thyroid hormone in your body. This helps your doctor know whether to adjust the dosage of your medication if needed.     Tell your doctor about any signs of further thyroid problems.     Signs that you may have too much thyroid hormone in your body include:                 Restlessness                 Rapid weight loss                 Sweating                 Signs that you may have too little thyroid hormone in your body include:                 Fatigue or sluggishness                 Puffy hands, face, or feet                 Hoarseness                 Muscle pain                 Slow pulse (less than 60 beats per minute)     When to Call Your Doctor:  Call your doctor right away if you have any of the following:  Fever above 100.4°F  Swelling or bleeding at the incision site  Choking  Trouble breathing  A sore throat that lasts longer than 7 days  Tingling or cramps in your hands, feet, or lips     FOLLOW-UP:  · Please call my office at [phone] to make an appointment in 1 week     Office address:  [address] [place] [address] [id number]  [place] [id number]     Ebony Mzea M.D.  [facility]  [phone]    Thyroidectomy, Care After  Refer to this sheet in the next few weeks. These instructions provide you with information about caring for yourself after your procedure. Your health care provider may also give you more  specific instructions. Your treatment has been planned according to current medical practices, but problems sometimes occur. Call your health care provider if you have any problems or questions after your procedure.  WHAT TO EXPECT AFTER THE PROCEDURE  After your procedure, it is typical to have:  · Mild pain in the neck or upper body, especially when swallowing.  · A sore throat.  · A weak voice.  HOME CARE INSTRUCTIONS   · Take medicines only as directed by your health care provider.  · If your entire thyroid gland was removed, you may need to take thyroid hormone medicine from now on.  · Do not take medicines that contain aspirin and ibuprofen until your health care provider says that you can. These medicines can increase your risk of bleeding.  · Some pain medicines cause constipation. Drink enough fluid to keep your urine clear or pale yellow. This can help to prevent constipation.  · Start slowly with eating. You may need to have only liquids and soft foods for a few days or as directed by your health care provider.  · Do not take baths, swim, or use a hot tub until your health care provider approves.  · There are many different ways to close and cover an incision, including stitches (sutures), skin glue, and adhesive strips. Follow your health care provider's instructions for:  ¨ Incision care.  ¨ Bandage (dressing) changes and removal.  ¨ Incision closure removal.  · Resume your usual activities as directed by your health care provider.  · For the first 10 days after the procedure or as instructed by your health care provider:  ¨ Do not lift anything heavier than 20 lb (9.1 kg).  ¨ Do not jog, swim, or do other strenuous exercises.  ¨ Do not play contact sports.  · Keep all follow-up visits as directed by your health care provider. This is important.  SEEK MEDICAL CARE IF:  · The soreness in your throat gets worse.  · You have increased pain at your incision or incisions.  · You have increased bleeding from  an incision.  · Your incision becomes infected. Watch for:  ¨ Swelling.  ¨ Redness.  ¨ Warmth.  ¨ Pus.  · You notice a bad smell coming from an incision or dressing.  · You have a fever.  · You feel lightheaded or faint.  · You have numbness, tingling, or muscle spasms in your:  ¨ Arms.  ¨ Hands.  ¨ Feet.  ¨ Face.  · You have trouble swallowing.  SEEK IMMEDIATE MEDICAL CARE IF:   · You develop a rash.  · You have difficulty breathing.  · You hear whistling noises coming from your chest.  · You develop a cough that gets worse.  · Your speech changes, or you have hoarseness that gets worse.     This information is not intended to replace advice given to you by your health care provider. Make sure you discuss any questions you have with your health care provider.     Document Released: 07/07/2006 Document Revised: 01/08/2016 Document Reviewed: 05/20/2015  CURRENT Interactive Patient Education ©2016 [employer]

## 2020-01-09 NOTE — DISCHARGE SUMMARY
Discharge Summary      DATE OF ADMISSION: 1/8/2020    DATE OF DISCHARGE: 1/9/2020    DISCHARGE DIAGNOSIS (ES):  hyperthyroidism    DISCHARGE CONDITION:  ? good    CONSULTATIONS:  ? none    PROCEDURES:  1/9/20: total thyroidectomy    BRIEF HPI:  ? Admitted with above, see admission history and physical.     HOSPITAL COURSE:  ? Underwent procedure as above. On day of discharge, the patient has stable vital signs, tolerating a regular diet, and pain is well controlled with PO meds. The patient is stable for discharge to home.    MEDS:   Current Outpatient Medications   Medication Sig Dispense Refill   • HYDROcodone-acetaminophen (NORCO) 5-325 MG Tab per tablet Take 1-2 Tabs by mouth every 6 hours as needed for up to 7 days. 20 Tab 0   • [START ON 1/10/2020] levothyroxine (SYNTHROID) 125 MCG Tab Take 1 Tab by mouth Every morning on an empty stomach. 30 Tab 3       FOLLOW-UP:  ? Please call my office at [phone] to make an appointment in 1 week(s)    DISCHARGE INSTRUCTIONS:      Discharge instructions after thyroidectomy:    You had surgery called thyroidectomy. This means that part or all of your thyroid gland was removed. The main job of the thyroid gland is to make thyroid hormone. This hormone controls your body’s metabolism. This is the way your body creates and uses energy. Removing the thyroid gland removes your body’s source of thyroid hormone. So after the surgery, you will need to take thyroid hormone pills daily. This helps keep the level of thyroid hormone in your body steady. This sheet tells you more about how to care for yourself after surgery.    Recovering After Surgery:    Get plenty of rest.    Care for your incision as directed.    Avoid heavy lifting and strenuous activity for 3-5 weeks.    Walk a few times daily. But don’t push yourself too hard. Slowly increase your pace and distance, as you feel able.    Return to work when your doctor says it’s okay.    Taking Your Thyroid Medication:    Take  your medication as directed.    Use a pillbox labeled with the days of the week. This will help you remember whether you’ve taken your medication each day.    Take your medication with a liquid. But avoid taking it with soy milk. Soy milk can affect how well your body absorbs the medication. The pill needs to reach your stomach and not dissolve in your throat.    Try to take your medication with the same types and amounts of food and liquid each day. This helps control the amount of thyroid hormone in your system.    After taking your medication:    Wait 4 hours before eating or drinking anything that contains soy.    Wait 4 hours before taking certain medications. These include:     Iron supplements     Calcium supplements     Antacids that contain either calcium or aluminum hydroxide     Medications that lower your cholesterol    Do not stop taking your medication even if you become pregnant.    Never stop taking medications on your own.    Keeping Your Doctor’s Appointments:    See your doctor for regular visits. These are needed to monitor your health.    Have routine blood tests done. These check the level of thyroid hormone in your body. This helps your doctor know whether to adjust the dosage of your medication if needed.    Tell your doctor about any signs of further thyroid problems.    Signs that you may have too much thyroid hormone in your body include:     Restlessness     Rapid weight loss     Sweating     Signs that you may have too little thyroid hormone in your body include:     Fatigue or sluggishness     Puffy hands, face, or feet     Hoarseness     Muscle pain     Slow pulse (less than 60 beats per minute)    When to Call Your Doctor:  Call your doctor right away if you have any of the following:  Fever above 100.4°F  Swelling or bleeding at the incision site  Choking  Trouble breathing  A sore throat that lasts longer than 7 days  Tingling or cramps in your hands, feet, or  lips    FOLLOW-UP:  ? Please call my office at [phone] to make an appointment in 1 week    Office address:  [address]  [place] [address]    Ebony Meza M.D.  [facility]  [phone]

## 2020-01-09 NOTE — PROGRESS NOTES
Surgical sutures from anterior neck removed per MD order.  Steristrip placed.  Patient tolerated well.

## 2020-01-29 ENCOUNTER — OFFICE VISIT (OUTPATIENT)
Dept: MEDICAL GROUP | Facility: PHYSICIAN GROUP | Age: 60
End: 2020-01-29
Payer: COMMERCIAL

## 2020-01-29 VITALS
OXYGEN SATURATION: 98 % | HEIGHT: 66 IN | BODY MASS INDEX: 40.53 KG/M2 | DIASTOLIC BLOOD PRESSURE: 80 MMHG | SYSTOLIC BLOOD PRESSURE: 130 MMHG | RESPIRATION RATE: 14 BRPM | WEIGHT: 252.2 LBS | TEMPERATURE: 97.8 F | HEART RATE: 93 BPM

## 2020-01-29 DIAGNOSIS — R73.9 HYPERGLYCEMIA: ICD-10-CM

## 2020-01-29 DIAGNOSIS — E78.5 DYSLIPIDEMIA: ICD-10-CM

## 2020-01-29 DIAGNOSIS — Z23 NEED FOR VACCINATION: ICD-10-CM

## 2020-01-29 DIAGNOSIS — E89.0 H/O TOTAL THYROIDECTOMY: ICD-10-CM

## 2020-01-29 DIAGNOSIS — R94.31 ABNORMAL EKG: ICD-10-CM

## 2020-01-29 PROBLEM — Z90.89 H/O TOTAL THYROIDECTOMY: Status: ACTIVE | Noted: 2020-01-29

## 2020-01-29 PROBLEM — Z98.890 H/O TOTAL THYROIDECTOMY: Status: ACTIVE | Noted: 2020-01-29

## 2020-01-29 PROCEDURE — 99214 OFFICE O/P EST MOD 30 MIN: CPT | Performed by: FAMILY MEDICINE

## 2020-01-29 ASSESSMENT — PATIENT HEALTH QUESTIONNAIRE - PHQ9: CLINICAL INTERPRETATION OF PHQ2 SCORE: 0

## 2020-01-30 NOTE — PATIENT INSTRUCTIONS
Diagnoses and all orders for this visit:    H/O total thyroidectomy    Need for vaccination  -     Tdap Vaccine =>9YO IM    BMI 40.0-44.9, adult (HCC)    Abnormal EKG  -     REFERRAL TO CARDIOLOGY    Hyperglycemia  -     HEMOGLOBIN A1C; Future    Dyslipidemia  -     Lipid Profile; Future    -Status post thyroidectomy January 8 she will see her general surgeon who has removed her thyroid in about 8 weeks and she will follow-up with her endocrinologist who is now going to [facility] and she has report that she is gained weight and she would like to do gastric sleeve surgery and to talk to her primary care doctor about this and to do additional lab work and its been a while since she gained weight so we will do a fasting lab work 8 hours of no eating but drink plenty of water at least 1 week before your follow-up so you have the lab work back.  And also we will give a referral to cardiology if you do not hear back in 1 week then please call the referrals department to see what is happening for cardiology referral as she is concerned about her EKG and we did go over it that there is a concern for AV block but unsure about the other abnormality since she has had a surgery I would recommend to repeat the EKG with her cardiologist and since she may be doing a right knee surgery and/or gastric sleeve to get cardiology approval before doing any of her surgeries we will also give her the Tdap vaccine today as she is due for this before her surgeries.  And please read patient instruction section on what we call AV block and please stay hydrated and reduce any of your caffeine intake.  She normally does see Dr. Mathew and she will make a follow-up when she checks out to make an appointment with her.    Return in about 4 weeks (around 2/26/2020), or FU Dr Mathew.    First-Degree Atrioventricular Block  What is atrioventricular block?   Atrioventricular (AV) block, also called heart block, is a problem with the system that controls how  often the heart beats (heart rate) and the pattern of heart beats (heart rhythm). In this condition, the signals that travel from the heart’s upper chambers (atria) to its lower chambers (ventricles) move too slowly or are interrupted. There are several types of heart block:  · First-degree.  · Second-degree.  · Third-degree or complete.  What is first-degree heart block?   First-degree AV block is the least serious type of heart block. In this condition, the signals that control heart rate move too slowly. As a result, the heart may beat more slowly than normal. First-degree AV block can increase your risk of developing a type of irregular heartbeat called atrial fibrillation.  What are the causes?  This condition may be caused by:  · Any condition that damages the system that controls the heart’s rate and rhythm, such as a heart attack.  · Overstimulation of the nerve that slows down heart rate (vagus nerve). This cause is common among well-conditioned athletes.  · Some medicines that slow down heart rate, such as beta blockers or calcium channel blockers.  · Surgery that damages the heart.  Some people are born with this condition (congenital heart block), but most people develop it over time.  What increases the risk?  The risk for this condition increases with age. You are also more likely to develop this condition if you have:  · A history of heart attack.  · Heart failure.  · Coronary heart disease.  · Inflammation of heart muscle (myocarditis).  · Disease of heart muscle (cardiomyopathy).  · Infection of the heart valves (endocarditis).  · Infections or diseases that affect the heart. These include:  ¨ Lyme disease.  ¨ Sarcoidosis.  ¨ Hemochromatosis.  ¨ Rheumatic fever.  ¨ Muscle disorders including Lev disease and Lenegre disease.  Babies are more likely to be born with heart block if:  · The mother has an autoimmune disease, such as lupus.  · The baby is born with a heart defect that affects the heart’s  structure.  · A parent was born with a heart defect.  What are the signs or symptoms?  This condition usually does not cause any symptoms.  How is this diagnosed?  This condition may be diagnosed based on:  · A physical exam.  · Your medical history.  · A measurement of your pulse or heartbeat.  · Tests. These may include:  ¨ An electrocardiogram (ECG). This test is done to check for problems with electrical activity in the heart.  ¨ A Holter monitor or event monitor test. This test involves wearing a portable device that monitors your heart rate over time.  ¨ An electrophysiology (EP) study. This test involves having long, thin tubes (catheters) placed in the heart. This test records electrical signals in the heart.  How is this treated?  Usually, treatment is not needed for this condition. In some cases, treatment involves:  · Treating an underlying condition, such as heart disease.  · Changing or stopping any heart medicines that can cause heart block.  Follow these instructions at home:    · Take over-the-counter and prescription medicines only as told by your health care provider.  · Work with your health care provider to control lifestyle choices that increase your risk for heart disease. You may need to:  ¨ Get regular exercise. Each week, try to get 150 minutes of moderate-intensity activity (such as walking or yoga) or 75 minutes of vigorous activity (such as running or swimming). Ask your health care provider what type of exercise is safe for you.  ¨ Eat a heart-healthy diet with fruits and vegetables, whole grains, low-fat dairy products, and lean proteins like poultry and eggs. Your health care provider or diet and nutrition specialist (dietitian) can help you make healthy choices.  ¨ Maintain a healthy weight.  ¨ Limit alcohol intake to no more than 1 drink per day for nonpregnant women and 2 drinks per day for men. One drink equals 12 oz of beer, 5 oz of wine, or 1½ oz of hard liquor.  · Do not use any  products that contain nicotine or tobacco, such as cigarettes and e-cigarettes. If you need help quitting, ask your health care provider.  · Keep all follow-up visits as told by your health care provider. This is important.  Contact a health care provider if:  · You feel like your heart is skipping beats.  · You feel more tired than normal.  · You have swelling in your hands, feet, or lower legs.  Get help right away if:  · Your symptoms change or they get worse.  · You develop new symptoms.  · You have chest pain, especially if the pain:  ¨ Feels like crushing or pressure.  ¨ Spreads to your arms, back, neck, or jaw.  · You feel short of breath.  · You feel light-headed or weak.  · You faint.  Summary  · First-degree AV block is the least serious type of heart block. In this condition, the signals that control heart rate move too slowly. As a result, the heart may beat more slowly than normal.  · Usually, treatment is not needed for this condition. In some cases, you may need to change or stop medications that may be making the condition worse.  · Healthy lifestyle choices such as exercising regularly, eating a healthy diet, and limiting alcohol are good for your heart.  This information is not intended to replace advice given to you by your health care provider. Make sure you discuss any questions you have with your health care provider.

## 2020-01-30 NOTE — PROGRESS NOTES
cc: History of total thyroidectomy, abnormal EKG, dyslipidemia, BMI 41, hypothyroidism on medication, Tdap vaccine    Subjective:     Evita Mckenna is a 60 y.o. female presenting she is normally a Dr. Mathew patient she does have history of colorectal cancer in 2012 and she did have chemotherapy and radiation.  Also for her Graves' disease she recently January 8 had her thyroid removed and has an appointment in 8 weeks with Dr. Meza part of general surgery.  She reports that she has been gaining weight and she is thinking of doing the gastric sleeve surgery  But she will be having surgery on her knee first and she was concerned about the EKG results January 7 which did show sinus rhythm and first-degree AV block and early precordial RS transition to consider an inferior infarct.  Review of systems:     Constitutional: Negative for fever, chills and negative fatigue.   HENT: Negative for sinus pressure  Eyes: Negative for blurriness  Respiratory: Negative for cough and shortness of breath, negative for exertional shortness of breath  Cardiovascular: Negative for leg swelling, negative for palpitations, negative for chest pain  Gastrointestinal: Negative for nausea, vomiting, abdominal pain, constipation and diarrhea.  Genitourinary: Negative for dysuria and hematuria.   Neurological: Negative for dizziness, focal weakness and headaches.   Endo/Heme/Allergies: Denies bleeding, bruising, and recurrent allergies.  Psychiatric/Behavioral: Negative for depression and anxiety.        Current Outpatient Medications:   •  levothyroxine (SYNTHROID) 125 MCG Tab, Take 1 Tab by mouth Every morning on an empty stomach., Disp: 30 Tab, Rfl: 3    Allergies, past medical history, past surgical history, family history, social history reviewed and updated    Objective:     Vitals: /80 (BP Location: Left arm, Patient Position: Sitting, BP Cuff Size: Adult)   Pulse 93   Temp 36.6 °C (97.8 °F) (Temporal)   Resp 14   Ht  "1.664 m (5' 5.5\")   Wt 114.4 kg (252 lb 3.2 oz)   SpO2 98%   BMI 41.33 kg/m²   General: Alert, pleasant, NAD  HEENT: Normocephalic.  Nontraumatic. EOMI, no icterus or pallor.  Conjunctivae and lids normal. External ears normal.   Heart: Regular rate and rhythm.  S1 and S2 normal.  No murmurs appreciated.  Respiratory: Normal respiratory effort.  Clear to auscultation bilaterally.  Abdomen: Non-distended, soft, non tender in all 4 quadrants.  Skin: Warm, dry, no rashes.  Musculoskeletal: Gait is normal.  Moves all extremities well.  Extremities: No leg edema.  Pedal pulses 2+ symmetric.   Psych:  Affect/mood is normal, judgement is good, memory is intact, grooming is appropriate.    Assessment/Plan:     Diagnoses and all orders for this visit:    H/O total thyroidectomy    Need for vaccination  -     Tdap Vaccine =>8YO IM    BMI 40.0-44.9, adult (HCC)    Abnormal EKG  -     REFERRAL TO CARDIOLOGY    Hyperglycemia  -     HEMOGLOBIN A1C; Future    Dyslipidemia  -     Lipid Profile; Future    -Status post thyroidectomy January 8 she will see her general surgeon who has removed her thyroid in about 8 weeks and she will follow-up with her endocrinologist who is now going to [facility] and she has report that she is gained weight and she would like to do gastric sleeve surgery and to talk to her primary care doctor about this and to do additional lab work and its been a while since she gained weight so we will do a fasting lab work 8 hours of no eating but drink plenty of water at least 1 week before your follow-up so you have the lab work back.  And also we will give a referral to cardiology if you do not hear back in 1 week then please call the referrals department to see what is happening for cardiology referral as she is concerned about her EKG and we did go over it that there is a concern for AV block but unsure about the other abnormality since she has had a surgery I would recommend to repeat the EKG with her " cardiologist and since she may be doing a right knee surgery and/or gastric sleeve to get cardiology approval before doing any of her surgeries. And please read patient instruction section on what we call AV block and please stay hydrated and reduce any of your caffeine intake.  She normally does see Dr. Mathew and she will make a follow-up when she checks out to make an appointment with her.  She first wanted her Tdap vaccine but later declined.  She kept asking the same questions about her EKG and was quite argumentative and wanted more paperwork signed for other specialists and I explained to her that she has to wait to see her primary care.  She admitted to be a hypochondriac and explained to her the EKG many times and that I recommend her to repeat an EKG since this was before her thyroid surgery, she was on the phone most of the time during the appointment and did not seem to agree with what I have to say as she wanted more things addressed at this appointment and I did tell her to follow-up with her primary care and her endocrinologist and her surgeon and she has seen a cardiologist in the past but she states that this was a while ago and she would like to see another one again.    Return in about 4 weeks (around 2/26/2020), or FU Dr Mathew.

## 2020-01-31 ENCOUNTER — OFFICE VISIT (OUTPATIENT)
Dept: CARDIOLOGY | Facility: MEDICAL CENTER | Age: 60
End: 2020-01-31
Payer: COMMERCIAL

## 2020-01-31 VITALS
DIASTOLIC BLOOD PRESSURE: 66 MMHG | BODY MASS INDEX: 41.99 KG/M2 | SYSTOLIC BLOOD PRESSURE: 130 MMHG | HEIGHT: 65 IN | HEART RATE: 92 BPM | OXYGEN SATURATION: 99 % | WEIGHT: 252 LBS

## 2020-01-31 DIAGNOSIS — R00.2 PALPITATIONS: Chronic | ICD-10-CM

## 2020-01-31 DIAGNOSIS — R94.31 ABNORMAL EKG: ICD-10-CM

## 2020-01-31 DIAGNOSIS — E78.5 DYSLIPIDEMIA: ICD-10-CM

## 2020-01-31 PROCEDURE — 99204 OFFICE O/P NEW MOD 45 MIN: CPT | Performed by: INTERNAL MEDICINE

## 2020-01-31 ASSESSMENT — ENCOUNTER SYMPTOMS
BLURRED VISION: 0
DIZZINESS: 0
SHORTNESS OF BREATH: 0
PALPITATIONS: 0
CHILLS: 0
WEAKNESS: 0
FALLS: 0
FEVER: 0
NAUSEA: 0
BRUISES/BLEEDS EASILY: 0
SORE THROAT: 0
FOCAL WEAKNESS: 0
ABDOMINAL PAIN: 1
PND: 0
COUGH: 0
CLAUDICATION: 0

## 2020-01-31 NOTE — LETTER
PROCEDURE/SURGERY CLEARANCE FORM      Encounter Date: 1/31/2020    Patient: Evita Mckenna  YOB: 1960    CARDIOLOGIST:  Kenji Julien M.D.        The above patient is cleared to have the following procedure/surgery: orthopedic or bariatric surgery                                           Additional comments: It is my pleasure to participate in the care of Ms. Mckenna.  Please do not hesitate to contact me with questions or concerns. [facility] is available 24/7 for consultative services at [phone] in the perioperative period.    Electronically Signed    Kenji Julien MD PhD [facility]  Cardiologist [facility]    Please note that this dictation was created using voice recognition software. I have worked with consultants from the vendor as well as technical experts from [facility] to optimize the interface. I have made every reasonable attempt to correct obvious errors, but I expect that there are errors of grammar and possibly content I did not discover before finalizing the note.

## 2020-02-01 NOTE — PROGRESS NOTES
Chief Complaint   Patient presents with   • Abnormal EKG     new patient/ PP OF [facility]       Subjective:   Evita Mckenna is a 60 y.o. female who presents today in consultation from Dr. Anita Vasquez for evaluation of abnormal EKG prior history of the same.   She has had inferior Q waves in the 3 since at least 2014 at that time she had an echocardiogram with normal wall motion but was limited in technique and then she had a stress test which suggested possible anterior infarct but could have been diaphragmatic attenuation she is struggled with weight long-term to have a LAP-BAND in the past did not work out so that was removed she does have more significant right knee arthralgias is likely a possible surgery possible bariatric surgery as well    Past Medical History:   Diagnosis Date   • Abnormal EKG - possible inferior infarct seen from 2014    • Allergy    • Anemia     Vaginal bleeding.   • Arrhythmia    • Arthritis     knee   • Cancer (HCC) 2011    colorectal   • Colorectal cancer (HCC) April 2011    Dr. Garcia, chemo and radiation.    • Colorectal cancer (HCC)    • Dyslipidemia 1/29/2020   • GERD (gastroesophageal reflux disease)    • Graves disease    • Heart burn    • Hyperthyroidism 12/18/2018   • Indigestion    • Migraine    • Pain 2020    knee right   • Palpitations    • Snoring      Past Surgical History:   Procedure Laterality Date   • THYROIDECTOMY TOTAL Bilateral 1/8/2020    Procedure: THYROIDECTOMY, TOTAL- W/ NIMS;  Surgeon: Ebony Meza M.D.;  Location: SURGERY SAME DAY [facility];  Service: General   • JOINT INJECTION DIAGNOSTIC  3/8/2013    Performed by Kenji Meek M.D. at SURGERY [facility]   • KNEE ARTHROSCOPY  3/8/2013    Performed by Kenji Meek M.D. at SURGERY [facility]   • MEDIAL MENISCECTOMY  3/8/2013    Performed by Kenji Meek M.D. at [facility]   • GASTRIC BANDING LAPAROSCOPIC  2005    removal of lapband 2016   • ABDOMINAL  HYSTERECTOMY TOTAL  2003    For menorrhagia, no BSO   • HERNIA REPAIR      child   • OTHER ABDOMINAL SURGERY      lap band removal     Family History   Problem Relation Age of Onset   • Stroke Father         68yo   • Hypertension Father    • Heart Disease Father         68 yo   • Cancer Maternal Grandfather         Lung   • Diabetes Paternal Grandmother    • Hypertension Paternal Grandmother    • Stroke Paternal Grandmother         70 s   • Psychiatric Illness Sister         Schizophrenia   • Psychiatric Illness Brother         Schizophrenia   • Cancer Paternal Aunt         Bone, liver   • Psychiatric Illness Mother         dental / mental illness    • Lung Disease Mother         Copd    • Psychiatric Illness Sister         schizoprenia    • Diabetes Sister    • Hypertension Sister    • Psychiatric Illness Brother    • Heart Disease Brother    • Hypertension Brother    • Hypertension Brother    • Diabetes Brother      Social History     Socioeconomic History   • Marital status:      Spouse name: Not on file   • Number of children: Not on file   • Years of education: Not on file   • Highest education level: Not on file   Occupational History   • Not on file   Social Needs   • Financial resource strain: Not on file   • Food insecurity:     Worry: Not on file     Inability: Not on file   • Transportation needs:     Medical: Not on file     Non-medical: Not on file   Tobacco Use   • Smoking status: Never Smoker   • Smokeless tobacco: Never Used   • Tobacco comment: significant 2nd hand exposure    Substance and Sexual Activity   • Alcohol use: No     Alcohol/week: 0.6 oz     Types: 1 Standard drinks or equivalent per week   • Drug use: No   • Sexual activity: Yes     Partners: Female   Lifestyle   • Physical activity:     Days per week: Not on file     Minutes per session: Not on file   • Stress: Not on file   Relationships   • Social connections:     Talks on phone: Not on file     Gets together: Not on file      "Attends Denominational service: Not on file     Active member of club or organization: Not on file     Attends meetings of clubs or organizations: Not on file     Relationship status: Not on file   • Intimate partner violence:     Fear of current or ex partner: Not on file     Emotionally abused: Not on file     Physically abused: Not on file     Forced sexual activity: Not on file   Other Topics Concern   •  Service No   • Blood Transfusions No   • Caffeine Concern No   • Occupational Exposure No   • Hobby Hazards No   • Sleep Concern Yes   • Stress Concern No   • Weight Concern Yes   • Special Diet No   • Back Care No   • Exercise Yes   • Bike Helmet No   • Seat Belt Yes   • Self-Exams Yes   Social History Narrative    , no children.      Allergies   Allergen Reactions   • Iodine    • Morphine Itching     blisters   • Shellfish Allergy Vomiting     Outpatient Encounter Medications as of 1/31/2020   Medication Sig Dispense Refill   • levothyroxine (SYNTHROID) 125 MCG Tab Take 1 Tab by mouth Every morning on an empty stomach. 30 Tab 3     No facility-administered encounter medications on file as of 1/31/2020.      Review of Systems   Constitutional: Negative for chills and fever.   HENT: Negative for sore throat.    Eyes: Negative for blurred vision.   Respiratory: Negative for cough and shortness of breath.    Cardiovascular: Negative for chest pain, palpitations, claudication, leg swelling and PND.   Gastrointestinal: Positive for abdominal pain. Negative for nausea.   Musculoskeletal: Positive for joint pain. Negative for falls.   Skin: Negative for rash.   Neurological: Negative for dizziness, focal weakness and weakness.   Endo/Heme/Allergies: Does not bruise/bleed easily.        Objective:   /66 (BP Location: Left arm, Patient Position: Sitting)   Pulse 92   Ht 1.651 m (5' 5\")   Wt 114.3 kg (252 lb)   SpO2 99%   BMI 41.93 kg/m²     Physical Exam   Constitutional: No distress.   Central " obesity   HENT:   Mouth/Throat: Oropharynx is clear and moist. No oropharyngeal exudate.   Eyes: No scleral icterus.   Neck: No JVD present.   Cardiovascular: Normal rate and normal heart sounds. Exam reveals no gallop and no friction rub.   No murmur heard.  Pulmonary/Chest: No respiratory distress. She has no wheezes. She has no rales.   Abdominal: Soft. Bowel sounds are normal.   Musculoskeletal:         General: No edema.   Neurological: She is alert.   Skin: No rash noted. She is not diaphoretic.   Psychiatric: She has a normal mood and affect.     We reviewed in person the most recent labs  Recent Results (from the past 4032 hour(s))   THYROXINE (T4)    Collection Time: 08/27/19  6:56 AM   Result Value Ref Range    Free T-4 1.31 0.82 - 1.77 ng/dL   TSH    Collection Time: 08/27/19  6:56 AM   Result Value Ref Range    TSH <0.006 (L) 0.450 - 4.500 uIU/mL   THYROTROPIN RECEP AB    Collection Time: 08/27/19  6:56 AM   Result Value Ref Range    Thyrotropin Receptor Ab, Serum 1.32 0.00 - 1.75 IU/L   TRIIDOTHYRONINE    Collection Time: 08/27/19  6:56 AM   Result Value Ref Range    T3 197 (H) 71 - 180 ng/dL   T3 FREE    Collection Time: 08/27/19  6:56 AM   Result Value Ref Range    T3,Free 4.8 (H) 2.0 - 4.4 pg/mL   BASIC METABOLIC PANEL (8)    Collection Time: 09/04/19 10:41 AM   Result Value Ref Range    Glucose 88 65 - 99 mg/dL    Bun 12 6 - 24 mg/dL    Creatinine 0.60 0.57 - 1.00 mg/dL    GFR If Non African American 101 >59 mL/min/1.73    GFR If  116 >59 mL/min/1.73    Bun-Creatinine Ratio 20 9 - 23    Sodium 139 134 - 144 mmol/L    Potassium 4.0 3.5 - 5.2 mmol/L    Chloride 100 96 - 106 mmol/L    Co2 26 20 - 29 mmol/L    Calcium 9.8 8.7 - 10.2 mg/dL   MAGNESIUM    Collection Time: 09/04/19 10:41 AM   Result Value Ref Range    Magnesium 1.7 1.6 - 2.3 mg/dL   TSH+FREE T4    Collection Time: 11/11/19  8:53 AM   Result Value Ref Range    TSH 0.245 (L) 0.450 - 4.500 uIU/mL    Free T-4 0.78 (L) 0.82 -  1.77 ng/dL   THYROTROPIN RECEP AB    Collection Time: 11/11/19  8:53 AM   Result Value Ref Range    Thyrotropin Receptor Ab, Serum 1.46 0.00 - 1.75 IU/L   TRIIDOTHYRONINE    Collection Time: 11/11/19  8:53 AM   Result Value Ref Range    T3 120 71 - 180 ng/dL   T3 FREE    Collection Time: 11/11/19  8:53 AM   Result Value Ref Range    T3,Free 2.8 2.0 - 4.4 pg/mL   THYROXINE (T4)    Collection Time: 12/11/19  7:03 AM   Result Value Ref Range    Free T-4 0.78 (L) 0.82 - 1.77 ng/dL   TSH    Collection Time: 12/11/19  7:03 AM   Result Value Ref Range    TSH 1.450 0.450 - 4.500 uIU/mL   TRIIDOTHYRONINE    Collection Time: 12/11/19  7:03 AM   Result Value Ref Range    T3 162 71 - 180 ng/dL   T3 FREE    Collection Time: 12/11/19  7:03 AM   Result Value Ref Range    T3,Free 3.5 2.0 - 4.4 pg/mL   TSH+FREE T4    Collection Time: 12/31/19  5:14 AM   Result Value Ref Range    TSH 0.866 0.450 - 4.500 uIU/mL    Free T-4 0.81 (L) 0.82 - 1.77 ng/dL   Comp Metabolic Panel    Collection Time: 12/31/19  5:14 AM   Result Value Ref Range    Glucose 91 65 - 99 mg/dL    Bun 12 6 - 24 mg/dL    Creatinine 0.66 0.57 - 1.00 mg/dL    GFR If Non  98 >59 mL/min/1.73    GFR If  113 >59 mL/min/1.73    Bun-Creatinine Ratio 18 9 - 23    Sodium 136 134 - 144 mmol/L    Potassium 3.7 3.5 - 5.2 mmol/L    Chloride 101 96 - 106 mmol/L    Co2 21 20 - 29 mmol/L    Calcium 9.4 8.7 - 10.2 mg/dL    Total Protein 7.7 6.0 - 8.5 g/dL    Albumin 3.9 3.5 - 5.5 g/dL    Globulin 3.8 1.5 - 4.5 g/dL    A-G Ratio 1.0 (L) 1.2 - 2.2    Total Bilirubin 0.3 0.0 - 1.2 mg/dL    Alkaline Phosphatase 150 (H) 39 - 117 IU/L    AST(SGOT) 21 0 - 40 IU/L    ALT(SGPT) 18 0 - 32 IU/L   THYROTROPIN RECEP AB    Collection Time: 12/31/19  5:14 AM   Result Value Ref Range    Thyrotropin Receptor Ab, Serum 1.37 0.00 - 1.75 IU/L   VITAMIN D 25-HYDROXY    Collection Time: 12/31/19  5:14 AM   Result Value Ref Range    25-Hydroxy   Vitamin D 25 18.4 (L) 30.0 -  100.0 ng/mL   TSI    Collection Time: 12/31/19  5:14 AM   Result Value Ref Range    Thyroid Stim Immunoglobulin 1.39 (H) 0.00 - 0.55 IU/L   TRIIDOTHYRONINE    Collection Time: 12/31/19  5:14 AM   Result Value Ref Range    T3 178 71 - 180 ng/dL   VITAMIN B12    Collection Time: 12/31/19  5:14 AM   Result Value Ref Range    Vitamin B12 -True Cobalamin 242 232 - 1,245 pg/mL   T3 FREE    Collection Time: 12/31/19  5:14 AM   Result Value Ref Range    T3,Free 4.1 2.0 - 4.4 pg/mL   THYROGLOBULIN    Collection Time: 01/07/20  9:02 AM   Result Value Ref Range    Thyroglobulin 18.6 1.3 - 31.8 ng/mL    Anti-Thyroglobulin <0.9 0.0 - 4.0 IU/mL    Thyroglobulin by LC-MC/MS-S/P Not Applicable 1.3 - 31.8 ng/mL   Basic Metabolic Panel    Collection Time: 01/07/20  9:02 AM   Result Value Ref Range    Sodium 139 135 - 145 mmol/L    Potassium 4.4 3.6 - 5.5 mmol/L    Chloride 105 96 - 112 mmol/L    Co2 29 20 - 33 mmol/L    Glucose 87 65 - 99 mg/dL    Bun 11 8 - 22 mg/dL    Creatinine 0.63 0.50 - 1.40 mg/dL    Calcium 9.5 8.5 - 10.5 mg/dL    Anion Gap 5.0 0.0 - 11.9   CBC Without Differential    Collection Time: 01/07/20  9:02 AM   Result Value Ref Range    WBC 3.7 (L) 4.8 - 10.8 K/uL    RBC 4.72 4.20 - 5.40 M/uL    Hemoglobin 12.6 12.0 - 16.0 g/dL    Hematocrit 41.4 37.0 - 47.0 %    MCV 87.7 81.4 - 97.8 fL    MCH 26.7 (L) 27.0 - 33.0 pg    MCHC 30.4 (L) 33.6 - 35.0 g/dL    RDW 45.8 35.9 - 50.0 fL    Platelet Count 255 164 - 446 K/uL    MPV 8.6 (L) 9.0 - 12.9 fL   ESTIMATED GFR    Collection Time: 01/07/20  9:02 AM   Result Value Ref Range    GFR If African American >60 >60 mL/min/1.73 m 2    GFR If Non African American >60 >60 mL/min/1.73 m 2   ECG    Collection Time: 01/07/20  9:02 AM   Result Value Ref Range    Report       [facility]    Test Date:  2020-01-07  Pt Name:    ADILENE GALLOWAY                    Department: [facility]  MRN:        [id number]                      Room:  Gender:     Female                       Technician:  TXM  :        [id number]                   Requested By:DAVIDANGELINA VASQUEZ  Order #:    [id number]                    Reading MD: Leodan Smith MD    Measurements  Intervals                                Axis  Rate:       71                           P:          70  MD:         220                          QRS:        25  QRSD:       84                           T:          18  QT:         404  QTc:        439    Interpretive Statements  SINUS RHYTHM  FIRST DEGREE AV BLOCK  EARLY PRECORDIAL R/S TRANSITION  CONSIDER INFERIOR INFARCT  Compared to ECG 2016 09:12:09  First degree AV block now present  Myocardial infarct finding now present  Electronically Signed On 2020 15:02:25 PST by Leodan Smith MD     Histology Request    Collection Time: 20  9:32 AM   Result Value Ref Range    Pathology Request Sent to Histo    Albumin    Collection Time: 20  6:20 PM   Result Value Ref Range    Albumin 3.7 3.2 - 4.9 g/dL   Calcium    Collection Time: 20  6:20 PM   Result Value Ref Range    Calcium 9.3 8.5 - 10.5 mg/dL   Albumin    Collection Time: 20 11:49 PM   Result Value Ref Range    Albumin 3.4 3.2 - 4.9 g/dL   Calcium    Collection Time: 20 11:49 PM   Result Value Ref Range    Calcium 9.2 8.5 - 10.5 mg/dL   Albumin    Collection Time: 20  6:09 AM   Result Value Ref Range    Albumin 3.4 3.2 - 4.9 g/dL   Calcium    Collection Time: 20  6:09 AM   Result Value Ref Range    Calcium 9.3 8.5 - 10.5 mg/dL       Assessment:     1. Abnormal EKG     2. Dyslipidemia     3. Palpitations         Medical Decision Making:  Today's Assessment / Status / Plan:     It was my pleasure to meet with Ms. Mckenna.    Her EKG shows long-term possibly for Q wave in lead III reviewed the images of her CT scan for pulmonary embolism back in 2016 which was negative she also did not have any significant coronary calcification actually decent opacification of the proximal vessels so unlikely have any  significant coronary disease unclear if she had a silent MI due to spasm or dissection in the past but no apparent consequence based on remote echocardiogram think she needs any further testing because of that she is also low risk for any upcoming surgeries that would be important    We discussed the importance of meaningful weight loss.    Ms. Mckenna does not require regular cardiology follow up, I have advised her to call our office or e-mail using my MyChart if needed.    It is my pleasure to participate in the care of Ms. Mckenna.  Please do not hesitate to contact me with questions or concerns.    Kenji Julien MD PhD Located within [facility]  Cardiologist [facility] for [facility] and V[facility]    Please note that this dictation was created using voice recognition software. I have worked with consultants from the vendor as well as technical experts from [facility] to optimize the interface. I have made every reasonable attempt to correct obvious errors, but I expect that there are errors of grammar and possibly content I did not discover before finalizing the note.

## 2020-02-26 DIAGNOSIS — Z01.812 PRE-OPERATIVE LABORATORY EXAMINATION: ICD-10-CM

## 2020-02-26 LAB
ERYTHROCYTE [DISTWIDTH] IN BLOOD BY AUTOMATED COUNT: 42.9 FL (ref 35.9–50)
HCT VFR BLD AUTO: 41.7 % (ref 37–47)
HGB BLD-MCNC: 12.7 G/DL (ref 12–16)
MCH RBC QN AUTO: 26.4 PG (ref 27–33)
MCHC RBC AUTO-ENTMCNC: 30.5 G/DL (ref 33.6–35)
MCV RBC AUTO: 86.7 FL (ref 81.4–97.8)
PLATELET # BLD AUTO: 274 K/UL (ref 164–446)
PMV BLD AUTO: 9 FL (ref 9–12.9)
RBC # BLD AUTO: 4.81 M/UL (ref 4.2–5.4)
WBC # BLD AUTO: 4.8 K/UL (ref 4.8–10.8)

## 2020-02-26 PROCEDURE — 36415 COLL VENOUS BLD VENIPUNCTURE: CPT

## 2020-02-26 PROCEDURE — 85027 COMPLETE CBC AUTOMATED: CPT

## 2020-02-27 NOTE — OR NURSING
Ok to proceed based the note provided.  Thank you.   Gino Montoya M.D.  [facility]      On Feb 27, 2020, at 07:35, Scott [email or web address] wrote:  ?    Andrew Montoya, this pt is having a knee scope on 3/5 with Dr. Madden, her bmi is 42.26.  She just had her thyroid removed in January, she does have an abnormal EKG, and there is a note from Dr. Julien stating she is stable for surgeries. Her ROMMEL was 4.  Her ekg from January showed possible infarct.  Her is a portion of the note from Dr. Julien, she saw regarding her abnormal ekg:     Her EKG shows long-term possibly for Q wave in lead III reviewed the images of her CT scan for pulmonary embolism back in 2016 which was negative she also did not have any significant coronary calcification actually decent opacification of the proximal vessels so unlikely have any significant coronary disease unclear if she had a silent MI due to spasm or dissection in the past but no apparent consequence based on remote echocardiogram think she needs any further testing because of that she is also low risk for any upcoming surgeries that would be important     We discussed the importance of meaningful weight loss.        Patient Name: Evita Mckenna MRN: [id number] Admission Date: Patient not admitted   Allergies: Iodine, Morphine, Shellfish Allergy

## 2020-02-27 NOTE — OR NURSING
Pre admit apt: Pt. Instructed to continue regularly prescribed medications through day before surgery.  Instructed to take the following medications, the day of surgery, with a sip of water per anesthesia protocol: levotyroxine

## 2020-03-05 ENCOUNTER — ANESTHESIA EVENT (OUTPATIENT)
Dept: SURGERY | Facility: MEDICAL CENTER | Age: 60
End: 2020-03-05
Payer: COMMERCIAL

## 2020-03-05 ENCOUNTER — HOSPITAL ENCOUNTER (OUTPATIENT)
Facility: MEDICAL CENTER | Age: 60
End: 2020-03-05
Attending: ORTHOPAEDIC SURGERY | Admitting: ORTHOPAEDIC SURGERY
Payer: COMMERCIAL

## 2020-03-05 ENCOUNTER — ANESTHESIA (OUTPATIENT)
Dept: SURGERY | Facility: MEDICAL CENTER | Age: 60
End: 2020-03-05
Payer: COMMERCIAL

## 2020-03-05 VITALS
BODY MASS INDEX: 41.87 KG/M2 | DIASTOLIC BLOOD PRESSURE: 85 MMHG | HEIGHT: 65 IN | WEIGHT: 251.32 LBS | SYSTOLIC BLOOD PRESSURE: 142 MMHG | TEMPERATURE: 97.3 F | RESPIRATION RATE: 16 BRPM | HEART RATE: 90 BPM | OXYGEN SATURATION: 98 %

## 2020-03-05 DIAGNOSIS — Z98.890 S/P RIGHT KNEE ARTHROSCOPY: ICD-10-CM

## 2020-03-05 PROCEDURE — 160048 HCHG OR STATISTICAL LEVEL 1-5: Performed by: ORTHOPAEDIC SURGERY

## 2020-03-05 PROCEDURE — 501838 HCHG SUTURE GENERAL: Performed by: ORTHOPAEDIC SURGERY

## 2020-03-05 PROCEDURE — 160046 HCHG PACU - 1ST 60 MINS PHASE II: Performed by: ORTHOPAEDIC SURGERY

## 2020-03-05 PROCEDURE — 160009 HCHG ANES TIME/MIN: Performed by: ORTHOPAEDIC SURGERY

## 2020-03-05 PROCEDURE — 160041 HCHG SURGERY MINUTES - EA ADDL 1 MIN LEVEL 4: Performed by: ORTHOPAEDIC SURGERY

## 2020-03-05 PROCEDURE — 160036 HCHG PACU - EA ADDL 30 MINS PHASE I: Performed by: ORTHOPAEDIC SURGERY

## 2020-03-05 PROCEDURE — 160035 HCHG PACU - 1ST 60 MINS PHASE I: Performed by: ORTHOPAEDIC SURGERY

## 2020-03-05 PROCEDURE — 700111 HCHG RX REV CODE 636 W/ 250 OVERRIDE (IP): Performed by: ANESTHESIOLOGY

## 2020-03-05 PROCEDURE — 500028 HCHG ARTHROWAND TURBOVAC 3.5/90 SUCT.: Performed by: ORTHOPAEDIC SURGERY

## 2020-03-05 PROCEDURE — 700101 HCHG RX REV CODE 250: Performed by: ORTHOPAEDIC SURGERY

## 2020-03-05 PROCEDURE — 700101 HCHG RX REV CODE 250: Performed by: ANESTHESIOLOGY

## 2020-03-05 PROCEDURE — 700105 HCHG RX REV CODE 258: Performed by: ORTHOPAEDIC SURGERY

## 2020-03-05 PROCEDURE — 160025 RECOVERY II MINUTES (STATS): Performed by: ORTHOPAEDIC SURGERY

## 2020-03-05 PROCEDURE — 160002 HCHG RECOVERY MINUTES (STAT): Performed by: ORTHOPAEDIC SURGERY

## 2020-03-05 PROCEDURE — 700102 HCHG RX REV CODE 250 W/ 637 OVERRIDE(OP): Performed by: ANESTHESIOLOGY

## 2020-03-05 PROCEDURE — A9270 NON-COVERED ITEM OR SERVICE: HCPCS | Performed by: ANESTHESIOLOGY

## 2020-03-05 PROCEDURE — 160029 HCHG SURGERY MINUTES - 1ST 30 MINS LEVEL 4: Performed by: ORTHOPAEDIC SURGERY

## 2020-03-05 PROCEDURE — 700111 HCHG RX REV CODE 636 W/ 250 OVERRIDE (IP): Performed by: ORTHOPAEDIC SURGERY

## 2020-03-05 PROCEDURE — 502580 HCHG PACK, KNEE ARTHROSCOPY: Performed by: ORTHOPAEDIC SURGERY

## 2020-03-05 RX ORDER — GABAPENTIN 300 MG/1
300 CAPSULE ORAL ONCE
Status: DISCONTINUED | OUTPATIENT
Start: 2020-03-05 | End: 2020-03-05 | Stop reason: HOSPADM

## 2020-03-05 RX ORDER — LABETALOL HYDROCHLORIDE 5 MG/ML
5 INJECTION, SOLUTION INTRAVENOUS
Status: DISCONTINUED | OUTPATIENT
Start: 2020-03-05 | End: 2020-03-05 | Stop reason: HOSPADM

## 2020-03-05 RX ORDER — MAGNESIUM SULFATE HEPTAHYDRATE 500 MG/ML
INJECTION, SOLUTION INTRAMUSCULAR; INTRAVENOUS PRN
Status: DISCONTINUED | OUTPATIENT
Start: 2020-03-05 | End: 2020-03-05 | Stop reason: SURG

## 2020-03-05 RX ORDER — HALOPERIDOL 5 MG/ML
1 INJECTION INTRAMUSCULAR
Status: DISCONTINUED | OUTPATIENT
Start: 2020-03-05 | End: 2020-03-05 | Stop reason: HOSPADM

## 2020-03-05 RX ORDER — METOPROLOL TARTRATE 1 MG/ML
1 INJECTION, SOLUTION INTRAVENOUS
Status: DISCONTINUED | OUTPATIENT
Start: 2020-03-05 | End: 2020-03-05 | Stop reason: HOSPADM

## 2020-03-05 RX ORDER — HYDROMORPHONE HYDROCHLORIDE 1 MG/ML
0.2 INJECTION, SOLUTION INTRAMUSCULAR; INTRAVENOUS; SUBCUTANEOUS
Status: DISCONTINUED | OUTPATIENT
Start: 2020-03-05 | End: 2020-03-05 | Stop reason: HOSPADM

## 2020-03-05 RX ORDER — HYDROMORPHONE HYDROCHLORIDE 1 MG/ML
0.1 INJECTION, SOLUTION INTRAMUSCULAR; INTRAVENOUS; SUBCUTANEOUS
Status: DISCONTINUED | OUTPATIENT
Start: 2020-03-05 | End: 2020-03-05 | Stop reason: HOSPADM

## 2020-03-05 RX ORDER — SODIUM CHLORIDE, SODIUM LACTATE, POTASSIUM CHLORIDE, CALCIUM CHLORIDE 600; 310; 30; 20 MG/100ML; MG/100ML; MG/100ML; MG/100ML
INJECTION, SOLUTION INTRAVENOUS CONTINUOUS
Status: DISCONTINUED | OUTPATIENT
Start: 2020-03-05 | End: 2020-03-05 | Stop reason: HOSPADM

## 2020-03-05 RX ORDER — DEXAMETHASONE SODIUM PHOSPHATE 4 MG/ML
INJECTION, SOLUTION INTRA-ARTICULAR; INTRALESIONAL; INTRAMUSCULAR; INTRAVENOUS; SOFT TISSUE PRN
Status: DISCONTINUED | OUTPATIENT
Start: 2020-03-05 | End: 2020-03-05 | Stop reason: SURG

## 2020-03-05 RX ORDER — BUPIVACAINE HYDROCHLORIDE AND EPINEPHRINE 5; 5 MG/ML; UG/ML
INJECTION, SOLUTION EPIDURAL; INTRACAUDAL; PERINEURAL
Status: DISCONTINUED | OUTPATIENT
Start: 2020-03-05 | End: 2020-03-05 | Stop reason: HOSPADM

## 2020-03-05 RX ORDER — CEFAZOLIN SODIUM 1 G/3ML
INJECTION, POWDER, FOR SOLUTION INTRAMUSCULAR; INTRAVENOUS PRN
Status: DISCONTINUED | OUTPATIENT
Start: 2020-03-05 | End: 2020-03-05 | Stop reason: SURG

## 2020-03-05 RX ORDER — ACETAMINOPHEN 500 MG
1000 TABLET ORAL ONCE
Status: DISCONTINUED | OUTPATIENT
Start: 2020-03-05 | End: 2020-03-05 | Stop reason: HOSPADM

## 2020-03-05 RX ORDER — HYDRALAZINE HYDROCHLORIDE 20 MG/ML
5 INJECTION INTRAMUSCULAR; INTRAVENOUS
Status: DISCONTINUED | OUTPATIENT
Start: 2020-03-05 | End: 2020-03-05 | Stop reason: HOSPADM

## 2020-03-05 RX ORDER — MEPERIDINE HYDROCHLORIDE 25 MG/ML
12.5 INJECTION INTRAMUSCULAR; INTRAVENOUS; SUBCUTANEOUS
Status: DISCONTINUED | OUTPATIENT
Start: 2020-03-05 | End: 2020-03-05 | Stop reason: HOSPADM

## 2020-03-05 RX ORDER — ROCURONIUM BROMIDE 10 MG/ML
INJECTION, SOLUTION INTRAVENOUS PRN
Status: DISCONTINUED | OUTPATIENT
Start: 2020-03-05 | End: 2020-03-05 | Stop reason: SURG

## 2020-03-05 RX ORDER — OXYCODONE HCL 5 MG/5 ML
5 SOLUTION, ORAL ORAL
Status: COMPLETED | OUTPATIENT
Start: 2020-03-05 | End: 2020-03-05

## 2020-03-05 RX ORDER — OXYCODONE HCL 5 MG/5 ML
10 SOLUTION, ORAL ORAL
Status: COMPLETED | OUTPATIENT
Start: 2020-03-05 | End: 2020-03-05

## 2020-03-05 RX ORDER — SCOLOPAMINE TRANSDERMAL SYSTEM 1 MG/1
1 PATCH, EXTENDED RELEASE TRANSDERMAL ONCE
Status: DISCONTINUED | OUTPATIENT
Start: 2020-03-05 | End: 2020-03-05 | Stop reason: HOSPADM

## 2020-03-05 RX ORDER — BETAMETHASONE SODIUM PHOSPHATE AND BETAMETHASONE ACETATE 3; 3 MG/ML; MG/ML
INJECTION, SUSPENSION INTRA-ARTICULAR; INTRALESIONAL; INTRAMUSCULAR; SOFT TISSUE
Status: DISCONTINUED | OUTPATIENT
Start: 2020-03-05 | End: 2020-03-05 | Stop reason: HOSPADM

## 2020-03-05 RX ORDER — ONDANSETRON 2 MG/ML
4 INJECTION INTRAMUSCULAR; INTRAVENOUS
Status: COMPLETED | OUTPATIENT
Start: 2020-03-05 | End: 2020-03-05

## 2020-03-05 RX ORDER — KETOROLAC TROMETHAMINE 30 MG/ML
INJECTION, SOLUTION INTRAMUSCULAR; INTRAVENOUS PRN
Status: DISCONTINUED | OUTPATIENT
Start: 2020-03-05 | End: 2020-03-05 | Stop reason: SURG

## 2020-03-05 RX ORDER — METOCLOPRAMIDE HYDROCHLORIDE 5 MG/ML
INJECTION INTRAMUSCULAR; INTRAVENOUS PRN
Status: DISCONTINUED | OUTPATIENT
Start: 2020-03-05 | End: 2020-03-05 | Stop reason: SURG

## 2020-03-05 RX ORDER — ONDANSETRON 2 MG/ML
INJECTION INTRAMUSCULAR; INTRAVENOUS PRN
Status: DISCONTINUED | OUTPATIENT
Start: 2020-03-05 | End: 2020-03-05 | Stop reason: SURG

## 2020-03-05 RX ORDER — HYDROMORPHONE HYDROCHLORIDE 1 MG/ML
0.4 INJECTION, SOLUTION INTRAMUSCULAR; INTRAVENOUS; SUBCUTANEOUS
Status: DISCONTINUED | OUTPATIENT
Start: 2020-03-05 | End: 2020-03-05 | Stop reason: HOSPADM

## 2020-03-05 RX ORDER — HYDROCODONE BITARTRATE AND ACETAMINOPHEN 5; 325 MG/1; MG/1
1-2 TABLET ORAL EVERY 4 HOURS PRN
Qty: 20 TAB | Refills: 0 | Status: SHIPPED | OUTPATIENT
Start: 2020-03-05 | End: 2020-03-12

## 2020-03-05 RX ADMIN — OXYCODONE HYDROCHLORIDE 5 MG: 5 SOLUTION ORAL at 11:05

## 2020-03-05 RX ADMIN — METOCLOPRAMIDE 10 MG: 5 INJECTION, SOLUTION INTRAMUSCULAR; INTRAVENOUS at 10:29

## 2020-03-05 RX ADMIN — ONDANSETRON 4 MG: 2 INJECTION INTRAMUSCULAR; INTRAVENOUS at 10:59

## 2020-03-05 RX ADMIN — KETOROLAC TROMETHAMINE 30 MG: 30 INJECTION, SOLUTION INTRAMUSCULAR at 10:29

## 2020-03-05 RX ADMIN — FENTANYL CITRATE 25 MCG: 50 INJECTION, SOLUTION INTRAMUSCULAR; INTRAVENOUS at 10:15

## 2020-03-05 RX ADMIN — LIDOCAINE HYDROCHLORIDE 0.5 ML: 10 INJECTION, SOLUTION INFILTRATION; PERINEURAL at 08:02

## 2020-03-05 RX ADMIN — PROPOFOL 200 MG: 10 INJECTION, EMULSION INTRAVENOUS at 09:59

## 2020-03-05 RX ADMIN — FENTANYL CITRATE 50 MCG: 50 INJECTION, SOLUTION INTRAMUSCULAR; INTRAVENOUS at 10:37

## 2020-03-05 RX ADMIN — ONDANSETRON 4 MG: 2 INJECTION INTRAMUSCULAR; INTRAVENOUS at 10:29

## 2020-03-05 RX ADMIN — CEFAZOLIN 2 G: 1 INJECTION, POWDER, FOR SOLUTION INTRAVENOUS at 09:59

## 2020-03-05 RX ADMIN — SODIUM CHLORIDE, POTASSIUM CHLORIDE, SODIUM LACTATE AND CALCIUM CHLORIDE 1000 ML: 600; 310; 30; 20 INJECTION, SOLUTION INTRAVENOUS at 08:02

## 2020-03-05 RX ADMIN — SUGAMMADEX 200 MG: 100 INJECTION, SOLUTION INTRAVENOUS at 10:36

## 2020-03-05 RX ADMIN — MAGNESIUM SULFATE HEPTAHYDRATE 2 G: 500 INJECTION, SOLUTION INTRAMUSCULAR; INTRAVENOUS at 09:59

## 2020-03-05 RX ADMIN — LIDOCAINE HYDROCHLORIDE 40 MG: 20 INJECTION, SOLUTION INFILTRATION; PERINEURAL at 09:59

## 2020-03-05 RX ADMIN — FENTANYL CITRATE 25 MCG: 50 INJECTION, SOLUTION INTRAMUSCULAR; INTRAVENOUS at 10:08

## 2020-03-05 RX ADMIN — ROCURONIUM BROMIDE 50 MG: 10 INJECTION, SOLUTION INTRAVENOUS at 09:59

## 2020-03-05 RX ADMIN — SCOPALAMINE 1 PATCH: 1 PATCH, EXTENDED RELEASE TRANSDERMAL at 08:01

## 2020-03-05 RX ADMIN — DEXAMETHASONE SODIUM PHOSPHATE 4 MG: 4 INJECTION, SOLUTION INTRAMUSCULAR; INTRAVENOUS at 09:59

## 2020-03-05 RX ADMIN — MIDAZOLAM HYDROCHLORIDE 2 MG: 1 INJECTION, SOLUTION INTRAMUSCULAR; INTRAVENOUS at 09:54

## 2020-03-05 RX ADMIN — FENTANYL CITRATE 25 MCG: 50 INJECTION INTRAMUSCULAR; INTRAVENOUS at 11:01

## 2020-03-05 ASSESSMENT — FIBROSIS 4 INDEX: FIB4 SCORE: 1.07

## 2020-03-05 NOTE — OR NURSING
1210 Pt assisted to the BR and voided large amount of clear yellow urine. Prompt dereje-care rendered, pt given new undergarments. Pt with min assist. 1220 Sitting up on gurney, conversing with spouse. Pain tolerable, no nausea. Dressing remains C.D.[id number] No assessment changes. 1240 Pt meets criteria for discharge and transfer to stage 2.

## 2020-03-05 NOTE — DISCHARGE INSTRUCTIONS
ACTIVITY: Rest and take it easy for the first 24 hours.  A responsible adult is recommended to remain with you during that time.  It is normal to feel sleepy.  We encourage you to not do anything that requires balance, judgment or coordination.    MILD FLU-LIKE SYMPTOMS ARE NORMAL. YOU MAY EXPERIENCE GENERALIZED MUSCLE ACHES, THROAT IRRITATION, HEADACHE AND/OR SOME NAUSEA.    FOR 24 HOURS DO NOT:  Drive, operate machinery or run household appliances.  Drink beer or alcoholic beverages.   Make important decisions or sign legal documents.    SPECIAL INSTRUCTIONS: ACTIVITY AS TOLERATED. WEIGHT BEARING AS TOLERATED TO RIGHT KNEE.  USE CRUTCHES AS NEEDED. REMOVE SCOPALAMINE PATCH BEHIND RIGHT EAR ON 3-8-2020 OR BEFORE IF REMAIN NAUSEA FREE.    DIET: To avoid nausea, slowly advance diet as tolerated, avoiding spicy or greasy foods for the first day.  Add more substantial food to your diet according to your physician's instructions.  Babies can be fed formula or breast milk as soon as they are hungry.  INCREASE FLUIDS AND FIBER TO AVOID CONSTIPATION.    SURGICAL DRESSING/BATHING: KEEP DRESSING CLEAN AND DRY.  YOU MAY SHOWER TOMORROW WITH INCISIONS COVERED.  REMOVE DRESSING IN 5 DAYS AND SHOWER WITH INCISION UNCOVERED.  APPLY BAND AIDS.       FOLLOW-UP APPOINTMENT:  A follow-up appointment should be arranged with your doctor; call to schedule.    You should CALL YOUR PHYSICIAN if you develop:  Fever greater than 101 degrees F.  Pain not relieved by medication, or persistent nausea or vomiting.  Excessive bleeding (blood soaking through dressing) or unexpected drainage from the wound.  Extreme redness or swelling around the incision site, drainage of pus or foul smelling drainage.  Inability to urinate or empty your bladder within 8 hours.  Problems with breathing or chest pain.    You should call 911 if you develop problems with breathing or chest pain.  If you are unable to contact your doctor or surgical center, you  should go to the nearest emergency room or urgent care center.      Physician's telephone #: DR. MARR  [phone]    If any questions arise, call your doctor.  If your doctor is not available, please feel free to call the Surgical Center at [phone].  The Center is open Monday through Friday from 7AM to 7PM.  You can also call the HEALTH HOTLINE open 24 hours/day, 7 days/week and speak to a nurse at [phone], or toll free at [phone].    A registered nurse may call you a few days after your surgery to see how you are doing after your procedure.    MEDICATIONS: Resume taking daily medication.  Take prescribed pain medication with food.  If no medication is prescribed, you may take non-aspirin pain medication if needed.  PAIN MEDICATION CAN BE VERY CONSTIPATING.  Take a stool softener or laxative such as senokot, pericolace, or milk of magnesia if needed.    Prescription given for NORCO.      Last pain medication given at 11:05AM. (OXYCODONE 5MG).    If your physician has prescribed pain medication that includes Acetaminophen (Tylenol), do not take additional Acetaminophen (Tylenol) while taking the prescribed medication.    Depression / Suicide Risk    As you are discharged from this [facility] facility, it is important to learn how to keep safe from harming yourself.    Recognize the warning signs:  · Abrupt changes in personality, positive or negative- including increase in energy   · Giving away possessions  · Change in eating patterns- significant weight changes-  positive or negative  · Change in sleeping patterns- unable to sleep or sleeping all the time   · Unwillingness or inability to communicate  · Depression  · Unusual sadness, discouragement and loneliness  · Talk of wanting to die  · Neglect of personal appearance   · Rebelliousness- reckless behavior  · Withdrawal from people/activities they love  · Confusion- inability to concentrate     If you or a loved one observes any of  these behaviors or has concerns about self-harm, here's what you can do:  · Talk about it- your feelings and reasons for harming yourself  · Remove any means that you might use to hurt yourself (examples: pills, rope, extension cords, firearm)  · Get professional help from the community (Mental Health, Substance Abuse, psychological counseling)  · Do not be alone:Call your Safe Contact- someone whom you trust who will be there for you.  · Call your local CRISIS HOTLINE 689-4775 or 473-726-9247  · Call your local Children's Mobile Crisis Response Team [place] (937) 435-3892 or www.Clew  · Call the toll free National Suicide Prevention Hotlines   · National Suicide Prevention Lifeline 514-520-GSVO (4356)  · National Hope Line Network 800-SUICIDE (649-4019)

## 2020-03-05 NOTE — OP REPORT
DATE OF SERVICE: 3/5/20    PREOPERATIVE DIAGNOSIS: right knee lateral meniscus tear, left knee djd    POSTOPERATIVE DIAGNOSIS: right knee lateralmeniscus tear, left knee djd    PROCEDURE PERFORMED:  1. right knee arthroscopic partial lateral meniscectomy.     SURGEON: Abdias Madden MD    ANESTHESIA: LMA    ANESTHESIOLOGIST: Jeff    ANTIBIOTICS: Ancef    COMPLICATIONS: None.         INDICATIONS: The patient presents with right knee pain recalcitrant to conservative treatment. The patient has evidence of a lateral meniscus tear. The patient was having a lot of mechanical type symptoms.  After further discussion, the patient elects to undergo a right knee scope, partial medial meniscectomy and repair as indicated.  Risks of surgery were discussed at length. All questions were answered. No guarantees were given.     PROCEDURE IN DETAIL: The patient was properly identified in the preoperative holding area, and the right knee was marked as the correct surgical site. The patient was then taken back to the operative room, and placed supine on the operating room table and underwent general anesthesia. The right lower extremity was sterilely prepped and draped in standard fashion. The limb was exsanguinated and the tourniquet was inflated. A standard anterolateral portal was created. The scope was placed up the into the suprapatellar pouch. The patella showed moderate wear. The trochlear groove showed grade II and III changes. The medial and lateral gutters were visualized, and no loose bodies were noted. The medial compartment was entered. There was no evidence of a medial meniscus tear. An anterior medial portal was created. The probe was used to rule out a medial meniscus tear.  There was evidence of moderate chondromalacia to the medial compartment. The ACL looked good. The lateral compartment was visualized. There were moderate to advanced lateral compartment chondromalacia changes and a complex lateral meniscus tear  involving the body and posterior horn..The scope was placed back in the suprapatellar pouch and loose fragments were removed. Excess fluid was drained. The incision was closed with 3-0 nylon. A total of 30ml of marcaine was injected. Soft dressings were applied. The left knee was sterilly prepped with alcohol and injected with 2 of xylocaine and 2 cc of celestone.  The patient was extubated and transferred to the recovery room in stable condition.

## 2020-03-05 NOTE — ANESTHESIA TIME REPORT
Anesthesia Start and Stop Event Times     Date Time Event    3/5/2020 0953 Ready for Procedure     0954 Anesthesia Start     1043 Anesthesia Stop        Responsible Staff  03/05/20    Name Role Begin End    Brandt Aguilar M.D. Anesth 0954 1043        Preop Diagnosis (Free Text):  Pre-op Diagnosis     COMPLEX TER OF LATERAL MENISCUS RIGHT KNEE WITH PAIN; pain left knee        Preop Diagnosis (Codes):    Post op Diagnosis  Knee pain      Premium Reason  Non-Premium    Comments:

## 2020-03-05 NOTE — ANESTHESIA PREPROCEDURE EVALUATION
Obesity  Knee pain    Relevant Problems   NEURO   (+) History of anemia      Other   (+) BMI 40.0-44.9, adult (HCC)   (+) Chronic pain of right knee       Physical Exam    Airway   Mallampati: II  TM distance: <3 FB  Neck ROM: full       Cardiovascular - normal exam  Rhythm: regular  Rate: normal  (-) murmur     Dental - normal exam         Pulmonary - normal exam  Breath sounds clear to auscultation     Abdominal    Neurological - normal exam                 Anesthesia Plan    ASA 3   ASA physical status 3 criteria: morbid obesity - BMI greater than or equal to 40    Plan - general       Airway plan will be LMA        Induction: intravenous      Pertinent diagnostic labs and testing reviewed    Informed Consent:    Anesthetic plan and risks discussed with patient.

## 2020-03-05 NOTE — OR NURSING
1144 REPORT RECEIVED FOR FUNMI WARD TO ASSUME  CARE OF THIS PT. SPO2 88% WHILE SLEEPING.  DB& C INSTRUCTIONS GIVEN TO PT WITH RETURN DEMONSTRATION. SPO2 93% RA WHILE AWAKE.  1158 PT REPORTS R KNEE PAIN 5/10 AND TOLERABLE. DENIES NAUSEA.  PT MEETS CRITERIA FOR TRANSFER TO STAGE 2.

## 2020-03-05 NOTE — ANESTHESIA POSTPROCEDURE EVALUATION
Patient: Evita Mckenna    Procedure Summary     Date:  03/05/20 Room / Location:   OR 06 / SURGERY [facility]    Anesthesia Start:  0954 Anesthesia Stop:  1043    Procedures:       ARTHROSCOPY, KNEE (Right Knee)      MENISCECTOMY, KNEE, MEDIAL - PARTIAL LATERAL, (Right Knee)      INJECTION, JOINT, DIAGNOSTIC (Left Knee) Diagnosis:  (same as preop.dx)    Surgeon:  Abdias Madden M.D. Responsible Provider:  Brandt Aguilar M.D.    Anesthesia Type:  general ASA Status:  3          Final Anesthesia Type: general  Last vitals  BP   Blood Pressure: 142/85, NIBP: 130/59    Temp   36.5 °C (97.7 °F)    Pulse   Pulse: 88   Resp   18    SpO2   98 %      Anesthesia Post Evaluation    Patient location during evaluation: PACU  Patient participation: complete - patient participated  Level of consciousness: awake and alert    Airway patency: patent  Anesthetic complications: no  Cardiovascular status: hemodynamically stable  Respiratory status: acceptable  Hydration status: euvolemic    PONV: none           Nurse Pain Score: 6 (NPRS)

## 2020-03-05 NOTE — OR NURSING
1041 PT RECEIVED IN PACU, REPORT RECEIVED.  VSS, RESP SPONT, EVEN, NON LABORED. PT REPORTS R KNEE PAIN 5/10 MEDICATE FOR PAIN, SEE MAR. 1051 PT REPORTS NEED TO VOID. PT UP ON BEDPAN TO VOID. PT REPORTS NAUSEA. 1101 PT VOIDS WITHOUT DIFFICULTY. 1111 PT REPORTS PAIN 1/10 ND TOLERABLE. DENIES NAUSEA. VSS.  1124 REPORT GIVEN TO FUNMI WARD TO ASSUME CARE OF THIS PT.

## 2020-03-17 ENCOUNTER — TELEPHONE (OUTPATIENT)
Dept: HEALTH INFORMATION MANAGEMENT | Facility: OTHER | Age: 60
End: 2020-03-17

## 2020-03-18 DIAGNOSIS — L50.9 HIVES: ICD-10-CM

## 2020-03-18 RX ORDER — METHYLPREDNISOLONE 4 MG/1
TABLET ORAL
Qty: 21 TAB | Refills: 0 | Status: SHIPPED | OUTPATIENT
Start: 2020-03-18 | End: 2020-05-05

## 2020-03-24 ENCOUNTER — OFFICE VISIT (OUTPATIENT)
Dept: MEDICAL GROUP | Facility: PHYSICIAN GROUP | Age: 60
End: 2020-03-24
Payer: COMMERCIAL

## 2020-03-24 VITALS
TEMPERATURE: 97 F | BODY MASS INDEX: 41.92 KG/M2 | RESPIRATION RATE: 16 BRPM | WEIGHT: 251.6 LBS | HEART RATE: 76 BPM | SYSTOLIC BLOOD PRESSURE: 126 MMHG | DIASTOLIC BLOOD PRESSURE: 88 MMHG | OXYGEN SATURATION: 95 % | HEIGHT: 65 IN

## 2020-03-24 DIAGNOSIS — L50.9 HIVES: ICD-10-CM

## 2020-03-24 DIAGNOSIS — J01.90 SUBACUTE SINUSITIS, UNSPECIFIED LOCATION: ICD-10-CM

## 2020-03-24 DIAGNOSIS — J30.9 ALLERGIC RHINITIS, UNSPECIFIED SEASONALITY, UNSPECIFIED TRIGGER: ICD-10-CM

## 2020-03-24 PROCEDURE — 99214 OFFICE O/P EST MOD 30 MIN: CPT | Performed by: FAMILY MEDICINE

## 2020-03-24 RX ORDER — AMOXICILLIN AND CLAVULANATE POTASSIUM 875; 125 MG/1; MG/1
1 TABLET, FILM COATED ORAL 2 TIMES DAILY
Qty: 20 TAB | Refills: 0 | Status: SHIPPED | OUTPATIENT
Start: 2020-03-24 | End: 2020-04-03

## 2020-03-24 RX ORDER — CETIRIZINE HYDROCHLORIDE 10 MG/1
10 TABLET ORAL 2 TIMES DAILY
Qty: 60 TAB | Refills: 0 | Status: SHIPPED | OUTPATIENT
Start: 2020-03-24 | End: 2020-04-15

## 2020-03-24 ASSESSMENT — FIBROSIS 4 INDEX: FIB4 SCORE: 1.07

## 2020-03-24 NOTE — PROGRESS NOTES
Chief Complaint   Patient presents with   • Seasonal Allergies       HISTORY OF PRESENT ILLNESS: Patient is a 60 y.o. female established patient here today for the following concerns:    1. Allergic rhinitis, unspecified seasonality, unspecified trigger  2. Hives  3. Subacute sinusitis, unspecified location  3 + weeks of intermittent cough, drainage worsening allergy symptoms including hives.  Treated via virtual visit with steroid pack.  Rash is subsiding.  But still with intermittent headaches and PND.  No wheezing or SOB.        Past Medical, Social, and Family history reviewed and updated in EPIC    Allergies:Iodine; Morphine; and Shellfish allergy    Current Outpatient Medications   Medication Sig Dispense Refill   • cetirizine (ZYRTEC) 10 MG Tab Take 1 Tab by mouth 2 times a day. 60 Tab 0   • amoxicillin-clavulanate (AUGMENTIN) 875-125 MG Tab Take 1 Tab by mouth 2 times a day for 10 days. 20 Tab 0   • levothyroxine (SYNTHROID) 125 MCG Tab Take 1 Tab by mouth Every morning on an empty stomach. 30 Tab 3   • methylPREDNISolone (MEDROL DOSEPAK) 4 MG Tablet Therapy Pack As directed on the packaging label. (Patient not taking: Reported on 3/24/2020) 21 Tab 0     No current facility-administered medications for this visit.          ROS:  Review of Systems   Constitutional: Negative for fever, chills, weight loss and malaise/fatigue.   HENT: Negative for ear pain, nosebleeds, congestion, sore throat and neck pain.    Eyes: Negative for blurred vision.   Respiratory: Negative for cough, sputum production, shortness of breath and wheezing.    Cardiovascular: Negative for chest pain, palpitations,  and leg swelling.   Gastrointestinal: Negative for heartburn, nausea, vomiting, diarrhea and abdominal pain.   Genitourinary: Negative for dysuria, urgency and frequency.   Musculoskeletal: Negative for myalgias, back pain and joint pain.   Skin: Negative for rash and itching.   Neurological: Negative for dizziness, tingling,  "tremors, sensory change, focal weakness and headaches.   Endo/Heme/Allergies: Does not bruise/bleed easily.   Psychiatric/Behavioral: Negative for depression, anxiety, suicidal ideas, insomnia and memory loss.      Exam:  /88 (BP Location: Right arm, Patient Position: Sitting, BP Cuff Size: Adult)   Pulse 76   Temp 36.1 °C (97 °F) (Temporal)   Resp 16   Ht 1.651 m (5' 5\")   Wt 114.1 kg (251 lb 9.6 oz)   SpO2 95%     General:  Well nourished, well developed in NAD  Head is grossly normal.  HEENT: TM clear bilaterally.  Significant PND. Nasal mucosa edematous with purulent nasal discharge.  Sinus tenderness on palpation.   Neck: Supple without JVD   Pulmonary:  Normal effort. CTAB no w/r/c  Cardiovascular: Regular rate  Extremities: no clubbing, cyanosis, or edema.  Psych: affect appropriate  Skin: some areas on the upper extremities of hives    Please note that this dictation was created using voice recognition software. I have made every reasonable attempt to correct obvious errors, but I expect that there are errors of grammar and possibly content that I did not discover before finalizing the note.    Assessment/Plan:  1. Allergic rhinitis, unspecified seasonality, unspecified trigger  - cetirizine (ZYRTEC) 10 MG Tab; Take 1 Tab by mouth 2 times a day.  Dispense: 60 Tab; Refill: 0    2. Hives  Likely allergic in nature, finish steroids.   - cetirizine (ZYRTEC) 10 MG Tab; Take 1 Tab by mouth 2 times a day.  Dispense: 60 Tab; Refill: 0    3. Subacute sinusitis, unspecified location  treat  - amoxicillin-clavulanate (AUGMENTIN) 875-125 MG Tab; Take 1 Tab by mouth 2 times a day for 10 days.  Dispense: 20 Tab; Refill: 0            "

## 2020-04-15 DIAGNOSIS — L50.9 HIVES: ICD-10-CM

## 2020-04-15 DIAGNOSIS — J30.9 ALLERGIC RHINITIS, UNSPECIFIED SEASONALITY, UNSPECIFIED TRIGGER: ICD-10-CM

## 2020-04-15 RX ORDER — CETIRIZINE HYDROCHLORIDE 10 MG/1
TABLET ORAL
Qty: 60 TAB | Refills: 5 | Status: SHIPPED | OUTPATIENT
Start: 2020-04-15 | End: 2020-05-05

## 2020-05-05 ENCOUNTER — TELEMEDICINE (OUTPATIENT)
Dept: MEDICAL GROUP | Facility: PHYSICIAN GROUP | Age: 60
End: 2020-05-05
Payer: COMMERCIAL

## 2020-05-05 VITALS — WEIGHT: 245 LBS | BODY MASS INDEX: 39.37 KG/M2 | HEART RATE: 95 BPM | TEMPERATURE: 97.7 F | HEIGHT: 66 IN

## 2020-05-05 DIAGNOSIS — Z12.39 SCREENING FOR BREAST CANCER: ICD-10-CM

## 2020-05-05 DIAGNOSIS — R05.9 COUGH: ICD-10-CM

## 2020-05-05 DIAGNOSIS — R51.9 NEW ONSET OF HEADACHES: ICD-10-CM

## 2020-05-05 DIAGNOSIS — E89.0 H/O TOTAL THYROIDECTOMY: ICD-10-CM

## 2020-05-05 DIAGNOSIS — E03.9 HYPOTHYROIDISM (ACQUIRED): ICD-10-CM

## 2020-05-05 PROCEDURE — 99214 OFFICE O/P EST MOD 30 MIN: CPT | Mod: 95,CR | Performed by: FAMILY MEDICINE

## 2020-05-05 RX ORDER — FLUTICASONE PROPIONATE 50 MCG
2 SPRAY, SUSPENSION (ML) NASAL DAILY
Qty: 16 G | Refills: 11
Start: 2020-05-05 | End: 2020-06-23 | Stop reason: SDUPTHER

## 2020-05-05 ASSESSMENT — FIBROSIS 4 INDEX: FIB4 SCORE: 1.07

## 2020-05-05 NOTE — PROGRESS NOTES
Chief Complaint   Patient presents with   • Headache     x 3 days       HISTORY OF PRESENT ILLNESS: Patient is a 60 y.o. female established patient here today for the following concerns:    This encounter was conducted via Zoom .   Verbal consent was obtained. Patient's identity was verified.      1. Screening for breast cancer  Due for mammogram.  request order.      2. Cough  This is a pleasant 60 year old female seen about 6 weeks ago for cough, who had associated myalgias, low grade temp elevation (99) who then developed urticarial type rash.  She was treated with steroids and did improve.  She reports that the dry cough has lingered some.  She did stop the allergy medication as she felt like she was doing better.  She has now had headaches for the last 3 days, occipital and sometimes global that respond to tyelnol.  Wondering if she might have had COVID 19.  Some family members have had it out of state and have passed.  No fevers recently.  In addition has had some adjustment to her thyroid twice due to over replacement    3. H/O total thyroidectomy  4. Hypothyroidism (acquired)  Hx of hyperthyroidism s/p thyroidectomy now on replacement therapy.  Reports that her endocrinologist has reduced the dose 2 times recently due to over replacement.  She has noted hot flashes and headaches and muscle aches as well as tremors that were similar to when she was overtly hyperthyroid.        Past Medical, Social, and Family history reviewed and updated in EPIC    Allergies:Iodine; Morphine; Shellfish allergy; and Atorvastatin    Current Outpatient Medications   Medication Sig Dispense Refill   • levothyroxine (SYNTHROID) 125 MCG Tab Take 1 Tab by mouth Every morning on an empty stomach. 30 Tab 3   • cetirizine (ZYRTEC) 10 MG Tab TAKE 1 TABLET BY MOUTH TWICE A DAY (Patient not taking: Reported on 5/5/2020) 60 Tab 5   • methylPREDNISolone (MEDROL DOSEPAK) 4 MG Tablet Therapy Pack As directed on the packaging label. (Patient  "not taking: Reported on 3/24/2020) 21 Tab 0     No current facility-administered medications for this visit.          ROS:  Review of Systems   Constitutional: Negative for fever, chills, weight loss and malaise/fatigue.   HENT: Negative for ear pain, nosebleeds, congestion, sore throat and neck pain.    Eyes: Negative for blurred vision.   Respiratory: Negative for cough, sputum production, shortness of breath and wheezing.    Cardiovascular: Negative for chest pain, palpitations,  and leg swelling.   Gastrointestinal: Negative for heartburn, nausea, vomiting, diarrhea and abdominal pain.   Genitourinary: Negative for dysuria, urgency and frequency.   Musculoskeletal: + myalgias, back pain and joint pain.   Skin: Negative for rash and itching.   Neurological: Negative for dizziness, tingling, +tremors, sensory change, focal weakness and +headaches.   Endo/Heme/Allergies: Does not bruise/bleed easily.   Psychiatric/Behavioral: Negative for depression, anxiety, suicidal ideas, insomnia and memory loss.      Exam:  Pulse 95   Temp 36.5 °C (97.7 °F) (Oral)   Ht 1.676 m (5' 6\")   Wt 111.1 kg (245 lb)     General:  Well nourished, well developed in NAD  Head is grossly normal.  Neck: Supple without JVD, Trachea midline, no thyromegaly noted  Pulmonary:  Normal effort. Speaking in full sentences  Psych: affect appropriate  Skin: no Jaundice noted or facial rashes    Please note that this dictation was created using voice recognition software. I have made every reasonable attempt to correct obvious errors, but I expect that there are errors of grammar and possibly content that I did not discover before finalizing the note.    Assessment/Plan:  1. Screening for breast cancer    - MA-SCREENING MAMMO BILAT W/TOMOSYNTHESIS W/CAD; Future    2. Cough  Suspect allergies as the cause currently.  Requests antibody test to see if in fact 6 weeks ago had COVID 19  - MISCELLANEOUS LAB TEST ([facility]/Other); Future    3. H/O total " thyroidectomy  4. Hypothyroidism (acquired)  Will request current labs for record.  Continue with endocrinology recommendations on lowering dose.  Recheck per their recommendations.     2 weeks.

## 2020-05-06 ENCOUNTER — HOSPITAL ENCOUNTER (OUTPATIENT)
Dept: LAB | Facility: MEDICAL CENTER | Age: 60
End: 2020-05-06
Attending: FAMILY MEDICINE
Payer: COMMERCIAL

## 2020-05-06 DIAGNOSIS — R05.9 COUGH: ICD-10-CM

## 2020-05-06 PROCEDURE — 86769 SARS-COV-2 COVID-19 ANTIBODY: CPT

## 2020-05-06 PROCEDURE — 36415 COLL VENOUS BLD VENIPUNCTURE: CPT

## 2020-05-08 LAB — TEST NAME 95000: NORMAL

## 2020-05-18 ENCOUNTER — HOSPITAL ENCOUNTER (OUTPATIENT)
Dept: RADIOLOGY | Facility: MEDICAL CENTER | Age: 60
End: 2020-05-18
Attending: FAMILY MEDICINE
Payer: COMMERCIAL

## 2020-05-18 ENCOUNTER — HOSPITAL ENCOUNTER (OUTPATIENT)
Dept: RADIOLOGY | Facility: MEDICAL CENTER | Age: 60
End: 2020-05-18
Attending: SURGERY
Payer: COMMERCIAL

## 2020-05-18 DIAGNOSIS — Z12.39 SCREENING FOR BREAST CANCER: ICD-10-CM

## 2020-05-18 DIAGNOSIS — K21.9 GASTROESOPHAGEAL REFLUX DISEASE, ESOPHAGITIS PRESENCE NOT SPECIFIED: ICD-10-CM

## 2020-05-18 PROCEDURE — 74240 X-RAY XM UPR GI TRC 1CNTRST: CPT

## 2020-05-18 PROCEDURE — 77067 SCR MAMMO BI INCL CAD: CPT

## 2020-05-19 PROBLEM — Z86.39 HISTORY OF GRAVES' DISEASE: Status: ACTIVE | Noted: 2020-05-19

## 2020-05-19 PROBLEM — E89.0 HYPOTHYROIDISM, POSTSURGICAL: Status: ACTIVE | Noted: 2020-01-10

## 2020-06-09 ENCOUNTER — PATIENT MESSAGE (OUTPATIENT)
Dept: MEDICAL GROUP | Facility: PHYSICIAN GROUP | Age: 60
End: 2020-06-09

## 2020-06-23 ENCOUNTER — PATIENT MESSAGE (OUTPATIENT)
Dept: MEDICAL GROUP | Facility: PHYSICIAN GROUP | Age: 60
End: 2020-06-23

## 2020-06-23 DIAGNOSIS — Z13.6 SCREENING FOR CARDIOVASCULAR CONDITION: ICD-10-CM

## 2020-06-23 DIAGNOSIS — R51.9 NEW ONSET OF HEADACHES: ICD-10-CM

## 2020-06-23 DIAGNOSIS — R05.9 COUGH: ICD-10-CM

## 2020-06-23 RX ORDER — FLUTICASONE PROPIONATE 50 MCG
2 SPRAY, SUSPENSION (ML) NASAL DAILY
Qty: 16 G | Refills: 11 | Status: SHIPPED | OUTPATIENT
Start: 2020-06-23 | End: 2020-08-26

## 2020-07-14 LAB
ALBUMIN SERPL-MCNC: 4.2 G/DL (ref 3.8–4.9)
ALBUMIN/GLOB SERPL: 1.1 {RATIO} (ref 1.2–2.2)
ALP SERPL-CCNC: 146 IU/L (ref 39–117)
ALT SERPL-CCNC: 14 IU/L (ref 0–32)
AST SERPL-CCNC: 19 IU/L (ref 0–40)
BILIRUB SERPL-MCNC: 0.4 MG/DL (ref 0–1.2)
BUN SERPL-MCNC: 7 MG/DL (ref 6–24)
BUN/CREAT SERPL: 11 (ref 9–23)
CALCIUM SERPL-MCNC: 9.8 MG/DL (ref 8.7–10.2)
CHLORIDE SERPL-SCNC: 101 MMOL/L (ref 96–106)
CHOLEST SERPL-MCNC: 203 MG/DL (ref 100–199)
CO2 SERPL-SCNC: 20 MMOL/L (ref 20–29)
CREAT SERPL-MCNC: 0.65 MG/DL (ref 0.57–1)
GLOBULIN SER CALC-MCNC: 4 G/DL (ref 1.5–4.5)
GLUCOSE SERPL-MCNC: 91 MG/DL (ref 65–99)
HDLC SERPL-MCNC: 73 MG/DL
LABORATORY COMMENT REPORT: ABNORMAL
LDLC SERPL CALC-MCNC: 112 MG/DL (ref 0–99)
POTASSIUM SERPL-SCNC: 4 MMOL/L (ref 3.5–5.2)
PROT SERPL-MCNC: 8.2 G/DL (ref 6–8.5)
SODIUM SERPL-SCNC: 139 MMOL/L (ref 134–144)
TRIGL SERPL-MCNC: 91 MG/DL (ref 0–149)
VLDLC SERPL CALC-MCNC: 18 MG/DL (ref 5–40)

## 2020-07-15 ENCOUNTER — OFFICE VISIT (OUTPATIENT)
Dept: MEDICAL GROUP | Facility: PHYSICIAN GROUP | Age: 60
End: 2020-07-15
Payer: COMMERCIAL

## 2020-07-15 VITALS
TEMPERATURE: 98.9 F | HEIGHT: 66 IN | RESPIRATION RATE: 16 BRPM | WEIGHT: 240 LBS | BODY MASS INDEX: 38.57 KG/M2 | OXYGEN SATURATION: 97 % | HEART RATE: 90 BPM | DIASTOLIC BLOOD PRESSURE: 72 MMHG | SYSTOLIC BLOOD PRESSURE: 128 MMHG

## 2020-07-15 DIAGNOSIS — R74.8 ELEVATED ALKALINE PHOSPHATASE LEVEL: ICD-10-CM

## 2020-07-15 DIAGNOSIS — E89.0 HYPOTHYROIDISM, POSTSURGICAL: ICD-10-CM

## 2020-07-15 DIAGNOSIS — Z86.39 HISTORY OF GRAVES' DISEASE: ICD-10-CM

## 2020-07-15 DIAGNOSIS — E89.0 H/O TOTAL THYROIDECTOMY: ICD-10-CM

## 2020-07-15 PROCEDURE — 99214 OFFICE O/P EST MOD 30 MIN: CPT | Performed by: FAMILY MEDICINE

## 2020-07-15 ASSESSMENT — FIBROSIS 4 INDEX: FIB4 SCORE: 1.09

## 2020-07-15 NOTE — PROGRESS NOTES
Chief Complaint   Patient presents with   • Knee Pain     rt knee   • Follow-Up     labs       HISTORY OF PRESENT ILLNESS: Patient is a 60 y.o. female established patient here today for the following concerns:      This is a pleasant 60 year old female with hx of rectal cancer who has had a trend of elevated ALK PHos since about 2016.  During this time she also had developed abdominal pains and had difficulty with previously installed lap-band which required removal and lysis of lots of adhesions and then had reactivation of Graves disease and after failing medication went for thyroidectomy.  She has been struggling with finding the appropriate dose for replacement.  Tells me they are still backing down her levels.  She continues to work on weight reduction in anticipation of possible bariatric surgery.  She has managed some very good weight loss since last in office visit.  Down at least 12 lbs since January 2020.    Using meal replacement and trying to exercise, but still having lots of right leg pain that was ruled out for clot, had arthroscopic knee surgery and is felt to likely be lumbar radicular in nature.  Currently her plan is to loose the weight and see if pain improves.          Past Medical, Social, and Family history reviewed and updated in EPIC    Allergies:Iodine; Morphine; and Shellfish allergy    Current Outpatient Medications   Medication Sig Dispense Refill   • fluticasone (FLONASE) 50 MCG/ACT nasal spray Spray 2 Sprays in nose every day. Each Nostril 16 g 11   • SYNTHROID 100 MCG Tab Take 100 mcg by mouth every day.       No current facility-administered medications for this visit.          ROS:  Review of Systems   Constitutional: Negative for fever, chills, weight loss and malaise/fatigue.   HENT: Negative for ear pain, nosebleeds, congestion, sore throat and neck pain.    Eyes: Negative for blurred vision.   Respiratory: Negative for cough, sputum production, shortness of breath and wheezing.   "  Cardiovascular: Negative for chest pain, palpitations,  and leg swelling.   Gastrointestinal: Negative for heartburn, nausea, vomiting, diarrhea and abdominal pain.   Genitourinary: Negative for dysuria, urgency and frequency.   Musculoskeletal: Negative for myalgias, back pain and joint pain.   Skin: Negative for rash and itching.   Neurological: Negative for dizziness, tingling, tremors, sensory change, focal weakness and headaches.   Endo/Heme/Allergies: Does not bruise/bleed easily.   Psychiatric/Behavioral: Negative for depression, anxiety, suicidal ideas, insomnia and memory loss.      Exam:  /72   Pulse 90   Temp 37.2 °C (98.9 °F)   Resp 16   Ht 1.676 m (5' 6\")   Wt 108.9 kg (240 lb)   SpO2 97%     General:  Well nourished, well developed in NAD  Head is grossly normal.  Neck: Supple without JVD   Pulmonary:  Normal effort.   Cardiovascular: Regular rate  Extremities: no clubbing, cyanosis, or edema.  Psych: affect appropriate      Please note that this dictation was created using voice recognition software. I have made every reasonable attempt to correct obvious errors, but I expect that there are errors of grammar and possibly content that I did not discover before finalizing the note.    Assessment/Plan:  1. Elevated alkaline phosphatase level  Will check isoenzymes, but suspect this is fatty liver disease, vs hyperthyroidism causing calcium metabolism issues, but given leg pain and hx of cancer will check levels.   - ALKALINE PHOSPHATASE ISOENZYMES; Future  - GAMMA GT (GGT); Future    2. History of Graves' disease  3. Hypothyroidism, postsurgical  4. H/O total thyroidectomy  Continue replacement per endo, request updated labs from [facility]    5. BMI 38.0-38.9,adult  Continue weight reduction with calorie restriction and regular exercise, will just need to go slow as to not aggrevate the knee or low back.     1 month follow up        "

## 2020-07-21 ENCOUNTER — HOSPITAL ENCOUNTER (OUTPATIENT)
Dept: RADIOLOGY | Facility: MEDICAL CENTER | Age: 60
DRG: 621 | End: 2020-07-21
Attending: SURGERY | Admitting: SURGERY
Payer: COMMERCIAL

## 2020-07-21 DIAGNOSIS — Z01.812 PRE-OPERATIVE LABORATORY EXAMINATION: ICD-10-CM

## 2020-07-21 DIAGNOSIS — Z01.811 PRE-OPERATIVE RESPIRATORY EXAMINATION: ICD-10-CM

## 2020-07-21 DIAGNOSIS — Z01.810 PRE-OPERATIVE CARDIOVASCULAR EXAMINATION: ICD-10-CM

## 2020-07-21 LAB
25(OH)D3 SERPL-MCNC: 36 NG/ML (ref 30–100)
BASOPHILS # BLD AUTO: 0.3 % (ref 0–1.8)
BASOPHILS # BLD: 0.01 K/UL (ref 0–0.12)
EKG IMPRESSION: NORMAL
EOSINOPHIL # BLD AUTO: 0.04 K/UL (ref 0–0.51)
EOSINOPHIL NFR BLD: 1.2 % (ref 0–6.9)
ERYTHROCYTE [DISTWIDTH] IN BLOOD BY AUTOMATED COUNT: 43.5 FL (ref 35.9–50)
FERRITIN SERPL-MCNC: 271 NG/ML (ref 10–291)
FOLATE SERPL-MCNC: 28.1 NG/ML
HCT VFR BLD AUTO: 41.2 % (ref 37–47)
HGB BLD-MCNC: 12.6 G/DL (ref 12–16)
IMM GRANULOCYTES # BLD AUTO: 0 K/UL (ref 0–0.11)
IMM GRANULOCYTES NFR BLD AUTO: 0 % (ref 0–0.9)
IRON SATN MFR SERPL: 20 % (ref 15–55)
IRON SERPL-MCNC: 50 UG/DL (ref 40–170)
LYMPHOCYTES # BLD AUTO: 1.6 K/UL (ref 1–4.8)
LYMPHOCYTES NFR BLD: 47.1 % (ref 22–41)
MCH RBC QN AUTO: 26.3 PG (ref 27–33)
MCHC RBC AUTO-ENTMCNC: 30.6 G/DL (ref 33.6–35)
MCV RBC AUTO: 86 FL (ref 81.4–97.8)
MONOCYTES # BLD AUTO: 0.45 K/UL (ref 0–0.85)
MONOCYTES NFR BLD AUTO: 13.2 % (ref 0–13.4)
NEUTROPHILS # BLD AUTO: 1.3 K/UL (ref 2–7.15)
NEUTROPHILS NFR BLD: 38.2 % (ref 44–72)
NRBC # BLD AUTO: 0 K/UL
NRBC BLD-RTO: 0 /100 WBC
PLATELET # BLD AUTO: 259 K/UL (ref 164–446)
PMV BLD AUTO: 8.6 FL (ref 9–12.9)
PREALB SERPL-MCNC: 19.5 MG/DL (ref 18–38)
PTH-INTACT SERPL-MCNC: 42.4 PG/ML (ref 14–72)
RBC # BLD AUTO: 4.79 M/UL (ref 4.2–5.4)
TIBC SERPL-MCNC: 245 UG/DL (ref 250–450)
UIBC SERPL-MCNC: 195 UG/DL (ref 110–370)
VIT B12 SERPL-MCNC: 552 PG/ML (ref 211–911)
WBC # BLD AUTO: 3.4 K/UL (ref 4.8–10.8)

## 2020-07-21 PROCEDURE — 83550 IRON BINDING TEST: CPT

## 2020-07-21 PROCEDURE — 83970 ASSAY OF PARATHORMONE: CPT

## 2020-07-21 PROCEDURE — 82607 VITAMIN B-12: CPT

## 2020-07-21 PROCEDURE — 71046 X-RAY EXAM CHEST 2 VIEWS: CPT

## 2020-07-21 PROCEDURE — 93005 ELECTROCARDIOGRAM TRACING: CPT

## 2020-07-21 PROCEDURE — 82306 VITAMIN D 25 HYDROXY: CPT

## 2020-07-21 PROCEDURE — 85025 COMPLETE CBC W/AUTO DIFF WBC: CPT

## 2020-07-21 PROCEDURE — 82728 ASSAY OF FERRITIN: CPT

## 2020-07-21 PROCEDURE — 93010 ELECTROCARDIOGRAM REPORT: CPT | Performed by: INTERNAL MEDICINE

## 2020-07-21 PROCEDURE — 84425 ASSAY OF VITAMIN B-1: CPT

## 2020-07-21 PROCEDURE — 36415 COLL VENOUS BLD VENIPUNCTURE: CPT

## 2020-07-21 PROCEDURE — 82746 ASSAY OF FOLIC ACID SERUM: CPT

## 2020-07-21 PROCEDURE — 83540 ASSAY OF IRON: CPT

## 2020-07-21 PROCEDURE — 84134 ASSAY OF PREALBUMIN: CPT

## 2020-07-21 ASSESSMENT — FIBROSIS 4 INDEX: FIB4 SCORE: 1.09

## 2020-07-24 ENCOUNTER — OFFICE VISIT (OUTPATIENT)
Dept: ADMISSIONS | Facility: MEDICAL CENTER | Age: 60
DRG: 621 | End: 2020-07-24
Attending: SURGERY
Payer: COMMERCIAL

## 2020-07-24 DIAGNOSIS — Z01.812 PRE-OPERATIVE LABORATORY EXAMINATION: ICD-10-CM

## 2020-07-24 LAB
COVID ORDER STATUS COVID19: NORMAL
SARS-COV-2 RNA RESP QL NAA+PROBE: NOTDETECTED
SPECIMEN SOURCE: NORMAL

## 2020-07-24 PROCEDURE — U0003 INFECTIOUS AGENT DETECTION BY NUCLEIC ACID (DNA OR RNA); SEVERE ACUTE RESPIRATORY SYNDROME CORONAVIRUS 2 (SARS-COV-2) (CORONAVIRUS DISEASE [COVID-19]), AMPLIFIED PROBE TECHNIQUE, MAKING USE OF HIGH THROUGHPUT TECHNOLOGIES AS DESCRIBED BY CMS-2020-01-R: HCPCS

## 2020-07-25 LAB
ALP BONE CFR SERPL: 19 % (ref 14–68)
ALP INTEST CFR SERPL: 0 % (ref 0–18)
ALP LIVER CFR SERPL: 81 % (ref 18–85)
ALP SERPL-CCNC: 143 IU/L (ref 39–117)
GGT SERPL-CCNC: 16 IU/L (ref 0–60)

## 2020-07-26 LAB — VIT B1 BLD-MCNC: 88 NMOL/L (ref 70–180)

## 2020-07-27 NOTE — PROGRESS NOTES
COVID-19 Pre-surgery screenin. Do you have an undiagnosed respiratory illness or symptoms such as coughing or sneezing? NO (Yes/No)  a. Onset of Sx NO  b. Acute vs. chronic respiratory illness NO    2. Do you have an unexplained fever greater than 100.4 degrees Fahrenheit or 38 degrees Celsius?     NO (Yes/No)    3. Have you had direct exposure to a patient who tested positive for Covid-19?    NO (Yes/No)    4. Have you traveled outside [facility] in the last 14 days? NO    5. Have you had any loss of your sense of taste or smell? Have you had N/V or sore throat? NO    Patient has been informed of visitor policy and asked to wear a mask upon entering the hospital   YES (Yes/No)

## 2020-07-28 ENCOUNTER — HOSPITAL ENCOUNTER (INPATIENT)
Facility: MEDICAL CENTER | Age: 60
LOS: 1 days | DRG: 621 | End: 2020-07-29
Attending: SURGERY | Admitting: SURGERY
Payer: COMMERCIAL

## 2020-07-28 ENCOUNTER — ANESTHESIA (OUTPATIENT)
Dept: SURGERY | Facility: MEDICAL CENTER | Age: 60
DRG: 621 | End: 2020-07-28
Payer: COMMERCIAL

## 2020-07-28 ENCOUNTER — ANESTHESIA EVENT (OUTPATIENT)
Dept: SURGERY | Facility: MEDICAL CENTER | Age: 60
DRG: 621 | End: 2020-07-28
Payer: COMMERCIAL

## 2020-07-28 DIAGNOSIS — G89.18 POSTOPERATIVE PAIN: ICD-10-CM

## 2020-07-28 PROCEDURE — 700111 HCHG RX REV CODE 636 W/ 250 OVERRIDE (IP): Performed by: SURGERY

## 2020-07-28 PROCEDURE — 8E0W4CZ ROBOTIC ASSISTED PROCEDURE OF TRUNK REGION, PERCUTANEOUS ENDOSCOPIC APPROACH: ICD-10-PCS | Performed by: SURGERY

## 2020-07-28 PROCEDURE — 700111 HCHG RX REV CODE 636 W/ 250 OVERRIDE (IP): Performed by: ANESTHESIOLOGY

## 2020-07-28 PROCEDURE — 770006 HCHG ROOM/CARE - MED/SURG/GYN SEMI*

## 2020-07-28 PROCEDURE — 0DB64Z3 EXCISION OF STOMACH, PERCUTANEOUS ENDOSCOPIC APPROACH, VERTICAL: ICD-10-PCS | Performed by: SURGERY

## 2020-07-28 PROCEDURE — 700111 HCHG RX REV CODE 636 W/ 250 OVERRIDE (IP): Performed by: PHYSICIAN ASSISTANT

## 2020-07-28 PROCEDURE — 500868 HCHG NEEDLE, SURGI(VARES): Performed by: SURGERY

## 2020-07-28 PROCEDURE — A9270 NON-COVERED ITEM OR SERVICE: HCPCS | Performed by: ANESTHESIOLOGY

## 2020-07-28 PROCEDURE — 160035 HCHG PACU - 1ST 60 MINS PHASE I: Performed by: SURGERY

## 2020-07-28 PROCEDURE — 700105 HCHG RX REV CODE 258

## 2020-07-28 PROCEDURE — 501571 HCHG TROCAR, SEPARATOR 12X100: Performed by: SURGERY

## 2020-07-28 PROCEDURE — 160029 HCHG SURGERY MINUTES - 1ST 30 MINS LEVEL 4: Performed by: SURGERY

## 2020-07-28 PROCEDURE — 502570 HCHG PACK, GASTRIC BANDING: Performed by: SURGERY

## 2020-07-28 PROCEDURE — 501570 HCHG TROCAR, SEPARATOR: Performed by: SURGERY

## 2020-07-28 PROCEDURE — 160002 HCHG RECOVERY MINUTES (STAT): Performed by: SURGERY

## 2020-07-28 PROCEDURE — 501838 HCHG SUTURE GENERAL: Performed by: SURGERY

## 2020-07-28 PROCEDURE — 501583 HCHG TROCAR, THRD CAN&SEAL 5X100: Performed by: SURGERY

## 2020-07-28 PROCEDURE — 500521 HCHG ENDOSTITCH LOAD UNIT: Performed by: SURGERY

## 2020-07-28 PROCEDURE — 160041 HCHG SURGERY MINUTES - EA ADDL 1 MIN LEVEL 4: Performed by: SURGERY

## 2020-07-28 PROCEDURE — 700101 HCHG RX REV CODE 250: Performed by: ANESTHESIOLOGY

## 2020-07-28 PROCEDURE — 502240 HCHG MISC OR SUPPLY RC 0272: Performed by: SURGERY

## 2020-07-28 PROCEDURE — 88305 TISSUE EXAM BY PATHOLOGIST: CPT

## 2020-07-28 PROCEDURE — 160036 HCHG PACU - EA ADDL 30 MINS PHASE I: Performed by: SURGERY

## 2020-07-28 PROCEDURE — 160048 HCHG OR STATISTICAL LEVEL 1-5: Performed by: SURGERY

## 2020-07-28 PROCEDURE — 700105 HCHG RX REV CODE 258: Performed by: PHYSICIAN ASSISTANT

## 2020-07-28 PROCEDURE — 700101 HCHG RX REV CODE 250: Performed by: SURGERY

## 2020-07-28 PROCEDURE — 700102 HCHG RX REV CODE 250 W/ 637 OVERRIDE(OP): Performed by: SURGERY

## 2020-07-28 PROCEDURE — 160009 HCHG ANES TIME/MIN: Performed by: SURGERY

## 2020-07-28 PROCEDURE — 500522 HCHG ENDOSTITCH SUTURING DEVICE: Performed by: SURGERY

## 2020-07-28 PROCEDURE — A9270 NON-COVERED ITEM OR SERVICE: HCPCS | Performed by: SURGERY

## 2020-07-28 PROCEDURE — 700102 HCHG RX REV CODE 250 W/ 637 OVERRIDE(OP): Performed by: ANESTHESIOLOGY

## 2020-07-28 PROCEDURE — 700105 HCHG RX REV CODE 258: Performed by: SURGERY

## 2020-07-28 PROCEDURE — 0BQT4ZZ REPAIR DIAPHRAGM, PERCUTANEOUS ENDOSCOPIC APPROACH: ICD-10-PCS | Performed by: SURGERY

## 2020-07-28 RX ORDER — ONDANSETRON 2 MG/ML
INJECTION INTRAMUSCULAR; INTRAVENOUS PRN
Status: DISCONTINUED | OUTPATIENT
Start: 2020-07-28 | End: 2020-07-28 | Stop reason: SURG

## 2020-07-28 RX ORDER — LABETALOL HYDROCHLORIDE 5 MG/ML
5 INJECTION, SOLUTION INTRAVENOUS
Status: DISCONTINUED | OUTPATIENT
Start: 2020-07-28 | End: 2020-07-28 | Stop reason: HOSPADM

## 2020-07-28 RX ORDER — ONDANSETRON 2 MG/ML
4 INJECTION INTRAMUSCULAR; INTRAVENOUS EVERY 4 HOURS PRN
Status: DISCONTINUED | OUTPATIENT
Start: 2020-07-28 | End: 2020-07-29 | Stop reason: HOSPADM

## 2020-07-28 RX ORDER — SODIUM CHLORIDE, SODIUM LACTATE, POTASSIUM CHLORIDE, CALCIUM CHLORIDE 600; 310; 30; 20 MG/100ML; MG/100ML; MG/100ML; MG/100ML
INJECTION, SOLUTION INTRAVENOUS CONTINUOUS
Status: DISCONTINUED | OUTPATIENT
Start: 2020-07-28 | End: 2020-07-28 | Stop reason: HOSPADM

## 2020-07-28 RX ORDER — BUPIVACAINE HYDROCHLORIDE AND EPINEPHRINE 5; 5 MG/ML; UG/ML
INJECTION, SOLUTION EPIDURAL; INTRACAUDAL; PERINEURAL
Status: DISCONTINUED | OUTPATIENT
Start: 2020-07-28 | End: 2020-07-28 | Stop reason: HOSPADM

## 2020-07-28 RX ORDER — HYDROMORPHONE HYDROCHLORIDE 1 MG/ML
0.5 INJECTION, SOLUTION INTRAMUSCULAR; INTRAVENOUS; SUBCUTANEOUS
Status: DISCONTINUED | OUTPATIENT
Start: 2020-07-28 | End: 2020-07-28 | Stop reason: HOSPADM

## 2020-07-28 RX ORDER — ONDANSETRON 4 MG/1
4 TABLET, ORALLY DISINTEGRATING ORAL EVERY 4 HOURS PRN
Status: DISCONTINUED | OUTPATIENT
Start: 2020-07-28 | End: 2020-07-29 | Stop reason: HOSPADM

## 2020-07-28 RX ORDER — ENALAPRILAT 1.25 MG/ML
2.5 INJECTION INTRAVENOUS EVERY 6 HOURS PRN
Status: DISCONTINUED | OUTPATIENT
Start: 2020-07-28 | End: 2020-07-29 | Stop reason: HOSPADM

## 2020-07-28 RX ORDER — LEVOTHYROXINE SODIUM 0.1 MG/1
100 TABLET ORAL
Status: DISCONTINUED | OUTPATIENT
Start: 2020-07-29 | End: 2020-07-29 | Stop reason: HOSPADM

## 2020-07-28 RX ORDER — ACETAMINOPHEN 10 MG/ML
1000 INJECTION, SOLUTION INTRAVENOUS EVERY 6 HOURS
Status: COMPLETED | OUTPATIENT
Start: 2020-07-28 | End: 2020-07-29

## 2020-07-28 RX ORDER — PROMETHAZINE HYDROCHLORIDE 25 MG/1
25 SUPPOSITORY RECTAL EVERY 4 HOURS PRN
Status: DISCONTINUED | OUTPATIENT
Start: 2020-07-28 | End: 2020-07-29 | Stop reason: HOSPADM

## 2020-07-28 RX ORDER — OXYCODONE HCL 5 MG/5 ML
10 SOLUTION, ORAL ORAL
Status: COMPLETED | OUTPATIENT
Start: 2020-07-28 | End: 2020-07-28

## 2020-07-28 RX ORDER — CEFOTETAN DISODIUM 2 G/20ML
INJECTION, POWDER, FOR SOLUTION INTRAMUSCULAR; INTRAVENOUS PRN
Status: DISCONTINUED | OUTPATIENT
Start: 2020-07-28 | End: 2020-07-28 | Stop reason: SURG

## 2020-07-28 RX ORDER — SCOLOPAMINE TRANSDERMAL SYSTEM 1 MG/1
1 PATCH, EXTENDED RELEASE TRANSDERMAL
Status: DISCONTINUED | OUTPATIENT
Start: 2020-07-28 | End: 2020-07-29 | Stop reason: HOSPADM

## 2020-07-28 RX ORDER — HYDRALAZINE HYDROCHLORIDE 20 MG/ML
10 INJECTION INTRAMUSCULAR; INTRAVENOUS EVERY 6 HOURS PRN
Status: DISCONTINUED | OUTPATIENT
Start: 2020-07-28 | End: 2020-07-29 | Stop reason: HOSPADM

## 2020-07-28 RX ORDER — SODIUM CHLORIDE, SODIUM LACTATE, POTASSIUM CHLORIDE, CALCIUM CHLORIDE 600; 310; 30; 20 MG/100ML; MG/100ML; MG/100ML; MG/100ML
INJECTION, SOLUTION INTRAVENOUS CONTINUOUS
Status: DISCONTINUED | OUTPATIENT
Start: 2020-07-28 | End: 2020-07-29 | Stop reason: HOSPADM

## 2020-07-28 RX ORDER — HYDROMORPHONE HYDROCHLORIDE 2 MG/ML
INJECTION, SOLUTION INTRAMUSCULAR; INTRAVENOUS; SUBCUTANEOUS PRN
Status: DISCONTINUED | OUTPATIENT
Start: 2020-07-28 | End: 2020-07-28 | Stop reason: SURG

## 2020-07-28 RX ORDER — KETOROLAC TROMETHAMINE 30 MG/ML
INJECTION, SOLUTION INTRAMUSCULAR; INTRAVENOUS PRN
Status: DISCONTINUED | OUTPATIENT
Start: 2020-07-28 | End: 2020-07-28 | Stop reason: SURG

## 2020-07-28 RX ORDER — HYDROMORPHONE HYDROCHLORIDE 1 MG/ML
0.4 INJECTION, SOLUTION INTRAMUSCULAR; INTRAVENOUS; SUBCUTANEOUS
Status: DISCONTINUED | OUTPATIENT
Start: 2020-07-28 | End: 2020-07-28 | Stop reason: HOSPADM

## 2020-07-28 RX ORDER — DEXAMETHASONE SODIUM PHOSPHATE 4 MG/ML
INJECTION, SOLUTION INTRA-ARTICULAR; INTRALESIONAL; INTRAMUSCULAR; INTRAVENOUS; SOFT TISSUE PRN
Status: DISCONTINUED | OUTPATIENT
Start: 2020-07-28 | End: 2020-07-28 | Stop reason: SURG

## 2020-07-28 RX ORDER — ACETAMINOPHEN 10 MG/ML
1 INJECTION, SOLUTION INTRAVENOUS
Status: COMPLETED | OUTPATIENT
Start: 2020-07-28 | End: 2020-07-28

## 2020-07-28 RX ORDER — LIDOCAINE HYDROCHLORIDE 20 MG/ML
INJECTION, SOLUTION EPIDURAL; INFILTRATION; INTRACAUDAL; PERINEURAL PRN
Status: DISCONTINUED | OUTPATIENT
Start: 2020-07-28 | End: 2020-07-28 | Stop reason: SURG

## 2020-07-28 RX ORDER — DIPHENHYDRAMINE HYDROCHLORIDE 50 MG/ML
12.5 INJECTION INTRAMUSCULAR; INTRAVENOUS EVERY 6 HOURS PRN
Status: DISCONTINUED | OUTPATIENT
Start: 2020-07-28 | End: 2020-07-29 | Stop reason: HOSPADM

## 2020-07-28 RX ORDER — HYDROMORPHONE HYDROCHLORIDE 1 MG/ML
0.2 INJECTION, SOLUTION INTRAMUSCULAR; INTRAVENOUS; SUBCUTANEOUS
Status: DISCONTINUED | OUTPATIENT
Start: 2020-07-28 | End: 2020-07-28 | Stop reason: HOSPADM

## 2020-07-28 RX ORDER — OXYCODONE HCL 5 MG/5 ML
5 SOLUTION, ORAL ORAL
Status: COMPLETED | OUTPATIENT
Start: 2020-07-28 | End: 2020-07-28

## 2020-07-28 RX ORDER — HALOPERIDOL 5 MG/ML
1 INJECTION INTRAMUSCULAR
Status: DISCONTINUED | OUTPATIENT
Start: 2020-07-28 | End: 2020-07-28 | Stop reason: HOSPADM

## 2020-07-28 RX ORDER — LORAZEPAM 2 MG/ML
.5-1 INJECTION INTRAMUSCULAR EVERY 4 HOURS PRN
Status: DISCONTINUED | OUTPATIENT
Start: 2020-07-28 | End: 2020-07-29 | Stop reason: HOSPADM

## 2020-07-28 RX ORDER — SODIUM CHLORIDE, SODIUM LACTATE, POTASSIUM CHLORIDE, AND CALCIUM CHLORIDE .6; .31; .03; .02 G/100ML; G/100ML; G/100ML; G/100ML
500 INJECTION, SOLUTION INTRAVENOUS
Status: DISCONTINUED | OUTPATIENT
Start: 2020-07-28 | End: 2020-07-29 | Stop reason: HOSPADM

## 2020-07-28 RX ORDER — MORPHINE SULFATE 4 MG/ML
1-5 INJECTION, SOLUTION INTRAMUSCULAR; INTRAVENOUS
Status: DISCONTINUED | OUTPATIENT
Start: 2020-07-28 | End: 2020-07-29 | Stop reason: HOSPADM

## 2020-07-28 RX ORDER — HYDRALAZINE HYDROCHLORIDE 20 MG/ML
5 INJECTION INTRAMUSCULAR; INTRAVENOUS
Status: DISCONTINUED | OUTPATIENT
Start: 2020-07-28 | End: 2020-07-28 | Stop reason: HOSPADM

## 2020-07-28 RX ORDER — KETOROLAC TROMETHAMINE 30 MG/ML
30 INJECTION, SOLUTION INTRAMUSCULAR; INTRAVENOUS EVERY 6 HOURS PRN
Status: DISCONTINUED | OUTPATIENT
Start: 2020-07-28 | End: 2020-07-29 | Stop reason: HOSPADM

## 2020-07-28 RX ORDER — DIPHENHYDRAMINE HYDROCHLORIDE 50 MG/ML
12.5 INJECTION INTRAMUSCULAR; INTRAVENOUS
Status: DISCONTINUED | OUTPATIENT
Start: 2020-07-28 | End: 2020-07-28 | Stop reason: HOSPADM

## 2020-07-28 RX ORDER — SODIUM CHLORIDE 9 MG/ML
INJECTION, SOLUTION INTRAVENOUS
Status: COMPLETED
Start: 2020-07-28 | End: 2020-07-28

## 2020-07-28 RX ORDER — MIDAZOLAM HYDROCHLORIDE 1 MG/ML
INJECTION INTRAMUSCULAR; INTRAVENOUS PRN
Status: DISCONTINUED | OUTPATIENT
Start: 2020-07-28 | End: 2020-07-28 | Stop reason: SURG

## 2020-07-28 RX ORDER — ROCURONIUM BROMIDE 10 MG/ML
INJECTION, SOLUTION INTRAVENOUS PRN
Status: DISCONTINUED | OUTPATIENT
Start: 2020-07-28 | End: 2020-07-28 | Stop reason: SURG

## 2020-07-28 RX ORDER — MEPERIDINE HYDROCHLORIDE 25 MG/ML
12.5 INJECTION INTRAMUSCULAR; INTRAVENOUS; SUBCUTANEOUS
Status: DISCONTINUED | OUTPATIENT
Start: 2020-07-28 | End: 2020-07-28 | Stop reason: HOSPADM

## 2020-07-28 RX ORDER — SODIUM CHLORIDE, SODIUM LACTATE, POTASSIUM CHLORIDE, CALCIUM CHLORIDE 600; 310; 30; 20 MG/100ML; MG/100ML; MG/100ML; MG/100ML
INJECTION, SOLUTION INTRAVENOUS CONTINUOUS
Status: DISCONTINUED | OUTPATIENT
Start: 2020-07-28 | End: 2020-07-28

## 2020-07-28 RX ADMIN — ACETAMINOPHEN 1 G: 10 INJECTION, SOLUTION INTRAVENOUS at 13:50

## 2020-07-28 RX ADMIN — FENTANYL CITRATE 50 MCG: 50 INJECTION INTRAMUSCULAR; INTRAVENOUS at 16:47

## 2020-07-28 RX ADMIN — HYDROMORPHONE HYDROCHLORIDE 0.5 MG: 2 INJECTION, SOLUTION INTRAMUSCULAR; INTRAVENOUS; SUBCUTANEOUS at 16:24

## 2020-07-28 RX ADMIN — SODIUM CHLORIDE, POTASSIUM CHLORIDE, SODIUM LACTATE AND CALCIUM CHLORIDE: 600; 310; 30; 20 INJECTION, SOLUTION INTRAVENOUS at 18:24

## 2020-07-28 RX ADMIN — FAMOTIDINE 20 MG: 10 INJECTION, SOLUTION INTRAVENOUS at 18:24

## 2020-07-28 RX ADMIN — OXYCODONE HYDROCHLORIDE 10 MG: 5 SOLUTION ORAL at 16:41

## 2020-07-28 RX ADMIN — HALOPERIDOL LACTATE 1 MG: 5 INJECTION, SOLUTION INTRAMUSCULAR at 17:06

## 2020-07-28 RX ADMIN — MIDAZOLAM HYDROCHLORIDE 2 MG: 1 INJECTION, SOLUTION INTRAMUSCULAR; INTRAVENOUS at 15:02

## 2020-07-28 RX ADMIN — HYDROMORPHONE HYDROCHLORIDE 0.5 MG: 2 INJECTION, SOLUTION INTRAMUSCULAR; INTRAVENOUS; SUBCUTANEOUS at 15:31

## 2020-07-28 RX ADMIN — LIDOCAINE HYDROCHLORIDE 60 MG: 20 INJECTION, SOLUTION EPIDURAL; INFILTRATION; INTRACAUDAL at 15:13

## 2020-07-28 RX ADMIN — FENTANYL CITRATE 50 MCG: 50 INJECTION INTRAMUSCULAR; INTRAVENOUS at 17:02

## 2020-07-28 RX ADMIN — SCOLOPAMINE TRANSDERMAL SYSTEM 1 PATCH: 1 PATCH, EXTENDED RELEASE TRANSDERMAL at 13:50

## 2020-07-28 RX ADMIN — ROCURONIUM BROMIDE 50 MG: 10 INJECTION, SOLUTION INTRAVENOUS at 15:14

## 2020-07-28 RX ADMIN — SODIUM CHLORIDE, POTASSIUM CHLORIDE, SODIUM LACTATE AND CALCIUM CHLORIDE: 600; 310; 30; 20 INJECTION, SOLUTION INTRAVENOUS at 13:49

## 2020-07-28 RX ADMIN — KETOROLAC TROMETHAMINE 30 MG: 30 INJECTION, SOLUTION INTRAMUSCULAR at 16:13

## 2020-07-28 RX ADMIN — CEFOTETAN DISODIUM 2 G: 2 INJECTION, POWDER, FOR SOLUTION INTRAMUSCULAR; INTRAVENOUS at 15:16

## 2020-07-28 RX ADMIN — PROPOFOL 150 MG: 10 INJECTION, EMULSION INTRAVENOUS at 15:13

## 2020-07-28 RX ADMIN — SODIUM CHLORIDE 500 ML: 9 INJECTION, SOLUTION INTRAVENOUS at 19:33

## 2020-07-28 RX ADMIN — SUGAMMADEX 200 MG: 100 INJECTION, SOLUTION INTRAVENOUS at 16:21

## 2020-07-28 RX ADMIN — DEXAMETHASONE SODIUM PHOSPHATE 8 MG: 4 INJECTION, SOLUTION INTRA-ARTICULAR; INTRALESIONAL; INTRAMUSCULAR; INTRAVENOUS; SOFT TISSUE at 15:16

## 2020-07-28 RX ADMIN — ONDANSETRON 4 MG: 2 INJECTION INTRAMUSCULAR; INTRAVENOUS at 16:19

## 2020-07-28 RX ADMIN — HYDROMORPHONE HYDROCHLORIDE 0.5 MG: 2 INJECTION, SOLUTION INTRAMUSCULAR; INTRAVENOUS; SUBCUTANEOUS at 15:13

## 2020-07-28 RX ADMIN — ACETAMINOPHEN 1000 MG: 10 INJECTION, SOLUTION INTRAVENOUS at 19:33

## 2020-07-28 ASSESSMENT — COGNITIVE AND FUNCTIONAL STATUS - GENERAL
HELP NEEDED FOR BATHING: A LITTLE
EATING MEALS: A LITTLE
MOBILITY SCORE: 18
DRESSING REGULAR UPPER BODY CLOTHING: A LITTLE
DRESSING REGULAR LOWER BODY CLOTHING: A LITTLE
MOVING FROM LYING ON BACK TO SITTING ON SIDE OF FLAT BED: A LITTLE
PERSONAL GROOMING: A LITTLE
TURNING FROM BACK TO SIDE WHILE IN FLAT BAD: A LITTLE
TOILETING: A LITTLE
MOVING TO AND FROM BED TO CHAIR: A LITTLE
WALKING IN HOSPITAL ROOM: A LITTLE
CLIMB 3 TO 5 STEPS WITH RAILING: A LITTLE
SUGGESTED CMS G CODE MODIFIER MOBILITY: CK
SUGGESTED CMS G CODE MODIFIER DAILY ACTIVITY: CK
STANDING UP FROM CHAIR USING ARMS: A LITTLE
DAILY ACTIVITIY SCORE: 18

## 2020-07-28 ASSESSMENT — LIFESTYLE VARIABLES
EVER HAD A DRINK FIRST THING IN THE MORNING TO STEADY YOUR NERVES TO GET RID OF A HANGOVER: NO
EVER_SMOKED: NEVER
ALCOHOL_USE: NO
TOTAL SCORE: 0
CONSUMPTION TOTAL: NEGATIVE
HAVE PEOPLE ANNOYED YOU BY CRITICIZING YOUR DRINKING: NO
HOW MANY TIMES IN THE PAST YEAR HAVE YOU HAD 5 OR MORE DRINKS IN A DAY: 0
TOTAL SCORE: 0
ON A TYPICAL DAY WHEN YOU DRINK ALCOHOL HOW MANY DRINKS DO YOU HAVE: 0
TOTAL SCORE: 0
HAVE YOU EVER FELT YOU SHOULD CUT DOWN ON YOUR DRINKING: NO
EVER FELT BAD OR GUILTY ABOUT YOUR DRINKING: NO
AVERAGE NUMBER OF DAYS PER WEEK YOU HAVE A DRINK CONTAINING ALCOHOL: 0

## 2020-07-28 ASSESSMENT — COPD QUESTIONNAIRES
IN THE PAST 12 MONTHS DO YOU DO LESS THAN YOU USED TO BECAUSE OF YOUR BREATHING PROBLEMS: DISAGREE/UNSURE
HAVE YOU SMOKED AT LEAST 100 CIGARETTES IN YOUR ENTIRE LIFE: NO/DON'T KNOW
DO YOU EVER COUGH UP ANY MUCUS OR PHLEGM?: NO/ONLY WITH OCCASIONAL COLDS OR INFECTIONS
DURING THE PAST 4 WEEKS HOW MUCH DID YOU FEEL SHORT OF BREATH: NONE/LITTLE OF THE TIME
COPD SCREENING SCORE: 1

## 2020-07-28 ASSESSMENT — FIBROSIS 4 INDEX: FIB4 SCORE: 1.16

## 2020-07-28 NOTE — ANESTHESIA TIME REPORT
Anesthesia Start and Stop Event Times     Date Time Event    7/28/2020 1500 Ready for Procedure     1501 Anesthesia Start     1640 Anesthesia Stop        Responsible Staff  07/28/20    Name Role Begin End    Sherman Rodriguez M.D. Anesth 1501 1640        Preop Diagnosis (Free Text):  Pre-op Diagnosis     MORBID OBESITY        Preop Diagnosis (Codes):    Post op Diagnosis  Morbid obesity (HCC)      Premium Reason  A. 3PM - 7AM    Comments:

## 2020-07-28 NOTE — ANESTHESIA PREPROCEDURE EVALUATION
"    Relevant Problems   NEURO   (+) History of Graves' disease   (+) History of anemia   (+) History of colorectal cancer      CARDIAC   (+) Internal hemorrhoid      ENDO   (+) Hypothyroidism, postsurgical     Anes H&P:  PAST MEDICAL HISTORY:   60 y.o. female who presents for Procedure(s):  GASTRECTOMY, SLEEVE, LAPAROSCOPIC.  She has current and past medical problems significant for:    Past Medical History:   Diagnosis Date   • Abnormal EKG - possible inferior infarct seen from 2014    • Acute nasopharyngitis 02/21/2020    Cold, productive cough, denies SOB.  Pt will monitor sxs and will call Dr. Madden is sxs worsen   • Allergy    • Anemia     Vaginal bleeding.   • Anesthesia     PONV   • Arrhythmia    • Arthritis     osteo-knees   • Cancer (HCC) 2011    colorectal   • Colorectal cancer (HCC) April 2011    Dr. Garcia, chemo and radiation.    • Colorectal cancer (HCC)    • Dyslipidemia 1/29/2020   • GERD (gastroesophageal reflux disease)    • Graves disease    • Heart burn    • History of anemia 02/2020    No an issue currently   • Hyperthyroidism 12/18/2018   • Indigestion    • Migraine    • Myocardial infarct (HCC)     \"Before 2014\"   • Pain 2020    knee right   • Palpitations    • Snoring 02/2020    No sleep study       SMOKING/ALCOHOL/RECREATIONAL DRUG USE:  Social History     Tobacco Use   • Smoking status: Never Smoker   • Smokeless tobacco: Never Used   • Tobacco comment: significant 2nd hand exposure    Substance Use Topics   • Alcohol use: No     Alcohol/week: 0.6 oz     Types: 1 Standard drinks or equivalent per week   • Drug use: No     Social History     Substance and Sexual Activity   Drug Use No       PAST SURGICAL HISTORY:  Past Surgical History:   Procedure Laterality Date   • PB KNEE SCOPE,DIAGNOSTIC Right 3/5/2020    Procedure: ARTHROSCOPY, KNEE;  Surgeon: Abdias Madden M.D.;  Location: SURGERY [facility];  Service: Orthopedics   • MEDIAL MENISCECTOMY Right 3/5/2020    Procedure: " MENISCECTOMY, KNEE, MEDIAL - PARTIAL LATERAL,;  Surgeon: Abdias Madden M.D.;  Location: SURGERY [facility];  Service: Orthopedics   • JOINT INJECTION DIAGNOSTIC Left 3/5/2020    Procedure: INJECTION, JOINT, DIAGNOSTIC;  Surgeon: Abdias Madden M.D.;  Location: SURGERY [facility];  Service: Orthopedics   • THYROIDECTOMY TOTAL Bilateral 1/8/2020    Procedure: THYROIDECTOMY, TOTAL- W/ NIMS;  Surgeon: Ebony Meza M.D.;  Location: SURGERY SAME DAY [facility];  Service: General   • THYROIDECTOMY  01/08/2020   • JOINT INJECTION DIAGNOSTIC  3/8/2013    Performed by Kenji Meek M.D. at [facility]   • KNEE ARTHROSCOPY  3/8/2013    Performed by Kenji Meek M.D. at [facility]   • MEDIAL MENISCECTOMY  3/8/2013    Performed by Kenji Meek M.D. at [facility]   • GASTRIC BANDING LAPAROSCOPIC  2005    removal of lapband 2016   • ABDOMINAL HYSTERECTOMY TOTAL  2003    For menorrhagia, no BSO   • HERNIA REPAIR      child   • OTHER ABDOMINAL SURGERY      lap band removal       ALLERGIES:   Allergies   Allergen Reactions   • Iodine Itching   • Morphine Itching     blisters   • Shellfish Allergy Vomiting       VITALS:  No data found.    MEDICATIONS:  No current facility-administered medications on file prior to encounter.      Current Outpatient Medications on File Prior to Encounter   Medication Sig Dispense Refill   • fluticasone (FLONASE) 50 MCG/ACT nasal spray Spray 2 Sprays in nose every day. Each Nostril 16 g 11   • SYNTHROID 100 MCG Tab Take 100 mcg by mouth every day.         LABS:  Lab Results   Component Value Date/Time    HEMOGLOBIN 12.6 07/21/2020 0948    HEMATOCRIT 41.2 07/21/2020 0948    WBC 3.4 (L) 07/21/2020 0948     Lab Results   Component Value Date/Time    SODIUM 139 07/13/2020 0936    POTASSIUM 4.0 07/13/2020 0936    CHLORIDE 101 07/13/2020 0936    CO2 20 07/13/2020 0936    GLUCOSE 91 07/13/2020 0936    BUN 7 07/13/2020 0936     CALCIUM 9.8 07/13/2020 0936       SARS-CoV2 result: Negative      PREVIOUS ANESTHETICS: See EMR  __________________________________________      Physical Exam    Airway   Mallampati: II  TM distance: >3 FB  Neck ROM: full       Cardiovascular - normal exam  Rhythm: regular  Rate: normal  (-) murmur     Dental - normal exam           Pulmonary - normal exam  Breath sounds clear to auscultation     Abdominal   (+) obese     Neurological - normal exam                 Anesthesia Plan    ASA 3   ASA physical status 3 criteria: morbid obesity - BMI greater than or equal to 40    Plan - general       Airway plan will be ETT        Induction: intravenous    Postoperative Plan: Postoperative administration of opioids is intended.    Pertinent diagnostic labs and testing reviewed    Informed Consent:    Anesthetic plan and risks discussed with patient.    Use of blood products discussed with: patient whom consented to blood products.

## 2020-07-28 NOTE — DISCHARGE INSTR - OTHER INFO
Gastric bypass clears today, advance diet as tolerated to gastric bypass full liquids on morning of POD#2.  Incentive spirometry Q hr while awake, continue at home.  Up ad emla.  Ok to Shower over Tegaderms; remove Tegaderms on 8/01/20 .  No baths or soaks x 14 days.  No driving x 4-5 days.  No lifting > 15 lbs until after post-op appt.  D/C home when alert, comfortable, ambulatory, and tolerating PO well.  Pt Counseled re: I.S., diet, activity, home med's, continued use of incentive spirometer at home,  and wound care.  Begin PPI at home (all home med's larger in size than eraser head should be crushed or changed to liquid form x 2-3 weeks, while on liquid diet).  Hold MVI/supplements until after postop appointment.  F/U with Dr. Ganser ~ 1-2 weeks.

## 2020-07-28 NOTE — ANESTHESIA PROCEDURE NOTES
Airway    Date/Time: 7/28/2020 3:15 PM  Performed by: Sherman Rodriguez M.D.  Authorized by: Sherman Rodriguez M.D.     Location:  OR  Urgency:  Elective  Difficult Airway: No    Indications for Airway Management:  Anesthesia      Spontaneous Ventilation: absent    Sedation Level:  Deep  Preoxygenated: Yes    Patient Position:  Sniffing  Mask Difficulty Assessment:  1 - vent by mask  Final Airway Type:  Endotracheal airway  Final Endotracheal Airway:  ETT  Cuffed: Yes    Technique Used for Successful ETT Placement:  Direct laryngoscopy    Insertion Site:  Oral  Blade Type:  Jain  Laryngoscope Blade/Videolaryngoscope Blade Size:  2  ETT Size (mm):  7.0  Measured from:  Teeth  ETT to Teeth (cm):  21  Placement Verified by: auscultation and capnometry    Cormack-Lehane Classification:  Grade I - full view of glottis  Number of Attempts at Approach:  1

## 2020-07-28 NOTE — OP REPORT
Operative Report    Date: 7/28/2020    Surgeon: John Ganser M.D.    Assistant: Salazar Wright PA-C    Anesthesia: Dr. Rodriguez    Preoperative Diagnosis: Morbid Obesity, GERD    Postoperative Diagnosis: Same, Hiatal Hernia    Procedure Performed: Laparoscopic Vertical Sleeve Gastrectomy, Hiatal hernia repair    Indications: The patient is suffering from morbid obesity and it's sequelae with a body mass index of 39.2 kg/m2. They have failed dietary attempts at weight loss and have been fully counseled about risks and alternatives to sleeve gastrectomy and wish to proceed.    Findings: Sliding hiatal hernia    DESCRIPTION OF PROCEDURE: The patient was identified and general anesthetic   administered. Her abdomen was prepped and draped in the usual sterile   fashion. Local anesthesia of 0.5% Marcaine with epinephrine was injected   prior to making skin incision. Small incision was made to left midline in low  epigastric region and the Veress needle passed. The abdomen was insufflated   with carbon dioxide without incident and a 5-mm blunt trocar and 5-mm   30-degree scope inserted. Cj liver retractor was passed through a   small subxiphoid incision, used to elevate the lateral segment of liver and   this was held with the robotic arm. Right upper quadrant 12 mm, left upper   quadrant 15 mm, left lateral subcostal 5 mm trocars were placed. Inspection   of the hiatus revealed a sliding hiatal hernia. Scar tissue in the upper abdomen was divided from prior Lap Band surgery. The fat pad was rotated off the gastroesophageal junction to help expose this area as well. The omentum was then   taken down off the greater curve of the stomach using the Harmonic scalpel. Gastropexy sutures were divided on the anterior stomach.    The hiatal hernia was reduced and repaired posteriorly with 2 horizontal mattress sutures using 0-Surgidac.    A 40-Nepalese bougie wasthen passed by anesthesia and laid along the lesser curve of the  stomach. The [place] 60 power stapler was then placed starting about 4-5 cm proximal to the pylorus and positioned adjacent to the bougie and fired. The sleeve was   completed with sequential firing of the stapler using green and gold loads. A  small cuff of stomach was left adjacent to the esophagus. The distal stomach was removed through the 15 mm trocar site.    The tip of the staple line proximally was imbricated with 0-Surgidac and pexed adjacent to the left martina.The staple line was then oversewn with 3-0 absorbable V-Loc suture   incorporating the omentum along the way. The bougie was removed and the   sleeve maintained good orientation with no torsion or kinks. The abdomen was   irrigated and hemostasis assured. The trocars were removed. The abdomen   deflated, and incisions closed with Vicryl. Sterile dressings were applied.   The patient returned to recovery room in stable condition.    ____________________________________  JOHN H. GANSER, MD John Ganser, M.D., F.A.C.S.  [facility]  [facility]  [phone]

## 2020-07-29 VITALS
HEIGHT: 65 IN | HEART RATE: 62 BPM | BODY MASS INDEX: 39.34 KG/M2 | OXYGEN SATURATION: 94 % | TEMPERATURE: 98.5 F | DIASTOLIC BLOOD PRESSURE: 60 MMHG | WEIGHT: 236.11 LBS | SYSTOLIC BLOOD PRESSURE: 115 MMHG | RESPIRATION RATE: 18 BRPM

## 2020-07-29 LAB
ANION GAP SERPL CALC-SCNC: 11 MMOL/L (ref 7–16)
BUN SERPL-MCNC: 7 MG/DL (ref 8–22)
CALCIUM SERPL-MCNC: 9.1 MG/DL (ref 8.5–10.5)
CHLORIDE SERPL-SCNC: 101 MMOL/L (ref 96–112)
CO2 SERPL-SCNC: 25 MMOL/L (ref 20–33)
CREAT SERPL-MCNC: 0.58 MG/DL (ref 0.5–1.4)
ERYTHROCYTE [DISTWIDTH] IN BLOOD BY AUTOMATED COUNT: 42.6 FL (ref 35.9–50)
GLUCOSE SERPL-MCNC: 106 MG/DL (ref 65–99)
HCT VFR BLD AUTO: 39.1 % (ref 37–47)
HGB BLD-MCNC: 12.1 G/DL (ref 12–16)
MCH RBC QN AUTO: 26.2 PG (ref 27–33)
MCHC RBC AUTO-ENTMCNC: 30.9 G/DL (ref 33.6–35)
MCV RBC AUTO: 84.8 FL (ref 81.4–97.8)
PATHOLOGY CONSULT NOTE: NORMAL
PLATELET # BLD AUTO: 237 K/UL (ref 164–446)
PMV BLD AUTO: 8.3 FL (ref 9–12.9)
POTASSIUM SERPL-SCNC: 3.8 MMOL/L (ref 3.6–5.5)
RBC # BLD AUTO: 4.61 M/UL (ref 4.2–5.4)
SODIUM SERPL-SCNC: 137 MMOL/L (ref 135–145)
WBC # BLD AUTO: 6.8 K/UL (ref 4.8–10.8)

## 2020-07-29 PROCEDURE — 36415 COLL VENOUS BLD VENIPUNCTURE: CPT

## 2020-07-29 PROCEDURE — 85027 COMPLETE CBC AUTOMATED: CPT

## 2020-07-29 PROCEDURE — 700111 HCHG RX REV CODE 636 W/ 250 OVERRIDE (IP): Performed by: PHYSICIAN ASSISTANT

## 2020-07-29 PROCEDURE — 80048 BASIC METABOLIC PNL TOTAL CA: CPT

## 2020-07-29 PROCEDURE — 700102 HCHG RX REV CODE 250 W/ 637 OVERRIDE(OP): Performed by: PHYSICIAN ASSISTANT

## 2020-07-29 PROCEDURE — A9270 NON-COVERED ITEM OR SERVICE: HCPCS | Performed by: PHYSICIAN ASSISTANT

## 2020-07-29 PROCEDURE — 700105 HCHG RX REV CODE 258: Performed by: PHYSICIAN ASSISTANT

## 2020-07-29 RX ADMIN — ENOXAPARIN SODIUM 40 MG: 40 INJECTION SUBCUTANEOUS at 10:49

## 2020-07-29 RX ADMIN — HYDROCODONE BITARTRATE AND ACETAMINOPHEN 15 ML: 7.5; 325 SOLUTION ORAL at 03:51

## 2020-07-29 RX ADMIN — ACETAMINOPHEN 1000 MG: 10 INJECTION, SOLUTION INTRAVENOUS at 00:05

## 2020-07-29 RX ADMIN — HYDROCODONE BITARTRATE AND ACETAMINOPHEN 15 ML: 7.5; 325 SOLUTION ORAL at 10:49

## 2020-07-29 RX ADMIN — LEVOTHYROXINE SODIUM 100 MCG: 0.1 TABLET ORAL at 05:38

## 2020-07-29 RX ADMIN — SODIUM CHLORIDE, POTASSIUM CHLORIDE, SODIUM LACTATE AND CALCIUM CHLORIDE: 600; 310; 30; 20 INJECTION, SOLUTION INTRAVENOUS at 04:02

## 2020-07-29 RX ADMIN — FAMOTIDINE 20 MG: 10 INJECTION, SOLUTION INTRAVENOUS at 05:38

## 2020-07-29 RX ADMIN — ONDANSETRON 4 MG: 2 INJECTION INTRAMUSCULAR; INTRAVENOUS at 03:51

## 2020-07-29 ASSESSMENT — ENCOUNTER SYMPTOMS
SHORTNESS OF BREATH: 0
ABDOMINAL PAIN: 1
FEVER: 0
NAUSEA: 0
CHILLS: 0
VOMITING: 0

## 2020-07-29 NOTE — PROGRESS NOTES
Pt is A&O x4  Pain controlled on current regimen   - nausea  Tolerating a clear liquid diet   Lap sites x5 DIP CDI  + Voids  - flatus  Last BM 7/28/20 PTA  Up SBA  SCD's on    Bed alarm n/a, pt lowfall risk per priscila kurtz  Reviewed plan of care with patient, bed in lowest position and locked, pt resting comfortably now, call light within reach, all needs met at this time. Interventions will be executed per plan of care

## 2020-07-29 NOTE — PROGRESS NOTES
Received in bed aaox4, pain controlled with prn med, c/o sore throat, lozenges orders prn and med ff up with pharmacy, up ambulatory in hallway, POC discussed, possible home today, needs attended.

## 2020-07-29 NOTE — CARE PLAN
Problem: Bowel/Gastric:  Goal: Normal bowel function is maintained or improved  Note: Sp gastric bypass diet instructions     Problem: Pain Management  Goal: Pain level will decrease to patient's comfort goal  Outcome: PROGRESSING AS EXPECTED  Note: Prn meds per MAR

## 2020-07-29 NOTE — PROGRESS NOTES
Surgical Progress Note    Author: BOBY Marte Date & Time created: 2020   9:42 AM     Interval Events:  S/p Laparoscopic Vertical Sleeve Gastrectomy, hiatal hernia repair -POD#1, doing well; karen bariatric clears  well without dysphagia or N/V; ambulatory; using IS; pain controlled; wants to go home    Review of Systems   Constitutional: Negative for chills and fever.   Respiratory: Negative for shortness of breath.    Gastrointestinal: Positive for abdominal pain. Negative for nausea and vomiting.     Hemodynamics:  Temp (24hrs), Av.5 °C (97.7 °F), Min:36.1 °C (96.9 °F), Max:37 °C (98.6 °F)  Temperature: 36.9 °C (98.5 °F)  Pulse  Av.3  Min: 62  Max: 103   Blood Pressure: 115/60     Respiratory:    Respiration: 18, Pulse Oximetry: 94 %           Neuro:  GCS       Fluids:    Intake/Output Summary (Last 24 hours) at 2020 0942  Last data filed at 2020 0402  Gross per 24 hour   Intake 2060 ml   Output 650 ml   Net 1410 ml     Weight: 107.1 kg (236 lb 1.8 oz)  Current Diet Order   Procedures   • Diet Order     Physical Exam  Vitals signs and nursing note reviewed.   Constitutional:       General: She is not in acute distress.     Appearance: She is obese.   Cardiovascular:      Rate and Rhythm: Normal rate.   Pulmonary:      Effort: Pulmonary effort is normal. No respiratory distress.   Abdominal:      Palpations: Abdomen is soft.      Comments: Teg's c/d/i   Skin:     General: Skin is warm and dry.   Neurological:      Mental Status: She is alert and oriented to person, place, and time.   Psychiatric:         Mood and Affect: Mood normal.       Labs:  Recent Results (from the past 24 hour(s))   Histology Request    Collection Time: 20  7:14 AM   Result Value Ref Range    Pathology Request Sent to Histo    CBC without Differential (blood)    Collection Time: 20  7:24 AM   Result Value Ref Range    WBC 6.8 4.8 - 10.8 K/uL    RBC 4.61 4.20 - 5.40 M/uL    Hemoglobin 12.1 12.0 -  16.0 g/dL    Hematocrit 39.1 37.0 - 47.0 %    MCV 84.8 81.4 - 97.8 fL    MCH 26.2 (L) 27.0 - 33.0 pg    MCHC 30.9 (L) 33.6 - 35.0 g/dL    RDW 42.6 35.9 - 50.0 fL    Platelet Count 237 164 - 446 K/uL    MPV 8.3 (L) 9.0 - 12.9 fL   Basic Metabolic Panel (BMP)    Collection Time: 07/29/20  7:24 AM   Result Value Ref Range    Sodium 137 135 - 145 mmol/L    Potassium 3.8 3.6 - 5.5 mmol/L    Chloride 101 96 - 112 mmol/L    Co2 25 20 - 33 mmol/L    Glucose 106 (H) 65 - 99 mg/dL    Bun 7 (L) 8 - 22 mg/dL    Creatinine 0.58 0.50 - 1.40 mg/dL    Calcium 9.1 8.5 - 10.5 mg/dL    Anion Gap 11.0 7.0 - 16.0   ESTIMATED GFR    Collection Time: 07/29/20  7:24 AM   Result Value Ref Range    GFR If African American >60 >60 mL/min/1.73 m 2    GFR If Non African American >60 >60 mL/min/1.73 m 2     Medical Decision Making, by Problem:  There are no active hospital problems to display for this patient.    Plan:  D/c IVF, saline lock.  Gastric bypass clears today, advance diet as tolerated to gastric bypass full liquids on morning of POD#2.  Incentive spirometry Q hr while awake, continue at home.  Up ad elma.  Ok to Shower over Tegaderms; remove Tegaderms on 8/01/20 .  No baths or soaks x 14 days.  No driving x 4-5 days.  No lifting > 15 lbs until after post-op appt.  D/C home when alert, comfortable, ambulatory, and tolerating PO well.  Pt Counseled re: I.S., diet, activity, home med's, continued use of incentive spirometer at home,  and wound care.  Begin PPI at home (all home med's larger in size than eraser head should be crushed or changed to liquid form x 2-3 weeks, while on liquid diet).  Hold MVI/supplements until after postop appointment.  F/U with Dr. Ganser ~ 1-2 weeks.       Quality Measures:  Quality-Core Measures   Reviewed items::  Labs reviewed and Medications reviewed  Garcia catheter::  No Garcia  DVT prophylaxis pharmacological::  Enoxaparin (Lovenox)  DVT prophylaxis - mechanical:  SCDs  Ulcer Prophylaxis::   Yes      Discussed patient condition with RN, Patient and Dr. Ganser

## 2020-07-29 NOTE — PROGRESS NOTES
AA&Ox4.   Reported pain tolerable per MAR.  Pt on 10L of O2 via oxymask per ERAS protocol.  Lap sites x 5 with dry gauze and tegaderm. CDI.   Gastric clear liquid diet in place. Denies N/V.  LBM 7/28 PTA.  Pt and spouse oriented to unit, room, and introduced to staff.  All needs met at this time. Call light within reach.

## 2020-07-29 NOTE — ANESTHESIA POSTPROCEDURE EVALUATION
Patient: Eunice Mckenna    Procedure Summary     Date:  07/28/20 Room / Location:  [place] [address] / SURGERY [facility]    Anesthesia Start:  1501 Anesthesia Stop:  1640    Procedure:  GASTRECTOMY, SLEEVE, LAPAROSCOPIC (N/A Abdomen) Diagnosis:  (lap gastric sleeve)    Surgeon:  John H Ganser, M.D. Responsible Provider:  Sherman Rodriguez M.D.    Anesthesia Type:  general ASA Status:  3          Final Anesthesia Type: general  Last vitals  BP   Blood Pressure: 114/68    Temp   36.6 °C (97.8 °F)    Pulse   Pulse: 90   Resp   16    SpO2   98 %      Anesthesia Post Evaluation    Patient location during evaluation: PACU  Patient participation: complete - patient participated  Level of consciousness: sleepy but conscious    Airway patency: patent  Anesthetic complications: no  Cardiovascular status: hemodynamically stable  Respiratory status: acceptable and face mask  Hydration status: balanced    PONV: none           Nurse Pain Score: 2 (NPRS)

## 2020-07-29 NOTE — PROGRESS NOTES
2 RN skin check complete.  Abdominal lap sites x5. Dressings CDI.  All other skin intact. No signs of skin breakdown present.

## 2020-07-29 NOTE — DISCHARGE INSTRUCTIONS
Laparoscopic Gastric Bypass Surgery, Care After  This sheet gives you information about how to care for yourself after your procedure. Your health care provider may also give you more specific instructions. If you have problems or questions, contact your health care provider.  What can I expect after the procedure?  After the procedure, it is common to have:  · Pain.  · Fatigue.  · Nausea.  · Gas or heartburn.  Follow these instructions at home:  Eating and drinking    · Drink enough fluid to keep your urine pale yellow.  · Follow instructions from your health care provider or nutrition specialist (dietitian) about:  ? What to eat and how much to eat. This is very important.  ? How long you need to stay on a liquid diet.  ? Taking any vitamins or protein supplements.  · You will begin by drinking liquids in small amounts. Liquids may include tea, juice, broth, or gelatin.  · Do not drink caffeine for 1 month after the procedure, or for as long as told by your health care provider.  · Do not drink alcohol.  Activity    · For 4-6 weeks after the procedure, or for as long as told by your health care provider:  ? Rest as told by your health care provider.  ? Avoid sitting for a long time without moving. Get up to take short walks every 1-2 hours. This is important to improve blood flow and breathing. Ask for help if you feel weak or unsteady.  · Do coughing and deep breathing exercises as told by your health care provider.  · Return to your normal activities as told by your health care provider. Ask your health care provider what activities are safe for you.  ? Avoid activities that require a lot of effort.  ? Do not lift, push, or pull anything that is heavier than 10 lb (4.5 kg), or the limit that you are told, until your health care provider says that it is safe.  Incision care    · Follow instructions from your health care provider about how to take care of your incisions. Make sure you:  ? Wash your hands with soap  and water before and after you change your bandages (dressings). If soap and water are not available, use hand .  ? Change your dressings as told by your health care provider.  ? Leave stitches (sutures), skin glue, or adhesive strips in place. These skin closures may need to be in place for 2 weeks or longer. If adhesive strip edges start to loosen and curl up, you may trim the loose edges. Do not remove adhesive strips completely unless your health care provider tells you to do that.  · Do not take baths, swim, or use a hot tub until your health care provider says that it is okay. You may shower when your health care provider says that it is okay.  · Pat your incisions dry. Do not rub them.  · Check your incision areas every day for signs of infection. Check for:  ? Redness, swelling, or pain.  ? Fluid or blood.  ? Warmth.  ? Pus or a bad smell.  General instructions  · Take over-the-counter and prescription medicines only as told by your health care provider.  · Do not use any products that contain nicotine or tobacco, such as cigarettes, e-cigarettes, and chewing tobacco. If you need help quitting, ask your health care provider.  · Do not drive or use heavy machinery while taking prescription pain medicine.  · Keep all follow-up visits as told by your health care provider. This is important.  Contact a health care provider if:  · You have a fever or chills.  · You have a cough that does not go away.  · You have pain that does not get better with medicine.  · You feel nauseous or you vomit.  · You have pain or discomfort with swallowing.  · You have redness, swelling, or pain around your incisions.  · You have fluid or blood coming from your incisions.  · Your incisions feel warm to the touch.  · You have pus or a bad smell coming from your incisions.  · You have diarrhea, nausea, or vomiting after eating (dumping syndrome).  Get help right away if:  · You develop shortness of breath.  · You develop  chest pain.  · You have very bad pain in your leg.  · You have severe abdominal pain, nausea, and vomiting.  Summary  · The laparoscopic gastric bypass surgery is a surgery to help you lose weight.  · After surgery, you will need to rest, but you also must remember to walk and move around while at home.  · Return to your normal activities as told by your health care provider. Ask your health care provider what activities are safe for you.  · You will start eating again by drinking liquids in small amounts and progressing to solid foods over several weeks.  · It is important to work closely with your physician and dietitian to assist you in your weight loss.  This information is not intended to replace advice given to you by your health care provider. Make sure you discuss any questions you have with your health care provider.  Document Released: 08/01/2005 Document Revised: 07/02/2019 Document Reviewed: 07/02/2019  [employer] Patient Education © 2020 [employer]          Discharge Instructions    Discharged to home by car with relative. Discharged via wheelchair, hospital escort: Yes.  Special equipment needed: Not Applicable    Be sure to schedule a follow-up appointment with your primary care doctor or any specialists as instructed.     Discharge Plan:        I understand that a diet low in cholesterol, fat, and sodium is recommended for good health. Unless I have been given specific instructions below for another diet, I accept this instruction as my diet prescription.   Other diet: as instructed    Special Instructions:   Gastric bypass clears today, advance diet as tolerated to gastric bypass full liquids on morning of POD#2.  Incentive spirometry every hour while awake, continue at home.  Ambulate  Ok to Shower over Tegaderms; remove Tegaderms on 8/01/20 .  No baths or soaks x 14 days.  No driving x 4-5 days.  No lifting > 15 lbs until after post-op appt.  Discharge home when alert, comfortable, ambulatory,  and tolerating PO well.  Pt Counseled re: I.S., diet, activity, home med's, continued use of incentive spirometer at home,  and wound care.  Begin Proton Pump Inhibitor at home (all home med's larger in size than eraser head should be crushed or changed to liquid form x 2-3 weeks, while on liquid diet).  Hold MVI/supplements until after postop appointment.  F/U with Dr. Ganser ~ 1-2 weeks.    · Is patient discharged on Warfarin / Coumadin?   No     Depression / Suicide Risk    As you are discharged from this [facility] facility, it is important to learn how to keep safe from harming yourself.    Recognize the warning signs:  · Abrupt changes in personality, positive or negative- including increase in energy   · Giving away possessions  · Change in eating patterns- significant weight changes-  positive or negative  · Change in sleeping patterns- unable to sleep or sleeping all the time   · Unwillingness or inability to communicate  · Depression  · Unusual sadness, discouragement and loneliness  · Talk of wanting to die  · Neglect of personal appearance   · Rebelliousness- reckless behavior  · Withdrawal from people/activities they love  · Confusion- inability to concentrate     If you or a loved one observes any of these behaviors or has concerns about self-harm, here's what you can do:  · Talk about it- your feelings and reasons for harming yourself  · Remove any means that you might use to hurt yourself (examples: pills, rope, extension cords, firearm)  · Get professional help from the community (Mental Health, Substance Abuse, psychological counseling)  · Do not be alone:Call your Safe Contact- someone whom you trust who will be there for you.  · Call your local CRISIS HOTLINE 355-2949 or 707-071-4548  · Call your local Children's Mobile Crisis Response Team [place] (475) 898-3985 or www.Blaast  · Call the toll free National Suicide Prevention Hotlines   · National Suicide Prevention Lifeline  482-185-FIEY (2887)  · National [place] Line Network 800-SUICIDE (565-5625)

## 2020-07-29 NOTE — CARE PLAN
Problem: Communication  Goal: The ability to communicate needs accurately and effectively will improve  Outcome: PROGRESSING AS EXPECTED  Note: Call light education provided. All needs met.     Problem: Venous Thromboembolism (VTW)/Deep Vein Thrombosis (DVT) Prevention:  Goal: Patient will participate in Venous Thrombosis (VTE)/Deep Vein Thrombosis (DVT)Prevention Measures  Outcome: PROGRESSING AS EXPECTED  Flowsheets (Taken 7/28/2020 2732)  Mechanical Prophylaxis: SCDs, Sequential Compression Device  Pharmacologic Prophylaxis Used: LMWH: Enoxaparin(Lovenox)  Note: Lovenox per MAR. SCD's on.

## 2020-08-04 ENCOUNTER — TELEPHONE (OUTPATIENT)
Dept: MEDICAL GROUP | Facility: PHYSICIAN GROUP | Age: 60
End: 2020-08-04

## 2020-08-04 ENCOUNTER — TELEMEDICINE (OUTPATIENT)
Dept: MEDICAL GROUP | Facility: PHYSICIAN GROUP | Age: 60
End: 2020-08-04
Payer: COMMERCIAL

## 2020-08-04 ENCOUNTER — NURSE TRIAGE (OUTPATIENT)
Dept: HEALTH INFORMATION MANAGEMENT | Facility: OTHER | Age: 60
End: 2020-08-04

## 2020-08-04 VITALS
BODY MASS INDEX: 37.12 KG/M2 | DIASTOLIC BLOOD PRESSURE: 90 MMHG | TEMPERATURE: 97.7 F | SYSTOLIC BLOOD PRESSURE: 130 MMHG | HEIGHT: 66 IN | WEIGHT: 231 LBS

## 2020-08-04 DIAGNOSIS — B37.81 THRUSH OF MOUTH AND ESOPHAGUS (HCC): ICD-10-CM

## 2020-08-04 DIAGNOSIS — B37.0 THRUSH OF MOUTH AND ESOPHAGUS (HCC): ICD-10-CM

## 2020-08-04 PROCEDURE — 99214 OFFICE O/P EST MOD 30 MIN: CPT | Mod: 95,CR | Performed by: FAMILY MEDICINE

## 2020-08-04 RX ORDER — OMEPRAZOLE 20 MG/1
CAPSULE, DELAYED RELEASE ORAL
Status: ON HOLD | COMMUNITY
Start: 2020-07-17 | End: 2020-08-26

## 2020-08-04 RX ORDER — CLOTRIMAZOLE 10 MG/1
10 LOZENGE ORAL; TOPICAL
Qty: 50 TROCHE | Refills: 0 | Status: SHIPPED | OUTPATIENT
Start: 2020-08-04 | End: 2020-08-14

## 2020-08-04 RX ORDER — ONDANSETRON 8 MG/1
TABLET, ORALLY DISINTEGRATING ORAL
Status: ON HOLD | COMMUNITY
Start: 2020-07-17 | End: 2020-08-26

## 2020-08-04 ASSESSMENT — FIBROSIS 4 INDEX: FIB4 SCORE: 1.26

## 2020-08-04 NOTE — PROGRESS NOTES
Chief Complaint   Patient presents with   • Cough     after surgery; dry cough x 6 days   • Pharyngitis     x 2 days       HISTORY OF PRESENT ILLNESS: Patient is a 60 y.o. female established patient here today for the following concerns:    This encounter was conducted via Zoom .   Verbal consent was obtained. Patient's identity was verified.      1. Thrush of mouth and esophagus (HCC)  This is a pleasant 60 year old female who is just less than 1 week post op from gastric sleeve and hiatal hernia repair who reports that she starting with a little cough for a few days after surgery which has resolved.  And now with sore throat and coating on her tongue and mouth with foul taste.  Sucking on christy seems to help it.  No further fevers.  No SOB.        Past Medical, Social, and Family history reviewed and updated in EPIC    Allergies:Iodine; Morphine; and Shellfish allergy    Current Outpatient Medications   Medication Sig Dispense Refill   • ondansetron (ZOFRAN ODT) 8 MG TABLET DISPERSIBLE DISSOLVE 1 TAB ON TOP OF TONGUE AND SWALLOW EVERY 8 HRS AS NEEDED FOR NAUSEA     • clotrimazole (MYCELEX) 10 MG Julio Cesar Take 1 Julio Cesar by mouth 5 Times a Day for 10 days. 50 Julio Cesar 0   • NON SPECIFIED 1 Dose. Multi vitamin     • NON SPECIFIED 1 Dose. Vitamin C     • NON SPECIFIED 1 Dose. Elderberry     • NON SPECIFIED 1 Dose. Vitamin D     • NON SPECIFIED 1 Dose. Vitamin B 12     • SYNTHROID 100 MCG Tab Take 100 mcg by mouth every day.     • omeprazole (PRILOSEC) 20 MG delayed-release capsule TAKE 1 CAPSULE BY MOUTH EVERY DAY BEFORE A MEAL     • fluticasone (FLONASE) 50 MCG/ACT nasal spray Spray 2 Sprays in nose every day. Each Nostril 16 g 11     No current facility-administered medications for this visit.          ROS:  Review of Systems   Constitutional: Negative for fever, chills, weight loss and malaise/fatigue.   HENT: Negative for ear pain, nosebleeds, congestion, sore throat and neck pain.    Eyes: Negative for blurred  "vision.   Respiratory: Negative for cough, sputum production, shortness of breath and wheezing.    Cardiovascular: Negative for chest pain, palpitations,  and leg swelling.   Gastrointestinal: Negative for heartburn, nausea, vomiting, diarrhea and abdominal pain.   Genitourinary: Negative for dysuria, urgency and frequency.   Musculoskeletal: Negative for myalgias, back pain and joint pain.   Skin: Negative for rash and itching.   Neurological: Negative for dizziness, tingling, tremors, sensory change, focal weakness and headaches.   Endo/Heme/Allergies: Does not bruise/bleed easily.   Psychiatric/Behavioral: Negative for depression, anxiety, suicidal ideas, insomnia and memory loss.      Exam:  /90   Temp 36.5 °C (97.7 °F)   Ht 1.676 m (5' 6\")   Wt 104.8 kg (231 lb)     General:  Well nourished, well developed in NAD  Head is grossly normal.  HEENT: Oropharynx and tongue with some whitish exudate coating tongue   Neck: Supple without JVD, Trachea midline, no thyromegaly noted  Pulmonary:  Normal effort. Speaking in full sentences  Psych: affect appropriate  Skin: no Jaundice noted or facial rashes    Please note that this dictation was created using voice recognition software. I have made every reasonable attempt to correct obvious errors, but I expect that there are errors of grammar and possibly content that I did not discover before finalizing the note.    Assessment/Plan:  1. Thrush of mouth and esophagus (HCC)    - clotrimazole (MYCELEX) 10 MG Julio Cesar; Take 1 Julio Cesar by mouth 5 Times a Day for 10 days.  Dispense: 50 Julio Cesar; Refill: 0    Follow up if not improving.         "

## 2020-08-04 NOTE — TELEPHONE ENCOUNTER
1. Caller Name: Eunice                 Call Back Number: [phone]  [facility] PCP or Specialty Provider: Yes         2.  In the last two weeks, has the patient had any new or worsening symptoms (not explained by alternative diagnosis)? Yes, the patient reports the following COVID-19 consistent symptoms: cough, sore throat, congestion or runny nose and diarrhea. Gastric sleeve one week ago.     3.  Does patient have any comoribidities? None     4.  Has the patient traveled in the last 14 days OR had any known contact with someone who is suspected or confirmed to have COVID-19?  No.    5. Disposition: Offered patient virtual visit to limit potential exposure to others; patient also given self care instructions    Note routed to [facility] Provider: SHAHAB only.

## 2020-08-04 NOTE — TELEPHONE ENCOUNTER
----- Message from Eunice Mckenna sent at 8/4/2020  8:40 AM PDT -----  Regarding: Procedure Question  Contact: [phone]  Andrew Solomon I scheduled an appointment. I came out of surgery with dry cough now sore throat and heavy mucus discharge. No fever over 99. I called the surgeon s office and nurse line but was advised to set up with you since it maybe respiratory. I wanted to do telemedicine today but couldn't get in.  
I spoke to Eunice.    Appointment changed to Virtual Vist.  
67 y/o female with c/o RLQ pains x 24 hours with associated nausea and vomiting x1 (non-bilious, non-bloody).  Patient denies fever, chills or tremors.  Patient denies anorexia.  Last BM was today and reports normal appearance.   Patient denies any urinary c/o.  Patient denies hematemesis, hemoptysis or melena.

## 2020-08-25 ENCOUNTER — APPOINTMENT (OUTPATIENT)
Dept: RADIOLOGY | Facility: MEDICAL CENTER | Age: 60
End: 2020-08-25
Attending: EMERGENCY MEDICINE
Payer: COMMERCIAL

## 2020-08-25 ENCOUNTER — HOSPITAL ENCOUNTER (OUTPATIENT)
Facility: MEDICAL CENTER | Age: 60
End: 2020-08-27
Attending: EMERGENCY MEDICINE | Admitting: SURGERY
Payer: COMMERCIAL

## 2020-08-25 DIAGNOSIS — R74.8 ELEVATED LIPASE: ICD-10-CM

## 2020-08-25 DIAGNOSIS — R10.9 ACUTE ABDOMINAL PAIN: ICD-10-CM

## 2020-08-25 DIAGNOSIS — R11.2 INTRACTABLE VOMITING WITH NAUSEA, UNSPECIFIED VOMITING TYPE: ICD-10-CM

## 2020-08-25 DIAGNOSIS — R11.0 NAUSEA: ICD-10-CM

## 2020-08-25 LAB
ALBUMIN SERPL BCP-MCNC: 4 G/DL (ref 3.2–4.9)
ALBUMIN/GLOB SERPL: 0.8 G/DL
ALP SERPL-CCNC: 110 U/L (ref 30–99)
ALT SERPL-CCNC: 16 U/L (ref 2–50)
ANION GAP SERPL CALC-SCNC: 18 MMOL/L (ref 7–16)
APPEARANCE UR: CLEAR
AST SERPL-CCNC: 20 U/L (ref 12–45)
BACTERIA #/AREA URNS HPF: ABNORMAL /HPF
BASOPHILS # BLD AUTO: 0.2 % (ref 0–1.8)
BASOPHILS # BLD: 0.01 K/UL (ref 0–0.12)
BILIRUB SERPL-MCNC: 0.4 MG/DL (ref 0.1–1.5)
BILIRUB UR QL STRIP.AUTO: ABNORMAL
BUN SERPL-MCNC: 7 MG/DL (ref 8–22)
CALCIUM SERPL-MCNC: 10.2 MG/DL (ref 8.4–10.2)
CHLORIDE SERPL-SCNC: 94 MMOL/L (ref 96–112)
CO2 SERPL-SCNC: 22 MMOL/L (ref 20–33)
COLOR UR: YELLOW
CREAT SERPL-MCNC: 0.57 MG/DL (ref 0.5–1.4)
EOSINOPHIL # BLD AUTO: 0.01 K/UL (ref 0–0.51)
EOSINOPHIL NFR BLD: 0.2 % (ref 0–6.9)
EPI CELLS #/AREA URNS HPF: ABNORMAL /HPF
ERYTHROCYTE [DISTWIDTH] IN BLOOD BY AUTOMATED COUNT: 42.3 FL (ref 35.9–50)
GLOBULIN SER CALC-MCNC: 5 G/DL (ref 1.9–3.5)
GLUCOSE SERPL-MCNC: 75 MG/DL (ref 65–99)
GLUCOSE UR STRIP.AUTO-MCNC: NEGATIVE MG/DL
HCT VFR BLD AUTO: 41.3 % (ref 37–47)
HGB BLD-MCNC: 13.3 G/DL (ref 12–16)
IMM GRANULOCYTES # BLD AUTO: 0 K/UL (ref 0–0.11)
IMM GRANULOCYTES NFR BLD AUTO: 0 % (ref 0–0.9)
KETONES UR STRIP.AUTO-MCNC: >=80 MG/DL
LACTATE BLD-SCNC: 1.3 MMOL/L (ref 0.5–2)
LEUKOCYTE ESTERASE UR QL STRIP.AUTO: NEGATIVE
LIPASE SERPL-CCNC: 85 U/L (ref 7–58)
LYMPHOCYTES # BLD AUTO: 1.59 K/UL (ref 1–4.8)
LYMPHOCYTES NFR BLD: 39.1 % (ref 22–41)
MCH RBC QN AUTO: 26 PG (ref 27–33)
MCHC RBC AUTO-ENTMCNC: 32.2 G/DL (ref 33.6–35)
MCV RBC AUTO: 80.8 FL (ref 81.4–97.8)
MICRO URNS: ABNORMAL
MONOCYTES # BLD AUTO: 0.74 K/UL (ref 0–0.85)
MONOCYTES NFR BLD AUTO: 18.2 % (ref 0–13.4)
MUCOUS THREADS #/AREA URNS HPF: ABNORMAL /HPF
NEUTROPHILS # BLD AUTO: 1.72 K/UL (ref 2–7.15)
NEUTROPHILS NFR BLD: 42.3 % (ref 44–72)
NITRITE UR QL STRIP.AUTO: NEGATIVE
NRBC # BLD AUTO: 0 K/UL
NRBC BLD-RTO: 0 /100 WBC
PH UR STRIP.AUTO: 6 [PH] (ref 5–8)
PLATELET # BLD AUTO: 233 K/UL (ref 164–446)
PMV BLD AUTO: 9.5 FL (ref 9–12.9)
POTASSIUM SERPL-SCNC: 4 MMOL/L (ref 3.6–5.5)
PROT SERPL-MCNC: 9 G/DL (ref 6–8.2)
PROT UR QL STRIP: NEGATIVE MG/DL
RBC # BLD AUTO: 5.11 M/UL (ref 4.2–5.4)
RBC # URNS HPF: ABNORMAL /HPF
RBC UR QL AUTO: ABNORMAL
SODIUM SERPL-SCNC: 134 MMOL/L (ref 135–145)
SP GR UR STRIP.AUTO: 1.02
TROPONIN T SERPL-MCNC: 7 NG/L (ref 6–19)
WBC # BLD AUTO: 4.1 K/UL (ref 4.8–10.8)
WBC #/AREA URNS HPF: ABNORMAL /HPF

## 2020-08-25 PROCEDURE — 93005 ELECTROCARDIOGRAM TRACING: CPT | Performed by: EMERGENCY MEDICINE

## 2020-08-25 PROCEDURE — 96375 TX/PRO/DX INJ NEW DRUG ADDON: CPT

## 2020-08-25 PROCEDURE — 99285 EMERGENCY DEPT VISIT HI MDM: CPT

## 2020-08-25 PROCEDURE — 83605 ASSAY OF LACTIC ACID: CPT

## 2020-08-25 PROCEDURE — 96374 THER/PROPH/DIAG INJ IV PUSH: CPT

## 2020-08-25 PROCEDURE — 81001 URINALYSIS AUTO W/SCOPE: CPT

## 2020-08-25 PROCEDURE — 84484 ASSAY OF TROPONIN QUANT: CPT

## 2020-08-25 PROCEDURE — 36415 COLL VENOUS BLD VENIPUNCTURE: CPT

## 2020-08-25 PROCEDURE — 85025 COMPLETE CBC W/AUTO DIFF WBC: CPT

## 2020-08-25 PROCEDURE — 80053 COMPREHEN METABOLIC PANEL: CPT

## 2020-08-25 PROCEDURE — 83690 ASSAY OF LIPASE: CPT

## 2020-08-25 PROCEDURE — 700111 HCHG RX REV CODE 636 W/ 250 OVERRIDE (IP): Performed by: EMERGENCY MEDICINE

## 2020-08-25 PROCEDURE — 700105 HCHG RX REV CODE 258: Performed by: EMERGENCY MEDICINE

## 2020-08-25 RX ORDER — PROCHLORPERAZINE EDISYLATE 5 MG/ML
10 INJECTION INTRAMUSCULAR; INTRAVENOUS ONCE
Status: COMPLETED | OUTPATIENT
Start: 2020-08-25 | End: 2020-08-25

## 2020-08-25 RX ORDER — ONDANSETRON 2 MG/ML
4 INJECTION INTRAMUSCULAR; INTRAVENOUS ONCE
Status: DISCONTINUED | OUTPATIENT
Start: 2020-08-25 | End: 2020-08-25

## 2020-08-25 RX ORDER — SODIUM CHLORIDE 9 MG/ML
1000 INJECTION, SOLUTION INTRAVENOUS ONCE
Status: COMPLETED | OUTPATIENT
Start: 2020-08-25 | End: 2020-08-26

## 2020-08-25 RX ORDER — METOCLOPRAMIDE HYDROCHLORIDE 5 MG/ML
10 INJECTION INTRAMUSCULAR; INTRAVENOUS ONCE
Status: DISCONTINUED | OUTPATIENT
Start: 2020-08-25 | End: 2020-08-25

## 2020-08-25 RX ORDER — ONDANSETRON 2 MG/ML
4 INJECTION INTRAMUSCULAR; INTRAVENOUS ONCE
Status: COMPLETED | OUTPATIENT
Start: 2020-08-25 | End: 2020-08-25

## 2020-08-25 RX ORDER — HYDROMORPHONE HYDROCHLORIDE 1 MG/ML
0.5 INJECTION, SOLUTION INTRAMUSCULAR; INTRAVENOUS; SUBCUTANEOUS ONCE
Status: COMPLETED | OUTPATIENT
Start: 2020-08-25 | End: 2020-08-25

## 2020-08-25 RX ORDER — HYDROMORPHONE HYDROCHLORIDE 1 MG/ML
1 INJECTION, SOLUTION INTRAMUSCULAR; INTRAVENOUS; SUBCUTANEOUS ONCE
Status: DISCONTINUED | OUTPATIENT
Start: 2020-08-25 | End: 2020-08-25

## 2020-08-25 RX ADMIN — SODIUM CHLORIDE 1000 ML: 9 INJECTION, SOLUTION INTRAVENOUS at 22:28

## 2020-08-25 RX ADMIN — ONDANSETRON HYDROCHLORIDE 4 MG: 2 SOLUTION INTRAMUSCULAR; INTRAVENOUS at 23:29

## 2020-08-25 RX ADMIN — HYDROMORPHONE HYDROCHLORIDE 0.5 MG: 1 INJECTION, SOLUTION INTRAMUSCULAR; INTRAVENOUS; SUBCUTANEOUS at 22:25

## 2020-08-25 RX ADMIN — PROCHLORPERAZINE EDISYLATE 10 MG: 5 INJECTION INTRAMUSCULAR; INTRAVENOUS at 22:25

## 2020-08-25 ASSESSMENT — ENCOUNTER SYMPTOMS
BLOOD IN STOOL: 0
NECK PAIN: 0
SHORTNESS OF BREATH: 0
VOMITING: 1
HEADACHES: 0
ABDOMINAL PAIN: 1
CARDIOVASCULAR NEGATIVE: 1
CONSTIPATION: 0
DIARRHEA: 0
SORE THROAT: 0
HALLUCINATIONS: 0
EYES NEGATIVE: 1
EYE PAIN: 0
SEIZURES: 0
BRUISES/BLEEDS EASILY: 0
FLANK PAIN: 0
RESPIRATORY NEGATIVE: 1
NAUSEA: 1
FEVER: 0
CONSTITUTIONAL NEGATIVE: 1
BACK PAIN: 0
WEAKNESS: 0
FOCAL WEAKNESS: 0
MYALGIAS: 0

## 2020-08-26 ENCOUNTER — APPOINTMENT (OUTPATIENT)
Dept: RADIOLOGY | Facility: MEDICAL CENTER | Age: 60
End: 2020-08-26
Attending: EMERGENCY MEDICINE
Payer: COMMERCIAL

## 2020-08-26 ENCOUNTER — APPOINTMENT (OUTPATIENT)
Dept: RADIOLOGY | Facility: MEDICAL CENTER | Age: 60
End: 2020-08-26
Attending: SURGERY
Payer: COMMERCIAL

## 2020-08-26 PROCEDURE — 700111 HCHG RX REV CODE 636 W/ 250 OVERRIDE (IP): Performed by: SURGERY

## 2020-08-26 PROCEDURE — 700101 HCHG RX REV CODE 250: Performed by: EMERGENCY MEDICINE

## 2020-08-26 PROCEDURE — U0003 INFECTIOUS AGENT DETECTION BY NUCLEIC ACID (DNA OR RNA); SEVERE ACUTE RESPIRATORY SYNDROME CORONAVIRUS 2 (SARS-COV-2) (CORONAVIRUS DISEASE [COVID-19]), AMPLIFIED PROBE TECHNIQUE, MAKING USE OF HIGH THROUGHPUT TECHNOLOGIES AS DESCRIBED BY CMS-2020-01-R: HCPCS

## 2020-08-26 PROCEDURE — 74177 CT ABD & PELVIS W/CONTRAST: CPT

## 2020-08-26 PROCEDURE — 700117 HCHG RX CONTRAST REV CODE 255: Performed by: EMERGENCY MEDICINE

## 2020-08-26 PROCEDURE — G0378 HOSPITAL OBSERVATION PER HR: HCPCS

## 2020-08-26 PROCEDURE — C9803 HOPD COVID-19 SPEC COLLECT: HCPCS | Performed by: EMERGENCY MEDICINE

## 2020-08-26 PROCEDURE — 76705 ECHO EXAM OF ABDOMEN: CPT

## 2020-08-26 PROCEDURE — C9113 INJ PANTOPRAZOLE SODIUM, VIA: HCPCS | Performed by: SURGERY

## 2020-08-26 PROCEDURE — 74240 X-RAY XM UPR GI TRC 1CNTRST: CPT

## 2020-08-26 PROCEDURE — 96375 TX/PRO/DX INJ NEW DRUG ADDON: CPT

## 2020-08-26 RX ORDER — OMEPRAZOLE 20 MG/1
20 CAPSULE, DELAYED RELEASE ORAL PRN
COMMUNITY
End: 2020-09-08

## 2020-08-26 RX ORDER — PANTOPRAZOLE SODIUM 40 MG/10ML
40 INJECTION, POWDER, LYOPHILIZED, FOR SOLUTION INTRAVENOUS DAILY
Status: DISCONTINUED | OUTPATIENT
Start: 2020-08-26 | End: 2020-08-27 | Stop reason: HOSPADM

## 2020-08-26 RX ORDER — SODIUM CHLORIDE AND POTASSIUM CHLORIDE 150; 900 MG/100ML; MG/100ML
INJECTION, SOLUTION INTRAVENOUS CONTINUOUS
Status: DISCONTINUED | OUTPATIENT
Start: 2020-08-26 | End: 2020-08-27 | Stop reason: HOSPADM

## 2020-08-26 RX ORDER — ONDANSETRON 8 MG/1
8 TABLET, ORALLY DISINTEGRATING ORAL EVERY 8 HOURS PRN
COMMUNITY
End: 2020-10-01

## 2020-08-26 RX ORDER — HYDROMORPHONE HYDROCHLORIDE 1 MG/ML
0.5 INJECTION, SOLUTION INTRAMUSCULAR; INTRAVENOUS; SUBCUTANEOUS
Status: DISCONTINUED | OUTPATIENT
Start: 2020-08-26 | End: 2020-08-27 | Stop reason: HOSPADM

## 2020-08-26 RX ORDER — ONDANSETRON 2 MG/ML
4 INJECTION INTRAMUSCULAR; INTRAVENOUS
Status: DISCONTINUED | OUTPATIENT
Start: 2020-08-26 | End: 2020-08-27 | Stop reason: HOSPADM

## 2020-08-26 RX ORDER — IBUPROFEN/DIPHENHYDRAMINE CIT 200MG-38MG
2 TABLET ORAL DAILY
Status: ON HOLD | COMMUNITY
End: 2020-08-27

## 2020-08-26 RX ADMIN — POTASSIUM CHLORIDE AND SODIUM CHLORIDE: 900; 150 INJECTION, SOLUTION INTRAVENOUS at 02:47

## 2020-08-26 RX ADMIN — PANTOPRAZOLE SODIUM 40 MG: 40 INJECTION, POWDER, LYOPHILIZED, FOR SOLUTION INTRAVENOUS at 17:44

## 2020-08-26 RX ADMIN — IOHEXOL 100 ML: 350 INJECTION, SOLUTION INTRAVENOUS at 00:14

## 2020-08-26 RX ADMIN — POTASSIUM CHLORIDE AND SODIUM CHLORIDE: 900; 150 INJECTION, SOLUTION INTRAVENOUS at 12:54

## 2020-08-26 ASSESSMENT — LIFESTYLE VARIABLES
HOW MANY TIMES IN THE PAST YEAR HAVE YOU HAD 5 OR MORE DRINKS IN A DAY: 0
EVER_SMOKED: NEVER
TOTAL SCORE: 0
HAVE PEOPLE ANNOYED YOU BY CRITICIZING YOUR DRINKING: NO
AVERAGE NUMBER OF DAYS PER WEEK YOU HAVE A DRINK CONTAINING ALCOHOL: 0
TOTAL SCORE: 0
EVER HAD A DRINK FIRST THING IN THE MORNING TO STEADY YOUR NERVES TO GET RID OF A HANGOVER: NO
TOTAL SCORE: 0
ON A TYPICAL DAY WHEN YOU DRINK ALCOHOL HOW MANY DRINKS DO YOU HAVE: 0
EVER FELT BAD OR GUILTY ABOUT YOUR DRINKING: NO
CONSUMPTION TOTAL: NEGATIVE
HAVE YOU EVER FELT YOU SHOULD CUT DOWN ON YOUR DRINKING: NO
ALCOHOL_USE: NO

## 2020-08-26 ASSESSMENT — COGNITIVE AND FUNCTIONAL STATUS - GENERAL
SUGGESTED CMS G CODE MODIFIER DAILY ACTIVITY: CJ
DRESSING REGULAR UPPER BODY CLOTHING: A LITTLE
DRESSING REGULAR LOWER BODY CLOTHING: A LITTLE
STANDING UP FROM CHAIR USING ARMS: A LITTLE
MOVING TO AND FROM BED TO CHAIR: A LITTLE
SUGGESTED CMS G CODE MODIFIER MOBILITY: CK
CLIMB 3 TO 5 STEPS WITH RAILING: A LITTLE
MOVING FROM LYING ON BACK TO SITTING ON SIDE OF FLAT BED: A LITTLE
TURNING FROM BACK TO SIDE WHILE IN FLAT BAD: A LITTLE
WALKING IN HOSPITAL ROOM: A LITTLE
DAILY ACTIVITIY SCORE: 22
MOBILITY SCORE: 18

## 2020-08-26 ASSESSMENT — COPD QUESTIONNAIRES
HAVE YOU SMOKED AT LEAST 100 CIGARETTES IN YOUR ENTIRE LIFE: NO/DON'T KNOW
DO YOU EVER COUGH UP ANY MUCUS OR PHLEGM?: NO/ONLY WITH OCCASIONAL COLDS OR INFECTIONS
IN THE PAST 12 MONTHS DO YOU DO LESS THAN YOU USED TO BECAUSE OF YOUR BREATHING PROBLEMS: DISAGREE/UNSURE
DURING THE PAST 4 WEEKS HOW MUCH DID YOU FEEL SHORT OF BREATH: NONE/LITTLE OF THE TIME
COPD SCREENING SCORE: 1

## 2020-08-26 ASSESSMENT — PATIENT HEALTH QUESTIONNAIRE - PHQ9
1. LITTLE INTEREST OR PLEASURE IN DOING THINGS: NOT AT ALL
SUM OF ALL RESPONSES TO PHQ9 QUESTIONS 1 AND 2: 0
2. FEELING DOWN, DEPRESSED, IRRITABLE, OR HOPELESS: NOT AT ALL

## 2020-08-26 ASSESSMENT — FIBROSIS 4 INDEX: FIB4 SCORE: 1.27

## 2020-08-26 NOTE — H&P
"Surgery General History & Physical Note    Date  8/26/2020    Primary Care Physician  Sofie Mathew M.D.    CC  Abdominal pain, nausea and vomiting    HPI  This is a 60 y.o. female who had a laparoscopic sleeve gastrectomy for morbid obesity 1 month ago. She did very well for the first 2 weeks, but started having nausea and pain associated with eating about 2 weeks ago. Soft foods like eggs were better tolerated than even liquids initially, but it has progressed to all foods and liquids associated with regurgitation. She has had no fever. The pain is epigastric and LUQ. No diarrhea or constipation. Her urine is concentrated. No biliuria or jaundice.    Past Medical History:   Diagnosis Date   • Abnormal EKG - possible inferior infarct seen from 2014    • Acute nasopharyngitis 02/21/2020    Cold, productive cough, denies SOB.  Pt will monitor sxs and will call Dr. Madden is sxs worsen   • Allergy    • Anemia     Vaginal bleeding.   • Anesthesia     PONV   • Arrhythmia    • Arthritis     osteo-knees   • Cancer (HCC) 2011    colorectal   • Colorectal cancer (HCC) April 2011    Dr. Garcia, chemo and radiation.    • Colorectal cancer (HCC)    • Dyslipidemia 1/29/2020   • GERD (gastroesophageal reflux disease)    • Graves disease    • Heart burn    • History of anemia 02/2020    No an issue currently   • Hyperthyroidism 12/18/2018   • Indigestion    • Migraine    • Myocardial infarct (HCC)     \"Before 2014\"   • Pain 2020    knee right   • Palpitations    • Snoring 02/2020    No sleep study       Past Surgical History:   Procedure Laterality Date   • PB LAP, ILEANA RESTRICT PROC, LONGITUDINAL GAS* N/A 7/28/2020    Procedure: GASTRECTOMY, SLEEVE, LAPAROSCOPIC;  Surgeon: John H Ganser, M.D.;  Location: SURGERY [facility];  Service: General   • PB KNEE SCOPE,DIAGNOSTIC Right 3/5/2020    Procedure: ARTHROSCOPY, KNEE;  Surgeon: Abdias Madden M.D.;  Location: SURGERY [facility];  Service: Orthopedics   • MEDIAL " MENISCECTOMY Right 3/5/2020    Procedure: MENISCECTOMY, KNEE, MEDIAL - PARTIAL LATERAL,;  Surgeon: Abdias Madden M.D.;  Location: SURGERY [facility];  Service: Orthopedics   • JOINT INJECTION DIAGNOSTIC Left 3/5/2020    Procedure: INJECTION, JOINT, DIAGNOSTIC;  Surgeon: Abdias Madden M.D.;  Location: SURGERY [facility];  Service: Orthopedics   • THYROIDECTOMY TOTAL Bilateral 1/8/2020    Procedure: THYROIDECTOMY, TOTAL- W/ NIMS;  Surgeon: Ebony Meza M.D.;  Location: SURGERY SAME DAY [facility];  Service: General   • THYROIDECTOMY  01/08/2020   • JOINT INJECTION DIAGNOSTIC  3/8/2013    Performed by Kenji Meek M.D. at [facility]   • KNEE ARTHROSCOPY  3/8/2013    Performed by Kenji Meek M.D. at [facility]   • MEDIAL MENISCECTOMY  3/8/2013    Performed by Kenji Meek M.D. at [facility]   • GASTRIC BANDING LAPAROSCOPIC  2005    removal of lapband 2016   • ABDOMINAL HYSTERECTOMY TOTAL  2003    For menorrhagia, no BSO   • HERNIA REPAIR      child   • OTHER ABDOMINAL SURGERY      lap band removal       Current Facility-Administered Medications   Medication Dose Route Frequency Provider Last Rate Last Dose   • HYDROmorphone pf (DILAUDID) injection 0.5 mg  0.5 mg Intravenous Q2HRS PRN Patrice Roque M.D.       • ondansetron (ZOFRAN) syringe/vial injection 4 mg  4 mg Intravenous Once PRN Patrice Roque M.D.       • 0.9 % NaCl with KCl 20 mEq infusion   Intravenous Continuous Patrice Roque M.D. 100 mL/hr at 08/26/20 0247         Social History     Socioeconomic History   • Marital status:      Spouse name: Not on file   • Number of children: Not on file   • Years of education: Not on file   • Highest education level: Not on file   Occupational History   • Not on file   Social Needs   • Financial resource strain: Not on file   • Food insecurity     Worry: Not on file     Inability: Not on file   • Transportation needs      Medical: Not on file     Non-medical: Not on file   Tobacco Use   • Smoking status: Never Smoker   • Smokeless tobacco: Never Used   • Tobacco comment: significant 2nd hand exposure    Substance and Sexual Activity   • Alcohol use: No     Alcohol/week: 0.6 oz     Types: 1 Standard drinks or equivalent per week   • Drug use: No   • Sexual activity: Yes     Partners: Female   Lifestyle   • Physical activity     Days per week: Not on file     Minutes per session: Not on file   • Stress: Not on file   Relationships   • Social connections     Talks on phone: Not on file     Gets together: Not on file     Attends Sikhism service: Not on file     Active member of club or organization: Not on file     Attends meetings of clubs or organizations: Not on file     Relationship status: Not on file   • Intimate partner violence     Fear of current or ex partner: Not on file     Emotionally abused: Not on file     Physically abused: Not on file     Forced sexual activity: Not on file   Other Topics Concern   •  Service No   • Blood Transfusions No   • Caffeine Concern No   • Occupational Exposure No   • Hobby Hazards No   • Sleep Concern Yes   • Stress Concern No   • Weight Concern Yes   • Special Diet No   • Back Care No   • Exercise Yes   • Bike Helmet No   • Seat Belt Yes   • Self-Exams Yes   Social History Narrative    , no children.        Family History   Problem Relation Age of Onset   • Stroke Father         68yo   • Hypertension Father    • Heart Disease Father         70 yo   • Cancer Maternal Grandfather         Lung   • Diabetes Paternal Grandmother    • Hypertension Paternal Grandmother    • Stroke Paternal Grandmother         70 s   • Psychiatric Illness Sister         Schizophrenia   • Psychiatric Illness Brother         Schizophrenia   • Cancer Paternal Aunt         Bone, liver   • Psychiatric Illness Mother         dental / mental illness    • Lung Disease Mother         Copd    • Psychiatric  Illness Sister         schizoprenia    • Diabetes Sister    • Hypertension Sister    • Psychiatric Illness Brother    • Heart Disease Brother    • Hypertension Brother    • Hypertension Brother    • Diabetes Brother        Allergies  Iodine, Morphine, and Shellfish allergy    Review of Systems  Negative except for Nausea, vomiting, abdominal pain    Physical Exam   Afebrile  Chest clear  Heart regular  Abdomen soft with mild epigastric and LUQ tenderness, no guarding or rebound    Vital Signs  Blood Pressure: 110/53   Temperature: 35.9 °C (96.6 °F)   Pulse: 75   Respiration: 18   Pulse Oximetry: 96 %       Labs:  Recent Labs     08/25/20 2052   WBC 4.1*   RBC 5.11   HEMOGLOBIN 13.3   HEMATOCRIT 41.3   MCV 80.8*   MCH 26.0*   MCHC 32.2*   RDW 42.3   PLATELETCT 233   MPV 9.5     Recent Labs     08/25/20 2052   SODIUM 134*   POTASSIUM 4.0   CHLORIDE 94*   CO2 22   GLUCOSE 75   BUN 7*   CREATININE 0.57   CALCIUM 10.2         Recent Labs     08/25/20 2052   ASTSGOT 20   ALTSGPT 16   TBILIRUBIN 0.4   ALKPHOSPHAT 110*   GLOBULIN 5.0*       Radiology:  US-RUQ   Final Result      1. Echogenic liver, most commonly hepatic steatosis.      2. No gallstone. No sonographic evidence of acute cholecystitis.         CT-ABDOMEN-PELVIS WITH   Final Result         1.  No acute abnormality.      DX-UPPER GI-SERIES WITH KUB    (Results Pending)         Assessment/Plan:  Abdominal pain, nausea and vomiting associated with eating 1 month post sleeve gastrectomy. CT scan looks normal. The differential includes ulcer with stricture and gallbladder disease. I will get an upper GI study and abdominal ultrasound. We will make further plans pending test results. The patient agrees with the plan as outlined and questions were answered.    John Ganser, MD  [facility]  [phone]

## 2020-08-26 NOTE — ED TRIAGE NOTES
.  Chief Complaint   Patient presents with   • Nausea     for the last 2 weeks,  abd surgery on the 28th. Pt talked to a provider at [facility] who told her to come to the ED. Pt feels nauseated after drinking fluids, has decreased fluid intake in the past 2 weeks.    • Epigastric Pain     for the past 2 weeks,

## 2020-08-26 NOTE — PROGRESS NOTES
A&Ox4, reports epigastric pain, declines medication. Reports nausea has subsided.  Lap stabs from previous surgery healed,  Hypo BS, voiding.  Advised of important of oral care while NPO.  Mouth swabs, tooth brush and tooth paste available.  Patient verbalized understanding.

## 2020-08-26 NOTE — ED PROVIDER NOTES
ED Provider Note    CHIEF COMPLAINT  Chief Complaint   Patient presents with   • Nausea     for the last 2 weeks,  abd surgery on the 28th. Pt talked to a provider at [place] [facility] who told her to come to the ED. Pt feels nauseated after drinking fluids, has decreased fluid intake in the past 2 weeks.    • Epigastric Pain     for the past 2 weeks,        HPI  HPI       60-year-old female with past medical history of bariatric surgery with sleeve 2 weeks ago presents the emergency department with chief complaint of abdominal pain and vomiting for the last several weeks.  Patient reports that she had had progressive abdominal pain that she describes as achy and mostly present in her upper abdomen with no radiation.  Patient states that her pain is mostly on the middle and left side of her abdomen.  Patient denies any fever.  Patient denies any dysuria.  Patient denies any cough or shortness of breath.  Patient denies any chest pain.    Patient states that she was seen by her bariatric surgery office today and feels like she has had more difficult time keeping stuff down and is noticing more reflux than usual.    Patient states that she is also been experiencing more pain that she describes as achy upper abdominal pain than usual.    REVIEW OF SYSTEMS  Review of Systems   Constitutional: Negative.  Negative for fever.   HENT: Negative.  Negative for ear pain and sore throat.    Eyes: Negative.  Negative for pain.   Respiratory: Negative.  Negative for shortness of breath.    Cardiovascular: Negative.  Negative for chest pain.   Gastrointestinal: Positive for abdominal pain, nausea and vomiting. Negative for blood in stool, constipation, diarrhea and melena.   Genitourinary: Negative for dysuria and flank pain.   Musculoskeletal: Negative for back pain, myalgias and neck pain.   Skin: Negative.  Negative for rash.   Neurological: Negative for focal weakness, seizures, weakness and headaches.   Endo/Heme/Allergies:  Does not bruise/bleed easily.   Psychiatric/Behavioral: Negative for hallucinations and suicidal ideas.   All other systems reviewed and are negative.      PAST MEDICAL HISTORY   has a past medical history of Abnormal EKG - possible inferior infarct seen from 2014, Acute nasopharyngitis (02/21/2020), Allergy, Anemia, Anesthesia, Arrhythmia, Arthritis, Cancer (HCC) (2011), Colorectal cancer (HCC) (April 2011), Colorectal cancer (HCC), Dyslipidemia (1/29/2020), GERD (gastroesophageal reflux disease), Graves disease, Heart burn, History of anemia (02/2020), Hyperthyroidism (12/18/2018), Indigestion, Migraine, Myocardial infarct (HCC), Pain (2020), Palpitations, and Snoring (02/2020).    SOCIAL HISTORY  Social History     Tobacco Use   • Smoking status: Never Smoker   • Smokeless tobacco: Never Used   • Tobacco comment: significant 2nd hand exposure    Substance and Sexual Activity   • Alcohol use: No     Alcohol/week: 0.6 oz     Types: 1 Standard drinks or equivalent per week   • Drug use: No   • Sexual activity: Yes     Partners: Female       SURGICAL HISTORY   has a past surgical history that includes gastric banding laparoscopic (2005); joint injection diagnostic (3/8/2013); other abdominal surgery; knee arthroscopy (3/8/2013); medial meniscectomy (3/8/2013); thyroidectomy total (Bilateral, 1/8/2020); hernia repair; abdominal hysterectomy total (2003); thyroidectomy (01/08/2020); knee scope,diagnostic (Right, 3/5/2020); medial meniscectomy (Right, 3/5/2020); joint injection diagnostic (Left, 3/5/2020); and lap, rose mary restrict proc, longitudinal gas* (N/A, 7/28/2020).    CURRENT MEDICATIONS  Home Medications    **Home medications have not yet been reviewed for this encounter**         ALLERGIES  Allergies   Allergen Reactions   • Iodine Itching   • Morphine Itching     blisters   • Shellfish Allergy Vomiting       PHYSICAL EXAM  VITAL SIGNS: /75   Pulse 94   Temp 36.1 °C (96.9 °F) (Oral)   Resp (!) 23   Ht  "1.651 m (5' 5\")   Wt 98.6 kg (217 lb 6 oz)   SpO2 95%   BMI 36.17 kg/m²  @MADALYN[[id number]::@  Pulse ox interpretation: I interpret this pulse ox as normal.    Physical Exam   Constitutional: She is oriented to person, place, and time and well-developed, well-nourished, and in no distress.   HENT:   Head: Normocephalic and atraumatic.   Right Ear: External ear normal.   Left Ear: External ear normal.   Eyes: Conjunctivae and EOM are normal. No scleral icterus.   Neck: Normal range of motion.   Cardiovascular: Normal rate.   Pulmonary/Chest: Effort normal. No stridor. No respiratory distress. She has no wheezes.   Abdominal: She exhibits no distension. There is abdominal tenderness.   Epigastric and left upper quadrant tenderness palpation.  Well-appearing postoperative scars with no surrounding erythema or edema.   Musculoskeletal: Normal range of motion.         General: No deformity or edema.   Neurological: She is alert and oriented to person, place, and time. Coordination normal.   Skin: Skin is warm and dry. No rash noted. No erythema.   Psychiatric: Affect and judgment normal.       DIAGNOSTIC STUDIES / PROCEDURES    LABS/EKG  Results for orders placed or performed during the hospital encounter of 08/25/20   CBC WITH DIFFERENTIAL   Result Value Ref Range    WBC 4.1 (L) 4.8 - 10.8 K/uL    RBC 5.11 4.20 - 5.40 M/uL    Hemoglobin 13.3 12.0 - 16.0 g/dL    Hematocrit 41.3 37.0 - 47.0 %    MCV 80.8 (L) 81.4 - 97.8 fL    MCH 26.0 (L) 27.0 - 33.0 pg    MCHC 32.2 (L) 33.6 - 35.0 g/dL    RDW 42.3 35.9 - 50.0 fL    Platelet Count 233 164 - 446 K/uL    MPV 9.5 9.0 - 12.9 fL    Neutrophils-Polys 42.30 (L) 44.00 - 72.00 %    Lymphocytes 39.10 22.00 - 41.00 %    Monocytes 18.20 (H) 0.00 - 13.40 %    Eosinophils 0.20 0.00 - 6.90 %    Basophils 0.20 0.00 - 1.80 %    Immature Granulocytes 0.00 0.00 - 0.90 %    Nucleated RBC 0.00 /100 WBC    Neutrophils (Absolute) 1.72 (L) 2.00 - 7.15 K/uL    Lymphs (Absolute) 1.59 1.00 - 4.80 K/uL "    Monos (Absolute) 0.74 0.00 - 0.85 K/uL    Eos (Absolute) 0.01 0.00 - 0.51 K/uL    Baso (Absolute) 0.01 0.00 - 0.12 K/uL    Immature Granulocytes (abs) 0.00 0.00 - 0.11 K/uL    NRBC (Absolute) 0.00 K/uL   COMP METABOLIC PANEL   Result Value Ref Range    Sodium 134 (L) 135 - 145 mmol/L    Potassium 4.0 3.6 - 5.5 mmol/L    Chloride 94 (L) 96 - 112 mmol/L    Co2 22 20 - 33 mmol/L    Anion Gap 18.0 (H) 7.0 - 16.0    Glucose 75 65 - 99 mg/dL    Bun 7 (L) 8 - 22 mg/dL    Creatinine 0.57 0.50 - 1.40 mg/dL    Calcium 10.2 8.4 - 10.2 mg/dL    AST(SGOT) 20 12 - 45 U/L    ALT(SGPT) 16 2 - 50 U/L    Alkaline Phosphatase 110 (H) 30 - 99 U/L    Total Bilirubin 0.4 0.1 - 1.5 mg/dL    Albumin 4.0 3.2 - 4.9 g/dL    Total Protein 9.0 (H) 6.0 - 8.2 g/dL    Globulin 5.0 (H) 1.9 - 3.5 g/dL    A-G Ratio 0.8 g/dL   LIPASE   Result Value Ref Range    Lipase 85 (H) 7 - 58 U/L   URINALYSIS,CULTURE IF INDICATED    Specimen: Urine, Clean Catch; Blood   Result Value Ref Range    Color Yellow     Character Clear     Specific Gravity 1.025 <1.035    Ph 6.0 5.0 - 8.0    Glucose Negative Negative mg/dL    Ketones >=80 (A) Negative mg/dL    Protein Negative Negative mg/dL    Bilirubin Moderate (A) Negative    Nitrite Negative Negative    Leukocyte Esterase Negative Negative    Occult Blood Small (A) Negative    Micro Urine Req Microscopic    URINE MICROSCOPIC (W/UA)   Result Value Ref Range    WBC 0-2 /hpf    RBC 2-5 (A) /hpf    Bacteria Rare (A) None /hpf    Epithelial Cells Few Few /hpf    Mucous Threads Moderate /hpf   ESTIMATED GFR   Result Value Ref Range    GFR If African American >60 >60 mL/min/1.73 m 2    GFR If Non African American >60 >60 mL/min/1.73 m 2   LACTIC ACID   Result Value Ref Range    Lactic Acid 1.3 0.5 - 2.0 mmol/L   TROPONIN   Result Value Ref Range    Troponin T 7 6 - 19 ng/L   COVID/SARS CoV-2 PCR    Specimen: Nasopharyngeal; Respirate   Result Value Ref Range    COVID Order Status Received    EKG   Result Value Ref Range     Report       [facility] Emergency Dept.    Test Date:  2020  Pt Name:    ZECHARIAH GALLOWAY                    Department: [facility]  MRN:        [id number]                      Room:       -ROOM 8  Gender:     Female                       Technician: JACEK  :        1960                   Requested By:SPENCER MENDOZA  Order #:    [id number]                    Reading MD:    Measurements  Intervals                                Axis  Rate:       79                           P:          58  WI:         208                          QRS:        16  QRSD:       88                           T:          -4  QT:         380  QTc:        436    Interpretive Statements  SINUS RHYTHM  BORDERLINE AV CONDUCTION DELAY  EARLY PRECORDIAL R/S TRANSITION  PROBABLE LEFT VENTRICULAR HYPERTROPHY  BORDERLINE T ABNORMALITIES, INFERIOR LEADS  Compared to ECG 2020 10:14:36  T-wave abnormality now present       12 Lead EKG interpreted by me to show:  Normal sinus rhythm  Rate 79  Axis: Normal  Intervals: Normal  Normal T waves  Normal ST segments  My impression of this EKG: No STEMI.  Early precordial RS transition.  Probable LVH.  Borderline T wave abnormalities in inferior leads.    RADIOLOGY  CT-ABDOMEN-PELVIS WITH   Final Result         1.  No acute abnormality.      DX-UPPER GI-SERIES WITH KUB    (Results Pending)        COURSE & MEDICAL DECISION MAKING  Pertinent Labs & Imaging studies reviewed by me. (See chart for details)  I verified that the patient was wearing a mask and I was wearing appropriate PPE every time I entered the room. The patient's mask was on the patient at all times during my encounter except for a brief view of the oropharynx.     60 y.o. female PMH recent bariatric sleeve surgery p/w upper abdominal pain and nausea and vomiting.     Differential diagnosis includes but is not limited to:  CT scan ordered by me given recent surgery and high complication rates with said surgery.  Patient  noted to have mildly elevated lipase.  Patient given IV fluids for vomiting on my reassessment patient with improved symptoms after IV fluids.  Patient denies any dysuria and noted to have scant red blood cells epithelial cells in urine, this does not represent UTI.    11:06 PM patient feeling incredibly nauseous after attempting to drink oral contrast material.  Lactate and troponin EKG ordered by me.  I called to radiology to inform that she was unable to tolerate oral contrast and she will attempt to gulp and go.     Patient given both Compazine for nausea and then approximately 1 hour later Zofran as patient feels persistently nauseous despite taking antiemetics at home.    I discussed this case with Dr. Ganser who agrees to admit patient under his service and requested GI series to be obtained this a.m.  I placed holding orders for patient and updated patient and partner with plan of care    Patient noted to Dr. Ganser in guarded condition    FINAL IMPRESSION  Visit Diagnoses     ICD-10-CM   1. Acute abdominal pain  R10.9   2. Nausea  R11.0   3. Intractable vomiting with nausea, unspecified vomiting type  R11.2   4. Elevated lipase  R74.8              Electronically signed by: Patrice Roque M.D., 8/25/2020 10:50 PM

## 2020-08-27 VITALS
RESPIRATION RATE: 17 BRPM | HEIGHT: 65 IN | OXYGEN SATURATION: 96 % | TEMPERATURE: 97.6 F | BODY MASS INDEX: 36.22 KG/M2 | DIASTOLIC BLOOD PRESSURE: 44 MMHG | WEIGHT: 217.37 LBS | SYSTOLIC BLOOD PRESSURE: 112 MMHG | HEART RATE: 77 BPM

## 2020-08-27 PROCEDURE — 96376 TX/PRO/DX INJ SAME DRUG ADON: CPT

## 2020-08-27 PROCEDURE — C9113 INJ PANTOPRAZOLE SODIUM, VIA: HCPCS | Performed by: SURGERY

## 2020-08-27 PROCEDURE — 700101 HCHG RX REV CODE 250: Performed by: EMERGENCY MEDICINE

## 2020-08-27 PROCEDURE — 700111 HCHG RX REV CODE 636 W/ 250 OVERRIDE (IP): Performed by: SURGERY

## 2020-08-27 PROCEDURE — G0378 HOSPITAL OBSERVATION PER HR: HCPCS

## 2020-08-27 RX ADMIN — PANTOPRAZOLE SODIUM 40 MG: 40 INJECTION, POWDER, LYOPHILIZED, FOR SOLUTION INTRAVENOUS at 05:14

## 2020-08-27 RX ADMIN — POTASSIUM CHLORIDE AND SODIUM CHLORIDE: 900; 150 INJECTION, SOLUTION INTRAVENOUS at 01:31

## 2020-08-27 NOTE — PROGRESS NOTES
Went over all d/c instructions with pt.all questions answered.  Escorted out via w/c with ortho tech assist.

## 2020-08-27 NOTE — DISCHARGE INSTRUCTIONS
Discharge Instructions    Discharged to home by car with relative. Discharged via wheelchair, hospital escort: Yes.  Special equipment needed: Not Applicable    Be sure to schedule a follow-up appointment with your primary care doctor or any specialists as instructed.     Discharge Plan:   Diet Plan: Discussed  Activity Level: Discussed  Confirmed Follow up Appointment: Patient to Call and Schedule Appointment  Confirmed Symptoms Management: Discussed  Medication Reconciliation Updated: No (Comments)    I understand that a diet low in cholesterol, fat, and sodium is recommended for good health. Unless I have been given specific instructions below for another diet, I accept this instruction as my diet prescription.   Other diet: diet as tolerated per MD    Special Instructions: None    · Is patient discharged on Warfarin / Coumadin?   No     Depression / Suicide Risk    As you are discharged from this [facility] facility, it is important to learn how to keep safe from harming yourself.    Recognize the warning signs:  · Abrupt changes in personality, positive or negative- including increase in energy   · Giving away possessions  · Change in eating patterns- significant weight changes-  positive or negative  · Change in sleeping patterns- unable to sleep or sleeping all the time   · Unwillingness or inability to communicate  · Depression  · Unusual sadness, discouragement and loneliness  · Talk of wanting to die  · Neglect of personal appearance   · Rebelliousness- reckless behavior  · Withdrawal from people/activities they love  · Confusion- inability to concentrate     If you or a loved one observes any of these behaviors or has concerns about self-harm, here's what you can do:  · Talk about it- your feelings and reasons for harming yourself  · Remove any means that you might use to hurt yourself (examples: pills, rope, extension cords, firearm)  · Get professional help from the community (Mental Health,  Substance Abuse, psychological counseling)  · Do not be alone:Call your Safe Contact- someone whom you trust who will be there for you.  · Call your local CRISIS HOTLINE 020-9131 or 722-909-6444  · Call your local Children's Mobile Crisis Response Team [place] (333) 658-2950 or www.SiBEAM  · Call the toll free National Suicide Prevention Hotlines   · National Suicide Prevention Lifeline 518-817-RQQZ (2671)  · National Hope Line Network 800-SUICIDE (937-3894)

## 2020-08-27 NOTE — PROGRESS NOTES
"Received report from day shift RN. Assumed care of patient. Patient is currently AOx4, reporting some pain still present in the LUQ region, but reports that it has improved from yesterday. Reporting some nausea and a HA at this time as well. Stated that her nausea \"did come after eating it seemed like.\" I offered to give Zofran and the patient stated, \"that makes the HA worse it seems like. I can just hold off.\" Offered alternatives such as ice and some juice due to it possibly being low blood sugar due to not eating. Patient declined.   Reporting no SOB or chest pain. VSS. Plan of care reviewed with the patient and daughter at bedside. Patient and daughter very pleasant and verbalized understanding with care. Hourly rounding in place. Bed locked and in lowest position, call light and belongings within reach. Will continue to monitor ESPARZA and nausea.   "

## 2020-08-27 NOTE — PROGRESS NOTES
Received report from noc shift nurse. Assumed pt care at 0715. Pt is A&Ox4, resting comfortably in bed. Pt on r.a. No signs of SOB/respiratory distress. Labs noted, VSS. Pt c/o no pain at this moment. Assessment complete. Dr. Ganser in to see patient. Fall precautions in place. Bed at lowest position. Call light and personal belongings within reach.

## 2020-08-27 NOTE — PROGRESS NOTES
"Progress Note:    S: Doing much better  Pain improved  Tolerating liquid diet    O:  Recent Labs     08/25/20 2052   WBC 4.1*   RBC 5.11   HEMOGLOBIN 13.3   HEMATOCRIT 41.3   MCV 80.8*   MCH 26.0*   MCHC 32.2*   RDW 42.3   PLATELETCT 233   MPV 9.5     Recent Labs     08/25/20 2052   SODIUM 134*   POTASSIUM 4.0   CHLORIDE 94*   CO2 22   GLUCOSE 75   BUN 7*   CREATININE 0.57   CALCIUM 10.2         Current Facility-Administered Medications   Medication Dose   • HYDROmorphone pf (DILAUDID) injection 0.5 mg  0.5 mg   • ondansetron (ZOFRAN) syringe/vial injection 4 mg  4 mg   • 0.9 % NaCl with KCl 20 mEq infusion     • pantoprazole (PROTONIX) injection 40 mg  40 mg       PE:  /44   Pulse 77   Temp 36.4 °C (97.6 °F) (Oral)   Resp 17   Ht 1.651 m (5' 5\")   Wt 98.6 kg (217 lb 6 oz)   SpO2 96%     Intake/Output Summary (Last 24 hours) at 8/27/2020 0833  Last data filed at 8/27/2020 0500  Gross per 24 hour   Intake 2760 ml   Output 1300 ml   Net 1460 ml       Abd soft, nontender    Rads:  DX-UPPER GI-SERIES WITH KUB   Final Result      No evidence of leak or obstruction.      Slight delay in emptying of the stomach. However, after 10 minutes contrast is seen throughout the duodenum and small bowel suggesting of no obstruction.      Patient can only drink small amount of contrast. Patient experienced nausea with reflux/regurgitation easily.      US-RUQ   Final Result      1. Echogenic liver, most commonly hepatic steatosis.      2. No gallstone. No sonographic evidence of acute cholecystitis.         CT-ABDOMEN-PELVIS WITH   Final Result         1.  No acute abnormality.          A: I suspect she had some gastritis from not taking PPI post op. No stricture or surgical problem        P: Discharge.   Continue PPI  Follow up next week    John Ganser M.D.  [facility]  "

## 2020-08-27 NOTE — CARE PLAN
Problem: Safety  Goal: Will remain free from injury  Outcome: PROGRESSING AS EXPECTED  Note: Safety precautions in place. Patient educated to use call light when needing assistance, patient verbalized understanding.      Problem: Pain Management  Goal: Pain level will decrease to patient's comfort goal  Outcome: PROGRESSING AS EXPECTED  Note: Patient stated that her pain has improved from the day before. Will continue to monitor LUQ pain

## 2020-08-29 LAB — EKG IMPRESSION: NORMAL

## 2020-09-08 ENCOUNTER — HOSPITAL ENCOUNTER (INPATIENT)
Facility: MEDICAL CENTER | Age: 60
LOS: 7 days | DRG: 392 | End: 2020-09-15
Attending: EMERGENCY MEDICINE | Admitting: INTERNAL MEDICINE
Payer: COMMERCIAL

## 2020-09-08 DIAGNOSIS — R11.2 INTRACTABLE NAUSEA AND VOMITING: ICD-10-CM

## 2020-09-08 DIAGNOSIS — R10.13 EPIGASTRIC PAIN: ICD-10-CM

## 2020-09-08 PROBLEM — E83.42 HYPOMAGNESEMIA: Status: ACTIVE | Noted: 2020-09-08

## 2020-09-08 PROBLEM — E87.20 METABOLIC ACIDOSIS: Status: ACTIVE | Noted: 2020-09-08

## 2020-09-08 PROBLEM — E87.6 HYPOKALEMIA: Status: ACTIVE | Noted: 2020-09-08

## 2020-09-08 LAB
ALBUMIN SERPL BCP-MCNC: 4.2 G/DL (ref 3.2–4.9)
ALBUMIN/GLOB SERPL: 0.9 G/DL
ALP SERPL-CCNC: 100 U/L (ref 30–99)
ALT SERPL-CCNC: 24 U/L (ref 2–50)
ANION GAP SERPL CALC-SCNC: 20 MMOL/L (ref 7–16)
APPEARANCE UR: CLEAR
AST SERPL-CCNC: 20 U/L (ref 12–45)
BACTERIA #/AREA URNS HPF: NEGATIVE /HPF
BASOPHILS # BLD AUTO: 0.3 % (ref 0–1.8)
BASOPHILS # BLD: 0.01 K/UL (ref 0–0.12)
BILIRUB SERPL-MCNC: 0.5 MG/DL (ref 0.1–1.5)
BILIRUB UR QL STRIP.AUTO: ABNORMAL
BUN SERPL-MCNC: 5 MG/DL (ref 8–22)
CALCIUM SERPL-MCNC: 9.7 MG/DL (ref 8.5–10.5)
CHLORIDE SERPL-SCNC: 88 MMOL/L (ref 96–112)
CO2 SERPL-SCNC: 26 MMOL/L (ref 20–33)
COLOR UR: YELLOW
COVID ORDER STATUS COVID19: NORMAL
CREAT SERPL-MCNC: 0.53 MG/DL (ref 0.5–1.4)
EOSINOPHIL # BLD AUTO: 0.01 K/UL (ref 0–0.51)
EOSINOPHIL NFR BLD: 0.3 % (ref 0–6.9)
EPI CELLS #/AREA URNS HPF: NEGATIVE /HPF
ERYTHROCYTE [DISTWIDTH] IN BLOOD BY AUTOMATED COUNT: 44.1 FL (ref 35.9–50)
GLOBULIN SER CALC-MCNC: 4.6 G/DL (ref 1.9–3.5)
GLUCOSE SERPL-MCNC: 70 MG/DL (ref 65–99)
GLUCOSE UR STRIP.AUTO-MCNC: NEGATIVE MG/DL
HCT VFR BLD AUTO: 42.5 % (ref 37–47)
HGB BLD-MCNC: 13.5 G/DL (ref 12–16)
HYALINE CASTS #/AREA URNS LPF: ABNORMAL /LPF
IMM GRANULOCYTES # BLD AUTO: 0.01 K/UL (ref 0–0.11)
IMM GRANULOCYTES NFR BLD AUTO: 0.3 % (ref 0–0.9)
KETONES UR STRIP.AUTO-MCNC: >=80 MG/DL
LEUKOCYTE ESTERASE UR QL STRIP.AUTO: NEGATIVE
LIPASE SERPL-CCNC: 43 U/L (ref 11–82)
LYMPHOCYTES # BLD AUTO: 1.16 K/UL (ref 1–4.8)
LYMPHOCYTES NFR BLD: 34.3 % (ref 22–41)
MAGNESIUM SERPL-MCNC: 1.7 MG/DL (ref 1.5–2.5)
MCH RBC QN AUTO: 26.8 PG (ref 27–33)
MCHC RBC AUTO-ENTMCNC: 31.8 G/DL (ref 33.6–35)
MCV RBC AUTO: 84.5 FL (ref 81.4–97.8)
MICRO URNS: ABNORMAL
MONOCYTES # BLD AUTO: 0.56 K/UL (ref 0–0.85)
MONOCYTES NFR BLD AUTO: 16.6 % (ref 0–13.4)
NEUTROPHILS # BLD AUTO: 1.63 K/UL (ref 2–7.15)
NEUTROPHILS NFR BLD: 48.2 % (ref 44–72)
NITRITE UR QL STRIP.AUTO: NEGATIVE
NRBC # BLD AUTO: 0 K/UL
NRBC BLD-RTO: 0 /100 WBC
PH UR STRIP.AUTO: 6.5 [PH] (ref 5–8)
PLATELET # BLD AUTO: 248 K/UL (ref 164–446)
PMV BLD AUTO: 9.8 FL (ref 9–12.9)
POTASSIUM SERPL-SCNC: 3 MMOL/L (ref 3.6–5.5)
PROT SERPL-MCNC: 8.8 G/DL (ref 6–8.2)
PROT UR QL STRIP: NEGATIVE MG/DL
RBC # BLD AUTO: 5.03 M/UL (ref 4.2–5.4)
RBC # URNS HPF: ABNORMAL /HPF
RBC UR QL AUTO: ABNORMAL
SODIUM SERPL-SCNC: 134 MMOL/L (ref 135–145)
SP GR UR STRIP.AUTO: 1.02
UROBILINOGEN UR STRIP.AUTO-MCNC: 0.2 MG/DL
WBC # BLD AUTO: 3.4 K/UL (ref 4.8–10.8)
WBC #/AREA URNS HPF: ABNORMAL /HPF

## 2020-09-08 PROCEDURE — C9803 HOPD COVID-19 SPEC COLLECT: HCPCS | Performed by: INTERNAL MEDICINE

## 2020-09-08 PROCEDURE — 99222 1ST HOSP IP/OBS MODERATE 55: CPT | Performed by: INTERNAL MEDICINE

## 2020-09-08 PROCEDURE — 700102 HCHG RX REV CODE 250 W/ 637 OVERRIDE(OP): Performed by: EMERGENCY MEDICINE

## 2020-09-08 PROCEDURE — 83690 ASSAY OF LIPASE: CPT

## 2020-09-08 PROCEDURE — 96375 TX/PRO/DX INJ NEW DRUG ADDON: CPT

## 2020-09-08 PROCEDURE — 700101 HCHG RX REV CODE 250: Performed by: INTERNAL MEDICINE

## 2020-09-08 PROCEDURE — 81001 URINALYSIS AUTO W/SCOPE: CPT

## 2020-09-08 PROCEDURE — 770006 HCHG ROOM/CARE - MED/SURG/GYN SEMI*

## 2020-09-08 PROCEDURE — A9270 NON-COVERED ITEM OR SERVICE: HCPCS | Performed by: INTERNAL MEDICINE

## 2020-09-08 PROCEDURE — 700105 HCHG RX REV CODE 258: Performed by: STUDENT IN AN ORGANIZED HEALTH CARE EDUCATION/TRAINING PROGRAM

## 2020-09-08 PROCEDURE — 96376 TX/PRO/DX INJ SAME DRUG ADON: CPT

## 2020-09-08 PROCEDURE — 700102 HCHG RX REV CODE 250 W/ 637 OVERRIDE(OP): Performed by: INTERNAL MEDICINE

## 2020-09-08 PROCEDURE — 80053 COMPREHEN METABOLIC PANEL: CPT

## 2020-09-08 PROCEDURE — 36415 COLL VENOUS BLD VENIPUNCTURE: CPT

## 2020-09-08 PROCEDURE — 83735 ASSAY OF MAGNESIUM: CPT

## 2020-09-08 PROCEDURE — 700111 HCHG RX REV CODE 636 W/ 250 OVERRIDE (IP): Performed by: INTERNAL MEDICINE

## 2020-09-08 PROCEDURE — U0003 INFECTIOUS AGENT DETECTION BY NUCLEIC ACID (DNA OR RNA); SEVERE ACUTE RESPIRATORY SYNDROME CORONAVIRUS 2 (SARS-COV-2) (CORONAVIRUS DISEASE [COVID-19]), AMPLIFIED PROBE TECHNIQUE, MAKING USE OF HIGH THROUGHPUT TECHNOLOGIES AS DESCRIBED BY CMS-2020-01-R: HCPCS

## 2020-09-08 PROCEDURE — 96374 THER/PROPH/DIAG INJ IV PUSH: CPT

## 2020-09-08 PROCEDURE — 99285 EMERGENCY DEPT VISIT HI MDM: CPT

## 2020-09-08 PROCEDURE — 85025 COMPLETE CBC W/AUTO DIFF WBC: CPT

## 2020-09-08 PROCEDURE — 700111 HCHG RX REV CODE 636 W/ 250 OVERRIDE (IP): Performed by: STUDENT IN AN ORGANIZED HEALTH CARE EDUCATION/TRAINING PROGRAM

## 2020-09-08 PROCEDURE — A9270 NON-COVERED ITEM OR SERVICE: HCPCS | Performed by: EMERGENCY MEDICINE

## 2020-09-08 RX ORDER — ENALAPRILAT 1.25 MG/ML
1.25 INJECTION INTRAVENOUS EVERY 6 HOURS PRN
Status: DISCONTINUED | OUTPATIENT
Start: 2020-09-08 | End: 2020-09-15 | Stop reason: HOSPADM

## 2020-09-08 RX ORDER — ONDANSETRON 2 MG/ML
4 INJECTION INTRAMUSCULAR; INTRAVENOUS EVERY 6 HOURS PRN
Status: DISCONTINUED | OUTPATIENT
Start: 2020-09-08 | End: 2020-09-15 | Stop reason: HOSPADM

## 2020-09-08 RX ORDER — AMOXICILLIN 250 MG
2 CAPSULE ORAL 2 TIMES DAILY
Status: DISCONTINUED | OUTPATIENT
Start: 2020-09-08 | End: 2020-09-11

## 2020-09-08 RX ORDER — ONDANSETRON 2 MG/ML
4 INJECTION INTRAMUSCULAR; INTRAVENOUS ONCE
Status: COMPLETED | OUTPATIENT
Start: 2020-09-08 | End: 2020-09-08

## 2020-09-08 RX ORDER — SODIUM CHLORIDE 9 MG/ML
1000 INJECTION, SOLUTION INTRAVENOUS ONCE
Status: COMPLETED | OUTPATIENT
Start: 2020-09-08 | End: 2020-09-09

## 2020-09-08 RX ORDER — LEVOTHYROXINE SODIUM 0.1 MG/1
100 TABLET ORAL DAILY
Status: DISCONTINUED | OUTPATIENT
Start: 2020-09-09 | End: 2020-09-15 | Stop reason: HOSPADM

## 2020-09-08 RX ORDER — PROMETHAZINE HYDROCHLORIDE 25 MG/1
25 SUPPOSITORY RECTAL EVERY 6 HOURS PRN
Status: COMPLETED | OUTPATIENT
Start: 2020-09-08 | End: 2020-09-11

## 2020-09-08 RX ORDER — PROMETHAZINE HYDROCHLORIDE 25 MG/1
25 TABLET ORAL EVERY 6 HOURS PRN
Status: DISCONTINUED | OUTPATIENT
Start: 2020-09-08 | End: 2020-09-15 | Stop reason: HOSPADM

## 2020-09-08 RX ORDER — PANTOPRAZOLE SODIUM 40 MG/10ML
40 INJECTION, POWDER, LYOPHILIZED, FOR SOLUTION INTRAVENOUS ONCE
Status: COMPLETED | OUTPATIENT
Start: 2020-09-08 | End: 2020-09-09

## 2020-09-08 RX ORDER — POLYETHYLENE GLYCOL 3350 17 G/17G
1 POWDER, FOR SOLUTION ORAL
Status: DISCONTINUED | OUTPATIENT
Start: 2020-09-08 | End: 2020-09-11

## 2020-09-08 RX ORDER — BISACODYL 10 MG
10 SUPPOSITORY, RECTAL RECTAL
Status: DISCONTINUED | OUTPATIENT
Start: 2020-09-08 | End: 2020-09-11

## 2020-09-08 RX ORDER — DEXTROSE MONOHYDRATE, SODIUM CHLORIDE, AND POTASSIUM CHLORIDE 50; 1.49; 4.5 G/1000ML; G/1000ML; G/1000ML
INJECTION, SOLUTION INTRAVENOUS CONTINUOUS
Status: ACTIVE | OUTPATIENT
Start: 2020-09-09 | End: 2020-09-09

## 2020-09-08 RX ORDER — POTASSIUM CHLORIDE 20 MEQ/1
40 TABLET, EXTENDED RELEASE ORAL ONCE
Status: ACTIVE | OUTPATIENT
Start: 2020-09-08 | End: 2020-09-09

## 2020-09-08 RX ORDER — POTASSIUM CHLORIDE 7.45 MG/ML
10 INJECTION INTRAVENOUS ONCE
Status: DISCONTINUED | OUTPATIENT
Start: 2020-09-08 | End: 2020-09-08

## 2020-09-08 RX ADMIN — SODIUM CHLORIDE 1000 ML: 9 INJECTION, SOLUTION INTRAVENOUS at 22:03

## 2020-09-08 RX ADMIN — POTASSIUM CHLORIDE, DEXTROSE MONOHYDRATE AND SODIUM CHLORIDE: 150; 5; 450 INJECTION, SOLUTION INTRAVENOUS at 23:39

## 2020-09-08 RX ADMIN — ONDANSETRON 4 MG: 2 INJECTION INTRAMUSCULAR; INTRAVENOUS at 23:39

## 2020-09-08 RX ADMIN — ONDANSETRON 4 MG: 2 INJECTION INTRAMUSCULAR; INTRAVENOUS at 22:02

## 2020-09-08 RX ADMIN — FENTANYL CITRATE 50 MCG: 50 INJECTION INTRAMUSCULAR; INTRAVENOUS at 22:26

## 2020-09-08 ASSESSMENT — FIBROSIS 4 INDEX: FIB4 SCORE: 1.27

## 2020-09-09 LAB
ALBUMIN SERPL BCP-MCNC: 3.1 G/DL (ref 3.2–4.9)
ALBUMIN/GLOB SERPL: 0.9 G/DL
ALP SERPL-CCNC: 73 U/L (ref 30–99)
ALT SERPL-CCNC: 17 U/L (ref 2–50)
ANION GAP SERPL CALC-SCNC: 11 MMOL/L (ref 7–16)
ANION GAP SERPL CALC-SCNC: 11 MMOL/L (ref 7–16)
AST SERPL-CCNC: 15 U/L (ref 12–45)
BASOPHILS # BLD AUTO: 0 % (ref 0–1.8)
BASOPHILS # BLD: 0 K/UL (ref 0–0.12)
BILIRUB SERPL-MCNC: 0.4 MG/DL (ref 0.1–1.5)
BUN SERPL-MCNC: 3 MG/DL (ref 8–22)
BUN SERPL-MCNC: 3 MG/DL (ref 8–22)
CALCIUM SERPL-MCNC: 8.4 MG/DL (ref 8.5–10.5)
CALCIUM SERPL-MCNC: 8.4 MG/DL (ref 8.5–10.5)
CHLORIDE SERPL-SCNC: 98 MMOL/L (ref 96–112)
CHLORIDE SERPL-SCNC: 98 MMOL/L (ref 96–112)
CO2 SERPL-SCNC: 26 MMOL/L (ref 20–33)
CO2 SERPL-SCNC: 26 MMOL/L (ref 20–33)
CREAT SERPL-MCNC: 0.59 MG/DL (ref 0.5–1.4)
CREAT SERPL-MCNC: 0.59 MG/DL (ref 0.5–1.4)
EOSINOPHIL # BLD AUTO: 0.01 K/UL (ref 0–0.51)
EOSINOPHIL NFR BLD: 0.3 % (ref 0–6.9)
ERYTHROCYTE [DISTWIDTH] IN BLOOD BY AUTOMATED COUNT: 43.3 FL (ref 35.9–50)
ERYTHROCYTE [DISTWIDTH] IN BLOOD BY AUTOMATED COUNT: 43.3 FL (ref 35.9–50)
GLOBULIN SER CALC-MCNC: 3.6 G/DL (ref 1.9–3.5)
GLUCOSE SERPL-MCNC: 146 MG/DL (ref 65–99)
GLUCOSE SERPL-MCNC: 146 MG/DL (ref 65–99)
HCT VFR BLD AUTO: 34.4 % (ref 37–47)
HCT VFR BLD AUTO: 34.4 % (ref 37–47)
HGB BLD-MCNC: 11 G/DL (ref 12–16)
HGB BLD-MCNC: 11 G/DL (ref 12–16)
IMM GRANULOCYTES # BLD AUTO: 0 K/UL (ref 0–0.11)
IMM GRANULOCYTES NFR BLD AUTO: 0 % (ref 0–0.9)
LYMPHOCYTES # BLD AUTO: 1.2 K/UL (ref 1–4.8)
LYMPHOCYTES NFR BLD: 39.6 % (ref 22–41)
MCH RBC QN AUTO: 26.6 PG (ref 27–33)
MCH RBC QN AUTO: 26.6 PG (ref 27–33)
MCHC RBC AUTO-ENTMCNC: 32 G/DL (ref 33.6–35)
MCHC RBC AUTO-ENTMCNC: 32 G/DL (ref 33.6–35)
MCV RBC AUTO: 83.1 FL (ref 81.4–97.8)
MCV RBC AUTO: 83.1 FL (ref 81.4–97.8)
MONOCYTES # BLD AUTO: 0.68 K/UL (ref 0–0.85)
MONOCYTES NFR BLD AUTO: 22.4 % (ref 0–13.4)
NEUTROPHILS # BLD AUTO: 1.14 K/UL (ref 2–7.15)
NEUTROPHILS NFR BLD: 37.7 % (ref 44–72)
NRBC # BLD AUTO: 0 K/UL
NRBC BLD-RTO: 0 /100 WBC
PLATELET # BLD AUTO: 181 K/UL (ref 164–446)
PLATELET # BLD AUTO: 181 K/UL (ref 164–446)
PMV BLD AUTO: 9.5 FL (ref 9–12.9)
PMV BLD AUTO: 9.5 FL (ref 9–12.9)
POTASSIUM SERPL-SCNC: 3.3 MMOL/L (ref 3.6–5.5)
POTASSIUM SERPL-SCNC: 3.3 MMOL/L (ref 3.6–5.5)
PROT SERPL-MCNC: 6.7 G/DL (ref 6–8.2)
RBC # BLD AUTO: 4.14 M/UL (ref 4.2–5.4)
RBC # BLD AUTO: 4.14 M/UL (ref 4.2–5.4)
SARS-COV-2 RNA RESP QL NAA+PROBE: NOTDETECTED
SODIUM SERPL-SCNC: 135 MMOL/L (ref 135–145)
SODIUM SERPL-SCNC: 135 MMOL/L (ref 135–145)
SPECIMEN SOURCE: NORMAL
WBC # BLD AUTO: 3 K/UL (ref 4.8–10.8)
WBC # BLD AUTO: 3 K/UL (ref 4.8–10.8)

## 2020-09-09 PROCEDURE — 700111 HCHG RX REV CODE 636 W/ 250 OVERRIDE (IP): Performed by: INTERNAL MEDICINE

## 2020-09-09 PROCEDURE — 80053 COMPREHEN METABOLIC PANEL: CPT

## 2020-09-09 PROCEDURE — 36415 COLL VENOUS BLD VENIPUNCTURE: CPT

## 2020-09-09 PROCEDURE — A9270 NON-COVERED ITEM OR SERVICE: HCPCS | Performed by: INTERNAL MEDICINE

## 2020-09-09 PROCEDURE — 770006 HCHG ROOM/CARE - MED/SURG/GYN SEMI*

## 2020-09-09 PROCEDURE — 700102 HCHG RX REV CODE 250 W/ 637 OVERRIDE(OP): Performed by: NURSE PRACTITIONER

## 2020-09-09 PROCEDURE — 700101 HCHG RX REV CODE 250: Performed by: INTERNAL MEDICINE

## 2020-09-09 PROCEDURE — 700111 HCHG RX REV CODE 636 W/ 250 OVERRIDE (IP): Performed by: STUDENT IN AN ORGANIZED HEALTH CARE EDUCATION/TRAINING PROGRAM

## 2020-09-09 PROCEDURE — 700101 HCHG RX REV CODE 250: Performed by: HOSPITALIST

## 2020-09-09 PROCEDURE — A9270 NON-COVERED ITEM OR SERVICE: HCPCS | Performed by: NURSE PRACTITIONER

## 2020-09-09 PROCEDURE — C9113 INJ PANTOPRAZOLE SODIUM, VIA: HCPCS | Performed by: INTERNAL MEDICINE

## 2020-09-09 PROCEDURE — 85025 COMPLETE CBC W/AUTO DIFF WBC: CPT

## 2020-09-09 PROCEDURE — 700102 HCHG RX REV CODE 250 W/ 637 OVERRIDE(OP): Performed by: INTERNAL MEDICINE

## 2020-09-09 PROCEDURE — 99232 SBSQ HOSP IP/OBS MODERATE 35: CPT | Performed by: HOSPITALIST

## 2020-09-09 PROCEDURE — C9113 INJ PANTOPRAZOLE SODIUM, VIA: HCPCS | Performed by: STUDENT IN AN ORGANIZED HEALTH CARE EDUCATION/TRAINING PROGRAM

## 2020-09-09 RX ORDER — DEXTROSE MONOHYDRATE, SODIUM CHLORIDE, AND POTASSIUM CHLORIDE 50; 1.49; 4.5 G/1000ML; G/1000ML; G/1000ML
INJECTION, SOLUTION INTRAVENOUS CONTINUOUS
Status: DISCONTINUED | OUTPATIENT
Start: 2020-09-09 | End: 2020-09-10

## 2020-09-09 RX ORDER — ACETAMINOPHEN 500 MG
500 TABLET ORAL EVERY 4 HOURS PRN
Status: DISCONTINUED | OUTPATIENT
Start: 2020-09-09 | End: 2020-09-15 | Stop reason: HOSPADM

## 2020-09-09 RX ORDER — PANTOPRAZOLE SODIUM 40 MG/10ML
40 INJECTION, POWDER, LYOPHILIZED, FOR SOLUTION INTRAVENOUS DAILY
Status: DISCONTINUED | OUTPATIENT
Start: 2020-09-09 | End: 2020-09-12

## 2020-09-09 RX ORDER — SUCRALFATE ORAL 1 G/10ML
1 SUSPENSION ORAL EVERY 6 HOURS
Status: DISPENSED | OUTPATIENT
Start: 2020-09-09 | End: 2020-09-09

## 2020-09-09 RX ORDER — OXYCODONE HYDROCHLORIDE 5 MG/1
5 TABLET ORAL EVERY 4 HOURS PRN
Status: DISCONTINUED | OUTPATIENT
Start: 2020-09-09 | End: 2020-09-10

## 2020-09-09 RX ADMIN — POTASSIUM CHLORIDE, DEXTROSE MONOHYDRATE AND SODIUM CHLORIDE: 150; 5; 450 INJECTION, SOLUTION INTRAVENOUS at 05:04

## 2020-09-09 RX ADMIN — PANTOPRAZOLE SODIUM 40 MG: 40 INJECTION, POWDER, LYOPHILIZED, FOR SOLUTION INTRAVENOUS at 05:07

## 2020-09-09 RX ADMIN — SUCRALFATE 1 G: 1 SUSPENSION ORAL at 05:07

## 2020-09-09 RX ADMIN — ONDANSETRON 4 MG: 2 INJECTION INTRAMUSCULAR; INTRAVENOUS at 11:05

## 2020-09-09 RX ADMIN — ONDANSETRON 4 MG: 2 INJECTION INTRAMUSCULAR; INTRAVENOUS at 22:50

## 2020-09-09 RX ADMIN — LEVOTHYROXINE SODIUM 100 MCG: 100 TABLET ORAL at 05:07

## 2020-09-09 RX ADMIN — SUCRALFATE 1 G: 1 SUSPENSION ORAL at 11:07

## 2020-09-09 RX ADMIN — ENOXAPARIN SODIUM 40 MG: 40 INJECTION SUBCUTANEOUS at 05:07

## 2020-09-09 RX ADMIN — PROMETHAZINE HYDROCHLORIDE 25 MG: 25 SUPPOSITORY RECTAL at 00:37

## 2020-09-09 RX ADMIN — PANTOPRAZOLE SODIUM 40 MG: 40 INJECTION, POWDER, LYOPHILIZED, FOR SOLUTION INTRAVENOUS at 00:54

## 2020-09-09 RX ADMIN — PROMETHAZINE HYDROCHLORIDE 25 MG: 25 SUPPOSITORY RECTAL at 19:41

## 2020-09-09 RX ADMIN — POTASSIUM CHLORIDE, DEXTROSE MONOHYDRATE AND SODIUM CHLORIDE: 150; 5; 450 INJECTION, SOLUTION INTRAVENOUS at 11:53

## 2020-09-09 RX ADMIN — ONDANSETRON 4 MG: 2 INJECTION INTRAMUSCULAR; INTRAVENOUS at 16:29

## 2020-09-09 ASSESSMENT — LIFESTYLE VARIABLES
CONSUMPTION TOTAL: NEGATIVE
EVER HAD A DRINK FIRST THING IN THE MORNING TO STEADY YOUR NERVES TO GET RID OF A HANGOVER: NO
HOW MANY TIMES IN THE PAST YEAR HAVE YOU HAD 5 OR MORE DRINKS IN A DAY: 0
DOES PATIENT WANT TO STOP DRINKING: CANNOT ASSESS
TOTAL SCORE: 0
ALCOHOL_USE: NO
SUBSTANCE_ABUSE: 0
HAVE PEOPLE ANNOYED YOU BY CRITICIZING YOUR DRINKING: NO
AVERAGE NUMBER OF DAYS PER WEEK YOU HAVE A DRINK CONTAINING ALCOHOL: 0
HAVE YOU EVER FELT YOU SHOULD CUT DOWN ON YOUR DRINKING: NO
TOTAL SCORE: 0
ON A TYPICAL DAY WHEN YOU DRINK ALCOHOL HOW MANY DRINKS DO YOU HAVE: 0
TOTAL SCORE: 0
EVER FELT BAD OR GUILTY ABOUT YOUR DRINKING: NO

## 2020-09-09 ASSESSMENT — ENCOUNTER SYMPTOMS
BRUISES/BLEEDS EASILY: 0
EYES NEGATIVE: 1
NAUSEA: 1
DIARRHEA: 0
DOUBLE VISION: 0
MUSCULOSKELETAL NEGATIVE: 1
ABDOMINAL PAIN: 1
CARDIOVASCULAR NEGATIVE: 1
PSYCHIATRIC NEGATIVE: 1
BLURRED VISION: 0
CONSTIPATION: 0
COUGH: 0
VOMITING: 1
MYALGIAS: 0
HALLUCINATIONS: 0
NEUROLOGICAL NEGATIVE: 1
SHORTNESS OF BREATH: 0
HEARTBURN: 0
CHILLS: 0
WEAKNESS: 0
WEIGHT LOSS: 1
FLANK PAIN: 0
HEARTBURN: 1
EYE DISCHARGE: 0
RESPIRATORY NEGATIVE: 1
HEMOPTYSIS: 0
SPEECH CHANGE: 0
DIZZINESS: 0
PALPITATIONS: 0
FOCAL WEAKNESS: 0
BLOOD IN STOOL: 0
SENSORY CHANGE: 0
DEPRESSION: 0
FEVER: 0

## 2020-09-09 ASSESSMENT — COGNITIVE AND FUNCTIONAL STATUS - GENERAL
MOBILITY SCORE: 24
DAILY ACTIVITIY SCORE: 24
SUGGESTED CMS G CODE MODIFIER DAILY ACTIVITY: CH
SUGGESTED CMS G CODE MODIFIER MOBILITY: CH

## 2020-09-09 ASSESSMENT — PAIN DESCRIPTION - PAIN TYPE
TYPE: ACUTE PAIN
TYPE: ACUTE PAIN;OTHER (COMMENT)
TYPE: ACUTE PAIN
TYPE: ACUTE PAIN

## 2020-09-09 ASSESSMENT — FIBROSIS 4 INDEX: FIB4 SCORE: 0.97

## 2020-09-09 NOTE — ED NOTES
Patient updated on plan of care.  Transport at bedside to take patient to her room.  Report called.  All questions answered

## 2020-09-09 NOTE — ED TRIAGE NOTES
Pt comes in complaining of abd pain, nausea/vomiting. Pt stating recent admission to [facility] for the same. Pt with hx of gastric sleeve in July of this year.

## 2020-09-09 NOTE — ED PROVIDER NOTES
"ED Provider Note    CHIEF COMPLAINT  Chief Complaint   Patient presents with   • Abdominal Pain   • N/V       HPI  Eunice Mckenna is a 60 y.o. female with medical history notable for gastric sleeve surgery on July, cured colon cancer presenting to the emergency department with epigastric pain which was started 2 to 3 weeks after gastric sleeve surgery.  She states that it is kind of dull and constant pain and associated with nausea and vomiting also she states that she has fatigue, lightheadedness, dry mouth, food and water intolerance and regurgitating yellow mucus.  She was recently admitted to [facility] and was seen by the surgeon.  At the time CT abdomen was not pertinent for acute abnormalities, barium swallow test did not show any leakage or obstruction, right upper quadrant ultrasound did not show any cholelithiasis.  She denies any melena, hematemesis, constipation or diarrhea, fever or chills.  She was hemodynamically stable, mild tachycardia, afebrile, 98% on room air.    REVIEW OF SYSTEMS  See HPI for further details. All other systems are negative.     PAST MEDICAL HISTORY  Past Medical History:   Diagnosis Date   • Acute nasopharyngitis 02/21/2020    Cold, productive cough, denies SOB.  Pt will monitor sxs and will call Dr. Madden is sxs worsen   • Snoring 02/2020    No sleep study   • History of anemia 02/2020    No an issue currently   • Dyslipidemia 1/29/2020   • Pain 2020    knee right   • Hyperthyroidism 12/18/2018   • Colorectal cancer (HCC) April 2011    Dr. Garcia, chemo and radiation.    • Cancer (HCC) 2011    colorectal   • Abnormal EKG - possible inferior infarct seen from 2014    • Allergy    • Anemia     Vaginal bleeding.   • Anesthesia     PONV   • Arrhythmia    • Arthritis     osteo-knees   • Colorectal cancer (HCC)    • GERD (gastroesophageal reflux disease)    • Graves disease    • Heart burn    • Indigestion    • Migraine    • Myocardial infarct (HCC)     \"Before 2014\"   • " Palpitations        FAMILY HISTORY  [unfilled]    SOCIAL HISTORY  Social History     Socioeconomic History   • Marital status:      Spouse name: Not on file   • Number of children: Not on file   • Years of education: Not on file   • Highest education level: Not on file   Occupational History   • Not on file   Social Needs   • Financial resource strain: Not on file   • Food insecurity     Worry: Not on file     Inability: Not on file   • Transportation needs     Medical: Not on file     Non-medical: Not on file   Tobacco Use   • Smoking status: Never Smoker   • Smokeless tobacco: Never Used   • Tobacco comment: significant 2nd hand exposure    Substance and Sexual Activity   • Alcohol use: No     Alcohol/week: 0.6 oz     Types: 1 Standard drinks or equivalent per week   • Drug use: No   • Sexual activity: Yes     Partners: Female   Lifestyle   • Physical activity     Days per week: Not on file     Minutes per session: Not on file   • Stress: Not on file   Relationships   • Social connections     Talks on phone: Not on file     Gets together: Not on file     Attends [employer] service: Not on file     Active member of club or organization: Not on file     Attends meetings of clubs or organizations: Not on file     Relationship status: Not on file   • Intimate partner violence     Fear of current or ex partner: Not on file     Emotionally abused: Not on file     Physically abused: Not on file     Forced sexual activity: Not on file   Other Topics Concern   •  Service No   • Blood Transfusions No   • Caffeine Concern No   • Occupational Exposure No   • Hobby Hazards No   • Sleep Concern Yes   • Stress Concern No   • Weight Concern Yes   • Special Diet No   • Back Care No   • Exercise Yes   • Bike Helmet No   • Seat Belt Yes   • Self-Exams Yes   Social History Narrative    , no children.        SURGICAL HISTORY  Past Surgical History:   Procedure Laterality Date   • PB LAP, ILEANA RESTRICT PROC,  LONGITUDINAL GAS* N/A 7/28/2020    Procedure: GASTRECTOMY, SLEEVE, LAPAROSCOPIC;  Surgeon: John H Ganser, M.D.;  Location: SURGERY [facility];  Service: General   • PB KNEE SCOPE,DIAGNOSTIC Right 3/5/2020    Procedure: ARTHROSCOPY, KNEE;  Surgeon: Abdias Madden M.D.;  Location: [facility];  Service: Orthopedics   • MEDIAL MENISCECTOMY Right 3/5/2020    Procedure: MENISCECTOMY, KNEE, MEDIAL - PARTIAL LATERAL,;  Surgeon: Abdias Madden M.D.;  Location: SURGERY [facility];  Service: Orthopedics   • JOINT INJECTION DIAGNOSTIC Left 3/5/2020    Procedure: INJECTION, JOINT, DIAGNOSTIC;  Surgeon: Abdias Madden M.D.;  Location: [facility];  Service: Orthopedics   • THYROIDECTOMY TOTAL Bilateral 1/8/2020    Procedure: THYROIDECTOMY, TOTAL- W/ NIMS;  Surgeon: Ebony Meza M.D.;  Location: SURGERY SAME DAY [facility];  Service: General   • THYROIDECTOMY  01/08/2020   • JOINT INJECTION DIAGNOSTIC  3/8/2013    Performed by Kenji Meek M.D. at [facility]   • KNEE ARTHROSCOPY  3/8/2013    Performed by Kenji Meek M.D. at [facility]   • MEDIAL MENISCECTOMY  3/8/2013    Performed by Kenji Meek M.D. at [facility]   • GASTRIC BANDING LAPAROSCOPIC  2005    removal of lapband 2016   • ABDOMINAL HYSTERECTOMY TOTAL  2003    For menorrhagia, no BSO   • HERNIA REPAIR      child   • OTHER ABDOMINAL SURGERY      lap band removal       CURRENT MEDICATIONS  Home Medications     Reviewed by Zainab Collado R.N. (Registered Nurse) on 09/08/20 at 1857  Med List Status: Complete   Medication Last Dose Status   ondansetron (ZOFRAN ODT) 8 MG TABLET DISPERSIBLE  Active   SYNTHROID 100 MCG Tab  Active                ALLERGIES  Allergies   Allergen Reactions   • Iodine Itching   • Morphine Itching     blisters   • Shellfish Allergy Vomiting       PHYSICAL EXAM  VITAL SIGNS: /61   Pulse (!) 101   Temp 36.4 °C (97.5 °F)  "(Temporal)   Resp (!) 27   Ht 1.676 m (5' 6\")   Wt 94.9 kg (209 lb 3.5 oz)   SpO2 99%   BMI 33.77 kg/m²       Constitutional: Well developed, Well nourished, No acute distress, Non-toxic appearance.  Patient is feeling very weak.  HENT: Normocephalic, Atraumatic, Bilateral external ears normal, oropharynx is dry.  No scleral icterus  Eyes: EOMI, Conjunctiva normal, No discharge.   Neck: Supple, No stridor.   Cardiovascular: Tachycardia, Normal rhythm, No murmurs, No rubs, No gallops.   Thorax & Lungs: Normal breath sounds, No respiratory distress, No wheezing, No chest tenderness.   Abdomen: Bowel sounds normal, epigastric tenderness to palpation, no rebound or guarding   Skin: Warm, Dry, No erythema, No rash.  No skin discoloration  Back: No tenderness, No CVA tenderness.   Extremities: Intact distal pulses, No edema, No tenderness, No cyanosis, No clubbing.   Musculoskeletal: No major deformities noted.   Neurologic: Alert & oriented x 3,  No focal deficits noted.   Psychiatric: Affect normal, Judgment normal, Mood normal.         COURSE & MEDICAL DECISION MAKING  Pertinent Labs & Imaging studies reviewed. (See chart for details)    This is a 60 years of female with medical history notable for gastric sleeve surgery in July presenting with epigastric pain associated with nausea and vomiting.  She has been admitted to [facility] and had CT abdomen, right upper quadrant ultrasound, barium swallow test which did not show any complications of gastric sleeve surgery.  She has been prescribed PPI and has been taking the medications.  Despite the medication some nausea did not resolve and also still feeling epigastric pain.  Due to recent gastric sleeve surgery and lack of other complications, the epigastric pain is likely due to gastritis or peptic ulcer disease.  Lab work showing HAGMA possibly starvation ketoacidosis, hypokalemia, mild hyponatremia.  Hemoglobin stable 13.5, therefore most likely to be GI " bleed.  Patient was started on Zofran, potassium supplementation, normal saline bolus, fentanyl and pantoprazole.  Patient will likely need gastroenterologist evaluation and possible endoscopy.  Hospitalist team is paged for admission.  Patient cannot tolerate any oral medications, meal or fluid.     FINAL IMPRESSION  1.  Epigastric pain  2.  Status post gastric sleeve surgery  3.  Hypokalemia  4.  High anion gap metabolic acidosis  5.  Dehydration - secondary to vomiting and decreased oral intake       Electronically signed by: Gerry Arreaga M.D., 9/8/2020 9:49 PM    I personally examined the patient and completed a history and physical.  I agree with the resident's note.  The patient most likely has some significant gastritis and duodenitis.  She may benefit from endoscopy.  She will receive intravenous hydration the patient will also receive oral potassium supplementation.  The patient received a proton pump inhibitor prior, fentanyl for pain control, and Zofran.  Should be admitted to the hospitalist for further work-up and treatment with GI in consultation.

## 2020-09-09 NOTE — PROGRESS NOTES
2 RN skin check completed with ED Jones  Dry flaky feet bilaterally.    No skin breakdown or wound noted.

## 2020-09-09 NOTE — CONSULTS
DATE OF SERVICE:  09/09/2020    GASTROENTEROLOGY CONSULTATION    REFERRING PHYSICIAN:  Hospitalist team.    REASON FOR CONSULTATION:  Request for nausea and vomiting.    HISTORY OF PRESENT ILLNESS:  This is a 60-year-old female well known to me.    She has a history of colon cancer, status post resection and treatment with   care.  She has a history of obesity with presurgical weight of 248 and a   subsequent gastric sleeve that was performed in 07/2020.  She reports that   after the procedure, she started having significant nausea, reflux, and now   epigastric pain related to eating.  She cannot eat much of a significant   volume until she over accommodates and then feels significant pressure and   subsequent nausea and vomiting.  She has had a 40-pound weight loss during the   course of her post-surgical state.  She does not have any hematemesis,   melena, or hematochezia.  She has a chronic sense of nausea.    PAST MEDICAL HISTORY:  1.  Colorectal cancer.  2.  Gastroesophageal reflux disease.  3.  Graves' disease.  4.  Obesity.  5.  Obstructive sleep apnea.  6.  Nasopharyngitis.    PAST SURGICAL HISTORY:  1.  Knee arthroscopy.  2.  Thyroidectomy.  3.  Hernia repair.  4.  Total abdominal hysterectomy.  5.  Gastric sleeve.    FAMILY HISTORY:  Cancer in maternal grandfather.  Diabetes in brother,   grandmother and sister.  Heart disease in brother and father.  Hypertension in   brother, father, sister.  Lung disease in her mother.    SOCIAL HISTORY:  No nicotine, alcohol, or drug use.  Her significant other was   present during the consult at bedside on video conference.    ALLERGIES:  1.  IODINE.  2.  MORPHINE.  3.  SHELLFISH.    OUTPATIENT MEDICATIONS:  1.  Synthroid.  2.  Zofran.    REVIEW OF SYSTEMS:  CONSTITUTIONAL:  Positive nausea and vomiting.  No fevers or chills.  Positive   weight loss of 40 pounds.  HEENT:  No headache, change in vision, change in hearing.  She reports a sore   throat on occasion.  No  nasal bleeding.  CARDIOVASCULAR:  No chest pain or palpitations.  PULMONARY:  No shortness of breath or dyspnea on exertion.  GASTROINTESTINAL:  As above.  No change in bowel habits.  DERMATOLOGIC:  No rash or ulcer.  GENITOURINARY:  No dysuria or hematuria.  PSYCHIATRIC:  No suicidal or homicidal ideation.  NEUROLOGIC:  No focal weakness or numbness.    LABS:  White blood cell count is 3, H and H of 11 and 34.4, platelet count of   181.    Sodium is 135, potassium 3.3, chloride 98, BUN and creatinine of 3 and 0.59.    Liver function tests within normal limits.    Urinalysis negative.    DIAGNOSTIC DATA:  Upper GI series 08/26/2015 was no evidence of leak or   obstruction, slight delaying in emptying of the stomach.  After 10 minutes,   the contrast was seen throughout the duodenum and small bowel suggesting no   obstruction.    IMPRESSION AND PLAN:  This is a 60-year-old female who has a history of   obesity, status post gastric sleeve, who has episodes of nausea and vomiting   after eating, who has had gastric emptying study revealing no evidence for   stricture or obstruction.  However, patient was evaluated by Dr. Ganser,   surgeon, who performed the gastric sleeve and he is requesting an upper   endoscopy to rule out ulcer or stricture that cannot be appreciated   radiologically.  We did discuss the other possibilities that has ultimately   over accommodation, specifically overfilling gastric sleeve with subsequent   back flow reflux with likely some degree of esophagitis causing nausea and   then vomiting related to overflow of the gastric sleeve due to limitations   intended by the surgical procedure.    Ultimately, we will perform an endoscopy.  Risks and benefits of the procedure   reviewed thoroughly with the patient.  Risks including but limited to   bleeding, perforation, side effects of medication were informed.  Patient   voiced understanding and agreed to proceed.  Ultimately, if the endoscopy does    not reveal any true mechanical pathology, then the likely etiology of the   patient's nausea and vomiting is overfilling of the gastric sleeve and over   accommodation and adjustments to her diet would likely be necessary.  Further   recommendations to follow.  The above plan was reviewed thoroughly with the   patient and the patient's significant other.  All questions were answered to   their satisfaction.       ____________________________________     [facility] MD PJ Jc / REBECCA    DD:  09/09/2020 11:48:44  DT:  09/09/2020 12:49:52    D#:  [id number]  Job#:  [id number]

## 2020-09-09 NOTE — DIETARY
Nutrition Services: Day 1 of admit.  Eunice Mckenna is a 60 y.o. female with admitting DX of Intractable nausea and vomiting  Consult received for Poor PO & Failure to Thrive    Pt states her appetite is not good. Pt states she hasn't seen an RD post-op yet and states she has an appointment next week with the RD. Pt states she was trying to eat food, not just liquids prior to admit. Pt states she is nauseous and would sometimes vomit. Pt drinks nutrition supplements at home. Pt states she drinks HMR protein shake, another protein shake she cannot remember the name and yogurt banana smoothie/pudding. Pt declined supplements here and states she cannot handle the sugar as it makes her nauseous. Pt expressed being over the broths. Pt reports 40 lb wt loss since surgery.     Assessment:  Ht: 167.6 cm, Wt 9/9: 94.2 kg via bed scale, BMI: 33.52 - Normal  Diet/Intake: Clear Liquids since last night - No PO documented yet.      Evaluation:   1. Pt appears adequately nourished.   2. Pt has laparoscopic sleeve gastrectomy on 7/28/20  3. Pt on 7/28/20 was 107.1 kg via stand up scale. Wt loss percent is 12% in 6 weeks, which is severe.   4. Labs: K 3.3, Glycose 146, BUN 3,   5. Meds: lovenox, synthroid, protonix, kdur, pericolace, carafate, zofran (prn)  6. Fluids: dextrose 5% and 0.45% NaCl with KCl 20 mEq @ 100 mL/hr    Malnutrition Risk: Pt with severe wt loss of 12% in 6 weeks, wt loss is expected post gastric sleeve procedure.     Recommendations/Plan:  1. Advance diet past clears when medically feasible.    2. Encourage intake of meals  3. Document intake of all meals as % taken in ADL's to provide interdisciplinary communication across all shifts.   4. Monitor weight.  5. Nutrition rep will continue to see patient for ongoing meal and snack preferences.  6. Obtain supplement order per RD as needed.    RD following

## 2020-09-09 NOTE — CONSULTS
"Surgical Consult    Date: 9/9/2020    Requesting Physician: Dr. Quinn  PCP: Sofie Mathew M.D.  Attending Physician: John Ganser M.D.    CC: Nausea and vomiting    HPI: This is a 60 y.o. female who is presenting about 6 week post sleeve gastrectomy for obesity with persistent nausea and vomiting. She was admitted 2 weeks ago with similar complaints and had CT showing no acute abnormality, UGI showing no stricture or leak and ultrasound showing no gallstones. She is able to swallow things like yogurt with no dysphagia, but gets nauseated and throws up. No hematemesis.     Past Medical History:   Diagnosis Date   • Abnormal EKG - possible inferior infarct seen from 2014    • Acute nasopharyngitis 02/21/2020    Cold, productive cough, denies SOB.  Pt will monitor sxs and will call Dr. Madden is sxs worsen   • Allergy    • Anemia     Vaginal bleeding.   • Anesthesia     PONV   • Arrhythmia    • Arthritis     osteo-knees   • Cancer (HCC) 2011    colorectal   • Colorectal cancer (HCC) April 2011    Dr. Garcia, chemo and radiation.    • Colorectal cancer (HCC)    • Dyslipidemia 1/29/2020   • GERD (gastroesophageal reflux disease)    • Graves disease    • Heart burn    • History of anemia 02/2020    No an issue currently   • Hyperthyroidism 12/18/2018   • Indigestion    • Migraine    • Myocardial infarct (HCC)     \"Before 2014\"   • Pain 2020    knee right   • Palpitations    • Snoring 02/2020    No sleep study       Past Surgical History:   Procedure Laterality Date   • PB LAP, ILEANA RESTRICT PROC, LONGITUDINAL GAS* N/A 7/28/2020    Procedure: GASTRECTOMY, SLEEVE, LAPAROSCOPIC;  Surgeon: John H Ganser, M.D.;  Location: SURGERY [facility];  Service: General   • PB KNEE SCOPE,DIAGNOSTIC Right 3/5/2020    Procedure: ARTHROSCOPY, KNEE;  Surgeon: Abdias Madden M.D.;  Location: SURGERY [facility];  Service: Orthopedics   • MEDIAL MENISCECTOMY Right 3/5/2020    Procedure: MENISCECTOMY, KNEE, MEDIAL - PARTIAL " LATERAL,;  Surgeon: Abdias Madden M.D.;  Location: SURGERY [facility];  Service: Orthopedics   • JOINT INJECTION DIAGNOSTIC Left 3/5/2020    Procedure: INJECTION, JOINT, DIAGNOSTIC;  Surgeon: Abdias Madden M.D.;  Location: SURGERY [facility];  Service: Orthopedics   • THYROIDECTOMY TOTAL Bilateral 1/8/2020    Procedure: THYROIDECTOMY, TOTAL- W/ NIMS;  Surgeon: Ebony Meza M.D.;  Location: SURGERY SAME DAY [facility];  Service: General   • THYROIDECTOMY  01/08/2020   • JOINT INJECTION DIAGNOSTIC  3/8/2013    Performed by Kenji Meek M.D. at [facility]   • KNEE ARTHROSCOPY  3/8/2013    Performed by Kenji Meek M.D. at [facility]   • MEDIAL MENISCECTOMY  3/8/2013    Performed by Kenji Meek M.D. at [facility]   • GASTRIC BANDING LAPAROSCOPIC  2005    removal of lapband 2016   • ABDOMINAL HYSTERECTOMY TOTAL  2003    For menorrhagia, no BSO   • HERNIA REPAIR      child   • OTHER ABDOMINAL SURGERY      lap band removal       Current Facility-Administered Medications   Medication Dose Route Frequency Provider Last Rate Last Dose   • sucralfate (CARAFATE) 1 GM/10ML suspension 1 g  1 g Oral Q6HRS Abdias Nunez M.D.   1 g at 09/09/20 0507   • pantoprazole (PROTONIX) injection 40 mg  40 mg Intravenous DAILY Abdias Nunez M.D.   40 mg at 09/09/20 0507   • levothyroxine (SYNTHROID) tablet 100 mcg  100 mcg Oral DAILY Abdias Nunez M.D.   100 mcg at 09/09/20 0507   • senna-docusate (PERICOLACE or SENOKOT S) 8.6-50 MG per tablet 2 Tab  2 Tab Oral BID Abdias Nunez M.D.   Stopped at 09/08/20 2304    And   • polyethylene glycol/lytes (MIRALAX) PACKET 1 Packet  1 Packet Oral QDAY PRN Abdias Nunez M.D.        And   • magnesium hydroxide (MILK OF MAGNESIA) suspension 30 mL  30 mL Oral QDAY PRN Abdias Nunez M.D.        And   • bisacodyl (DULCOLAX) suppository 10 mg  10 mg Rectal QDAY PRN Abdias Nunez M.D.       • enoxaparin  (LOVENOX) inj 40 mg  40 mg Subcutaneous DAILY Abdias Nunez M.D.   40 mg at 09/09/20 0507   • enalaprilat (VASOTEC) injection 1.25 mg  1.25 mg Intravenous Q6HRS PRN Abdias Nunez M.D.       • potassium chloride SA (Kdur) tablet 40 mEq  40 mEq Oral Once Gerry Arreaga M.D.   Stopped at 09/08/20 2304   • promethazine (PHENERGAN) tablet 25 mg  25 mg Oral Q6HRS PRRAY Nunez M.D.       • promethazine (PHENERGAN) suppository 25 mg  25 mg Rectal Q6HRS PRRAY Nunez M.D.   25 mg at 09/09/20 0037   • ondansetron (ZOFRAN) syringe/vial injection 4 mg  4 mg Intravenous Q6HRS PRRAY Nunez M.D.   4 mg at 09/08/20 2339       Social History     Socioeconomic History   • Marital status:      Spouse name: Not on file   • Number of children: Not on file   • Years of education: Not on file   • Highest education level: Not on file   Occupational History   • Not on file   Social Needs   • Financial resource strain: Not on file   • Food insecurity     Worry: Not on file     Inability: Not on file   • Transportation needs     Medical: Not on file     Non-medical: Not on file   Tobacco Use   • Smoking status: Never Smoker   • Smokeless tobacco: Never Used   • Tobacco comment: significant 2nd hand exposure    Substance and Sexual Activity   • Alcohol use: No     Alcohol/week: 0.6 oz     Types: 1 Standard drinks or equivalent per week   • Drug use: No   • Sexual activity: Yes     Partners: Female   Lifestyle   • Physical activity     Days per week: Not on file     Minutes per session: Not on file   • Stress: Not on file   Relationships   • Social connections     Talks on phone: Not on file     Gets together: Not on file     Attends Voodoo service: Not on file     Active member of club or organization: Not on file     Attends meetings of clubs or organizations: Not on file     Relationship status: Not on file   • Intimate partner violence     Fear of current or ex partner: Not on file     Emotionally  "abused: Not on file     Physically abused: Not on file     Forced sexual activity: Not on file   Other Topics Concern   •  Service No   • Blood Transfusions No   • Caffeine Concern No   • Occupational Exposure No   • Hobby Hazards No   • Sleep Concern Yes   • Stress Concern No   • Weight Concern Yes   • Special Diet No   • Back Care No   • Exercise Yes   • Bike Helmet No   • Seat Belt Yes   • Self-Exams Yes   Social History Narrative    , no children.        Family History   Problem Relation Age of Onset   • Stroke Father         70yo   • Hypertension Father    • Heart Disease Father         70 yo   • Cancer Maternal Grandfather         Lung   • Diabetes Paternal Grandmother    • Hypertension Paternal Grandmother    • Stroke Paternal Grandmother         70 s   • Psychiatric Illness Sister         Schizophrenia   • Psychiatric Illness Brother         Schizophrenia   • Cancer Paternal Aunt         Bone, liver   • Psychiatric Illness Mother         dental / mental illness    • Lung Disease Mother         Copd    • Psychiatric Illness Sister         schizoprenia    • Diabetes Sister    • Hypertension Sister    • Psychiatric Illness Brother    • Heart Disease Brother    • Hypertension Brother    • Hypertension Brother    • Diabetes Brother        Allergies:  Iodine, Morphine, and Shellfish allergy    Review of Systems:  Constitutional: Positive for malaise/fatigue    Gastrointestinal: positive for nausea, vomiting      Physical Exam:  /52   Pulse 71   Temp 36.7 °C (98.1 °F) (Temporal)   Resp 18   Ht 1.676 m (5' 6\")   Wt 94.2 kg (207 lb 10.8 oz)   SpO2 95%     Constitutional: she is oriented to person, place, and time.  she appears well-developed and well-nourished. No distress.   Cardiovascular: Normal rate, regular rhythm  Pulmonary/Chest: Effort normal and breath sounds normal.  Abdominal: Soft. Bowel sounds are normal. she exhibits no distension and no mass. There is no tenderness. There is " no rebound and no guarding.        Labs:  Recent Labs     09/08/20 1930 09/09/20 0912   WBC 3.4* 3.0*  3.0*   RBC 5.03 4.14*  4.14*   HEMOGLOBIN 13.5 11.0*  11.0*   HEMATOCRIT 42.5 34.4*  34.4*   MCV 84.5 83.1  83.1   MCH 26.8* 26.6*  26.6*   MCHC 31.8* 32.0*  32.0*   RDW 44.1 43.3  43.3   PLATELETCT 248 181  181   MPV 9.8 9.5  9.5     Recent Labs     09/08/20 1930 09/09/20 0912   SODIUM 134* 135   POTASSIUM 3.0* 3.3*   CHLORIDE 88* 98   CO2 26 26   GLUCOSE 70 146*   BUN 5* 3*   CREATININE 0.53 0.59   CALCIUM 9.7 8.4*         Recent Labs     09/08/20 1930   ASTSGOT 20   ALTSGPT 24   TBILIRUBIN 0.5   ALKPHOSPHAT 100*   GLOBULIN 4.6*       Radiology:  No orders to display       Assessment: This is a 60 y.o with persistent nausea and vomiting post sleeve gastrectomy. Recent evaluation negative for leak, stricture and gallstones.       Plan: Discussed with medicine team. Recommend GI consult for EGD to rule out ulcer or stricture. No indication for surgical intervention at this point. Will follow.    Thank you very much for this consultation.    John Ganser M.D.  [facility]  [phone]

## 2020-09-09 NOTE — PROGRESS NOTES
With patient’s permission (if able), completed daily phone call to designated support person, isela Saniya  Discussed patient condition and plan of care. All questions answered.  Follow up requested: Saniya would like to speak with MD, Dr. Quinn notified.

## 2020-09-09 NOTE — PROGRESS NOTES
[facility] Medicine Daily Progress Note    Date of Service  9/9/2020    Chief Complaint  60 y.o. female admitted 9/8/2020 with intractable nausea and vomiting.     Hospital Course    This is a 60 y.o. female who is presenting about 6 week post sleeve gastrectomy for obesity with persistent nausea and vomiting. She was admitted 2 weeks ago with similar complaints and had CT showing no acute abnormality, UGI showing no stricture or leak and ultrasound showing no gallstones. Plan for admission to [facility], GI and Surgery consulted.       Interval Problem Update  Continued n/v   No abd pain   Not tolerating diet   No acute events overnight   VS stable     Consultants/Specialty  Surgery   GI     Code Status  Full Code    Disposition  TBD     Review of Systems  Review of Systems   Constitutional: Positive for malaise/fatigue. Negative for chills and fever.   HENT: Negative for congestion, hearing loss and tinnitus.    Eyes: Negative for blurred vision, double vision and discharge.   Respiratory: Negative for cough, hemoptysis and shortness of breath.    Cardiovascular: Negative for chest pain, palpitations and leg swelling.   Gastrointestinal: Positive for abdominal pain, nausea and vomiting. Negative for heartburn.   Genitourinary: Negative for dysuria and flank pain.   Musculoskeletal: Negative for joint pain and myalgias.   Skin: Negative for rash.   Neurological: Negative for dizziness, sensory change, speech change, focal weakness and weakness.   Endo/Heme/Allergies: Negative for environmental allergies. Does not bruise/bleed easily.   Psychiatric/Behavioral: Negative for depression, hallucinations and substance abuse.        Physical Exam  Temp:  [36.4 °C (97.5 °F)-37.5 °C (99.5 °F)] 36.7 °C (98.1 °F)  Pulse:  [] 71  Resp:  [13-30] 18  BP: (104-143)/(52-85) 104/52  SpO2:  [95 %-99 %] 95 %    Physical Exam  Vitals signs reviewed.   Constitutional:       Appearance: Normal appearance. She is ill-appearing.   HENT:       Head: Normocephalic and atraumatic.      Nose: No congestion.      Mouth/Throat:      Mouth: Mucous membranes are dry.   Eyes:      Extraocular Movements: Extraocular movements intact.      Pupils: Pupils are equal, round, and reactive to light.   Neck:      Musculoskeletal: Normal range of motion and neck supple. No muscular tenderness.   Cardiovascular:      Rate and Rhythm: Normal rate and regular rhythm.      Pulses: Normal pulses.      Heart sounds: Normal heart sounds. No murmur.   Pulmonary:      Effort: Pulmonary effort is normal. No respiratory distress.      Breath sounds: Normal breath sounds. No stridor.   Abdominal:      General: Bowel sounds are normal. There is no distension.      Palpations: Abdomen is soft.      Tenderness: There is abdominal tenderness.      Comments: Epigastric ttp    Musculoskeletal:         General: No swelling or deformity.   Skin:     General: Skin is warm and dry.      Capillary Refill: Capillary refill takes 2 to 3 seconds.   Neurological:      General: No focal deficit present.      Mental Status: She is alert and oriented to person, place, and time.   Psychiatric:         Mood and Affect: Mood normal.         Behavior: Behavior normal.         Thought Content: Thought content normal.         Judgment: Judgment normal.         Fluids    Intake/Output Summary (Last 24 hours) at 9/9/2020 1111  Last data filed at 9/9/2020 0504  Gross per 24 hour   Intake 30 ml   Output --   Net 30 ml       Laboratory  Recent Labs     09/08/20 1930 09/09/20 0912   WBC 3.4* 3.0*  3.0*   RBC 5.03 4.14*  4.14*   HEMOGLOBIN 13.5 11.0*  11.0*   HEMATOCRIT 42.5 34.4*  34.4*   MCV 84.5 83.1  83.1   MCH 26.8* 26.6*  26.6*   MCHC 31.8* 32.0*  32.0*   RDW 44.1 43.3  43.3   PLATELETCT 248 181  181   MPV 9.8 9.5  9.5     Recent Labs     09/08/20 1930 09/09/20  0912   SODIUM 134* 135  135   POTASSIUM 3.0* 3.3*  3.3*   CHLORIDE 88* 98  98   CO2 26 26  26   GLUCOSE 70 146*  146*   BUN  5* 3*  3*   CREATININE 0.53 0.59  0.59   CALCIUM 9.7 8.4*  8.4*                   Imaging  No orders to display        Assessment/Plan  * Intractable nausea and vomiting  Assessment & Plan  Likely gastritis though complicated by gastric sleeve July 2020.    Recent extensive imaging with similar complaint included CT abdomen pelvis, right upper quadrant ultrasound, abdominal series obtained August 26, 2020 unremarkable  Symptomatic management Zofran and Phenergan  Consulted GI and Gen surgery   Advance diet as tolerated for now   May need to be NPO after midnight and endoscopy in am       Metabolic acidosis  Assessment & Plan  Cont with IVF   And recheck labs in am     Hypokalemia  Assessment & Plan  Replace and recheck labs in am        VTE prophylaxis: lovenox

## 2020-09-09 NOTE — H&P
Hospital Medicine History & Physical Note    Date of Service  9/8/2020    Primary Care Physician  Sofie Mathew M.D.    Consultants      Code Status  Full Code    Chief Complaint  Chief Complaint   Patient presents with   • Abdominal Pain   • N/V       History of Presenting Illness  60 y.o. female who presented 9/8/2020 with intermittent episodes of p.o. intolerance, abdominal pain, nausea and nonbloody vomiting prompting evaluation at [facility] August 25 with extensive work-up including CT abdomen pelvis, KUB, right upper quadrant ultrasound with unremarkable finding she was discharged with symptomatic management but returns to the emergency department at [facility] with unresolved symptoms and found to have a metabolic acidosis with hypokalemia.    She receives symptomatic management, IV fluids, potassium and referred to the hospitalist for admission.  She endorses nonbloody vomit of attempted p.o. intake.  She denies recent change of medication regiment, over-the-counter medications, diet supplementation, caffeine or energy drinks.  No fever, chest pain, shortness of breath, constipation or excessive flatulence.      review of Systems  Review of Systems   Constitutional: Positive for malaise/fatigue and weight loss. Negative for chills and fever.   HENT: Negative.    Eyes: Negative.    Respiratory: Negative.    Cardiovascular: Negative.    Gastrointestinal: Positive for abdominal pain, heartburn, nausea and vomiting. Negative for blood in stool, constipation, diarrhea and melena.   Genitourinary: Negative.    Musculoskeletal: Negative.    Skin: Negative for itching and rash.   Neurological: Negative.    Endo/Heme/Allergies: Negative.    Psychiatric/Behavioral: Negative.        Past Medical History   has a past medical history of Abnormal EKG - possible inferior infarct seen from 2014, Acute nasopharyngitis (02/21/2020), Allergy, Anemia, Anesthesia, Arrhythmia, Arthritis,  Cancer (HCC) (2011), Colorectal cancer (HCC) (April 2011), Colorectal cancer (HCC), Dyslipidemia (1/29/2020), GERD (gastroesophageal reflux disease), Graves disease, Heart burn, History of anemia (02/2020), Hyperthyroidism (12/18/2018), Indigestion, Migraine, Myocardial infarct (HCC), Pain (2020), Palpitations, and Snoring (02/2020).    Surgical History   has a past surgical history that includes gastric banding laparoscopic (2005); joint injection diagnostic (3/8/2013); other abdominal surgery; knee arthroscopy (3/8/2013); medial meniscectomy (3/8/2013); thyroidectomy total (Bilateral, 1/8/2020); hernia repair; abdominal hysterectomy total (2003); thyroidectomy (01/08/2020); pr knee scope,diagnostic (Right, 3/5/2020); medial meniscectomy (Right, 3/5/2020); joint injection diagnostic (Left, 3/5/2020); and pr lap, rose mary restrict proc, longitudinal gas* (N/A, 7/28/2020).     Family History  family history includes Cancer in her maternal grandfather and paternal aunt; Diabetes in her brother, paternal grandmother, and sister; Heart Disease in her brother and father; Hypertension in her brother, brother, father, paternal grandmother, and sister; Lung Disease in her mother; Psychiatric Illness in her brother, brother, mother, sister, and sister; Stroke in her father and paternal grandmother.     Social History   reports that she has never smoked. She has never used smokeless tobacco. She reports that she does not drink alcohol or use drugs.    Allergies  Allergies   Allergen Reactions   • Iodine Itching   • Morphine Itching     blisters   • Shellfish Allergy Vomiting       Medications  Prior to Admission Medications   Prescriptions Last Dose Informant Patient Reported? Taking?   SYNTHROID 100 MCG Tab  Patient Yes No   Sig: Take 100 mcg by mouth every day.   ondansetron (ZOFRAN ODT) 8 MG TABLET DISPERSIBLE  Patient Yes No   Sig: Take 8 mg by mouth every 8 hours as needed for Nausea.      Facility-Administered Medications:  None       Physical Exam  Temp:  [36.4 °C (97.5 °F)] 36.4 °C (97.5 °F)  Pulse:  [] 83  Resp:  [13-27] 13  BP: (112-143)/(55-85) 112/55  SpO2:  [96 %-99 %] 96 %    Physical Exam  Vitals signs and nursing note reviewed. Exam conducted with a chaperone present.   Constitutional:       General: She is not in acute distress.     Appearance: Normal appearance. She is obese. She is ill-appearing. She is not toxic-appearing.   HENT:      Head: Normocephalic and atraumatic.      Nose: Nose normal. No congestion or rhinorrhea.      Mouth/Throat:      Mouth: Mucous membranes are dry.      Pharynx: Oropharynx is clear.   Eyes:      Extraocular Movements: Extraocular movements intact.      Conjunctiva/sclera: Conjunctivae normal.      Pupils: Pupils are equal, round, and reactive to light.   Neck:      Musculoskeletal: Normal range of motion and neck supple. No muscular tenderness.      Vascular: No carotid bruit.   Cardiovascular:      Rate and Rhythm: Normal rate and regular rhythm.      Pulses: Normal pulses.      Heart sounds: Normal heart sounds. No murmur. No gallop.    Pulmonary:      Effort: No respiratory distress.      Breath sounds: Normal breath sounds. No wheezing or rales.   Abdominal:      General: Abdomen is flat. Bowel sounds are normal. There is no distension.      Palpations: Abdomen is soft. There is no mass.      Tenderness: There is abdominal tenderness. There is no right CVA tenderness, left CVA tenderness, guarding or rebound.      Hernia: No hernia is present.   Musculoskeletal:         General: No tenderness or signs of injury.   Lymphadenopathy:      Cervical: No cervical adenopathy.   Skin:     Capillary Refill: Capillary refill takes less than 2 seconds.      Coloration: Skin is not jaundiced or pale.      Findings: No bruising.   Neurological:      General: No focal deficit present.      Mental Status: She is alert and oriented to person, place, and time. Mental status is at baseline.       Cranial Nerves: No cranial nerve deficit.      Motor: No weakness.      Coordination: Coordination normal.   Psychiatric:         Mood and Affect: Mood normal.         Thought Content: Thought content normal.         Judgment: Judgment normal.         Laboratory:  Recent Labs     09/08/20 1930   WBC 3.4*   RBC 5.03   HEMOGLOBIN 13.5   HEMATOCRIT 42.5   MCV 84.5   MCH 26.8*   MCHC 31.8*   RDW 44.1   PLATELETCT 248   MPV 9.8     Recent Labs     09/08/20 1930   SODIUM 134*   POTASSIUM 3.0*   CHLORIDE 88*   CO2 26   GLUCOSE 70   BUN 5*   CREATININE 0.53   CALCIUM 9.7     Recent Labs     09/08/20 1930   ALTSGPT 24   ASTSGOT 20   ALKPHOSPHAT 100*   TBILIRUBIN 0.5   LIPASE 43   GLUCOSE 70         No results for input(s): NTPROBNP in the last 72 hours.      No results for input(s): TROPONINT in the last 72 hours.    Imaging:  No orders to display         Assessment/Plan:  I anticipate this patient will require at least two midnights for appropriate medical management, necessitating inpatient admission.    Metabolic acidosis  Assessment & Plan  Likely starvation ketoacidosis.  D5 half-normal with 20 of potassium.   Encourage clear liquids, follow-up a.m. chemistry    Hypokalemia  Assessment & Plan  Likely secondary to intractable nausea and vomiting.    IV repletion, follow-up magnesium and a.m. chemistry    Intractable nausea and vomiting  Assessment & Plan  Likely gastritis though complicated by gastric sleeve July 2020.  Recent extensive imaging with similar complaint included CT abdomen pelvis, right upper quadrant ultrasound, abdominal series obtained August 26, 2020 unremarkable.  Trial IV Pepcid.  Symptomatic management, consider GI consult.

## 2020-09-09 NOTE — CARE PLAN
Problem: Nutritional:  Goal: Achieve adequate nutritional intake  Description: Patient's diet will advance past clears and will consume 50% of meals  Outcome: NOT MET

## 2020-09-09 NOTE — ASSESSMENT & PLAN NOTE
EGD with esophagitis 9/10/20  Recent extensive imaging with similar complaint included CT abdomen pelvis, right upper quadrant ultrasound, abdominal series obtained August 26, 2020 unremarkable  Symptomatic management IVF, anti emetics   Start on reglan with Im[employer]vement  Trial gi cocktail   Persistent symptoms will start on TPN, Picc line placement. Surgery to follow up outpatient  GI and surgery following    Pending home health and home infusion approval   Advance diet as tolerated for now

## 2020-09-10 ENCOUNTER — ANESTHESIA EVENT (OUTPATIENT)
Dept: SURGERY | Facility: MEDICAL CENTER | Age: 60
DRG: 392 | End: 2020-09-10
Payer: COMMERCIAL

## 2020-09-10 ENCOUNTER — ANESTHESIA (OUTPATIENT)
Dept: SURGERY | Facility: MEDICAL CENTER | Age: 60
DRG: 392 | End: 2020-09-10
Payer: COMMERCIAL

## 2020-09-10 PROCEDURE — 500066 HCHG BITE BLOCK, ECT: Performed by: INTERNAL MEDICINE

## 2020-09-10 PROCEDURE — 770006 HCHG ROOM/CARE - MED/SURG/GYN SEMI*

## 2020-09-10 PROCEDURE — C9113 INJ PANTOPRAZOLE SODIUM, VIA: HCPCS | Performed by: INTERNAL MEDICINE

## 2020-09-10 PROCEDURE — 99232 SBSQ HOSP IP/OBS MODERATE 35: CPT | Performed by: HOSPITALIST

## 2020-09-10 PROCEDURE — 700102 HCHG RX REV CODE 250 W/ 637 OVERRIDE(OP): Performed by: INTERNAL MEDICINE

## 2020-09-10 PROCEDURE — A9270 NON-COVERED ITEM OR SERVICE: HCPCS

## 2020-09-10 PROCEDURE — 160202 HCHG ENDO MINUTES - 1ST 30 MINS LEVEL 3: Performed by: INTERNAL MEDICINE

## 2020-09-10 PROCEDURE — 700105 HCHG RX REV CODE 258: Performed by: ANESTHESIOLOGY

## 2020-09-10 PROCEDURE — 700111 HCHG RX REV CODE 636 W/ 250 OVERRIDE (IP): Performed by: ANESTHESIOLOGY

## 2020-09-10 PROCEDURE — 700105 HCHG RX REV CODE 258: Performed by: HOSPITALIST

## 2020-09-10 PROCEDURE — 160009 HCHG ANES TIME/MIN: Performed by: INTERNAL MEDICINE

## 2020-09-10 PROCEDURE — A9270 NON-COVERED ITEM OR SERVICE: HCPCS | Performed by: INTERNAL MEDICINE

## 2020-09-10 PROCEDURE — 700111 HCHG RX REV CODE 636 W/ 250 OVERRIDE (IP): Performed by: HOSPITALIST

## 2020-09-10 PROCEDURE — 160048 HCHG OR STATISTICAL LEVEL 1-5: Performed by: INTERNAL MEDICINE

## 2020-09-10 PROCEDURE — 160035 HCHG PACU - 1ST 60 MINS PHASE I: Performed by: INTERNAL MEDICINE

## 2020-09-10 PROCEDURE — 160002 HCHG RECOVERY MINUTES (STAT): Performed by: INTERNAL MEDICINE

## 2020-09-10 PROCEDURE — 700111 HCHG RX REV CODE 636 W/ 250 OVERRIDE (IP): Performed by: INTERNAL MEDICINE

## 2020-09-10 PROCEDURE — 0DJ08ZZ INSPECTION OF UPPER INTESTINAL TRACT, VIA NATURAL OR ARTIFICIAL OPENING ENDOSCOPIC: ICD-10-PCS | Performed by: INTERNAL MEDICINE

## 2020-09-10 PROCEDURE — 700102 HCHG RX REV CODE 250 W/ 637 OVERRIDE(OP)

## 2020-09-10 RX ORDER — SCOLOPAMINE TRANSDERMAL SYSTEM 1 MG/1
1 PATCH, EXTENDED RELEASE TRANSDERMAL
Status: DISCONTINUED | OUTPATIENT
Start: 2020-09-10 | End: 2020-09-10 | Stop reason: HOSPADM

## 2020-09-10 RX ORDER — HALOPERIDOL 5 MG/ML
1 INJECTION INTRAMUSCULAR
Status: DISCONTINUED | OUTPATIENT
Start: 2020-09-10 | End: 2020-09-10 | Stop reason: HOSPADM

## 2020-09-10 RX ORDER — MIDAZOLAM HYDROCHLORIDE 1 MG/ML
INJECTION INTRAMUSCULAR; INTRAVENOUS PRN
Status: DISCONTINUED | OUTPATIENT
Start: 2020-09-10 | End: 2020-09-10 | Stop reason: SURG

## 2020-09-10 RX ORDER — ONDANSETRON 2 MG/ML
4 INJECTION INTRAMUSCULAR; INTRAVENOUS
Status: DISCONTINUED | OUTPATIENT
Start: 2020-09-10 | End: 2020-09-10 | Stop reason: HOSPADM

## 2020-09-10 RX ORDER — SODIUM CHLORIDE 9 MG/ML
INJECTION, SOLUTION INTRAVENOUS CONTINUOUS
Status: DISPENSED | OUTPATIENT
Start: 2020-09-10 | End: 2020-09-11

## 2020-09-10 RX ORDER — DIPHENHYDRAMINE HYDROCHLORIDE 50 MG/ML
12.5 INJECTION INTRAMUSCULAR; INTRAVENOUS
Status: DISCONTINUED | OUTPATIENT
Start: 2020-09-10 | End: 2020-09-10 | Stop reason: HOSPADM

## 2020-09-10 RX ORDER — HYDROMORPHONE HYDROCHLORIDE 1 MG/ML
0.5 INJECTION, SOLUTION INTRAMUSCULAR; INTRAVENOUS; SUBCUTANEOUS EVERY 6 HOURS PRN
Status: DISCONTINUED | OUTPATIENT
Start: 2020-09-10 | End: 2020-09-15 | Stop reason: HOSPADM

## 2020-09-10 RX ORDER — SODIUM CHLORIDE, SODIUM LACTATE, POTASSIUM CHLORIDE, CALCIUM CHLORIDE 600; 310; 30; 20 MG/100ML; MG/100ML; MG/100ML; MG/100ML
INJECTION, SOLUTION INTRAVENOUS
Status: DISCONTINUED | OUTPATIENT
Start: 2020-09-10 | End: 2020-09-10 | Stop reason: SURG

## 2020-09-10 RX ORDER — SCOLOPAMINE TRANSDERMAL SYSTEM 1 MG/1
PATCH, EXTENDED RELEASE TRANSDERMAL
Status: COMPLETED
Start: 2020-09-10 | End: 2020-09-10

## 2020-09-10 RX ORDER — MORPHINE SULFATE 10 MG/5ML
5 SOLUTION ORAL EVERY 4 HOURS PRN
Status: DISCONTINUED | OUTPATIENT
Start: 2020-09-10 | End: 2020-09-10

## 2020-09-10 RX ADMIN — ONDANSETRON 4 MG: 2 INJECTION INTRAMUSCULAR; INTRAVENOUS at 13:30

## 2020-09-10 RX ADMIN — HYDROMORPHONE HYDROCHLORIDE 0.5 MG: 1 INJECTION, SOLUTION INTRAMUSCULAR; INTRAVENOUS; SUBCUTANEOUS at 13:46

## 2020-09-10 RX ADMIN — LEVOTHYROXINE SODIUM 100 MCG: 100 TABLET ORAL at 06:06

## 2020-09-10 RX ADMIN — ENOXAPARIN SODIUM 40 MG: 40 INJECTION SUBCUTANEOUS at 06:06

## 2020-09-10 RX ADMIN — MIDAZOLAM HYDROCHLORIDE 2 MG: 1 INJECTION, SOLUTION INTRAMUSCULAR; INTRAVENOUS at 11:24

## 2020-09-10 RX ADMIN — SODIUM CHLORIDE, POTASSIUM CHLORIDE, SODIUM LACTATE AND CALCIUM CHLORIDE: 600; 310; 30; 20 INJECTION, SOLUTION INTRAVENOUS at 11:22

## 2020-09-10 RX ADMIN — PANTOPRAZOLE SODIUM 40 MG: 40 INJECTION, POWDER, LYOPHILIZED, FOR SOLUTION INTRAVENOUS at 08:02

## 2020-09-10 RX ADMIN — FENTANYL CITRATE 50 MCG: 50 INJECTION INTRAMUSCULAR; INTRAVENOUS at 11:24

## 2020-09-10 RX ADMIN — SODIUM CHLORIDE: 9 INJECTION, SOLUTION INTRAVENOUS at 17:15

## 2020-09-10 RX ADMIN — PROPOFOL 200 MCG/KG/MIN: 10 INJECTION, EMULSION INTRAVENOUS at 11:24

## 2020-09-10 RX ADMIN — SCOLOPAMINE TRANSDERMAL SYSTEM 1 PATCH: 1 PATCH, EXTENDED RELEASE TRANSDERMAL at 11:19

## 2020-09-10 RX ADMIN — ONDANSETRON 4 MG: 2 INJECTION INTRAMUSCULAR; INTRAVENOUS at 20:18

## 2020-09-10 ASSESSMENT — ENCOUNTER SYMPTOMS
SPEECH CHANGE: 0
VOMITING: 1
FLANK PAIN: 0
WEAKNESS: 0
EYE DISCHARGE: 0
BLURRED VISION: 0
MYALGIAS: 0
HEMOPTYSIS: 0
DOUBLE VISION: 0
NAUSEA: 1
HALLUCINATIONS: 0
FEVER: 0
FOCAL WEAKNESS: 0
CHILLS: 0
DEPRESSION: 0
SENSORY CHANGE: 0
ABDOMINAL PAIN: 1
COUGH: 0
SHORTNESS OF BREATH: 0
DIZZINESS: 0
HEARTBURN: 0
BRUISES/BLEEDS EASILY: 0
PALPITATIONS: 0

## 2020-09-10 ASSESSMENT — PAIN DESCRIPTION - PAIN TYPE
TYPE: ACUTE PAIN

## 2020-09-10 ASSESSMENT — LIFESTYLE VARIABLES: SUBSTANCE_ABUSE: 0

## 2020-09-10 NOTE — PROGRESS NOTES
[facility] Medicine Daily Progress Note    Date of Service  9/10/2020    Chief Complaint  60 y.o. female admitted 9/8/2020 with intractable nausea and vomiting.     Hospital Course    This is a 60 y.o. female who is presenting about 6 week post sleeve gastrectomy for obesity with persistent nausea and vomiting. She was admitted 2 weeks ago with similar complaints and had CT showing no acute abnormality, UGI showing no stricture or leak and ultrasound showing no gallstones. Plan for admission to [facility], GI and Surgery consulted.       Interval Problem Update  Continued n/v and inability to tolerate oral intake   VS stable   Plan for EGD today   No acute events overnight    Consultants/Specialty  Surgery   GI     Code Status  Full Code    Disposition  TBD     Review of Systems  Review of Systems   Constitutional: Positive for malaise/fatigue. Negative for chills and fever.   HENT: Negative for congestion, hearing loss and tinnitus.    Eyes: Negative for blurred vision, double vision and discharge.   Respiratory: Negative for cough, hemoptysis and shortness of breath.    Cardiovascular: Negative for chest pain, palpitations and leg swelling.   Gastrointestinal: Positive for abdominal pain, nausea and vomiting. Negative for heartburn.   Genitourinary: Negative for dysuria and flank pain.   Musculoskeletal: Negative for joint pain and myalgias.   Skin: Negative for rash.   Neurological: Negative for dizziness, sensory change, speech change, focal weakness and weakness.   Endo/Heme/Allergies: Negative for environmental allergies. Does not bruise/bleed easily.   Psychiatric/Behavioral: Negative for depression, hallucinations and substance abuse.        Physical Exam  Temp:  [36.4 °C (97.6 °F)-37 °C (98.6 °F)] 37 °C (98.6 °F)  Pulse:  [63-77] 63  Resp:  [16-17] 16  BP: (110-121)/(53-68) 116/65  SpO2:  [94 %-98 %] 98 %    Physical Exam  Vitals signs reviewed.   Constitutional:       Appearance: Normal appearance. She is  ill-appearing.   HENT:      Head: Normocephalic and atraumatic.      Nose: No congestion.      Mouth/Throat:      Mouth: Mucous membranes are dry.   Eyes:      Extraocular Movements: Extraocular movements intact.      Pupils: Pupils are equal, round, and reactive to light.   Neck:      Musculoskeletal: Normal range of motion and neck supple. No muscular tenderness.   Cardiovascular:      Rate and Rhythm: Normal rate and regular rhythm.      Pulses: Normal pulses.      Heart sounds: Normal heart sounds. No murmur.   Pulmonary:      Effort: Pulmonary effort is normal. No respiratory distress.      Breath sounds: Normal breath sounds. No stridor.   Abdominal:      General: Bowel sounds are normal. There is no distension.      Palpations: Abdomen is soft.      Tenderness: There is abdominal tenderness.      Comments: Epigastric ttp    Musculoskeletal:         General: No swelling or deformity.   Skin:     General: Skin is warm and dry.      Capillary Refill: Capillary refill takes 2 to 3 seconds.   Neurological:      General: No focal deficit present.      Mental Status: She is alert and oriented to person, place, and time.   Psychiatric:         Mood and Affect: Mood normal.         Behavior: Behavior normal.         Thought Content: Thought content normal.         Judgment: Judgment normal.         Fluids    Intake/Output Summary (Last 24 hours) at 9/10/2020 0956  Last data filed at 9/9/2020 1815  Gross per 24 hour   Intake 300 ml   Output --   Net 300 ml       Laboratory  Recent Labs     09/08/20 1930 09/09/20 0912   WBC 3.4* 3.0*  3.0*   RBC 5.03 4.14*  4.14*   HEMOGLOBIN 13.5 11.0*  11.0*   HEMATOCRIT 42.5 34.4*  34.4*   MCV 84.5 83.1  83.1   MCH 26.8* 26.6*  26.6*   MCHC 31.8* 32.0*  32.0*   RDW 44.1 43.3  43.3   PLATELETCT 248 181  181   MPV 9.8 9.5  9.5     Recent Labs     09/08/20 1930 09/09/20 0912   SODIUM 134* 135  135   POTASSIUM 3.0* 3.3*  3.3*   CHLORIDE 88* 98  98   CO2 26 26  26    GLUCOSE 70 146*  146*   BUN 5* 3*  3*   CREATININE 0.53 0.59  0.59   CALCIUM 9.7 8.4*  8.4*                   Imaging  No orders to display        Assessment/Plan  * Intractable nausea and vomiting  Assessment & Plan  Likely gastritis though complicated by gastric sleeve July 2020.    Recent extensive imaging with similar complaint included CT abdomen pelvis, right upper quadrant ultrasound, abdominal series obtained August 26, 2020 unremarkable  Symptomatic management Zofran and Phenergan  Consulted GI and Gen surgery   NPO for now, plan for EGD today with GI     Metabolic acidosis  Assessment & Plan  Cont with IVF   And recheck labs in am     Hypokalemia  Assessment & Plan  Replace and recheck labs in am        VTE prophylaxis: lovenox

## 2020-09-10 NOTE — ANESTHESIA QCDR
2019 [facility] Clinical Data Registry (for Quality Improvement)     Postoperative nausea/vomiting risk protocol (Adult = 18 yrs and Pediatric 3-17 yrs)- (430 and 463)  General inhalation anesthetic (NOT TIVA) with PONV risk factors: No  Provision of anti-emetic therapy with at least 2 different classes of agents: N/A  Patient DID NOT receive anti-emetic therapy and reason is documented in Medical Record: N/A    Multimodal Pain Management- (477)  Non-emergent surgery AND patient age >= 18: No  Use of Multimodal Pain Management, two or more drugs and/or interventions, NOT including systemic opioids:   Exception: Documented allergy to multiple classes of analgesics:     Smoking Abstinence (404)  Patient is current smoker (cigarette, pipe, e-cig, marijuanna): No  Elective Surgery:   Abstinence instructions provided prior to day of surgery:   Patient abstained from smoking on day of surgery:     Pre-Op Beta-Blocker in Isolated CABG (44)  Isolated CABG AND patient age >= 18: No  Beta-blocker admin within 24 hours of surgical incision:   Exception:of medical reason(s) for not administering beta blocker within 24 hours prior to surgical incision (e.g., not  indicated,other medical reason):     PACU assessment of acute postoperative pain prior to Anesthesia Care End- Applies to Patients Age = 18- (ABG7)  Initial PACU pain score is which of the following: < 7/10  Patient unable to report pain score: N/A    Post-anesthetic transfer of care checklist/protocol to PACU/ICU- (426 and 427)  Upon conclusion of case, patient transferred to which of the following locations: PACU/Non-ICU  Use of transfer checklist/protocol: Yes  Exclusion: Service Performed in Patient Hospital Room (and thus did not require transfer): N/A  Unplanned admission to ICU related to anesthesia service up through end of PACU care- (MD51)  Unplanned admission to ICU (not initially anticipated at anesthesia start time): No

## 2020-09-10 NOTE — OR NURSING
1135 Pt arrived to PACU from OR via gurney. Report received from OR RN and anesthesiologist. Monitor applied. Pt sleeping. Respirations even and unlabored.     1142 Pt awakening, denies pain and nausea    1225 VSS, Report called to Joshua WARD, transport requested    1255 Pt transported to [facility] via [facility] and transport service

## 2020-09-10 NOTE — OP REPORT
DATE OF SERVICE:  09/10/2020    PROCEDURE PERFORMED:  Esophagogastroduodenoscopy.    INDICATION:  Recurrent nausea and vomiting.    POSTPROCEDURE DIAGNOSES:  1.  Post-surgical changes consistent with a normal gastric sleeve.  2.  Mild reflux esophagitis at the gastroesophageal junction.    PHYSICIAN:  Joshua Hutchinson MD    ANESTHESIOLOGIST:  Guadalupe Marin MD    MEDICATIONS:  Deep sedation.    CONSENT:  Procedure risks and benefits reviewed thoroughly with the patient,   risks including but not limited to bleeding, perforation, side effects of   medication were informed.  Patient voiced understanding and agreed to proceed.    PROCEDURE:  The patient was placed in a left lateral decubitus position after   sedation was achieved.  Bite block was inserted in the mouth and a   forward-viewing gastroscope was passed carefully and easily under direct   visualization into the esophagus.  All esophageal views were normal.  The GE   junction revealed mild reflux esophagitis.  The GE junction was located at 38   cm.  From 38 cm to 45 cm was the post-surgical change consistent with a   gastric sleeve.  The gastric sleeve itself revealed no evidence for stricture,   ulceration, or other abnormality.  Beyond this, post-surgical change from 45   to 50 cm was the residual stomach and then from then on was the pyloric valve   into the duodenal bulb, duodenal sweep, second, and third portions of   duodenum.  All duodenal views were normal.  The stomach was suctioned,   desufflated, and scope was removed.    COMPLICATIONS:  None.    ESTIMATED BLOOD LOSS:  None.    SPECIMENS:  None.    RECOMMENDATIONS:  Post-surgical changes consistent with a gastric sleeve.    There is mild reflux esophagitis likely consistent with reflux of food and   irritation of the esophagus.  The patient is advised to monitor the volume of   food that she consumes to avoid over filling the gastric sleeve and causing   reflux esophagitis and likely nausea,  which may be contributing to her   vomiting.    The above findings and recommendations will be reviewed with the patient's   wife.  All questions will be answered to her satisfaction.  Recommendation for   nutrition consultation to review volume of food consumed as adjustment should   be considered.  The above was reviewed.  All questions were answered.       ____________________________________     Joshua MD PJ Jc / REBECCA    DD:  09/10/2020 11:35:50  DT:  09/10/2020 13:13:22    D#:  [id number]  Job#:  [id number]

## 2020-09-10 NOTE — PROGRESS NOTES
2 RN Skin Check    Pt left unit for EGD at 1055 and arrived back to unit at 1303, was gone for over 2 hours.    2 RN skin check complete. Done with Ita WARD.  Devices in place: SCDs.  Skin assessed under devices: yes.  Confirmed pressure ulcers found on: N/A.  New potential pressure ulcers noted on N/A. Wound consult placed N/A.  The following interventions in place Pillows and Lotion.

## 2020-09-10 NOTE — PROGRESS NOTES
With patient’s permission (if able), completed daily phone call to designated support person, isela Saniya.  Discussed patient condition and plan of care. All questions answered.  Follow up requested:Call spouse with information on where to go prior to pt EGD.

## 2020-09-10 NOTE — ANESTHESIA TIME REPORT
Anesthesia Start and Stop Event Times     Date Time Event    9/10/2020 1112 Ready for Procedure     1122 Anesthesia Start     1138 Anesthesia Stop        Responsible Staff  09/10/20    Name Role Begin End    Guadalupe Marin M.D. Anesth 1122 1138        Preop Diagnosis (Free Text):  Pre-op Diagnosis     nausea, vomiting,abdominal pain        Preop Diagnosis (Codes):    Post op Diagnosis  Vomiting      Premium Reason  Non-Premium    Comments:

## 2020-09-10 NOTE — ANESTHESIA PREPROCEDURE EVALUATION
From GI note:  GASTROENTEROLOGY CONSULTATION     REFERRING PHYSICIAN:  Hospitalist team.     REASON FOR CONSULTATION:  Request for nausea and vomiting.     HISTORY OF PRESENT ILLNESS:  This is a 60-year-old female well known to me.    She has a history of colon cancer, status post resection and treatment with   care.  She has a history of obesity with presurgical weight of 248 and a   subsequent gastric sleeve that was performed in 07/2020.  She reports that   after the procedure, she started having significant nausea, reflux, and now   epigastric pain related to eating.  She cannot eat much of a significant   volume until she over accommodates and then feels significant pressure and   subsequent nausea and vomiting.  She has had a 40-pound weight loss during the   course of her post-surgical state.  She does not have any hematemesis,   melena, or hematochezia.  She has a chronic sense of nausea.     PAST MEDICAL HISTORY:  1.  Colorectal cancer.  2.  Gastroesophageal reflux disease.  3.  Graves' disease.  4.  Obesity.  5.  Obstructive sleep apnea.  6.  Nasopharyngitis.     PAST SURGICAL HISTORY:  1.  Knee arthroscopy.  2.  Thyroidectomy.  3.  Hernia repair.  4.  Total abdominal hysterectomy.  5.  Gastric sleeve.    Relevant Problems   NEURO   (+) History of Graves' disease   (+) History of anemia   (+) History of colorectal cancer      CARDIAC   (+) Internal hemorrhoid      ENDO   (+) Hypothyroidism, postsurgical       Physical Exam    Airway   Mallampati: II  TM distance: >3 FB  Neck ROM: full       Cardiovascular - normal exam  Rhythm: regular  Rate: normal  (-) murmur     Dental - normal exam           Pulmonary - normal exam  Breath sounds clear to auscultation     Abdominal    Neurological - normal exam                 Anesthesia Plan    ASA 3   ASA physical status 3 criteria: morbid obesity - BMI greater than or equal to 40    Plan - MAC             Induction: intravenous      Pertinent diagnostic labs and  testing reviewed    Informed Consent:    Anesthetic plan and risks discussed with patient.

## 2020-09-10 NOTE — ANESTHESIA POSTPROCEDURE EVALUATION
Patient: Eunice Edwards Ranolan    Procedure Summary     Date: 09/10/20 Room / Location: [facility] ROOM 26 / SURGERY SAME DAY [facility]    Anesthesia Start: 1122 Anesthesia Stop: 1138    Procedure: GASTROSCOPY (N/A Esophagus) Diagnosis: (erosive esophagitis,post surgical changes/adjustment disorder)    Surgeon: Joshua Hutchinson M.D. Responsible Provider: Guadalupe Marin M.D.    Anesthesia Type: MAC ASA Status: 3          Final Anesthesia Type: MAC  Last vitals  BP   Blood Pressure: 128/79    Temp   36.6 °C (97.9 °F)    Pulse   Pulse: 75   Resp   16    SpO2   98 %      Anesthesia Post Evaluation    Patient location during evaluation: PACU  Patient participation: complete - patient participated  Level of consciousness: awake and alert    Airway patency: patent  Anesthetic complications: no  Cardiovascular status: hemodynamically stable  Respiratory status: acceptable  Hydration status: euvolemic    PONV: none           Nurse Pain Score: 0 (NPRS)

## 2020-09-10 NOTE — OR NURSING
1100 Pt arrived with transport, pts family brought to bedside. Pt states 4/10 epigastric pain and nausea. Reviewed the plan of care with the pt and her spouse, VSS.   1125-Scop patch ordered by Dr. Marin and applied behind right ear.

## 2020-09-10 NOTE — PROGRESS NOTES
With patient’s permission (if able), completed daily phone call to designated support person, isela Kothari  Discussed patient condition and plan of care. All questions answered.  Follow up requested: N/A

## 2020-09-11 ENCOUNTER — APPOINTMENT (OUTPATIENT)
Dept: RADIOLOGY | Facility: MEDICAL CENTER | Age: 60
DRG: 392 | End: 2020-09-11
Attending: HOSPITALIST
Payer: COMMERCIAL

## 2020-09-11 LAB
ALBUMIN SERPL BCP-MCNC: 3.5 G/DL (ref 3.2–4.9)
ALBUMIN/GLOB SERPL: 0.8 G/DL
ALP SERPL-CCNC: 80 U/L (ref 30–99)
ALT SERPL-CCNC: 21 U/L (ref 2–50)
ANION GAP SERPL CALC-SCNC: 14 MMOL/L (ref 7–16)
AST SERPL-CCNC: 16 U/L (ref 12–45)
BILIRUB SERPL-MCNC: 0.4 MG/DL (ref 0.1–1.5)
BUN SERPL-MCNC: 3 MG/DL (ref 8–22)
CALCIUM SERPL-MCNC: 9.3 MG/DL (ref 8.5–10.5)
CHLORIDE SERPL-SCNC: 101 MMOL/L (ref 96–112)
CHOLEST SERPL-MCNC: 171 MG/DL (ref 100–199)
CO2 SERPL-SCNC: 26 MMOL/L (ref 20–33)
CREAT SERPL-MCNC: 0.53 MG/DL (ref 0.5–1.4)
GLOBULIN SER CALC-MCNC: 4.4 G/DL (ref 1.9–3.5)
GLUCOSE SERPL-MCNC: 101 MG/DL (ref 65–99)
MAGNESIUM SERPL-MCNC: 1.5 MG/DL (ref 1.5–2.5)
PHOSPHATE SERPL-MCNC: 1.7 MG/DL (ref 2.5–4.5)
POTASSIUM SERPL-SCNC: 3.4 MMOL/L (ref 3.6–5.5)
PROT SERPL-MCNC: 7.9 G/DL (ref 6–8.2)
SODIUM SERPL-SCNC: 141 MMOL/L (ref 135–145)
TRIGL SERPL-MCNC: 100 MG/DL (ref 0–149)

## 2020-09-11 PROCEDURE — 02HV33Z INSERTION OF INFUSION DEVICE INTO SUPERIOR VENA CAVA, PERCUTANEOUS APPROACH: ICD-10-PCS | Performed by: HOSPITALIST

## 2020-09-11 PROCEDURE — 700111 HCHG RX REV CODE 636 W/ 250 OVERRIDE (IP): Performed by: HOSPITALIST

## 2020-09-11 PROCEDURE — A9270 NON-COVERED ITEM OR SERVICE: HCPCS | Performed by: INTERNAL MEDICINE

## 2020-09-11 PROCEDURE — 80053 COMPREHEN METABOLIC PANEL: CPT

## 2020-09-11 PROCEDURE — B548ZZA ULTRASONOGRAPHY OF SUPERIOR VENA CAVA, GUIDANCE: ICD-10-PCS | Performed by: HOSPITALIST

## 2020-09-11 PROCEDURE — C1751 CATH, INF, PER/CENT/MIDLINE: HCPCS

## 2020-09-11 PROCEDURE — 700111 HCHG RX REV CODE 636 W/ 250 OVERRIDE (IP): Performed by: INTERNAL MEDICINE

## 2020-09-11 PROCEDURE — C9113 INJ PANTOPRAZOLE SODIUM, VIA: HCPCS | Performed by: INTERNAL MEDICINE

## 2020-09-11 PROCEDURE — 84100 ASSAY OF PHOSPHORUS: CPT

## 2020-09-11 PROCEDURE — 83735 ASSAY OF MAGNESIUM: CPT

## 2020-09-11 PROCEDURE — 84478 ASSAY OF TRIGLYCERIDES: CPT

## 2020-09-11 PROCEDURE — 3E0436Z INTRODUCTION OF NUTRITIONAL SUBSTANCE INTO CENTRAL VEIN, PERCUTANEOUS APPROACH: ICD-10-PCS | Performed by: HOSPITALIST

## 2020-09-11 PROCEDURE — 99232 SBSQ HOSP IP/OBS MODERATE 35: CPT | Performed by: HOSPITALIST

## 2020-09-11 PROCEDURE — 700101 HCHG RX REV CODE 250: Performed by: HOSPITALIST

## 2020-09-11 PROCEDURE — 700105 HCHG RX REV CODE 258: Performed by: HOSPITALIST

## 2020-09-11 PROCEDURE — A9270 NON-COVERED ITEM OR SERVICE: HCPCS | Performed by: HOSPITALIST

## 2020-09-11 PROCEDURE — 770006 HCHG ROOM/CARE - MED/SURG/GYN SEMI*

## 2020-09-11 PROCEDURE — 700102 HCHG RX REV CODE 250 W/ 637 OVERRIDE(OP): Performed by: INTERNAL MEDICINE

## 2020-09-11 PROCEDURE — 700102 HCHG RX REV CODE 250 W/ 637 OVERRIDE(OP): Performed by: HOSPITALIST

## 2020-09-11 PROCEDURE — 82465 ASSAY BLD/SERUM CHOLESTEROL: CPT

## 2020-09-11 RX ORDER — PROMETHAZINE HYDROCHLORIDE 25 MG/1
25 SUPPOSITORY RECTAL EVERY 6 HOURS PRN
Status: DISCONTINUED | OUTPATIENT
Start: 2020-09-11 | End: 2020-09-15 | Stop reason: HOSPADM

## 2020-09-11 RX ORDER — MAGNESIUM SULFATE HEPTAHYDRATE 40 MG/ML
2 INJECTION, SOLUTION INTRAVENOUS ONCE
Status: COMPLETED | OUTPATIENT
Start: 2020-09-11 | End: 2020-09-11

## 2020-09-11 RX ORDER — METOCLOPRAMIDE HYDROCHLORIDE 5 MG/ML
10 INJECTION INTRAMUSCULAR; INTRAVENOUS EVERY 6 HOURS
Status: DISCONTINUED | OUTPATIENT
Start: 2020-09-11 | End: 2020-09-12

## 2020-09-11 RX ORDER — AMOXICILLIN 250 MG
2 CAPSULE ORAL 2 TIMES DAILY
Status: DISCONTINUED | OUTPATIENT
Start: 2020-09-11 | End: 2020-09-15 | Stop reason: HOSPADM

## 2020-09-11 RX ORDER — BISACODYL 10 MG
10 SUPPOSITORY, RECTAL RECTAL
Status: DISCONTINUED | OUTPATIENT
Start: 2020-09-11 | End: 2020-09-15 | Stop reason: HOSPADM

## 2020-09-11 RX ORDER — POLYETHYLENE GLYCOL 3350 17 G/17G
1 POWDER, FOR SOLUTION ORAL 2 TIMES DAILY
Status: DISCONTINUED | OUTPATIENT
Start: 2020-09-11 | End: 2020-09-15 | Stop reason: HOSPADM

## 2020-09-11 RX ADMIN — POLYETHYLENE GLYCOL 3350 1 PACKET: 17 POWDER, FOR SOLUTION ORAL at 10:51

## 2020-09-11 RX ADMIN — SODIUM CHLORIDE: 9 INJECTION, SOLUTION INTRAVENOUS at 05:52

## 2020-09-11 RX ADMIN — METOCLOPRAMIDE 10 MG: 5 INJECTION, SOLUTION INTRAMUSCULAR; INTRAVENOUS at 23:26

## 2020-09-11 RX ADMIN — LIDOCAINE HYDROCHLORIDE 30 ML: 20 SOLUTION OROPHARYNGEAL at 10:51

## 2020-09-11 RX ADMIN — POTASSIUM PHOSPHATE, MONOBASIC AND POTASSIUM PHOSPHATE, DIBASIC 20 MMOL: 224; 236 INJECTION, SOLUTION, CONCENTRATE INTRAVENOUS at 15:41

## 2020-09-11 RX ADMIN — PROMETHAZINE HYDROCHLORIDE 25 MG: 25 SUPPOSITORY RECTAL at 06:30

## 2020-09-11 RX ADMIN — PROMETHAZINE HYDROCHLORIDE 25 MG: 25 SUPPOSITORY RECTAL at 00:23

## 2020-09-11 RX ADMIN — CALCIUM GLUCONATE: 98 INJECTION, SOLUTION INTRAVENOUS at 20:27

## 2020-09-11 RX ADMIN — PANTOPRAZOLE SODIUM 40 MG: 40 INJECTION, POWDER, LYOPHILIZED, FOR SOLUTION INTRAVENOUS at 05:53

## 2020-09-11 RX ADMIN — SODIUM CHLORIDE: 9 INJECTION, SOLUTION INTRAVENOUS at 17:27

## 2020-09-11 RX ADMIN — ENOXAPARIN SODIUM 40 MG: 40 INJECTION SUBCUTANEOUS at 05:53

## 2020-09-11 RX ADMIN — MAGNESIUM SULFATE 2 G: 2 INJECTION INTRAVENOUS at 15:39

## 2020-09-11 RX ADMIN — METOCLOPRAMIDE 10 MG: 5 INJECTION, SOLUTION INTRAMUSCULAR; INTRAVENOUS at 12:29

## 2020-09-11 RX ADMIN — METOCLOPRAMIDE 10 MG: 5 INJECTION, SOLUTION INTRAMUSCULAR; INTRAVENOUS at 17:31

## 2020-09-11 RX ADMIN — LEVOTHYROXINE SODIUM 100 MCG: 100 TABLET ORAL at 05:53

## 2020-09-11 ASSESSMENT — ENCOUNTER SYMPTOMS
DIZZINESS: 0
SENSORY CHANGE: 0
BRUISES/BLEEDS EASILY: 0
SHORTNESS OF BREATH: 0
PALPITATIONS: 0
FOCAL WEAKNESS: 0
DEPRESSION: 0
SPEECH CHANGE: 0
DOUBLE VISION: 0
RESPIRATORY NEGATIVE: 1
HEMOPTYSIS: 0
EYE DISCHARGE: 0
CHILLS: 0
CONSTIPATION: 1
HALLUCINATIONS: 0
ABDOMINAL PAIN: 1
WEAKNESS: 0
BLURRED VISION: 0
VOMITING: 1
MYALGIAS: 0
FLANK PAIN: 0
COUGH: 0
FEVER: 0
HEARTBURN: 0
CARDIOVASCULAR NEGATIVE: 1
NAUSEA: 1

## 2020-09-11 ASSESSMENT — PAIN DESCRIPTION - PAIN TYPE
TYPE: ACUTE PAIN

## 2020-09-11 ASSESSMENT — LIFESTYLE VARIABLES: SUBSTANCE_ABUSE: 0

## 2020-09-11 NOTE — FACE TO FACE
Face to Face Supporting Documentation - Home Health    The encounter with this patient was in whole or in part the primary reason for home health admission.    Date of encounter:   Patient:                    MRN:                       YOB: 2019  Eunice Mckenna  [id number]  1960     Home health to see patient for:  Skilled Nursing care for assessment, interventions & education and Registered dietitian consult    Skilled need for:  Medication Management TPN    Skilled nursing interventions to include:  Home IV infusion therapy    Homebound status evidenced by:  Need the aid of supportive devices such as crutches, canes, wheelchairs or walkers or Needs the assistance of another person in order to leave the home. Leaving home requires a considerable and taxing effort. There is a normal inability to leave the home.    Community Physician to provide follow up care: Sofie Mathew M.D.     Optional Interventions? No      I certify the face to face encounter for this home health care referral meets the CMS requirements and the encounter/clinical assessment with the patient was, in whole, or in part, for the medical condition(s) listed above, which is the primary reason for home health care. Based on my clinical findings: the service(s) are medically necessary, support the need for home health care, and the homebound criteria are met.  I certify that this patient has had a face to face encounter by myself.  Samra Quinn M.D. - NPI: [id number]

## 2020-09-11 NOTE — PROGRESS NOTES
"Pharmacy TPN Day # 1       2020    Dosing Weight   73.3 kg     TPN currently providing 50% of goal    TPN goal: 22 kcal/day including 1.5gm/kg/day Protein    TPN indication: intractable n/v , prolonged inadequate po intake     Pertinent PMH: 61 y/o female, PMHx for gastric sleeve surgery on July, colon cancer. Presenting to the ED w/ epigastric pain which was started 2 to 3 weeks after gastric sleeve surgery. Pain started as dull, constant and associated with nausea and vomiting also she states that she has fatigue, lightheadedness, dry mouth, food and water intolerance and regurgitating yellow mucus.  She was recently admitted to [facility] and was seen by the surgeon.  At the time CT abdomen was not pertinent for acute abnormalities, barium swallow test did not show any leakage or obstruction, right upper quadrant ultrasound did not show any cholelithiasis.    Temp (24hrs), Av.3 °C (97.3 °F), Min:36.1 °C (97 °F), Max:36.7 °C (98 °F)  .  Recent Labs     20  1930 20  0920  1218   SODIUM 134* 135  135 141   POTASSIUM 3.0* 3.3*  3.3* 3.4*   CHLORIDE 88* 98  98 101   CO2 26 26  26 26   BUN 5* 3*  3* 3*   CREATININE 0.53 0.59  0.59 0.53   GLUCOSE 70 146*  146* 101*   CALCIUM 9.7 8.4*  8.4* 9.3   ASTSGOT 20 15 16   ALTSGPT 24 17 21   ALBUMIN 4.2 3.1* 3.5   TBILIRUBIN 0.5 0.4 0.4   PHOSPHORUS  --   --  1.7*   MAGNESIUM 1.7  --  1.5     Accu-Checks  No results for input(s): POCGLUCOSE in the last 72 hours.    Vitals:    09/10/20 2343 20 0400 20 0800 20 1100   BP: 131/70 127/64 119/68    Weight:       Height:    1.676 m (5' 5.98\")       Intake/Output Summary (Last 24 hours) at 2020 1533  Last data filed at 9/10/2020 1800  Gross per 24 hour   Intake 60 ml   Output --   Net 60 ml       Orders Placed This Encounter   Procedures   • Diet Order Full Liquid     Standing Status:   Standing     Number of Occurrences:   1     Order Specific Question:   Diet:     Answer: "   Full Liquid [11]     Order Specific Question:   Miscellaneous modifications:     Answer:   6 Small Meals [4]         TPN for past 72 hours (Show up to 3 orders; newest on the left.)     Start date and time   09/11/2020 2000      TPN Central Line Formulation [[id number]]    Order Status  Active       Base    Clinisol 15%  55 g    dextrose 70%  106 g    fat emulsions 20%  23 g       Additives    potassium phosphate  15 mmol    potassium chloride  48 mEq    sodium acetate  125 mEq    sodium chloride  125 mEq    magnesium sulfate  12 mEq    calcium GLUConate  4.65 mEq    M.T.E. -5 Adult  1 mL    M.V.I. Adult  10 mL       QS Base    sterile water for inj(pf)  1,212.22 mL       Energy Contribution    Proteins  --    Dextrose  --    Lipids  --    Total  --       Electrolyte Ion Calculated Amount    Sodium  250 mEq    Potassium  70 mEq    Calcium  4.65 mEq    Magnesium  12.02 mEq    Aluminum  --    Phosphate  15 mmol    Chloride  173 mEq    Acetate  171.57 mEq       Other    Total Protein  55 g    Total Protein/kg  0.58 g/kg    Total Amino Acid  --    Total Amino Acid/kg  --    Glucose Infusion Rate  0.78 mg/kg/min    Osmolarity (Estimated)  --    Volume  1,992 mL    Rate  83 mL/hr    Dosing Weight  94.2 kg    Infusion Site  Central          This formula provides:  % kcal as lipids = 28%  Grams protein/kg = 0.58  Non-protein calories = 590  Kcals/kg = 8.6  Total daily calories = 810    Comments:  · Per surgery note: Initiate TPN,  likely to be ongoing after discharge.  · Pt on Protonix iv, will follow up on rounds and request pepcid in the formulation.   · TPN currently at 50% goal , in next 48hr anticipate advancing to goal and running at cyclic rate.  · D/w MD: KPhos 20mmol and Mg 2g x1 now     Fluids  · TPN @ ~25mL/kg day  · MIVF at 100mL/hr will discontinue @ 2000 when TPN starts     Macronutrient's:  · CHO 45% AA 27%Lipids 28%  · GIR: 0.84 mg/kg/min  · Accuchecks: 0.84 mg/kg/min    Micronutrients:  · Na 250meq  · K  70meq  · Ca 4.65meq   · Mg 12meq  · Phos 15mmol       Ishmael EnglandD BCPS

## 2020-09-11 NOTE — PROGRESS NOTES
[facility] Medicine Daily Progress Note    Date of Service  9/11/2020    Chief Complaint  60 y.o. female admitted 9/8/2020 with intractable nausea and vomiting.     Hospital Course    This is a 60 y.o. female who is presenting about 6 week post sleeve gastrectomy for obesity with persistent nausea and vomiting. She was admitted 2 weeks ago with similar complaints and had CT showing no acute abnormality, UGI showing no stricture or leak and ultrasound showing no gallstones. Plan for admission to [facility], GI and Surgery consulted.       Interval Problem Update  Continued n/v,  inability to tolerate oral intake persistent  egd unremarkable with some mild esophagitis   Start on reglan today   Plan for picc line placement and TPN     Consultants/Specialty  Surgery   GI     Code Status  Full Code    Disposition  TBD     Review of Systems  Review of Systems   Constitutional: Positive for malaise/fatigue. Negative for chills and fever.   HENT: Negative for congestion, hearing loss and tinnitus.    Eyes: Negative for blurred vision, double vision and discharge.   Respiratory: Negative for cough, hemoptysis and shortness of breath.    Cardiovascular: Negative for chest pain, palpitations and leg swelling.   Gastrointestinal: Positive for abdominal pain, nausea and vomiting. Negative for heartburn.   Genitourinary: Negative for dysuria and flank pain.   Musculoskeletal: Negative for joint pain and myalgias.   Skin: Negative for rash.   Neurological: Negative for dizziness, sensory change, speech change, focal weakness and weakness.   Endo/Heme/Allergies: Negative for environmental allergies. Does not bruise/bleed easily.   Psychiatric/Behavioral: Negative for depression, hallucinations and substance abuse.        Physical Exam  Temp:  [36.1 °C (97 °F)-36.7 °C (98 °F)] 36.4 °C (97.5 °F)  Pulse:  [60-93] 60  Resp:  [15-18] 16  BP: ()/(48-79) 119/68  SpO2:  [96 %-100 %] 97 %    Physical Exam  Vitals signs reviewed.    Constitutional:       Appearance: Normal appearance. She is ill-appearing.   HENT:      Head: Normocephalic and atraumatic.      Nose: No congestion.      Mouth/Throat:      Mouth: Mucous membranes are dry.   Eyes:      Extraocular Movements: Extraocular movements intact.      Pupils: Pupils are equal, round, and reactive to light.   Neck:      Musculoskeletal: Normal range of motion and neck supple. No muscular tenderness.   Cardiovascular:      Rate and Rhythm: Normal rate and regular rhythm.      Pulses: Normal pulses.      Heart sounds: Normal heart sounds. No murmur.   Pulmonary:      Effort: Pulmonary effort is normal. No respiratory distress.      Breath sounds: Normal breath sounds. No stridor.   Abdominal:      General: Bowel sounds are normal. There is no distension.      Palpations: Abdomen is soft.      Tenderness: There is abdominal tenderness.      Comments: Epigastric ttp    Musculoskeletal:         General: No swelling or deformity.   Skin:     General: Skin is warm and dry.      Capillary Refill: Capillary refill takes 2 to 3 seconds.   Neurological:      General: No focal deficit present.      Mental Status: She is alert and oriented to person, place, and time.   Psychiatric:         Mood and Affect: Mood normal.         Behavior: Behavior normal.         Thought Content: Thought content normal.         Judgment: Judgment normal.         Fluids    Intake/Output Summary (Last 24 hours) at 9/11/2020 1110  Last data filed at 9/10/2020 1800  Gross per 24 hour   Intake 60 ml   Output --   Net 60 ml       Laboratory  Recent Labs     09/08/20 1930 09/09/20  0912   WBC 3.4* 3.0*  3.0*   RBC 5.03 4.14*  4.14*   HEMOGLOBIN 13.5 11.0*  11.0*   HEMATOCRIT 42.5 34.4*  34.4*   MCV 84.5 83.1  83.1   MCH 26.8* 26.6*  26.6*   MCHC 31.8* 32.0*  32.0*   RDW 44.1 43.3  43.3   PLATELETCT 248 181  181   MPV 9.8 9.5  9.5     Recent Labs     09/08/20 1930 09/09/20  0912   SODIUM 134* 135  135   POTASSIUM  3.0* 3.3*  3.3*   CHLORIDE 88* 98  98   CO2 26 26  26   GLUCOSE 70 146*  146*   BUN 5* 3*  3*   CREATININE 0.53 0.59  0.59   CALCIUM 9.7 8.4*  8.4*                   Imaging  IR-PICC LINE PLACEMENT W/ GUIDANCE > AGE 5    (Results Pending)        Assessment/Plan  * Intractable nausea and vomiting  Assessment & Plan  EGD with esophagitis 9/10/20  Recent extensive imaging with similar complaint included CT abdomen pelvis, right upper quadrant ultrasound, abdominal series obtained August 26, 2020 unremarkable  Symptomatic management IVF, anti emetics   Start on reglan today  Trial gi cocktail   Persistent symptoms will start on TPN, Picc line placement. Surgery to follow up outpatient  GI and surgery following    Metabolic acidosis  Assessment & Plan  Cont with IVF   And recheck labs in am     Hypokalemia  Assessment & Plan  Replace and recheck labs in am        VTE prophylaxis: lovenox

## 2020-09-11 NOTE — PROGRESS NOTES
Bedside report received from night RN. Pt sleeping. No distress noted on room air. piv infusing. Bed in low position. Call light and belongings within reach

## 2020-09-11 NOTE — PROGRESS NOTES
Gastroenterology Progress Note     Author: Wan Dejesus M.D.   Date & Time Created: 2020 9:20 AM    Chief Complaint:  N/V    Interval History:  EGD yesterday without any obstructive process identified.  Continues with great difficulty even with small sips of water.  No BM X 1 week per patient, though she is passing gas.    Review of Systems:  Review of Systems   Respiratory: Negative.    Cardiovascular: Negative.    Gastrointestinal: Positive for constipation, nausea and vomiting.       Physical Exam:  Physical Exam  Cardiovascular:      Rate and Rhythm: Normal rate and regular rhythm.      Pulses: Normal pulses.   Pulmonary:      Effort: Pulmonary effort is normal.   Abdominal:      General: Abdomen is flat. Bowel sounds are normal.      Palpations: Abdomen is soft.         Labs:          Recent Labs     20   SODIUM 134* 135  135   POTASSIUM 3.0* 3.3*  3.3*   CHLORIDE 88* 98  98   CO2 26 26  26   BUN 5* 3*  3*   CREATININE 0.53 0.59  0.59   MAGNESIUM 1.7  --    CALCIUM 9.7 8.4*  8.4*     Recent Labs     2012   ALTSGPT 24 17   ASTSGOT 20 15   ALKPHOSPHAT 100* 73   TBILIRUBIN 0.5 0.4   LIPASE 43  --    GLUCOSE 70 146*  146*     Recent Labs     20   RBC 5.03 4.14*  4.14*   HEMOGLOBIN 13.5 11.0*  11.0*   HEMATOCRIT 42.5 34.4*  34.4*   PLATELETCT 248 181  181     Recent Labs     20   WBC 3.4* 3.0*  3.0*   NEUTSPOLYS 48.20 37.70*   LYMPHOCYTES 34.30 39.60   MONOCYTES 16.60* 22.40*   EOSINOPHILS 0.30 0.30   BASOPHILS 0.30 0.00   ASTSGOT 20 15   ALTSGPT 24 17   ALKPHOSPHAT 100* 73   TBILIRUBIN 0.5 0.4     Hemodynamics:  Temp (24hrs), Av.4 °C (97.5 °F), Min:36.1 °C (97 °F), Max:36.7 °C (98 °F)  Temperature: 36.4 °C (97.5 °F)  Pulse  Av.8  Min: 60  Max: 114   Blood Pressure: 119/68     Respiratory:    Respiration: 16, Pulse Oximetry: 97 %           Fluids:    Intake/Output Summary (Last 24  hours) at 9/11/2020 0920  Last data filed at 9/10/2020 1800  Gross per 24 hour   Intake 60 ml   Output --   Net 60 ml        GI/Nutrition:  Orders Placed This Encounter   Procedures   • Diet Order Clear Liquid     Standing Status:   Standing     Number of Occurrences:   1     Order Specific Question:   Diet:     Answer:   Clear Liquid [10]     Medical Decision Making, by Problem:  Active Hospital Problems    Diagnosis   • *Intractable nausea and vomiting [R11.2]   • Metabolic acidosis [E87.2]   • Hypokalemia [E87.6]   • Hypomagnesemia [E83.42]       Plan:  Will start Reglan IV  Scheduled miralax  Continue liquid diet for now.  Continue PPI, anti-emetics.  Will follow.    Quality-Core Measures   Reviewed items::  Labs reviewed, Medications reviewed and Radiology images reviewed

## 2020-09-11 NOTE — CARE PLAN
Problem: Communication  Goal: The ability to communicate needs accurately and effectively will improve  Outcome: PROGRESSING AS EXPECTED     Problem: Safety  Goal: Will remain free from injury  Outcome: PROGRESSING AS EXPECTED  Goal: Will remain free from falls  Outcome: PROGRESSING AS EXPECTED     Problem: Infection  Goal: Will remain free from infection  Outcome: PROGRESSING AS EXPECTED     Problem: Venous Thromboembolism (VTW)/Deep Vein Thrombosis (DVT) Prevention:  Goal: Patient will participate in Venous Thrombosis (VTE)/Deep Vein Thrombosis (DVT)Prevention Measures  Outcome: PROGRESSING AS EXPECTED     Problem: Knowledge Deficit  Goal: Knowledge of disease process/condition, treatment plan, diagnostic tests, and medications will improve  Outcome: PROGRESSING AS EXPECTED  Goal: Knowledge of the prescribed therapeutic regimen will improve  Outcome: PROGRESSING AS EXPECTED     Problem: Discharge Barriers/Planning  Goal: Patient's continuum of care needs will be met  Outcome: PROGRESSING AS EXPECTED     Problem: Fluid Volume:  Goal: Will maintain balanced intake and output  Outcome: PROGRESSING AS EXPECTED     Problem: Pain Management  Goal: Pain level will decrease to patient's comfort goal  Outcome: PROGRESSING AS EXPECTED

## 2020-09-11 NOTE — PROGRESS NOTES
"Progress Note:    S: Still C/O nausea, had dry heaves during night    O:  Recent Labs     09/08/20 1930 09/09/20 0912   WBC 3.4* 3.0*  3.0*   RBC 5.03 4.14*  4.14*   HEMOGLOBIN 13.5 11.0*  11.0*   HEMATOCRIT 42.5 34.4*  34.4*   MCV 84.5 83.1  83.1   MCH 26.8* 26.6*  26.6*   MCHC 31.8* 32.0*  32.0*   RDW 44.1 43.3  43.3   PLATELETCT 248 181  181   MPV 9.8 9.5  9.5     Recent Labs     09/08/20 1930 09/09/20 0912   SODIUM 134* 135  135   POTASSIUM 3.0* 3.3*  3.3*   CHLORIDE 88* 98  98   CO2 26 26  26   GLUCOSE 70 146*  146*   BUN 5* 3*  3*   CREATININE 0.53 0.59  0.59   CALCIUM 9.7 8.4*  8.4*         Current Facility-Administered Medications   Medication Dose   • polyethylene glycol/lytes (MIRALAX) PACKET 1 Packet  1 Packet    And   • senna-docusate (PERICOLACE or SENOKOT S) 8.6-50 MG per tablet 2 Tab  2 Tab    And   • magnesium hydroxide (MILK OF MAGNESIA) suspension 30 mL  30 mL    And   • bisacodyl (DULCOLAX) suppository 10 mg  10 mg   • metoclopramide (REGLAN) injection 10 mg  10 mg   • hyoscyamine-maalox plus-lidocaine viscous (GI COCKTAIL) oral susp 30 mL  30 mL   • promethazine (PHENERGAN) suppository 25 mg  25 mg   • HYDROmorphone pf (DILAUDID) injection 0.5 mg  0.5 mg   • NS infusion     • pantoprazole (PROTONIX) injection 40 mg  40 mg   • acetaminophen (TYLENOL) tablet 500 mg  500 mg   • levothyroxine (SYNTHROID) tablet 100 mcg  100 mcg   • enoxaparin (LOVENOX) inj 40 mg  40 mg   • enalaprilat (VASOTEC) injection 1.25 mg  1.25 mg   • promethazine (PHENERGAN) tablet 25 mg  25 mg   • ondansetron (ZOFRAN) syringe/vial injection 4 mg  4 mg       PE:  /68   Pulse 60   Temp 36.4 °C (97.5 °F) (Temporal)   Resp 16   Ht 1.676 m (5' 6\")   Wt 94.2 kg (207 lb 10.8 oz)   SpO2 97%     Intake/Output Summary (Last 24 hours) at 9/11/2020 1037  Last data filed at 9/10/2020 1800  Gross per 24 hour   Intake 60 ml   Output --   Net 60 ml       Abd soft, nontender    Rads:  No orders to display "       A:   Active Hospital Problems    Diagnosis   • Metabolic acidosis [E87.2]     Priority: High   • Intractable nausea and vomiting [R11.2]     Priority: Medium   • Hypokalemia [E87.6]     Priority: Medium   • Hypomagnesemia [E83.42]         P: EGD normal  Agree with Silvio  I would recommend starting TPN  Can send home on TPN if possible and I can follow as outpatient  Call if questions    John Ganser M.D.  [facility]

## 2020-09-11 NOTE — CARE PLAN
Problem: Nutritional:  Goal: Achieve adequate nutritional intake  Description: Patient's diet will advance past clears and will consume 50% of meals  9/11/2020 1208 by Daphne Wray  Outcome: PROGRESSING SLOWER THAN EXPECTED    Problem: Nutritional:  Goal: Nutrition support tolerated and meeting greater than 85% of estimated needs  Outcome: NOT MET

## 2020-09-11 NOTE — CARE PLAN
Problem: Safety  Goal: Will remain free from injury  Outcome: PROGRESSING AS EXPECTED   Safety precautions in place and maintained.      Problem: Fluid Volume:  Goal: Will maintain balanced intake and output  Outcome: PROGRESSING SLOWER THAN EXPECTED   Pt cont to c/o abdominal pain and nausea. S/P EGD. Pt on clear liquids and cont on IVF until pt can tolerate an appropriate volume of PO liquids.

## 2020-09-11 NOTE — CARE PLAN
Problem: Nutritional:  Goal: Achieve adequate nutritional intake  Description: Patient's diet will advance past clears and will consume 50% of meals  Outcome: PROGRESSING SLOWER THAN EXPECTED    Clear liquid diet x 2 days. Per GI note, continue with clear liquid diet for now.

## 2020-09-11 NOTE — PROCEDURES
Vascular Access Team     Date of Insertion: 9/11/2020  Arm Circumference: 39  Internal length: 42  External Length: 2  Vein Occupancy %: 45   Reason for PICC: TPN  Labs: on 9/9/2020 WBC 3.0, , BUN 3, Cr 0.59, GFR >60, INR na     Consents confirmed, vessel patency confirmed with ultrasound. Risks and benefits of procedure explained to patient and education regarding central line associated bloodstream infections provided. Questions answered.      PICC placed in LUE per licensed provider order with ultrasound guidance.  5 Fr, double lumen PICC placed in basilic vein after 1 attempt(s). 2 mL of 1% lidocaine injected intradermally, 21 gauge microintroducer needle and modified Seldinger technique used. 42 cm catheter inserted with good blood return. Secured at 2 cm marker. Each lumen flushed without resistance with 10 mL 0.9% normal saline. PICC line secured with Biopatch and Tegaderm.     PICC tip placement location is confirmed by nurse to be in the Superior Vena Cava (SVC) utilizing 3CG technology. PICC line is appropriate for use at this time. Patient tolerated procedure well, without complications.  Patient condition relayed to unit RN or ordering physician via this post procedure note in the EMR.      Ultrasound images uploaded to PACS and viewable in the EMR - yes  Ultrasound imaged printed and placed in paper chart - no     BARD Power PICC ref # [id number], Lot # [id number], Expiration Date 05/31/2021

## 2020-09-11 NOTE — DIETARY
"Nutrition Support Assessment   Day 3 of admit. Eunice Mckenna is a 60 y.o. female with admitting DX of intractable nausea and vomiting, metabolic acidosis.     Current problem list:  1. Intractable nausea and vomiting  2. Metabolic acidosis  3. Hypokalemia  4. hypomagnesemia     Assessment:  Estimated Nutritional Needs: based on:   Height: 167.6 cm (5' 5.98\")  Weight: 94.2 kg (207 lb 10.8 oz)  Ideal Body Weight: 59 kg (130 lb)  Body mass index is 33.54 kg/m²., BMI classification: obese class 1     Calculation/Equation: REE using MSJ  X 1 = 1535kcal/day  Total Calories / day: 1535 - 1800 (Calories / k - 19)  Total Grams Protein / day: 94.4 - 113 (Grams Protein / k - 1.2)     Evaluation:   1. Consult received for TPN  2. PICC line pending placement  3. Pt was on clear liq diet x 2 days. Pt now on full liq diet (6 small meals).   4. Per previous discussion with patient, would recommend providing 100% of nutritional needs via TPN.   5. Pt declined nutrition supplements due to being too sweet.   6. Pt has history of gastric sleeve 2019.   7. Labs (): potassium 3.3, Glu 146, BUN 3, Calcium 8.4  8. Meds: lovenox, synthroid, Reglan, protonix, miralax  9. Infusion: NS 100mL/hr      Malnutrition Risk: No new risk noted.      Recommendations/Plan:  1. Initiate TPN per pharmacy. Recommend providing 100% of needs.   2. Monitor refeeding labs  3. Monitor weights  4. Encourage PO diet as feasible.  5. Document intake of all meals as % taken in ADL's to provide interdisciplinary communication across all shifts.       RD following.         "

## 2020-09-12 LAB
ALBUMIN SERPL BCP-MCNC: 3.1 G/DL (ref 3.2–4.9)
ALBUMIN/GLOB SERPL: 0.9 G/DL
ALP SERPL-CCNC: 75 U/L (ref 30–99)
ALT SERPL-CCNC: 17 U/L (ref 2–50)
ANION GAP SERPL CALC-SCNC: 9 MMOL/L (ref 7–16)
AST SERPL-CCNC: 16 U/L (ref 12–45)
BILIRUB SERPL-MCNC: 0.4 MG/DL (ref 0.1–1.5)
BUN SERPL-MCNC: 4 MG/DL (ref 8–22)
CALCIUM SERPL-MCNC: 8.4 MG/DL (ref 8.5–10.5)
CHLORIDE SERPL-SCNC: 101 MMOL/L (ref 96–112)
CO2 SERPL-SCNC: 31 MMOL/L (ref 20–33)
CREAT SERPL-MCNC: 0.4 MG/DL (ref 0.5–1.4)
GLOBULIN SER CALC-MCNC: 3.6 G/DL (ref 1.9–3.5)
GLUCOSE SERPL-MCNC: 109 MG/DL (ref 65–99)
MAGNESIUM SERPL-MCNC: 1.9 MG/DL (ref 1.5–2.5)
PHOSPHATE SERPL-MCNC: 2.1 MG/DL (ref 2.5–4.5)
POTASSIUM SERPL-SCNC: 3.3 MMOL/L (ref 3.6–5.5)
PROT SERPL-MCNC: 6.7 G/DL (ref 6–8.2)
SODIUM SERPL-SCNC: 141 MMOL/L (ref 135–145)

## 2020-09-12 PROCEDURE — 700102 HCHG RX REV CODE 250 W/ 637 OVERRIDE(OP): Performed by: HOSPITALIST

## 2020-09-12 PROCEDURE — A9270 NON-COVERED ITEM OR SERVICE: HCPCS | Performed by: INTERNAL MEDICINE

## 2020-09-12 PROCEDURE — 700101 HCHG RX REV CODE 250: Performed by: INTERNAL MEDICINE

## 2020-09-12 PROCEDURE — 700102 HCHG RX REV CODE 250 W/ 637 OVERRIDE(OP): Performed by: INTERNAL MEDICINE

## 2020-09-12 PROCEDURE — 80053 COMPREHEN METABOLIC PANEL: CPT

## 2020-09-12 PROCEDURE — 700111 HCHG RX REV CODE 636 W/ 250 OVERRIDE (IP): Performed by: INTERNAL MEDICINE

## 2020-09-12 PROCEDURE — A9270 NON-COVERED ITEM OR SERVICE: HCPCS | Performed by: HOSPITALIST

## 2020-09-12 PROCEDURE — 700105 HCHG RX REV CODE 258: Performed by: HOSPITALIST

## 2020-09-12 PROCEDURE — 84100 ASSAY OF PHOSPHORUS: CPT

## 2020-09-12 PROCEDURE — 700101 HCHG RX REV CODE 250: Performed by: HOSPITALIST

## 2020-09-12 PROCEDURE — 83735 ASSAY OF MAGNESIUM: CPT

## 2020-09-12 PROCEDURE — C9113 INJ PANTOPRAZOLE SODIUM, VIA: HCPCS | Performed by: INTERNAL MEDICINE

## 2020-09-12 PROCEDURE — 770006 HCHG ROOM/CARE - MED/SURG/GYN SEMI*

## 2020-09-12 PROCEDURE — 99232 SBSQ HOSP IP/OBS MODERATE 35: CPT | Performed by: HOSPITALIST

## 2020-09-12 PROCEDURE — 700111 HCHG RX REV CODE 636 W/ 250 OVERRIDE (IP): Performed by: HOSPITALIST

## 2020-09-12 RX ORDER — OMEPRAZOLE 20 MG/1
20 CAPSULE, DELAYED RELEASE ORAL DAILY
Status: DISCONTINUED | OUTPATIENT
Start: 2020-09-12 | End: 2020-09-15 | Stop reason: HOSPADM

## 2020-09-12 RX ORDER — MAGNESIUM SULFATE HEPTAHYDRATE 40 MG/ML
2 INJECTION, SOLUTION INTRAVENOUS ONCE
Status: COMPLETED | OUTPATIENT
Start: 2020-09-12 | End: 2020-09-12

## 2020-09-12 RX ORDER — METOCLOPRAMIDE HYDROCHLORIDE 5 MG/ML
10 INJECTION INTRAMUSCULAR; INTRAVENOUS 3 TIMES DAILY
Status: DISCONTINUED | OUTPATIENT
Start: 2020-09-12 | End: 2020-09-14

## 2020-09-12 RX ADMIN — POLYETHYLENE GLYCOL 3350 1 PACKET: 17 POWDER, FOR SOLUTION ORAL at 05:33

## 2020-09-12 RX ADMIN — OMEPRAZOLE 20 MG: 20 CAPSULE, DELAYED RELEASE ORAL at 17:23

## 2020-09-12 RX ADMIN — METOCLOPRAMIDE 10 MG: 5 INJECTION, SOLUTION INTRAMUSCULAR; INTRAVENOUS at 05:45

## 2020-09-12 RX ADMIN — PANTOPRAZOLE SODIUM 40 MG: 40 INJECTION, POWDER, LYOPHILIZED, FOR SOLUTION INTRAVENOUS at 05:45

## 2020-09-12 RX ADMIN — MAGNESIUM SULFATE 2 G: 2 INJECTION INTRAVENOUS at 13:40

## 2020-09-12 RX ADMIN — POTASSIUM PHOSPHATE, MONOBASIC AND POTASSIUM PHOSPHATE, DIBASIC 25 MMOL: 224; 236 INJECTION, SOLUTION, CONCENTRATE INTRAVENOUS at 15:30

## 2020-09-12 RX ADMIN — METOCLOPRAMIDE 10 MG: 5 INJECTION, SOLUTION INTRAMUSCULAR; INTRAVENOUS at 13:40

## 2020-09-12 RX ADMIN — ENOXAPARIN SODIUM 40 MG: 40 INJECTION SUBCUTANEOUS at 05:45

## 2020-09-12 RX ADMIN — DOCUSATE SODIUM 50 MG AND SENNOSIDES 8.6 MG 2 TABLET: 8.6; 5 TABLET, FILM COATED ORAL at 17:23

## 2020-09-12 RX ADMIN — PROMETHAZINE HYDROCHLORIDE 25 MG: 25 SUPPOSITORY RECTAL at 10:27

## 2020-09-12 RX ADMIN — LEVOTHYROXINE SODIUM 100 MCG: 100 TABLET ORAL at 05:32

## 2020-09-12 RX ADMIN — CALCIUM GLUCONATE: 98 INJECTION, SOLUTION INTRAVENOUS at 20:30

## 2020-09-12 RX ADMIN — METOCLOPRAMIDE 10 MG: 5 INJECTION, SOLUTION INTRAMUSCULAR; INTRAVENOUS at 17:26

## 2020-09-12 ASSESSMENT — ENCOUNTER SYMPTOMS
CHILLS: 0
ABDOMINAL PAIN: 1
RESPIRATORY NEGATIVE: 1
VOMITING: 1
COUGH: 0
DIZZINESS: 0
NAUSEA: 1
HALLUCINATIONS: 0
DEPRESSION: 0
SPEECH CHANGE: 0
SHORTNESS OF BREATH: 0
FOCAL WEAKNESS: 0
BRUISES/BLEEDS EASILY: 0
BLURRED VISION: 0
SENSORY CHANGE: 0
FEVER: 0
PALPITATIONS: 0
WEAKNESS: 0
CARDIOVASCULAR NEGATIVE: 1
HEMOPTYSIS: 0
FLANK PAIN: 0
HEARTBURN: 0
MYALGIAS: 0
DOUBLE VISION: 0
EYE DISCHARGE: 0

## 2020-09-12 ASSESSMENT — PAIN DESCRIPTION - PAIN TYPE
TYPE: ACUTE PAIN

## 2020-09-12 ASSESSMENT — COGNITIVE AND FUNCTIONAL STATUS - GENERAL
DRESSING REGULAR UPPER BODY CLOTHING: A LITTLE
TOILETING: A LITTLE
CLIMB 3 TO 5 STEPS WITH RAILING: A LITTLE
HELP NEEDED FOR BATHING: A LITTLE
MOBILITY SCORE: 18
DAILY ACTIVITIY SCORE: 19
MOVING FROM LYING ON BACK TO SITTING ON SIDE OF FLAT BED: A LITTLE
SUGGESTED CMS G CODE MODIFIER MOBILITY: CK
PERSONAL GROOMING: A LITTLE
DRESSING REGULAR LOWER BODY CLOTHING: A LITTLE
STANDING UP FROM CHAIR USING ARMS: A LITTLE
MOVING TO AND FROM BED TO CHAIR: A LITTLE
SUGGESTED CMS G CODE MODIFIER DAILY ACTIVITY: CK
WALKING IN HOSPITAL ROOM: A LITTLE
TURNING FROM BACK TO SIDE WHILE IN FLAT BAD: A LITTLE

## 2020-09-12 ASSESSMENT — LIFESTYLE VARIABLES: SUBSTANCE_ABUSE: 0

## 2020-09-12 NOTE — DISCHARGE PLANNING
Received Choice form at 0800  Agency/Facility Name: [facility]   Referral sent per Choice form @ 0815 for HH and home infusion TPN

## 2020-09-12 NOTE — PROGRESS NOTES
[facility] Medicine Daily Progress Note    Date of Service  9/12/2020    Chief Complaint  60 y.o. female admitted 9/8/2020 with intractable nausea and vomiting.     Hospital Course    This is a 60 y.o. female who is presenting about 6 week post sleeve gastrectomy for obesity with persistent nausea and vomiting. She was admitted 2 weeks ago with similar complaints and had CT showing no acute abnormality, UGI showing no stricture or leak and ultrasound showing no gallstones. Plan for admission to [facility], GI and Surgery consulted.       Interval Problem Update  Continued n/v,  inability to tolerate oral intake but improving slowly  egd unremarkable with some mild esophagitis   picc line in place and tolerating TPN    Pending home health and home infusions  VS stable   No acute events overnight    Consultants/Specialty  Surgery   GI     Code Status  Full Code    Disposition  TBD     Review of Systems  Review of Systems   Constitutional: Positive for malaise/fatigue. Negative for chills and fever.   HENT: Negative for congestion, hearing loss and tinnitus.    Eyes: Negative for blurred vision, double vision and discharge.   Respiratory: Negative for cough, hemoptysis and shortness of breath.    Cardiovascular: Negative for chest pain, palpitations and leg swelling.   Gastrointestinal: Positive for abdominal pain, nausea and vomiting. Negative for heartburn.   Genitourinary: Negative for dysuria and flank pain.   Musculoskeletal: Negative for joint pain and myalgias.   Skin: Negative for rash.   Neurological: Negative for dizziness, sensory change, speech change, focal weakness and weakness.   Endo/Heme/Allergies: Negative for environmental allergies. Does not bruise/bleed easily.   Psychiatric/Behavioral: Negative for depression, hallucinations and substance abuse.        Physical Exam  Temp:  [36.1 °C (97 °F)-36.7 °C (98 °F)] 36.4 °C (97.6 °F)  Pulse:  [60-69] 69  Resp:  [16-17] 16  BP: (117-128)/(57-70) 128/70  SpO2:   [95 %-98 %] 96 %    Physical Exam  Vitals signs reviewed.   Constitutional:       Appearance: Normal appearance. She is ill-appearing.   HENT:      Head: Normocephalic and atraumatic.      Nose: No congestion.      Mouth/Throat:      Mouth: Mucous membranes are dry.   Eyes:      Extraocular Movements: Extraocular movements intact.      Pupils: Pupils are equal, round, and reactive to light.   Neck:      Musculoskeletal: Normal range of motion and neck supple. No muscular tenderness.   Cardiovascular:      Rate and Rhythm: Normal rate and regular rhythm.      Pulses: Normal pulses.      Heart sounds: Normal heart sounds. No murmur.   Pulmonary:      Effort: Pulmonary effort is normal. No respiratory distress.      Breath sounds: Normal breath sounds. No stridor.   Abdominal:      General: Bowel sounds are normal. There is no distension.      Palpations: Abdomen is soft.      Tenderness: There is abdominal tenderness.      Comments: Epigastric ttp    Musculoskeletal:         General: No swelling or deformity.   Skin:     General: Skin is warm and dry.      Capillary Refill: Capillary refill takes 2 to 3 seconds.   Neurological:      General: No focal deficit present.      Mental Status: She is alert and oriented to person, place, and time.   Psychiatric:         Mood and Affect: Mood normal.         Behavior: Behavior normal.         Thought Content: Thought content normal.         Judgment: Judgment normal.         Fluids  No intake or output data in the 24 hours ending 09/12/20 1059    Laboratory      Recent Labs     09/11/20  1218 09/12/20  0540   SODIUM 141 141   POTASSIUM 3.4* 3.3*   CHLORIDE 101 101   CO2 26 31   GLUCOSE 101* 109*   BUN 3* 4*   CREATININE 0.53 0.40*   CALCIUM 9.3 8.4*             Recent Labs     09/11/20  1218   TRIGLYCERIDE 100       Imaging  IR-PICC LINE PLACEMENT W/ GUIDANCE > AGE 5   Final Result                  Ultrasound-guided PICC placement performed by qualified nursing staff as     above.               Assessment/Plan  * Intractable nausea and vomiting  Assessment & Plan  EGD with esophagitis 9/10/20  Recent extensive imaging with similar complaint included CT abdomen pelvis, right upper quadrant ultrasound, abdominal series obtained August 26, 2020 unremarkable  Symptomatic management IVF, anti emetics   Start on reglan with [employer]  Trial gi cocktail   Persistent symptoms will start on TPN, Picc line placement. Surgery to follow up outpatient  GI and surgery following    Pending home health and home infusion approval   Advance diet as tolerated for now    Metabolic acidosis  Assessment & Plan  Cont with IVF   And recheck labs in am     Hypokalemia  Assessment & Plan  Replace and recheck labs in am        VTE prophylaxis: lovenox

## 2020-09-12 NOTE — PROGRESS NOTES
Received report from Karen at pt bedside. Pt A/o x 4 receiving TPN through PICC, waiting on HH acceptance and infusion set up, potassium is 3.3 mag is 1.9 will ask MD if replacement is needed, POC discussed. Call light and belongings within reach. Bed locked and in low position. Alarm on and fall precautions in place.

## 2020-09-12 NOTE — FACE TO FACE
Face to Face Note  -  Durable Medical Equipment    Samra Quinn M.D. - NPI: [id number]  I certify that this patient is under my care and that they had a durable medical equipment(DME)face to face encounter by myself that meets the physician DME face-to-face encounter requirements with this patient on:    Date of encounter:   Patient:                    MRN:                       YOB: 2019  Eunice Mckenna  [id number]  1960     The encounter with the patient was in whole, or in part, for the following medical condition, which is the primary reason for durable medical equipment:  Post-Op Surgery and Other - gastric sleeve     I certify that, based on my findings, the following durable medical equipment is medically necessary:  Enteral Feedings/Supplies/Pumps and Other DME Equipment - TPN.    HOME O2 Saturation Measurements:(Values must be present for Home Oxygen orders)         ,     ,         My Clinical findings support the need for the above equipment due to:  Other - malnutrition    Supporting Symptoms: intractable nausea and vomiting and malnutrition

## 2020-09-12 NOTE — DISCHARGE PLANNING
Anticipated Discharge Disposition:   Home Infusion for TPN  Home Health    Action:    Hospitalist note, GI note, TPN order, home health order, face to face (s), faxed to [facility].    Barriers to Discharge:    Home infusion acceptance  Home health acceptance    Plan:    F/U with [facility]  [phone].

## 2020-09-12 NOTE — DISCHARGE PLANNING
Anticipated Discharge Disposition: In-home infusion with HHC    Action: [phone] [facility]x4, x1, x2) to follow up with referral. Referral has been received and assigned to a CM who is currently working on referral. Once the referral has been placed in the system, a confirmation fax will be sent to [phone] which will give an idea of progress. Lsw was encouraged to call after fax has been received for update.    Barriers to Discharge: HHC and in-home infusion     Plan: Lsw to await for confirmation

## 2020-09-12 NOTE — CARE PLAN
Problem: Communication  Goal: The ability to communicate needs accurately and effectively will improve  Outcome: PROGRESSING AS EXPECTED  Pt. alert and oriented x 4 at this time and calling for all needs appropriately.      Problem: Bowel/Gastric:  Goal: Normal bowel function is maintained or improved  Outcome: PROGRESSING AS EXPECTED  Pt. started on TPN and tolerating well at this time. Scheduled Reglan in place for nausea. Last bowel movement PTA.

## 2020-09-12 NOTE — DISCHARGE PLANNING
Anticipated Discharge Disposition:   Home Infusion for TPN  Home Health    Action:    Pt admitted with intractable nausea and vomiting, metabolic acidosis, hypokalemia, s/p post sleeve gastrectomy.    RN BHANU spoke with patient this a.m.  She stated she lives in [place] in a house with family.  She plans to recuperate at [facility] with her partner Saniya Jimenez.  Pt does not use DME and drives.    Barriers to Discharge:    Home infusion acceptance  Home health acceptance    Plan:    Fax TPN infusion orders and home health orders to [facility] for processing.  Fax [phone].  Telephone [phone].    Care Transition Team Assessment    Information Source  Orientation : Oriented x 4  Information Given By: Patient  Who is responsible for making decisions for patient? : Patient    Readmission Evaluation  Is this a readmission?: Yes - unplanned readmission    Elopement Risk  Legal Hold: No  Ambulatory or Self Mobile in Wheelchair: Yes  Disoriented: No  Psychiatric Symptoms: None  History of Wandering: No  Elopement this Admit: No  Vocalizing Wanting to Leave: No  Displays Behaviors, Body Language Wanting to Leave: No-Not at Risk for Elopement  Elopement Risk: Not at Risk for Elopement    Interdisciplinary Discharge Planning  Lives with - Patient's Self Care Capacity: Spouse(wife)  Patient or legal guardian wants to designate a caregiver: No  Support Systems: Spouse / Significant Other  Housing / Facility: [address]    Discharge Preparedness  What is your plan after discharge?: Uncertain - pending medical team collaboration  What are your discharge supports?: Partner  Prior Functional Level: Ambulatory, Drives Self, Independent with Activities of Daily Living, Independent with Medication Management  Difficulity with ADLs: Bathing, Dressing, Toileting, Walking  Difficulity with IADLs: Cooking, Driving, Laundry, Managing medication, Shopping    Functional Assesment  Prior Functional Level:  Ambulatory, Drives Self, Independent with Activities of Daily Living, Independent with Medication Management    Finances  Financial Barriers to Discharge: No  Prescription Coverage: Yes    Vision / Hearing Impairment  Vision Impairment : No  Hearing Impairment : No         Advance Directive  Advance Directive?: None    Domestic Abuse  Have you ever been the victim of abuse or violence?: No  Physical Abuse or Sexual Abuse: No  Verbal Abuse or Emotional Abuse: No  Possible Abuse/Neglect Reported to:: Not Applicable         Discharge Risks or Barriers  Discharge risks or barriers?: Complex medical needs  Patient risk factors: Complex medical needs    Anticipated Discharge Information  Discharge Disposition: D/T to home under A care in anticipation of covered skilled care (06)  [facility]  Discharge Contact Phone Number: [phone]

## 2020-09-12 NOTE — PROGRESS NOTES
Pharmacy TPN Day # 2       2020    Dosing Weight   73.3 kg              TPN currently providing 50% of goal                TPN goal: 22 kcal/day including 1.5gm/kg/day Protein                       TPN indication: intractable n/v , prolonged inadequate po intake      Pertinent PMH: 61 y/o female, PMHx for gastric sleeve surgery on July, colon cancer. Presenting to the ED w/ epigastric pain which was started 2 to 3 weeks after gastric sleeve surgery. Pain started as dull, constant and associated with nausea and vomiting also she states that she has fatigue, lightheadedness, dry mouth, food and water intolerance and regurgitating yellow mucus.  She was recently admitted to [facility] and was seen by the surgeon.  At the time CT abdomen was not pertinent for acute abnormalities, barium swallow test did not show any leakage or obstruction, right upper quadrant ultrasound did not show any cholelithiasis.      Temp (24hrs), Av.4 °C (97.5 °F), Min:36.1 °C (97 °F), Max:36.7 °C (98 °F)  .  Recent Labs     20  1218 20  0540   SODIUM 141 141   POTASSIUM 3.4* 3.3*   CHLORIDE 101 101   CO2 26 31   BUN 3* 4*   CREATININE 0.53 0.40*   GLUCOSE 101* 109*   CALCIUM 9.3 8.4*   ASTSGOT 16 16   ALTSGPT 21 17   ALBUMIN 3.5 3.1*   TBILIRUBIN 0.4 0.4   PHOSPHORUS 1.7* 2.1*   MAGNESIUM 1.5 1.9     Accu-Checks  No results for input(s): POCGLUCOSE in the last 72 hours.    Vitals:    20 1600 20 0400 20 0724   BP: 122/59 128/58 117/57 128/70   Weight:       Height:         No intake or output data in the 24 hours ending 20 1241    Orders Placed This Encounter   Procedures   • Diet Order Full Liquid     Standing Status:   Standing     Number of Occurrences:   1     Order Specific Question:   Diet:     Answer:   Full Liquid [11]     Order Specific Question:   Miscellaneous modifications:     Answer:   6 Small Meals [4]         TPN for past 72 hours (Show up to 3 orders; newest on the  left. Changes between the two most recent orders are indicated.)     Start date and time   09/12/2020 2000 09/11/2020 2000      TPN Central Line Formulation [[id number]] TPN Central Line Formulation [[id number]]    Order Status  Active Active    Last Admin   New Bag at 09/11/2020 2027 by Palmira Major R.N.       Base    Clinisol 15%  55 g 55 g    dextrose 70%  106 g 106 g    fat emulsions 20%  23 g 23 g       Additives    potassium phosphate  20 mmol 15 mmol    potassium chloride  56 mEq 48 mEq    sodium acetate  120 mEq 125 mEq    sodium chloride  120 mEq 125 mEq    magnesium sulfate  12 mEq 12 mEq    calcium GLUConate  4.65 mEq 4.65 mEq    M.T.E. -5 Adult  1 mL 1 mL    M.V.I. Adult  10 mL 10 mL       QS Base    sterile water for inj(pf)  1,210.3 mL 1,212.22 mL       Energy Contribution    Proteins  -- --    Dextrose  -- --    Lipids  -- --    Total  -- --       Electrolyte Ion Calculated Amount    Sodium  240 mEq 250 mEq    Potassium  85.33 mEq 70 mEq    Calcium  4.65 mEq 4.65 mEq    Magnesium  12.02 mEq 12.02 mEq    Aluminum  -- --    Phosphate  20 mmol 15 mmol    Chloride  176 mEq 173 mEq    Acetate  166.57 mEq 171.57 mEq       Other    Total Protein  55 g 55 g    Total Protein/kg  0.58 g/kg 0.58 g/kg    Total Amino Acid  -- --    Total Amino Acid/kg  -- --    Glucose Infusion Rate  0.78 mg/kg/min 0.78 mg/kg/min    Osmolarity (Estimated)  -- --    Volume  1,992 mL 1,992 mL    Rate  83 mL/hr 83 mL/hr    Dosing Weight  94.2 kg 94.2 kg    Infusion Site  [facility]               This formula provides:  % kcal as lipids = 28%  Grams protein/kg = 0.58  Non-protein calories = 590  Kcals/kg = 8.6  Total daily calories = 810     Comments:  · Per surgery note 09/12/20: Initiate TPN,  likely to be ongoing after discharge.  · Pt on Protonix iv, will follow up and request pepcid in the formulation.   · TPN currently at 50% goal , anticipate advancing to goal and running at cyclic rate.  · D/w MD:Ok to add riders;   KPhos 25mmol and Mg 2g x1 today     Fluids  · TPN @ ~25mL/kg day     Macronutrient's:  · CHO 45% AA 27%Lipids 28%  · GIR: 0.84 mg/kg/min  · Accuchecks: not documented. AM glucose 109     Micronutrients:  · Na 240meq  · K 85meq  · Ca 4.65meq   · Mg 12meq  · Phos 20mmol         Ishmael EnglandD BCPS

## 2020-09-12 NOTE — PROGRESS NOTES
Gastroenterology Progress Note     Author: Wan Dejesus M.D.   Date & Time Created: 2020 3:28 PM    Chief Complaint:  N/V    Interval History:  Patient feeling better today - she attributes this in part to the Reglan which was started yesterday.  She does report feeling a little anxious at the time of Reglan injection, but this feeling resolves promptly.  She has been on some full liquids which she has been able to keep down.      Review of Systems:  Review of Systems   Respiratory: Negative.    Cardiovascular: Negative.    Gastrointestinal:        Able to tolerate foods better today.         Physical Exam:  Physical Exam  Cardiovascular:      Pulses: Normal pulses.   Pulmonary:      Effort: Pulmonary effort is normal.   Abdominal:      General: Abdomen is flat.         Labs:          Recent Labs     20  0540   SODIUM 141 141   POTASSIUM 3.4* 3.3*   CHLORIDE 101 101   CO2 26 31   BUN 3* 4*   CREATININE 0.53 0.40*   MAGNESIUM 1.5 1.9   PHOSPHORUS 1.7* 2.1*   CALCIUM 9.3 8.4*     Recent Labs     20  0540   ALTSGPT 21 17   ASTSGOT 16 16   ALKPHOSPHAT 80 75   TBILIRUBIN 0.4 0.4   GLUCOSE 101* 109*     No results for input(s): RBC, HEMOGLOBIN, HEMATOCRIT, PLATELETCT, PROTHROMBTM, APTT, INR, IRON, FERRITIN, TOTIRONBC in the last 72 hours.  Recent Labs     20  0540   ASTSGOT 16 16   ALTSGPT 21 17   ALKPHOSPHAT 80 75   TBILIRUBIN 0.4 0.4     Hemodynamics:  Temp (24hrs), Av.4 °C (97.5 °F), Min:36.1 °C (97 °F), Max:36.7 °C (98 °F)  Temperature: 36.4 °C (97.6 °F)  Pulse  Av.4  Min: 60  Max: 114   Blood Pressure: 128/70     Respiratory:    Respiration: 16, Pulse Oximetry: 96 %           Fluids:  No intake or output data in the 24 hours ending 20 1528     GI/Nutrition:  Orders Placed This Encounter   Procedures   • Diet Order Full Liquid     Standing Status:   Standing     Number of Occurrences:   1     Order Specific Question:   Diet:      Answer:   Full Liquid [11]     Order Specific Question:   Miscellaneous modifications:     Answer:   6 Small Meals [4]     Medical Decision Making, by Problem:  Active Hospital Problems    Diagnosis   • *Intractable nausea and vomiting [R11.2]   • Metabolic acidosis [E87.2]   • Hypokalemia [E87.6]   • Hypomagnesemia [E83.42]       Plan:  Continue Reglan, though transition to PO.  Discussed with patient Tardive Dyskinesia symptoms and to alert us if she develops these.    Will transition IV PPI to PO Omeprazole.    Diet as tolerated.    Continue parenteral nutrition as needed to maintain adequate nutrition.  Will s/o for now, please call if needed.    Quality-Core Measures   Reviewed items::  Labs reviewed and Medications reviewed

## 2020-09-13 LAB
ALBUMIN SERPL BCP-MCNC: 3.3 G/DL (ref 3.2–4.9)
ALBUMIN/GLOB SERPL: 0.9 G/DL
ALP SERPL-CCNC: 83 U/L (ref 30–99)
ALT SERPL-CCNC: 18 U/L (ref 2–50)
ANION GAP SERPL CALC-SCNC: 12 MMOL/L (ref 7–16)
AST SERPL-CCNC: 20 U/L (ref 12–45)
BILIRUB SERPL-MCNC: 0.5 MG/DL (ref 0.1–1.5)
BUN SERPL-MCNC: 6 MG/DL (ref 8–22)
CALCIUM SERPL-MCNC: 8.6 MG/DL (ref 8.5–10.5)
CHLORIDE SERPL-SCNC: 99 MMOL/L (ref 96–112)
CO2 SERPL-SCNC: 29 MMOL/L (ref 20–33)
CREAT SERPL-MCNC: 0.4 MG/DL (ref 0.5–1.4)
GLOBULIN SER CALC-MCNC: 3.8 G/DL (ref 1.9–3.5)
GLUCOSE BLD-MCNC: 116 MG/DL (ref 65–99)
GLUCOSE BLD-MCNC: 94 MG/DL (ref 65–99)
GLUCOSE SERPL-MCNC: 85 MG/DL (ref 65–99)
MAGNESIUM SERPL-MCNC: 2 MG/DL (ref 1.5–2.5)
PHOSPHATE SERPL-MCNC: 2.6 MG/DL (ref 2.5–4.5)
POTASSIUM SERPL-SCNC: 3.4 MMOL/L (ref 3.6–5.5)
PROT SERPL-MCNC: 7.1 G/DL (ref 6–8.2)
SODIUM SERPL-SCNC: 140 MMOL/L (ref 135–145)

## 2020-09-13 PROCEDURE — 700102 HCHG RX REV CODE 250 W/ 637 OVERRIDE(OP): Performed by: INTERNAL MEDICINE

## 2020-09-13 PROCEDURE — 700101 HCHG RX REV CODE 250: Performed by: HOSPITALIST

## 2020-09-13 PROCEDURE — 700111 HCHG RX REV CODE 636 W/ 250 OVERRIDE (IP): Performed by: HOSPITALIST

## 2020-09-13 PROCEDURE — 99232 SBSQ HOSP IP/OBS MODERATE 35: CPT | Performed by: HOSPITALIST

## 2020-09-13 PROCEDURE — 82962 GLUCOSE BLOOD TEST: CPT | Mod: 91

## 2020-09-13 PROCEDURE — 83735 ASSAY OF MAGNESIUM: CPT

## 2020-09-13 PROCEDURE — 770006 HCHG ROOM/CARE - MED/SURG/GYN SEMI*

## 2020-09-13 PROCEDURE — 80053 COMPREHEN METABOLIC PANEL: CPT

## 2020-09-13 PROCEDURE — A9270 NON-COVERED ITEM OR SERVICE: HCPCS | Performed by: INTERNAL MEDICINE

## 2020-09-13 PROCEDURE — 84100 ASSAY OF PHOSPHORUS: CPT

## 2020-09-13 PROCEDURE — 700111 HCHG RX REV CODE 636 W/ 250 OVERRIDE (IP): Performed by: INTERNAL MEDICINE

## 2020-09-13 PROCEDURE — 700105 HCHG RX REV CODE 258: Performed by: HOSPITALIST

## 2020-09-13 RX ORDER — POTASSIUM CHLORIDE 7.45 MG/ML
10 INJECTION INTRAVENOUS
Status: COMPLETED | OUTPATIENT
Start: 2020-09-13 | End: 2020-09-13

## 2020-09-13 RX ADMIN — METOCLOPRAMIDE 10 MG: 5 INJECTION, SOLUTION INTRAMUSCULAR; INTRAVENOUS at 20:51

## 2020-09-13 RX ADMIN — METOCLOPRAMIDE 10 MG: 5 INJECTION, SOLUTION INTRAMUSCULAR; INTRAVENOUS at 05:58

## 2020-09-13 RX ADMIN — POTASSIUM CHLORIDE 10 MEQ: 7.46 INJECTION, SOLUTION INTRAVENOUS at 13:24

## 2020-09-13 RX ADMIN — METOCLOPRAMIDE 10 MG: 5 INJECTION, SOLUTION INTRAMUSCULAR; INTRAVENOUS at 12:47

## 2020-09-13 RX ADMIN — ENOXAPARIN SODIUM 40 MG: 40 INJECTION SUBCUTANEOUS at 05:58

## 2020-09-13 RX ADMIN — POTASSIUM CHLORIDE 10 MEQ: 7.46 INJECTION, SOLUTION INTRAVENOUS at 12:13

## 2020-09-13 RX ADMIN — CALCIUM GLUCONATE: 98 INJECTION, SOLUTION INTRAVENOUS at 20:52

## 2020-09-13 RX ADMIN — BISACODYL 10 MG: 10 SUPPOSITORY RECTAL at 05:58

## 2020-09-13 RX ADMIN — LEVOTHYROXINE SODIUM 100 MCG: 100 TABLET ORAL at 05:57

## 2020-09-13 RX ADMIN — POTASSIUM CHLORIDE 10 MEQ: 7.46 INJECTION, SOLUTION INTRAVENOUS at 09:38

## 2020-09-13 RX ADMIN — POTASSIUM CHLORIDE 10 MEQ: 7.46 INJECTION, SOLUTION INTRAVENOUS at 10:37

## 2020-09-13 RX ADMIN — OMEPRAZOLE 20 MG: 20 CAPSULE, DELAYED RELEASE ORAL at 05:57

## 2020-09-13 ASSESSMENT — ENCOUNTER SYMPTOMS
DEPRESSION: 0
ABDOMINAL PAIN: 1
SHORTNESS OF BREATH: 0
BLURRED VISION: 0
HEMOPTYSIS: 0
SENSORY CHANGE: 0
DIZZINESS: 0
DOUBLE VISION: 0
FOCAL WEAKNESS: 0
PALPITATIONS: 0
VOMITING: 1
MYALGIAS: 0
FEVER: 0
HEARTBURN: 0
BRUISES/BLEEDS EASILY: 0
EYE DISCHARGE: 0
COUGH: 0
FLANK PAIN: 0
HALLUCINATIONS: 0
WEAKNESS: 0
NAUSEA: 1
CHILLS: 0
SPEECH CHANGE: 0

## 2020-09-13 ASSESSMENT — PAIN DESCRIPTION - PAIN TYPE
TYPE: ACUTE PAIN

## 2020-09-13 ASSESSMENT — FIBROSIS 4 INDEX: FIB4 SCORE: 1.54

## 2020-09-13 ASSESSMENT — LIFESTYLE VARIABLES: SUBSTANCE_ABUSE: 0

## 2020-09-13 NOTE — PROGRESS NOTES
Pharmacy TPN Day # 3       2020    Dosing Weight   73.3 kg (Adjusted)             TPN currently providing 50% of goal                TPN goal: 22 kcal/day including 1.5gm/kg/day Protein                       TPN indication: intractable n/v , prolonged inadequate po intake      Pertinent PMH: 61 y/o female, PMHx for gastric sleeve surgery on July, colon cancer. Presenting to the ED w/ epigastric pain which was started 2 to 3 weeks after gastric sleeve surgery. Pain started as dull, constant and associated with nausea and vomiting also she states that she has fatigue, lightheadedness, dry mouth, food and water intolerance and regurgitating yellow mucus.  She was recently admitted to [facility] and was seen by the surgeon.  At the time CT abdomen was not pertinent for acute abnormalities, barium swallow test did not show any leakage or obstruction, right upper quadrant ultrasound did not show any cholelithiasis.     Temp (24hrs), Av.8 °C (98.3 °F), Min:36.7 °C (98.1 °F), Max:37.1 °C (98.7 °F)  .  Recent Labs     20  1218 20  0540 20  0045   SODIUM 141 141 140   POTASSIUM 3.4* 3.3* 3.4*   CHLORIDE 101 101 99   CO2 26 31 29   BUN 3* 4* 6*   CREATININE 0.53 0.40* 0.40*   GLUCOSE 101* 109* 85   CALCIUM 9.3 8.4* 8.6   ASTSGOT 16 16 20   ALTSGPT 21 17 18   ALBUMIN 3.5 3.1* 3.3   TBILIRUBIN 0.4 0.4 0.5   PHOSPHORUS 1.7* 2.1* 2.6   MAGNESIUM 1.5 1.9 2.0     Accu-Checks  No results for input(s): POCGLUCOSE in the last 72 hours.    Vitals:    20 1556 20 2000 20 0400 20 0738   BP: 129/69 122/65 121/73 123/69   Weight:       Height:         No intake or output data in the 24 hours ending 20 0836    Orders Placed This Encounter   Procedures   • Diet Order Full Liquid     Standing Status:   Standing     Number of Occurrences:   1     Order Specific Question:   Diet:     Answer:   Full Liquid [11]     Order Specific Question:   Miscellaneous modifications:     Answer:   6  Small Meals [4]         TPN for past 72 hours (Show up to 3 orders; newest on the left. Changes between the two most recent orders are indicated.)     Start date and time   09/13/2020 2000 09/12/2020 2000 09/11/2020 2000      TPN Central Line Formulation [[id number]] TPN Central Line Formulation [[id number]] TPN Central Line Formulation [[id number]]    Order Status  Active Last Dose in Progress Completed    Last Admin   New Bag at 09/12/2020 2030 by Palmira Major R.N. New Bag at 09/11/2020 2027 by Palmira Major R.N.       Base    Clinisol 15%  55 g 55 g 55 g    dextrose 70%  106 g 106 g 106 g    fat emulsions 20%  23 g 23 g 23 g       Additives    potassium phosphate  25 mmol 20 mmol 15 mmol    potassium chloride  64 mEq 56 mEq 48 mEq    sodium acetate  120 mEq 120 mEq 125 mEq    sodium chloride  120 mEq 120 mEq 125 mEq    magnesium sulfate  12 mEq 12 mEq 12 mEq    calcium GLUConate  4.65 mEq 4.65 mEq 4.65 mEq    M.T.E. -5 Adult  1 mL 1 mL 1 mL    M.V.I. Adult  10 mL 10 mL 10 mL       QS Base    sterile water for inj(pf)  1,204.64 mL 1,210.3 mL 1,212.22 mL       Energy Contribution    Proteins  -- -- --    Dextrose  -- -- --    Lipids  -- -- --    Total  -- -- --       Electrolyte Ion Calculated Amount    Sodium  240 mEq 240 mEq 250 mEq    Potassium  100.67 mEq 85.33 mEq 70 mEq    Calcium  4.65 mEq 4.65 mEq 4.65 mEq    Magnesium  12.02 mEq 12.02 mEq 12.02 mEq    Aluminum  -- -- --    Phosphate  25 mmol 20 mmol 15 mmol    Chloride  184 mEq 176 mEq 173 mEq    Acetate  166.57 mEq 166.57 mEq 171.57 mEq       Other    Total Protein  55 g 55 g 55 g    Total Protein/kg  0.58 g/kg 0.58 g/kg 0.58 g/kg    Total Amino Acid  -- -- --    Total Amino Acid/kg  -- -- --    Glucose Infusion Rate  0.78 mg/kg/min 0.78 mg/kg/min 0.78 mg/kg/min    Osmolarity (Estimated)  -- -- --    Volume  1,992 mL 1,992 mL 1,992 mL    Rate  83 mL/hr 83 mL/hr 83 mL/hr    Dosing Weight  94.2 kg 94.2 kg 94.2 kg    Infusion Site  [facility]  [facility]          This formula provides:  % kcal as lipids = 28%  Grams protein/kg = 0.58  Non-protein calories = 590  Kcals/kg = 8.6  Total daily calories = 810     Comments:  · Per GI note 09/13/20: Patient feeling better today - attributes this in part to the Reglan, on some full liquids which she has been able to keep down.   · GI ordered Winnie d/w MD Hanif: continue Priolsec, do not add pepcid to TPN at this time.  · Per surgery note 09/12/20: Initiate TPN, patient likely to be discharged on TPN and followed by surgery.  · TPN currently at 50% goal , anticipate advancing to goal tomorrow and converting to cyclic rate. Electrolytes wnl except K , supplemental rider given today.   · D/w MD:Ok to add riders;  KCl 40meq x1  · Diet: Full liquids    Fluids  · TPN @ ~25mL/kg day     Macronutrient's:  · CHO 45% AA 27%Lipids 28%  · GIR: 0.84 mg/kg/min  · Accuchecks: not documented. D/w RN , she is aware accucheck are ordered q8hr.   · AM glucose 85     Micronutrients:  · Na 240meq  · K 100meq  · Ca 4.65meq   · Mg 12meq  · Phos 25mmol         Ishmael Keen PharmD BCPS

## 2020-09-13 NOTE — PROGRESS NOTES
Pt c/o numbness in BLE during potassium infusion, pt can feel pressure but states that the inside of her calves have less sensation than the outer portion of her calves. MD notified, orders to continue with IV potassium infusion, no other orders at this time.

## 2020-09-13 NOTE — PROGRESS NOTES
[facility] Medicine Daily Progress Note    Date of Service  9/13/2020    Chief Complaint  60 y.o. female admitted 9/8/2020 with intractable nausea and vomiting.     Hospital Course    This is a 60 y.o. female who is presenting about 6 week post sleeve gastrectomy for obesity with persistent nausea and vomiting. She was admitted 2 weeks ago with similar complaints and had CT showing no acute abnormality, UGI showing no stricture or leak and ultrasound showing no gallstones. Plan for admission to [facility], GI and Surgery consulted.       Interval Problem Update  Continued n/v,  inability to tolerate oral intake but improving slowly  egd unremarkable with some mild esophagitis   picc line in place and tolerating TPN    VS stable   No acute events overnight  Pending home health for TPN    Consultants/Specialty  Surgery   GI     Code Status  Full Code    Disposition  TBD     Review of Systems  Review of Systems   Constitutional: Positive for malaise/fatigue. Negative for chills and fever.   HENT: Negative for congestion, hearing loss and tinnitus.    Eyes: Negative for blurred vision, double vision and discharge.   Respiratory: Negative for cough, hemoptysis and shortness of breath.    Cardiovascular: Negative for chest pain, palpitations and leg swelling.   Gastrointestinal: Positive for abdominal pain, nausea and vomiting. Negative for heartburn.   Genitourinary: Negative for dysuria and flank pain.   Musculoskeletal: Negative for joint pain and myalgias.   Skin: Negative for rash.   Neurological: Negative for dizziness, sensory change, speech change, focal weakness and weakness.   Endo/Heme/Allergies: Negative for environmental allergies. Does not bruise/bleed easily.   Psychiatric/Behavioral: Negative for depression, hallucinations and substance abuse.        Physical Exam  Temp:  [36.7 °C (98.1 °F)-37.1 °C (98.7 °F)] 37.1 °C (98.7 °F)  Pulse:  [77-93] 78  Resp:  [16-20] 18  BP: (121-129)/(65-73) 123/69  SpO2:  [97 %-98  %] 97 %    Physical Exam  Vitals signs reviewed.   Constitutional:       Appearance: Normal appearance. She is ill-appearing.   HENT:      Head: Normocephalic and atraumatic.      Nose: No congestion.      Mouth/Throat:      Mouth: Mucous membranes are dry.   Eyes:      Extraocular Movements: Extraocular movements intact.      Pupils: Pupils are equal, round, and reactive to light.   Neck:      Musculoskeletal: Normal range of motion and neck supple. No muscular tenderness.   Cardiovascular:      Rate and Rhythm: Normal rate and regular rhythm.      Pulses: Normal pulses.      Heart sounds: Normal heart sounds. No murmur.   Pulmonary:      Effort: Pulmonary effort is normal. No respiratory distress.      Breath sounds: Normal breath sounds. No stridor.   Abdominal:      General: Bowel sounds are normal. There is no distension.      Palpations: Abdomen is soft.      Tenderness: There is abdominal tenderness.      Comments: Epigastric ttp    Musculoskeletal:         General: No swelling or deformity.   Skin:     General: Skin is warm and dry.      Capillary Refill: Capillary refill takes 2 to 3 seconds.   Neurological:      General: No focal deficit present.      Mental Status: She is alert and oriented to person, place, and time.   Psychiatric:         Mood and Affect: Mood normal.         Behavior: Behavior normal.         Thought Content: Thought content normal.         Judgment: Judgment normal.         Fluids  No intake or output data in the 24 hours ending 09/13/20 1032    Laboratory      Recent Labs     09/11/20  1218 09/12/20  0540 09/13/20  0045   SODIUM 141 141 140   POTASSIUM 3.4* 3.3* 3.4*   CHLORIDE 101 101 99   CO2 26 31 29   GLUCOSE 101* 109* 85   BUN 3* 4* 6*   CREATININE 0.53 0.40* 0.40*   CALCIUM 9.3 8.4* 8.6             Recent Labs     09/11/20  1218   TRIGLYCERIDE 100       Imaging  IR-PICC LINE PLACEMENT W/ GUIDANCE > AGE 5   Final Result                  Ultrasound-guided PICC placement performed  by qualified nursing staff as    above.               Assessment/Plan  * Intractable nausea and vomiting  Assessment & Plan  EGD with esophagitis 9/10/20  Recent extensive imaging with similar complaint included CT abdomen pelvis, right upper quadrant ultrasound, abdominal series obtained August 26, 2020 unremarkable  Symptomatic management IVF, anti emetics   Start on reglan with Imporvement  Trial gi cocktail   Persistent symptoms will start on TPN, Picc line placement. Surgery to follow up outpatient  GI and surgery following    Pending home health and home infusion approval   Advance diet as tolerated for now    Metabolic acidosis  Assessment & Plan  Cont with IVF   And recheck labs in am     Hypokalemia  Assessment & Plan  Replace and recheck labs in am        VTE prophylaxis: lovenox

## 2020-09-14 LAB
ALBUMIN SERPL BCP-MCNC: 3.1 G/DL (ref 3.2–4.9)
ALBUMIN/GLOB SERPL: 0.9 G/DL
ALP SERPL-CCNC: 79 U/L (ref 30–99)
ALT SERPL-CCNC: 20 U/L (ref 2–50)
ANION GAP SERPL CALC-SCNC: 9 MMOL/L (ref 7–16)
AST SERPL-CCNC: 17 U/L (ref 12–45)
BILIRUB SERPL-MCNC: 0.4 MG/DL (ref 0.1–1.5)
BUN SERPL-MCNC: 6 MG/DL (ref 8–22)
CALCIUM SERPL-MCNC: 8.9 MG/DL (ref 8.5–10.5)
CHLORIDE SERPL-SCNC: 101 MMOL/L (ref 96–112)
CO2 SERPL-SCNC: 27 MMOL/L (ref 20–33)
CREAT SERPL-MCNC: 0.44 MG/DL (ref 0.5–1.4)
GLOBULIN SER CALC-MCNC: 3.5 G/DL (ref 1.9–3.5)
GLUCOSE BLD-MCNC: 66 MG/DL (ref 65–99)
GLUCOSE SERPL-MCNC: 92 MG/DL (ref 65–99)
MAGNESIUM SERPL-MCNC: 1.8 MG/DL (ref 1.5–2.5)
PHOSPHATE SERPL-MCNC: 2.8 MG/DL (ref 2.5–4.5)
POTASSIUM SERPL-SCNC: 4.1 MMOL/L (ref 3.6–5.5)
PREALB SERPL-MCNC: 10 MG/DL (ref 18–38)
PROT SERPL-MCNC: 6.6 G/DL (ref 6–8.2)
SODIUM SERPL-SCNC: 137 MMOL/L (ref 135–145)

## 2020-09-14 PROCEDURE — 700102 HCHG RX REV CODE 250 W/ 637 OVERRIDE(OP): Performed by: INTERNAL MEDICINE

## 2020-09-14 PROCEDURE — 700111 HCHG RX REV CODE 636 W/ 250 OVERRIDE (IP): Performed by: INTERNAL MEDICINE

## 2020-09-14 PROCEDURE — 700102 HCHG RX REV CODE 250 W/ 637 OVERRIDE(OP): Performed by: HOSPITALIST

## 2020-09-14 PROCEDURE — A9270 NON-COVERED ITEM OR SERVICE: HCPCS | Performed by: INTERNAL MEDICINE

## 2020-09-14 PROCEDURE — A9270 NON-COVERED ITEM OR SERVICE: HCPCS | Performed by: HOSPITALIST

## 2020-09-14 PROCEDURE — 84100 ASSAY OF PHOSPHORUS: CPT

## 2020-09-14 PROCEDURE — 99232 SBSQ HOSP IP/OBS MODERATE 35: CPT | Performed by: HOSPITALIST

## 2020-09-14 PROCEDURE — 700101 HCHG RX REV CODE 250: Performed by: HOSPITALIST

## 2020-09-14 PROCEDURE — 84134 ASSAY OF PREALBUMIN: CPT

## 2020-09-14 PROCEDURE — 82962 GLUCOSE BLOOD TEST: CPT

## 2020-09-14 PROCEDURE — 770006 HCHG ROOM/CARE - MED/SURG/GYN SEMI*

## 2020-09-14 PROCEDURE — 83735 ASSAY OF MAGNESIUM: CPT

## 2020-09-14 PROCEDURE — 80053 COMPREHEN METABOLIC PANEL: CPT

## 2020-09-14 PROCEDURE — 700105 HCHG RX REV CODE 258: Performed by: HOSPITALIST

## 2020-09-14 PROCEDURE — 700111 HCHG RX REV CODE 636 W/ 250 OVERRIDE (IP): Performed by: HOSPITALIST

## 2020-09-14 RX ORDER — MAGNESIUM SULFATE HEPTAHYDRATE 40 MG/ML
2 INJECTION, SOLUTION INTRAVENOUS ONCE
Status: COMPLETED | OUTPATIENT
Start: 2020-09-14 | End: 2020-09-14

## 2020-09-14 RX ORDER — METOCLOPRAMIDE 10 MG/1
10 TABLET ORAL
Status: DISCONTINUED | OUTPATIENT
Start: 2020-09-14 | End: 2020-09-15 | Stop reason: HOSPADM

## 2020-09-14 RX ADMIN — ENOXAPARIN SODIUM 40 MG: 40 INJECTION SUBCUTANEOUS at 05:01

## 2020-09-14 RX ADMIN — MAGNESIUM SULFATE 2 G: 2 INJECTION INTRAVENOUS at 14:45

## 2020-09-14 RX ADMIN — METOCLOPRAMIDE 10 MG: 10 TABLET ORAL at 17:12

## 2020-09-14 RX ADMIN — OMEPRAZOLE 20 MG: 20 CAPSULE, DELAYED RELEASE ORAL at 05:01

## 2020-09-14 RX ADMIN — CALCIUM GLUCONATE: 98 INJECTION, SOLUTION INTRAVENOUS at 21:10

## 2020-09-14 RX ADMIN — METOCLOPRAMIDE 10 MG: 5 INJECTION, SOLUTION INTRAMUSCULAR; INTRAVENOUS at 11:39

## 2020-09-14 RX ADMIN — LEVOTHYROXINE SODIUM 100 MCG: 100 TABLET ORAL at 05:01

## 2020-09-14 RX ADMIN — METOCLOPRAMIDE 10 MG: 5 INJECTION, SOLUTION INTRAMUSCULAR; INTRAVENOUS at 05:01

## 2020-09-14 ASSESSMENT — ENCOUNTER SYMPTOMS
FEVER: 0
ABDOMINAL PAIN: 1
HEARTBURN: 0
FLANK PAIN: 0
EYE DISCHARGE: 0
HALLUCINATIONS: 0
VOMITING: 1
HEMOPTYSIS: 0
DEPRESSION: 0
MYALGIAS: 0
SENSORY CHANGE: 0
PALPITATIONS: 0
DIZZINESS: 0
DOUBLE VISION: 0
SPEECH CHANGE: 0
BLURRED VISION: 0
WEAKNESS: 0
FOCAL WEAKNESS: 0
CHILLS: 0
BRUISES/BLEEDS EASILY: 0
COUGH: 0
SHORTNESS OF BREATH: 0
NAUSEA: 1

## 2020-09-14 ASSESSMENT — PAIN DESCRIPTION - PAIN TYPE: TYPE: ACUTE PAIN

## 2020-09-14 ASSESSMENT — LIFESTYLE VARIABLES: SUBSTANCE_ABUSE: 0

## 2020-09-14 ASSESSMENT — PAIN SCALES - WONG BAKER: WONGBAKER_NUMERICALRESPONSE: DOESN'T HURT AT ALL

## 2020-09-14 NOTE — CARE PLAN
Problem: Communication  Goal: The ability to communicate needs accurately and effectively will improve  Outcome: PROGRESSING AS EXPECTED  Note: Pt able to make needs known, Uses call light appropriately.     Problem: Safety  Goal: Will remain free from injury  Outcome: PROGRESSING AS EXPECTED  Note: Pt remains free from injury, Floor clear from clutter and cords.  Pt A+OX4, Non-Skid socks, Bed/chair alarm off, pt up self, steady on feet. Proper signs outside pt door in place. Door remains open.  Pt uses call light appropriately. Call light with in reach of pt.  Hourly rounding in place.   Goal: Will remain free from falls  Outcome: PROGRESSING AS EXPECTED     Problem: Pain Management  Goal: Pain level will decrease to patient's comfort goal  Outcome: PROGRESSING AS EXPECTED  Flowsheets  Taken 9/14/2020 0931 by Esha Rueda RKiaraNKiara  Non Verbal Scale:   Calm   Unlabored Breathing  Yanes-Eric Scale: 0   Taken 9/13/2020 2115 by Fernando Laws RTODD  Pain Rating Scale (NPRS): 0

## 2020-09-14 NOTE — PROGRESS NOTES
[facility] Medicine Daily Progress Note    Date of Service  9/14/2020    Chief Complaint  60 y.o. female admitted 9/8/2020 with intractable nausea and vomiting.     Hospital Course    This is a 60 y.o. female who is presenting about 6 week post sleeve gastrectomy for obesity with persistent nausea and vomiting. She was admitted 2 weeks ago with similar complaints and had CT showing no acute abnormality, UGI showing no stricture or leak and ultrasound showing no gallstones. Plan for admission to [facility], GI and Surgery consulted. Patient had endoscopy done with evidence of mild esophagitis. Started on TPN due to inability to tolerate oral intake. Pending home health with TPN      Interval Problem Update  Continued n/v,  inability to tolerate oral intake but improving slowly  egd unremarkable with some mild esophagitis   picc line in place and tolerating TPN    VS stable   No acute events overnight  Pending home health with TPN     Consultants/Specialty  Surgery   GI     Code Status  Full Code    Disposition  TBD     Review of Systems  Review of Systems   Constitutional: Positive for malaise/fatigue. Negative for chills and fever.   HENT: Negative for congestion, hearing loss and tinnitus.    Eyes: Negative for blurred vision, double vision and discharge.   Respiratory: Negative for cough, hemoptysis and shortness of breath.    Cardiovascular: Negative for chest pain, palpitations and leg swelling.   Gastrointestinal: Positive for abdominal pain, nausea and vomiting. Negative for heartburn.   Genitourinary: Negative for dysuria and flank pain.   Musculoskeletal: Negative for joint pain and myalgias.   Skin: Negative for rash.   Neurological: Negative for dizziness, sensory change, speech change, focal weakness and weakness.   Endo/Heme/Allergies: Negative for environmental allergies. Does not bruise/bleed easily.   Psychiatric/Behavioral: Negative for depression, hallucinations and substance abuse.        Physical  Exam  Temp:  [36.8 °C (98.3 °F)-37 °C (98.6 °F)] 36.8 °C (98.3 °F)  Pulse:  [83-95] 83  Resp:  [16-18] 16  BP: ()/(59-72) 99/59  SpO2:  [98 %-100 %] 98 %    Physical Exam  Vitals signs reviewed.   Constitutional:       Appearance: Normal appearance. She is ill-appearing.   HENT:      Head: Normocephalic and atraumatic.      Nose: No congestion.      Mouth/Throat:      Mouth: Mucous membranes are dry.   Eyes:      Extraocular Movements: Extraocular movements intact.      Pupils: Pupils are equal, round, and reactive to light.   Neck:      Musculoskeletal: Normal range of motion and neck supple. No muscular tenderness.   Cardiovascular:      Rate and Rhythm: Normal rate and regular rhythm.      Pulses: Normal pulses.      Heart sounds: Normal heart sounds. No murmur.   Pulmonary:      Effort: Pulmonary effort is normal. No respiratory distress.      Breath sounds: Normal breath sounds. No stridor.   Abdominal:      General: Bowel sounds are normal. There is no distension.      Palpations: Abdomen is soft.      Tenderness: There is abdominal tenderness.      Comments: Epigastric ttp    Musculoskeletal:         General: No swelling or deformity.   Skin:     General: Skin is warm and dry.      Capillary Refill: Capillary refill takes 2 to 3 seconds.   Neurological:      General: No focal deficit present.      Mental Status: She is alert and oriented to person, place, and time.   Psychiatric:         Mood and Affect: Mood normal.         Behavior: Behavior normal.         Thought Content: Thought content normal.         Judgment: Judgment normal.         Fluids  No intake or output data in the 24 hours ending 09/14/20 1008    Laboratory      Recent Labs     09/12/20  0540 09/13/20  0045 09/14/20  0137   SODIUM 141 140 137   POTASSIUM 3.3* 3.4* 4.1   CHLORIDE 101 99 101   CO2 31 29 27   GLUCOSE 109* 85 92   BUN 4* 6* 6*   CREATININE 0.40* 0.40* 0.44*   CALCIUM 8.4* 8.6 8.9             Recent Labs     09/11/20  1218    TRIGLYCERIDE 100       Imaging  IR-PICC LINE PLACEMENT W/ GUIDANCE > AGE 5   Final Result                  Ultrasound-guided PICC placement performed by qualified nursing staff as    above.               Assessment/Plan  * Intractable nausea and vomiting  Assessment & Plan  EGD with esophagitis 9/10/20  Recent extensive imaging with similar complaint included CT abdomen pelvis, right upper quadrant ultrasound, abdominal series obtained August 26, 2020 unremarkable  Symptomatic management IVF, anti emetics   Start on reglan with [employer]  Trial gi cocktail   Persistent symptoms will start on TPN, Picc line placement. Surgery to follow up outpatient  GI and surgery following    Pending home health and home infusion approval   Advance diet as tolerated for now    Metabolic acidosis  Assessment & Plan  Cont with IVF   And recheck labs in am     Hypokalemia  Assessment & Plan  Replace and recheck labs in am        VTE prophylaxis: lovenox

## 2020-09-14 NOTE — PROGRESS NOTES
"Pharmacy TPN Day # 4       2020    Dosing Weight   76 kg (adjusted BW using 1st scale weight = 94.9 kg)      TPN currently providing 50% of goal      TPN goal: 20-25 kcal/day including 1-1.5 gm/kg/day Protein      TPN indication: intractable n/v , prolonged inadequate po intake   Pertinent PMH: 59 y/o female, PMHx for gastric sleeve surgery on July, colon cancer. Presenting to the ED w/ epigastric pain which was started 2 to 3 weeks after gastric sleeve surgery. Pain started as dull, constant and associated with nausea and vomiting also she states that she has fatigue, lightheadedness, dry mouth, food and water intolerance and regurgitating yellow mucus.  She was recently admitted to [facility] and was seen by the surgeon.  At the time CT abdomen was not pertinent for acute abnormalities, barium swallow test did not show any leakage or obstruction, right upper quadrant ultrasound did not show any cholelithiasis.    Temp (24hrs), Av.8 °C (98.2 °F), Min:36.3 °C (97.4 °F), Max:37 °C (98.6 °F)  .  Recent Labs     20  0540 20  0045 20  0137 20  0157   SODIUM 141 140 137  --    POTASSIUM 3.3* 3.4* 4.1  --    CHLORIDE 101 99 101  --    CO2 31 29 27  --    BUN 4* 6* 6*  --    CREATININE 0.40* 0.40* 0.44*  --    GLUCOSE 109* 85 92  --    CALCIUM 8.4* 8.6 8.9  --    ASTSGOT 16 20 17  --    ALTSGPT 17 18 20  --    ALBUMIN 3.1* 3.3 3.1*  --    TBILIRUBIN 0.4 0.5 0.4  --    PHOSPHORUS 2.1* 2.6 2.8  --    MAGNESIUM 1.9 2.0 1.8  --    PREALBUMIN  --   --   --  10.0*     Accu-Checks  Recent Labs     20  0845 20  2115   POCGLUCOSE 116* 94       Vitals:    20 1230 20 0400 20 0733 20 1114   BP:  116/65 (Abnormal) 99/59 106/75   Weight: 96 kg (211 lb 10.3 oz)      Height: 1.676 m (5' 5.98\")          Intake/Output Summary (Last 24 hours) at 2020 1452  Last data filed at 2020 1300  Gross per 24 hour   Intake 400 ml   Output no documentation   Net 400 ml "       TPN for past 72 hours (Show up to 3 orders; newest on the left. Changes between the two most recent orders are indicated.)     Start date and time   09/13/2020 2000 09/12/2020 2000 09/11/2020 2000      TPN Central Line Formulation [[id number]] TPN Central Line Formulation [[id number]] TPN Central Line Formulation [[id number]]    Order Status  Active Completed Completed    Last Admin  New Bag at 09/13/2020 2052 by Fernando Laws R.N. New Bag at 09/12/2020 2030 by Palmira Major R.N. New Bag at 09/11/2020 2027 by Palmira Major R.N.       Base    Clinisol 15%  55 g 55 g 55 g    dextrose 70%  106 g 106 g 106 g    fat emulsions 20%  23 g 23 g 23 g       Additives    potassium phosphate  25 mmol 20 mmol 15 mmol    potassium chloride  64 mEq 56 mEq 48 mEq    sodium acetate  120 mEq 120 mEq 125 mEq    sodium chloride  120 mEq 120 mEq 125 mEq    magnesium sulfate  12 mEq 12 mEq 12 mEq    calcium GLUConate  4.65 mEq 4.65 mEq 4.65 mEq    M.T.E. -5 Adult  1 mL 1 mL 1 mL    M.V.I. Adult  10 mL 10 mL 10 mL       QS Base    sterile water for inj(pf)  1,204.64 mL 1,210.3 mL 1,212.22 mL       Electrolyte Ion Calculated Amount    Sodium  240 mEq 240 mEq 250 mEq    Potassium  100.67 mEq 85.33 mEq 70 mEq    Calcium  4.65 mEq 4.65 mEq 4.65 mEq    Magnesium  12.02 mEq 12.02 mEq 12.02 mEq    Aluminum  -- -- --    Phosphate  25 mmol 20 mmol 15 mmol    Chloride  184 mEq 176 mEq 173 mEq    Acetate  166.57 mEq 166.57 mEq 171.57 mEq       Other    Total Protein  55 g 55 g 55 g    Total Protein/kg  0.58 g/kg 0.58 g/kg 0.58 g/kg    Total Amino Acid  -- -- --    Total Amino Acid/kg  -- -- --    Glucose Infusion Rate  0.78 mg/kg/min 0.78 mg/kg/min 0.78 mg/kg/min    Osmolarity (Estimated)  -- -- --    Volume  1,992 mL 1,992 mL 1,992 mL    Rate  83 mL/hr 83 mL/hr 83 mL/hr    Dosing Weight  94.2 kg 94.2 kg 94.2 kg    Infusion Site  Central Central Central        This formula provides:  % kcal as lipids = 28  Grams protein/kg =  0.7  Non-protein calories = 590  Kcals/kg = 10.6  Total daily calories = 810    A/P:  1. Patient tolerating clear liquid diet; however, does not like food options per discussion with RN.  Diet advanced to regular.    2. Macronutrients: TPN continues to provide ~50 % of estimate kcal requirements.  Diet advanced as noted above.  Holding off on further increase in kcals from TPN to avoid overfeeding.    3. Micronutrients/Electrolytes: Electrolytes WNL - administered 2 g of magnesium sulfate outside TPN this morning for magnesium of 1.8.  Repeat labs ordered tomorrow.   4. Glycemic Control: < 180 mg/dL   5. Fluid Status: TPN continues at 83 mL/hr    Daniella Rogers, PharmD, BCPS, BCCCP

## 2020-09-14 NOTE — DISCHARGE PLANNING
Anticipated Discharge Disposition:   Home Infusion for TPN  Home Health    Action:    Per Alcria, the staffing request for TPN infusion and home health is pending.  It can take 1 to 3 business days to get the vendors.    Pt updated.    Dr. Quinn updated.    Bedside RN updated.    Barriers to Discharge:    Insurance coordination for services    Plan:    F/U with insurance on staffing request.

## 2020-09-14 NOTE — DISCHARGE PLANNING
Anticipated Discharge Disposition:   Home Infusion for TPN with [facility]    Action:    [facility] is working with [employer]/Alcira for authorization for TPN and RN teaching.      Barriers to Discharge:    Insurance authorization and coordination of services    Plan:    F/U with [facility].

## 2020-09-15 VITALS
HEART RATE: 97 BPM | WEIGHT: 211.64 LBS | OXYGEN SATURATION: 99 % | BODY MASS INDEX: 34.01 KG/M2 | DIASTOLIC BLOOD PRESSURE: 73 MMHG | HEIGHT: 66 IN | TEMPERATURE: 97.3 F | SYSTOLIC BLOOD PRESSURE: 124 MMHG | RESPIRATION RATE: 16 BRPM

## 2020-09-15 PROBLEM — E87.20 METABOLIC ACIDOSIS: Status: RESOLVED | Noted: 2020-09-08 | Resolved: 2020-09-15

## 2020-09-15 PROBLEM — R11.2 INTRACTABLE NAUSEA AND VOMITING: Status: RESOLVED | Noted: 2020-09-08 | Resolved: 2020-09-15

## 2020-09-15 PROBLEM — E83.42 HYPOMAGNESEMIA: Status: RESOLVED | Noted: 2020-09-08 | Resolved: 2020-09-15

## 2020-09-15 PROBLEM — E87.6 HYPOKALEMIA: Status: RESOLVED | Noted: 2020-09-08 | Resolved: 2020-09-15

## 2020-09-15 LAB
ALBUMIN SERPL BCP-MCNC: 3.1 G/DL (ref 3.2–4.9)
ALBUMIN/GLOB SERPL: 0.8 G/DL
ALP SERPL-CCNC: 86 U/L (ref 30–99)
ALT SERPL-CCNC: 31 U/L (ref 2–50)
ANION GAP SERPL CALC-SCNC: 8 MMOL/L (ref 7–16)
AST SERPL-CCNC: 30 U/L (ref 12–45)
BILIRUB SERPL-MCNC: 0.4 MG/DL (ref 0.1–1.5)
BUN SERPL-MCNC: 8 MG/DL (ref 8–22)
CALCIUM SERPL-MCNC: 8.4 MG/DL (ref 8.5–10.5)
CHLORIDE SERPL-SCNC: 104 MMOL/L (ref 96–112)
CO2 SERPL-SCNC: 24 MMOL/L (ref 20–33)
CREAT SERPL-MCNC: 0.41 MG/DL (ref 0.5–1.4)
GLOBULIN SER CALC-MCNC: 3.7 G/DL (ref 1.9–3.5)
GLUCOSE BLD-MCNC: 106 MG/DL (ref 65–99)
GLUCOSE SERPL-MCNC: 114 MG/DL (ref 65–99)
MAGNESIUM SERPL-MCNC: 2 MG/DL (ref 1.5–2.5)
PHOSPHATE SERPL-MCNC: 2.7 MG/DL (ref 2.5–4.5)
POTASSIUM SERPL-SCNC: 4.1 MMOL/L (ref 3.6–5.5)
PROT SERPL-MCNC: 6.8 G/DL (ref 6–8.2)
SODIUM SERPL-SCNC: 136 MMOL/L (ref 135–145)

## 2020-09-15 PROCEDURE — 700102 HCHG RX REV CODE 250 W/ 637 OVERRIDE(OP): Performed by: INTERNAL MEDICINE

## 2020-09-15 PROCEDURE — 83735 ASSAY OF MAGNESIUM: CPT

## 2020-09-15 PROCEDURE — A9270 NON-COVERED ITEM OR SERVICE: HCPCS | Performed by: INTERNAL MEDICINE

## 2020-09-15 PROCEDURE — 82962 GLUCOSE BLOOD TEST: CPT

## 2020-09-15 PROCEDURE — 84100 ASSAY OF PHOSPHORUS: CPT

## 2020-09-15 PROCEDURE — A9270 NON-COVERED ITEM OR SERVICE: HCPCS | Performed by: HOSPITALIST

## 2020-09-15 PROCEDURE — 700102 HCHG RX REV CODE 250 W/ 637 OVERRIDE(OP): Performed by: HOSPITALIST

## 2020-09-15 PROCEDURE — 99239 HOSP IP/OBS DSCHRG MGMT >30: CPT | Performed by: INTERNAL MEDICINE

## 2020-09-15 PROCEDURE — 80053 COMPREHEN METABOLIC PANEL: CPT

## 2020-09-15 PROCEDURE — 700111 HCHG RX REV CODE 636 W/ 250 OVERRIDE (IP): Performed by: INTERNAL MEDICINE

## 2020-09-15 RX ORDER — OMEPRAZOLE 20 MG/1
20 CAPSULE, DELAYED RELEASE ORAL DAILY
Qty: 30 CAP | Refills: 1 | Status: SHIPPED | OUTPATIENT
Start: 2020-09-16 | End: 2020-11-04

## 2020-09-15 RX ORDER — METOCLOPRAMIDE 10 MG/1
10 TABLET ORAL
Qty: 120 TAB | Refills: 1 | Status: SHIPPED | OUTPATIENT
Start: 2020-09-15 | End: 2020-11-04

## 2020-09-15 RX ORDER — ACETAMINOPHEN 500 MG
500 TABLET ORAL EVERY 4 HOURS PRN
Qty: 30 TAB | Refills: 0 | COMMUNITY
Start: 2020-09-15 | End: 2022-11-29

## 2020-09-15 RX ORDER — AMOXICILLIN 250 MG
2 CAPSULE ORAL 2 TIMES DAILY PRN
Qty: 120 TAB | Refills: 0 | Status: SHIPPED | OUTPATIENT
Start: 2020-09-15 | End: 2020-10-01

## 2020-09-15 RX ADMIN — LEVOTHYROXINE SODIUM 100 MCG: 100 TABLET ORAL at 05:52

## 2020-09-15 RX ADMIN — DOCUSATE SODIUM 50 MG AND SENNOSIDES 8.6 MG 2 TABLET: 8.6; 5 TABLET, FILM COATED ORAL at 08:37

## 2020-09-15 RX ADMIN — OMEPRAZOLE 20 MG: 20 CAPSULE, DELAYED RELEASE ORAL at 05:55

## 2020-09-15 RX ADMIN — METOCLOPRAMIDE 10 MG: 10 TABLET ORAL at 08:37

## 2020-09-15 RX ADMIN — ENOXAPARIN SODIUM 40 MG: 40 INJECTION SUBCUTANEOUS at 05:55

## 2020-09-15 RX ADMIN — METOCLOPRAMIDE 10 MG: 10 TABLET ORAL at 11:51

## 2020-09-15 NOTE — DISCHARGE PLANNING
Anticipated Discharge Disposition:   Home    Action:    Dr. Crowe has changed diet to bariatric 6 small meals per day and TPN home infusion cancelled.    Notified Adela with [facility].    Barriers to Discharge:    None    Plan:    DC

## 2020-09-15 NOTE — CARE PLAN
Problem: Nutritional:  Goal: Achieve adequate nutritional intake  Description: Patient's diet will advance past clears and will consume 50% of meals  Outcome: PROGRESSING AS EXPECTED  Per chart pt PO %, most meals %.     Problem: Nutritional:  Goal: Nutrition support tolerated and meeting greater than 85% of estimated needs  Outcome: MET  TPN @ 50% + PO meeting pt's estimated nutritional needs.

## 2020-09-15 NOTE — DIETARY
Nutrition Services: Update   Day 7 of admit.  Eunice Mckenna is a 60 y.o. female with admitting DX of Intractable nausea and vomiting    Pt is currently on regular, 6 small meals diet, which was started yesterday for dinner. Pt was previously on full liquid, 6 small meals diet. Pt is receiving TPN @ 50% of estimated nutritional needs. Suspect PO and TPN meeting pt's estimated nutritional needs. Per chart pt PO %, most meals %. Wt 9/13: 96 kg via stand up scale - wt increased slightly from admit wt.     Malnutrition Risk: No new criteria noted at this time.     Recommendations/Plan:  1. Continue TPN per pharmacy  2. Encourage intake of meals  3. Document intake of all meals as % taken in ADL's to provide interdisciplinary communication across all shifts.   4. Monitor weight.  5. Nutrition rep will continue to see patient for ongoing meal and snack preferences.  6. Obtain supplement order per RD as needed.    RD following

## 2020-09-15 NOTE — DISCHARGE INSTRUCTIONS
Discharge Instructions    Discharged to home by car with friend. Discharged via wheelchair, hospital escort: Yes.  Special equipment needed: Not Applicable    Be sure to schedule a follow-up appointment with your primary care doctor or any specialists as instructed.     Discharge Plan:        I understand that a diet low in cholesterol, fat, and sodium is recommended for good health. Unless I have been given specific instructions below for another diet, I accept this instruction as my diet prescription.   Other diet: regular     Special Instructions: None    · Is patient discharged on Warfarin / Coumadin?   No     Depression / Suicide Risk    As you are discharged from this [facility] facility, it is important to learn how to keep safe from harming yourself.    Recognize the warning signs:  · Abrupt changes in personality, positive or negative- including increase in energy   · Giving away possessions  · Change in eating patterns- significant weight changes-  positive or negative  · Change in sleeping patterns- unable to sleep or sleeping all the time   · Unwillingness or inability to communicate  · Depression  · Unusual sadness, discouragement and loneliness  · Talk of wanting to die  · Neglect of personal appearance   · Rebelliousness- reckless behavior  · Withdrawal from people/activities they love  · Confusion- inability to concentrate     If you or a loved one observes any of these behaviors or has concerns about self-harm, here's what you can do:  · Talk about it- your feelings and reasons for harming yourself  · Remove any means that you might use to hurt yourself (examples: pills, rope, extension cords, firearm)  · Get professional help from the community (Mental Health, Substance Abuse, psychological counseling)  · Do not be alone:Call your Safe Contact- someone whom you trust who will be there for you.  · Call your local CRISIS HOTLINE 585-6544 or 815-162-6574  · Call your local Children's Mobile Crisis  Response Team [facility] (855) 615-6810 or [email or web address]  · Call the toll free National Suicide Prevention Hotlines   · National Suicide Prevention Lifeline 434-419-LTVO (6313)  · National Hope Line Network 800-SUICIDE (613-0944)

## 2020-09-15 NOTE — CARE PLAN
Problem: Discharge Barriers/Planning  Goal: Patient's continuum of care needs will be met  Outcome: PROGRESSING AS EXPECTED  Note: Pt updated on plan of care. Questions and concerns at this time addressed. Pt hopeful for discharge soon.      Problem: Pain Management  Goal: Pain level will decrease to patient's comfort goal  Outcome: PROGRESSING AS EXPECTED  Note: Pt denies any pain or discomfort at this time.

## 2020-09-15 NOTE — PROGRESS NOTES
"Progress Note:    S: Tolerating solid foods better, but getting non-bariatric diet  Nausea better on reglan    O:      Recent Labs     09/13/20  0045 09/14/20  0137 09/15/20  1150   SODIUM 140 137 136   POTASSIUM 3.4* 4.1 4.1   CHLORIDE 99 101 104   CO2 29 27 24   GLUCOSE 85 92 114*   BUN 6* 6* 8   CREATININE 0.40* 0.44* 0.41*   CALCIUM 8.6 8.9 8.4*         Current Facility-Administered Medications   Medication Dose   • metoclopramide (REGLAN) tablet 10 mg  10 mg   • TPN Central Line Formulation     • omeprazole (PRILOSEC) capsule 20 mg  20 mg   • polyethylene glycol/lytes (MIRALAX) PACKET 1 Packet  1 Packet    And   • senna-docusate (PERICOLACE or SENOKOT S) 8.6-50 MG per tablet 2 Tab  2 Tab    And   • magnesium hydroxide (MILK OF MAGNESIA) suspension 30 mL  30 mL    And   • bisacodyl (DULCOLAX) suppository 10 mg  10 mg   • promethazine (PHENERGAN) suppository 25 mg  25 mg   • MD Alert...TPN per Pharmacy     • HYDROmorphone pf (DILAUDID) injection 0.5 mg  0.5 mg   • acetaminophen (TYLENOL) tablet 500 mg  500 mg   • levothyroxine (SYNTHROID) tablet 100 mcg  100 mcg   • enoxaparin (LOVENOX) inj 40 mg  40 mg   • enalaprilat (VASOTEC) injection 1.25 mg  1.25 mg   • promethazine (PHENERGAN) tablet 25 mg  25 mg   • ondansetron (ZOFRAN) syringe/vial injection 4 mg  4 mg       PE:  /73   Pulse 97   Temp 36.3 °C (97.3 °F) (Temporal)   Resp 16   Ht 1.676 m (5' 5.98\")   Wt 96 kg (211 lb 10.3 oz)   SpO2 99%     Intake/Output Summary (Last 24 hours) at 9/15/2020 1324  Last data filed at 9/14/2020 1700  Gross per 24 hour   Intake 300 ml   Output --   Net 300 ml       Abd soft, nontender    Rads:  IR-PICC LINE PLACEMENT W/ GUIDANCE > AGE 5   Final Result                  Ultrasound-guided PICC placement performed by qualified nursing staff as    above.              A:   Active Hospital Problems    Diagnosis   • Metabolic acidosis [E87.2]     Priority: High   • Intractable nausea and vomiting [R11.2]     Priority: " Medium   • Hypokalemia [E87.6]     Priority: Medium   • Hypomagnesemia [E83.42]         P: Bariatric diet  Calorie count, stop TPN if taking adequate PO, otherwise will need home TPN    John Ganser M.D.  [facility]

## 2020-10-01 ENCOUNTER — OFFICE VISIT (OUTPATIENT)
Dept: MEDICAL GROUP | Facility: PHYSICIAN GROUP | Age: 60
End: 2020-10-01
Payer: COMMERCIAL

## 2020-10-01 VITALS
HEART RATE: 88 BPM | OXYGEN SATURATION: 97 % | RESPIRATION RATE: 14 BRPM | HEIGHT: 66 IN | DIASTOLIC BLOOD PRESSURE: 76 MMHG | WEIGHT: 203 LBS | BODY MASS INDEX: 32.62 KG/M2 | SYSTOLIC BLOOD PRESSURE: 120 MMHG | TEMPERATURE: 97.9 F

## 2020-10-01 DIAGNOSIS — Z90.3 HISTORY OF SLEEVE GASTRECTOMY: ICD-10-CM

## 2020-10-01 DIAGNOSIS — E89.0 H/O TOTAL THYROIDECTOMY: ICD-10-CM

## 2020-10-01 DIAGNOSIS — E86.0 DEHYDRATION: ICD-10-CM

## 2020-10-01 DIAGNOSIS — E89.0 HYPOTHYROIDISM, POSTSURGICAL: ICD-10-CM

## 2020-10-01 DIAGNOSIS — R11.0 NAUSEA: ICD-10-CM

## 2020-10-01 DIAGNOSIS — Z86.39 HISTORY OF GRAVES' DISEASE: ICD-10-CM

## 2020-10-01 PROCEDURE — 99214 OFFICE O/P EST MOD 30 MIN: CPT | Performed by: FAMILY MEDICINE

## 2020-10-01 ASSESSMENT — FIBROSIS 4 INDEX: FIB4 SCORE: 1.76

## 2020-10-01 NOTE — PROGRESS NOTES
Chief Complaint   Patient presents with   • GI Problem   • Hospital Follow-up       HISTORY OF PRESENT ILLNESS: Patient is a 60 y.o. female established patient here today for the following concerns:    1. History of sleeve gastrectomy  2. Nausea  3. Dehydration  Here today for hospital follow up post sleeve gastrectomy in July, had difficulty with what sounds like gastroparesis and nausea/dehydration.  Thankfully she has good success with use of reglan.  She did require some TPN temporarily.  She is eating throughout the day.  Still struggling with some constipation.  She is using suppositories but wondering what else can be used to prevent.     4. History of Graves' disease  5. Hypothyroidism, postsurgical  6. H/O total thyroidectomy  Just had thyroid labs through endocrinology.  Per patient report they showed her still hyperthyroid.  She is still struggling with the dose, more so now with the weight loss.  Has been feeling tremulous and the dose was decreased.  She has repeat labs in about 6 weeks.       Past Medical, Social, and Family history reviewed and updated in EPIC    Allergies:Iodine, Morphine, and Shellfish allergy    Current Outpatient Medications   Medication Sig Dispense Refill   • acetaminophen (TYLENOL) 500 MG Tab Take 1 Tab by mouth every four hours as needed. 30 Tab 0   • metoclopramide (REGLAN) 10 MG Tab Take 1 Tab by mouth 3 times a day, with meals. 120 Tab 1   • omeprazole (PRILOSEC) 20 MG delayed-release capsule Take 1 Cap by mouth every day. 30 Cap 1   • SYNTHROID 100 MCG Tab Take 100 mcg by mouth every day.       No current facility-administered medications for this visit.          ROS:  Review of Systems   Constitutional: Negative for fever, chills, weight loss and malaise/fatigue.   HENT: Negative for ear pain, nosebleeds, congestion, sore throat and neck pain.    Eyes: Negative for blurred vision.   Respiratory: Negative for cough, sputum production, shortness of breath and wheezing.   "  Cardiovascular: Negative for chest pain, palpitations,  and leg swelling.   Gastrointestinal: Negative for heartburn, nausea, vomiting, diarrhea and abdominal pain.   Genitourinary: Negative for dysuria, urgency and frequency.   Musculoskeletal: Negative for myalgias, back pain and joint pain.   Skin: Negative for rash and itching.   Neurological: Negative for dizziness, tingling, tremors, sensory change, focal weakness and headaches.   Endo/Heme/Allergies: Does not bruise/bleed easily.   Psychiatric/Behavioral: Negative for depression, anxiety, suicidal ideas, insomnia and memory loss.      Exam:  /76   Pulse 88   Temp 36.6 °C (97.9 °F)   Resp 14   Ht 1.676 m (5' 6\")   Wt 92.1 kg (203 lb)   SpO2 97%     General:  Well nourished, well developed in NAD  Head is grossly normal.  Neck: Supple without JVD   Pulmonary:  Normal effort. CTAB no w/r/c  Cardiovascular: Regular rate and rhythm   Extremities: no clubbing, cyanosis, or edema.  Psych: affect appropriate      Please note that this dictation was created using voice recognition software. I have made every reasonable attempt to correct obvious errors, but I expect that there are errors of grammar and possibly content that I did not discover before finalizing the note.    Assessment/Plan:  1. History of sleeve gastrectomy  Check labs with next lab draw.  Discuss labs with bariatric surgery office.   - CBC WITH DIFFERENTIAL; Future  - Comp Metabolic Panel; Future  - PREALBUMIN; Future  - VITAMIN B12; Future  - VITAMIN B6; Future  - VITAMIN B1; Future  - VITAMIN D,25 HYDROXY; Future  - IRON/TOTAL IRON BIND; Future  - FERRITIN; Future    2. Nausea  3. Dehydration  Improving, continue hydration, reglan     4. History of Graves' disease  5. Hypothyroidism, postsurgical  6. H/O total thyroidectomy  Continue dosing per endocrinology, recheck as planned.      Follow up in 6-8 weeks sooner prn  MA visit for flu vaccine when surgery is ok with it.         "

## 2020-10-16 NOTE — DISCHARGE SUMMARY
Discharge Summary    CHIEF COMPLAINT ON ADMISSION  Chief Complaint   Patient presents with   • Abdominal Pain   • N/V       Reason for Admission  Abdominal Pain      Admission Date  9/8/2020    CODE STATUS  Full code    HPI & HOSPITAL COURSE  This is a 60 y.o. female about 6 week post sleeve gastrectomy for obesity with persistent nausea and vomiting. She was admitted 2 weeks ago with similar complaints and had CT showing no acute abnormality, UGI showing no stricture or leak and ultrasound showing no gallstones.  She was admitted, GI and Surgery consulted. Patient had endoscopy done with evidence of mild esophagitis.  She was started on TPN due to inability to tolerate oral intake.  This started to slowly improve while on TPN, and on 9/15/2020 she tolerated advance of diet to bariatric 6 small meals per day with sufficient oral intake to discontinue TPN.       Therefore, she is discharged in good and stable condition to home with close outpatient follow-up.    The patient met 2-midnight criteria for an inpatient stay at the time of discharge.    Discharge Date  9/15/2020    FOLLOW UP ITEMS POST DISCHARGE  Oral intake    DISCHARGE DIAGNOSES  Principal Problem (Resolved):    Intractable nausea and vomiting POA: Yes  Active Problems:    * No active hospital problems. *  Resolved Problems:    Metabolic acidosis POA: Yes    Hypokalemia POA: Yes    Hypomagnesemia POA: Yes      FOLLOW UP  Sofie Mathew M.D.  [address] [id number] [phone]            MEDICATIONS ON DISCHARGE     Medication List      START taking these medications      Instructions   acetaminophen 500 MG Tabs  Commonly known as: TYLENOL   Take 1 Tab by mouth every four hours as needed.  Dose: 500 mg     metoclopramide 10 MG Tabs  Commonly known as: REGLAN   Take 1 Tab by mouth 3 times a day, with meals.  Dose: 10 mg     omeprazole 20 MG delayed-release capsule  Commonly known as: PRILOSEC   Take 1 Cap by mouth every  day.  Dose: 20 mg        CONTINUE taking these medications      Instructions   Synthroid 100 MCG Tabs  Generic drug: levothyroxine   Take 100 mcg by mouth every day.  Dose: 100 mcg            Allergies  Allergies   Allergen Reactions   • Iodine Itching   • Morphine Itching     blisters   • Shellfish Allergy Vomiting       DIET  No orders of the defined types were placed in this encounter.      ACTIVITY  As tolerated.  Weight bearing as tolerated    CONSULTATIONS  Gastroenterology  General surgery    PROCEDURES  9/10/2020 esophagogastroduodenoscopy  9/11/2020 PICC line placement    LABORATORY  Lab Results   Component Value Date    SODIUM 136 09/15/2020    POTASSIUM 4.1 09/15/2020    CHLORIDE 104 09/15/2020    CO2 24 09/15/2020    GLUCOSE 114 (H) 09/15/2020    BUN 8 09/15/2020    CREATININE 0.41 (L) 09/15/2020    CREATININE 0.77 12/29/2012    GLOMRATE >59 08/18/2010        Lab Results   Component Value Date    WBC 3.0 (L) 09/09/2020    WBC 3.0 (L) 09/09/2020    WBC 3.5 (L) 12/29/2012    HEMOGLOBIN 11.0 (L) 09/09/2020    HEMOGLOBIN 11.0 (L) 09/09/2020    HEMATOCRIT 34.4 (L) 09/09/2020    HEMATOCRIT 34.4 (L) 09/09/2020    PLATELETCT 181 09/09/2020    PLATELETCT 181 09/09/2020        Total time of the discharge process exceeds 35 minutes.

## 2020-10-23 ENCOUNTER — OFFICE VISIT (OUTPATIENT)
Dept: MEDICAL GROUP | Facility: PHYSICIAN GROUP | Age: 60
End: 2020-10-23
Payer: COMMERCIAL

## 2020-10-23 VITALS
RESPIRATION RATE: 12 BRPM | BODY MASS INDEX: 32.78 KG/M2 | HEART RATE: 80 BPM | TEMPERATURE: 98.2 F | WEIGHT: 204 LBS | OXYGEN SATURATION: 97 % | SYSTOLIC BLOOD PRESSURE: 130 MMHG | HEIGHT: 66 IN | DIASTOLIC BLOOD PRESSURE: 86 MMHG

## 2020-10-23 DIAGNOSIS — R25.1 TREMULOUSNESS: ICD-10-CM

## 2020-10-23 DIAGNOSIS — Z86.39 HISTORY OF GRAVES' DISEASE: ICD-10-CM

## 2020-10-23 DIAGNOSIS — E89.0 HYPOTHYROIDISM, POSTSURGICAL: ICD-10-CM

## 2020-10-23 DIAGNOSIS — Z90.3 HISTORY OF SLEEVE GASTRECTOMY: ICD-10-CM

## 2020-10-23 DIAGNOSIS — F41.9 ANXIETY: ICD-10-CM

## 2020-10-23 PROCEDURE — 99214 OFFICE O/P EST MOD 30 MIN: CPT | Performed by: FAMILY MEDICINE

## 2020-10-23 RX ORDER — LORAZEPAM 0.5 MG/1
.25-.5 TABLET ORAL 2 TIMES DAILY PRN
Qty: 30 TAB | Refills: 0 | Status: SHIPPED | OUTPATIENT
Start: 2020-10-23 | End: 2020-11-22

## 2020-10-23 ASSESSMENT — FIBROSIS 4 INDEX: FIB4 SCORE: 1.76

## 2020-10-23 NOTE — PROGRESS NOTES
Chief Complaint   Patient presents with   • Follow-Up     labs       HISTORY OF PRESENT ILLNESS: Patient is a 60 y.o. female established patient here today for the following concerns:    This is a pleasant 60 year old female with hx of Grave's disease s/p thyroidectomy who is on replacement therapy but has been requiring less and less dosing because of her sleeve gastrectomy and weight loss.  She was having horrible n/v and dehydration after surgery and had required hospitalization.  She was discharged on Reglan which worked great for the N/V and her weight loss was becoming more manageable and she was staying hydrated.  She started having some chattering of her teeth, tremulous sensation all over her body with tremor in the hands and palpitations and panic attacks which are unusual for her.  Her thyroid dose was just adjusted down again last week.  She did stop the reglan after talking with us and the teeth chattering has improved.  We had ordered some updated labs and her endocrine has her doing repeat thyroid studies in November.        Past Medical, Social, and Family history reviewed and updated in EPIC    Allergies:Iodine, Morphine, and Shellfish allergy    Current Outpatient Medications   Medication Sig Dispense Refill   • LORazepam (ATIVAN) 0.5 MG Tab Take 0.5-1 Tabs by mouth 2 times a day as needed for Anxiety for up to 30 days. 30 Tab 0   • acetaminophen (TYLENOL) 500 MG Tab Take 1 Tab by mouth every four hours as needed. 30 Tab 0   • SYNTHROID 100 MCG Tab Take 50 mcg by mouth every day.     • metoclopramide (REGLAN) 10 MG Tab Take 1 Tab by mouth 3 times a day, with meals. (Patient not taking: Reported on 10/23/2020) 120 Tab 1   • omeprazole (PRILOSEC) 20 MG delayed-release capsule Take 1 Cap by mouth every day. (Patient not taking: Reported on 10/23/2020) 30 Cap 1     No current facility-administered medications for this visit.          ROS:  Review of Systems   Constitutional: Negative for fever, chills,  "weight loss and malaise/fatigue.   HENT: Negative for ear pain, nosebleeds, congestion, sore throat and neck pain.    Eyes: Negative for blurred vision.   Respiratory: Negative for cough, sputum production, shortness of breath and wheezing.    Cardiovascular: Negative for chest pain, palpitations,  and leg swelling.   Gastrointestinal: Negative for heartburn, nausea, vomiting, diarrhea and abdominal pain.   Genitourinary: Negative for dysuria, urgency and frequency.   Musculoskeletal: Negative for myalgias, back pain and joint pain.   Skin: Negative for rash and itching.   Neurological: Negative for dizziness, tingling, tremors, sensory change, focal weakness and headaches.   Endo/Heme/Allergies: Does not bruise/bleed easily.   Psychiatric/Behavioral: Negative for depression, anxiety, suicidal ideas, insomnia and memory loss.      Exam:  /86 (BP Location: Right arm, Patient Position: Sitting, BP Cuff Size: Adult)   Pulse 80   Temp 36.8 °C (98.2 °F) (Temporal)   Resp 12   Ht 1.676 m (5' 6\")   Wt 92.5 kg (204 lb)   SpO2 97%     General:  Well nourished, well developed in NAD  Head is grossly normal.  Neck: Supple without JVD   Pulmonary:  Normal effort. CTAB no w/r/c  Cardiovascular: Regular rate and rhythm no m/r/g  Extremities: no clubbing, cyanosis, or edema.  Psych: affect appropriate      Please note that this dictation was created using voice recognition software. I have made every reasonable attempt to correct obvious errors, but I expect that there are errors of grammar and possibly content that I did not discover before finalizing the note.    Assessment/Plan:  1. Anxiety  Will temporarily use some ativan, which could help with an nausea as well.  Suspect that she is still hyperthyroid and may have been starting with tardive dyskinesia like symtpoms.    - LORazepam (ATIVAN) 0.5 MG Tab; Take 0.5-1 Tabs by mouth 2 times a day as needed for Anxiety for up to 30 days.  Dispense: 30 Tab; Refill: " 0    2. Tremulousness  - LORazepam (ATIVAN) 0.5 MG Tab; Take 0.5-1 Tabs by mouth 2 times a day as needed for Anxiety for up to 30 days.  Dispense: 30 Tab; Refill: 0    3. History of Graves' disease  Check labs per endo    4. History of sleeve gastrectomy  Check labs for lyte abnormality, vitamin deficiency, etc.     5. Hypothyroidism, postsurgical  Continue dosing per endo.     3 week follow up

## 2020-11-04 ENCOUNTER — OFFICE VISIT (OUTPATIENT)
Dept: MEDICAL GROUP | Facility: PHYSICIAN GROUP | Age: 60
End: 2020-11-04
Payer: COMMERCIAL

## 2020-11-04 VITALS
RESPIRATION RATE: 16 BRPM | OXYGEN SATURATION: 98 % | HEIGHT: 66 IN | TEMPERATURE: 98.2 F | WEIGHT: 199.8 LBS | HEART RATE: 84 BPM | DIASTOLIC BLOOD PRESSURE: 72 MMHG | BODY MASS INDEX: 32.11 KG/M2 | SYSTOLIC BLOOD PRESSURE: 128 MMHG

## 2020-11-04 DIAGNOSIS — H10.31 ACUTE CONJUNCTIVITIS OF RIGHT EYE, UNSPECIFIED ACUTE CONJUNCTIVITIS TYPE: ICD-10-CM

## 2020-11-04 DIAGNOSIS — B02.9 HERPES ZOSTER WITHOUT COMPLICATION: ICD-10-CM

## 2020-11-04 PROCEDURE — 99214 OFFICE O/P EST MOD 30 MIN: CPT | Performed by: FAMILY MEDICINE

## 2020-11-04 RX ORDER — LEVOTHYROXINE SODIUM 0.05 MG/1
TABLET ORAL
COMMUNITY
Start: 2020-10-19 | End: 2020-11-03

## 2020-11-04 RX ORDER — LEVOTHYROXINE SODIUM 0.07 MG/1
75 TABLET ORAL
COMMUNITY
Start: 2020-10-01 | End: 2020-11-03

## 2020-11-04 RX ORDER — LEVOTHYROXINE SODIUM 50 MCG
TABLET ORAL
COMMUNITY
Start: 2020-10-19 | End: 2021-03-12

## 2020-11-04 RX ORDER — ACYCLOVIR 400 MG/1
800 TABLET ORAL 2 TIMES DAILY
Qty: 20 TAB | Refills: 0 | Status: SHIPPED | OUTPATIENT
Start: 2020-11-04 | End: 2020-11-09

## 2020-11-04 RX ORDER — ONDANSETRON 8 MG/1
TABLET, ORALLY DISINTEGRATING ORAL
COMMUNITY
Start: 2020-10-26 | End: 2020-11-03

## 2020-11-04 RX ORDER — POLYMYXIN B SULFATE AND TRIMETHOPRIM 1; 10000 MG/ML; [USP'U]/ML
1 SOLUTION OPHTHALMIC EVERY 4 HOURS
Qty: 10 ML | Refills: 0 | Status: SHIPPED | OUTPATIENT
Start: 2020-11-04 | End: 2020-11-24

## 2020-11-04 ASSESSMENT — FIBROSIS 4 INDEX: FIB4 SCORE: 1.76

## 2020-11-04 NOTE — PROGRESS NOTES
Chief Complaint   Patient presents with   • Rash   • Numbness       HISTORY OF PRESENT ILLNESS: Patient is a 60 y.o. female established patient here today for the following concerns:    1. Herpes zoster without complication  Here today for numbness and burning pain that started late last week and then developed raised red rash over the left flank with pain radiating around to the left abdomen.  She reports no fevers chills.  No rashes elsewhere.     2. Acute conjunctivitis of right eye, unspecified acute conjunctivitis type  In addition, on exam today noted to have right eye redness.  Patient did not note this was a problem.  She denies any discharge or pain.  No visual changes.        Past Medical, Social, and Family history reviewed and updated in EPIC    Allergies:Iodine, Morphine, and Shellfish allergy    Current Outpatient Medications   Medication Sig Dispense Refill   • acyclovir (ZOVIRAX) 400 MG tablet Take 2 Tabs by mouth 2 times a day for 5 days. 20 Tab 0   • polymixin-trimethoprim (POLYTRIM) 96296-4.1 UNIT/ML-% Solution Place 1 Drop in both eyes every 4 hours. 10 mL 0   • LORazepam (ATIVAN) 0.5 MG Tab Take 0.5-1 Tabs by mouth 2 times a day as needed for Anxiety for up to 30 days. 30 Tab 0   • acetaminophen (TYLENOL) 500 MG Tab Take 1 Tab by mouth every four hours as needed. 30 Tab 0   • SYNTHROID 50 MCG Tab TAKE 1 TABLET BY MOUTH FOR 6 DAYS OF THE WEEK AND 1 AND 1/2 FOR 1 DAY       No current facility-administered medications for this visit.          ROS:  Review of Systems   Constitutional: Negative for fever, chills, weight loss and malaise/fatigue.   HENT: Negative for ear pain, nosebleeds, congestion, sore throat and neck pain.    Eyes: Negative for blurred vision.   Respiratory: Negative for cough, sputum production, shortness of breath and wheezing.    Cardiovascular: Negative for chest pain, palpitations,  and leg swelling.   Gastrointestinal: Negative for heartburn, nausea, vomiting, diarrhea and  "abdominal pain.   Genitourinary: Negative for dysuria, urgency and frequency.   Musculoskeletal: Negative for myalgias, back pain and joint pain.   Skin: Negative for rash and itching.   Neurological: Negative for dizziness, tingling, tremors, sensory change, focal weakness and headaches.   Endo/Heme/Allergies: Does not bruise/bleed easily.   Psychiatric/Behavioral: Negative for depression, anxiety, suicidal ideas, insomnia and memory loss.      Exam:  /72   Pulse 84   Temp 36.8 °C (98.2 °F)   Resp 16   Ht 1.676 m (5' 6\")   Wt 90.6 kg (199 lb 12.8 oz)   SpO2 98%     General:  Well nourished, well developed in NAD  Head is grossly normal. Right conjunctiva injected  Neck: Supple without JVD   Pulmonary:  Normal effort. CTAB  Cardiovascular: Regular rate  SKin: cluster of raised papules over the left back c/w shingles  Extremities: no clubbing, cyanosis, or edema.  Psych: affect appropriate      Please note that this dictation was created using voice recognition software. I have made every reasonable attempt to correct obvious errors, but I expect that there are errors of grammar and possibly content that I did not discover before finalizing the note.    Assessment/Plan:  1. Herpes zoster without complication  start  - acyclovir (ZOVIRAX) 400 MG tablet; Take 2 Tabs by mouth 2 times a day for 5 days.  Dispense: 20 Tab; Refill: 0  Discussed if needed we could start gabapentin if did not start to ease.     2. Acute conjunctivitis of right eye, unspecified acute conjunctivitis type  - polymixin-trimethoprim (POLYTRIM) 82789-4.1 UNIT/ML-% Solution; Place 1 Drop in both eyes every 4 hours.  Dispense: 10 mL; Refill: 0    Follow up in 2+ weeks to review visit with endo        "

## 2020-11-05 ENCOUNTER — TELEPHONE (OUTPATIENT)
Dept: MEDICAL GROUP | Facility: PHYSICIAN GROUP | Age: 60
End: 2020-11-05

## 2020-11-05 NOTE — TELEPHONE ENCOUNTER
Pt states that she is concerned with taking her acyclovir 800mg BID due to her history with gastroscopy and her other stomach issues. I already advised pt to take meds with food, she is on Reglan. She specifically wanted me to mention her stomach issues to Sofie Mathew M.D. to see what Sofie Mathew M.D.  advises.     Kimberly WHEELER RN, BSN

## 2020-11-05 NOTE — TELEPHONE ENCOUNTER
Thank you.  THey do make a liquid acyclovir, but to get the same dose it would be a larger volume.  I think the pills are fine with the sleeve gastrectomy

## 2020-11-24 ENCOUNTER — TELEMEDICINE (OUTPATIENT)
Dept: MEDICAL GROUP | Facility: PHYSICIAN GROUP | Age: 60
End: 2020-11-24
Payer: COMMERCIAL

## 2020-11-24 VITALS
WEIGHT: 194 LBS | DIASTOLIC BLOOD PRESSURE: 82 MMHG | BODY MASS INDEX: 31.18 KG/M2 | SYSTOLIC BLOOD PRESSURE: 111 MMHG | TEMPERATURE: 96.9 F | HEIGHT: 66 IN | HEART RATE: 92 BPM

## 2020-11-24 DIAGNOSIS — E51.9 VITAMIN B1 DEFICIENCY: ICD-10-CM

## 2020-11-24 DIAGNOSIS — R73.9 HYPERGLYCEMIA: ICD-10-CM

## 2020-11-24 DIAGNOSIS — Z86.39 HISTORY OF GRAVES' DISEASE: ICD-10-CM

## 2020-11-24 DIAGNOSIS — Z90.3 HISTORY OF SLEEVE GASTRECTOMY: ICD-10-CM

## 2020-11-24 DIAGNOSIS — E55.9 VITAMIN D DEFICIENCY: ICD-10-CM

## 2020-11-24 DIAGNOSIS — E89.0 H/O TOTAL THYROIDECTOMY: ICD-10-CM

## 2020-11-24 DIAGNOSIS — Z86.2 HISTORY OF ANEMIA: ICD-10-CM

## 2020-11-24 DIAGNOSIS — E89.0 HYPOTHYROIDISM, POSTSURGICAL: ICD-10-CM

## 2020-11-24 PROCEDURE — 99214 OFFICE O/P EST MOD 30 MIN: CPT | Mod: 95,CR | Performed by: FAMILY MEDICINE

## 2020-11-24 RX ORDER — OMEPRAZOLE 20 MG/1
20 CAPSULE, DELAYED RELEASE ORAL
COMMUNITY
Start: 2020-11-15 | End: 2020-11-24

## 2020-11-24 ASSESSMENT — FIBROSIS 4 INDEX: FIB4 SCORE: 1.76

## 2020-11-24 NOTE — PROGRESS NOTES
Chief Complaint   Patient presents with   • Lab Follow-up     vit deficiencies, thyroid          HISTORY OF PRESENT ILLNESS: Patient is a 60 y.o. female established patient here today for the following concerns:    This encounter was conducted via Zoom .   Verbal consent was obtained. Patient's identity was verified.      1. Hypothyroidism, postsurgical  2. History of Graves' disease  3. H/O total thyroidectomy  Working on adjusting thyroid levels with endocrinology.      4. Vitamin B1 deficiency  5. Vitamin D deficiency  6. History of sleeve gastrectomy  7. History of anemia  8. Hyperglycemia    Has hx of vitamin deficiency after the surgery.  On replacement B vitamins, vit D.  Hx of anemia.        Past Medical, Social, and Family history reviewed and updated in EPIC    Allergies:Iodine, Morphine, and Shellfish allergy    Current Outpatient Medications   Medication Sig Dispense Refill   • SYNTHROID 50 MCG Tab TAKE 1 TABLET BY MOUTH FOR 6 DAYS OF THE WEEK AND 1 AND 1/2 FOR 1 DAY     • acetaminophen (TYLENOL) 500 MG Tab Take 1 Tab by mouth every four hours as needed. 30 Tab 0     No current facility-administered medications for this visit.          ROS:  Review of Systems   Constitutional: Negative for fever, chills, weight loss and malaise/fatigue.   HENT: Negative for ear pain, nosebleeds, congestion, sore throat and neck pain.    Eyes: Negative for blurred vision.   Respiratory: Negative for cough, sputum production, shortness of breath and wheezing.    Cardiovascular: Negative for chest pain, palpitations,  and leg swelling.   Gastrointestinal: Negative for heartburn, nausea, vomiting, diarrhea and abdominal pain.   Genitourinary: Negative for dysuria, urgency and frequency.   Musculoskeletal: Negative for myalgias, back pain and joint pain.   Skin: Negative for rash and itching.   Neurological: Negative for dizziness, tingling, tremors, sensory change, focal weakness and headaches.   Endo/Heme/Allergies: Does  "not bruise/bleed easily.   Psychiatric/Behavioral: Negative for depression, anxiety, suicidal ideas, insomnia and memory loss.      Exam:  /82   Pulse 92   Temp 36.1 °C (96.9 °F)   Ht 1.676 m (5' 6\")   Wt 88 kg (194 lb)     General:  Well nourished, well developed in NAD  Head is grossly normal.  Neck: Supple without JVD, Trachea midline, no thyromegaly noted  Pulmonary:  Normal effort. Speaking in full sentences  Psych: affect appropriate  Skin: no Jaundice noted or facial rashes    Please note that this dictation was created using voice recognition software. I have made every reasonable attempt to correct obvious errors, but I expect that there are errors of grammar and possibly content that I did not discover before finalizing the note.    Assessment/Plan:  1. Hypothyroidism, postsurgical  2. History of Graves' disease  3. H/O total thyroidectomy  Continue current doses and recheck as directed    4. Vitamin B1 deficiency  - VITAMIN B1; Future    5. Vitamin D deficiency  - VITAMIN D,25 HYDROXY; Future    6. History of sleeve gastrectomy  - CBC WITH DIFFERENTIAL; Future  - VITAMIN B1; Future  - VITAMIN B6; Future  - Comp Metabolic Panel; Future  - HEMOGLOBIN A1C; Future    7. History of anemia  - CBC WITH DIFFERENTIAL; Future    8. Hyperglycemia  - HEMOGLOBIN A1C; Future    Follow up in 1 month         "

## 2020-12-18 ENCOUNTER — TELEMEDICINE (OUTPATIENT)
Dept: MEDICAL GROUP | Facility: PHYSICIAN GROUP | Age: 60
End: 2020-12-18
Payer: COMMERCIAL

## 2020-12-18 VITALS
DIASTOLIC BLOOD PRESSURE: 81 MMHG | WEIGHT: 189 LBS | TEMPERATURE: 98.4 F | HEIGHT: 66 IN | OXYGEN SATURATION: 97 % | SYSTOLIC BLOOD PRESSURE: 123 MMHG | BODY MASS INDEX: 30.37 KG/M2 | HEART RATE: 94 BPM

## 2020-12-18 DIAGNOSIS — Z86.39 HISTORY OF GRAVES' DISEASE: ICD-10-CM

## 2020-12-18 DIAGNOSIS — E89.0 H/O TOTAL THYROIDECTOMY: ICD-10-CM

## 2020-12-18 DIAGNOSIS — E89.0 HYPOTHYROIDISM, POSTSURGICAL: ICD-10-CM

## 2020-12-18 DIAGNOSIS — E51.9 VITAMIN B1 DEFICIENCY: ICD-10-CM

## 2020-12-18 DIAGNOSIS — Z90.3 HISTORY OF SLEEVE GASTRECTOMY: ICD-10-CM

## 2020-12-18 LAB
25(OH)D3+25(OH)D2 SERPL-MCNC: 65.6 NG/ML (ref 30–100)
ALBUMIN SERPL-MCNC: 4.1 G/DL (ref 3.8–4.9)
ALBUMIN/GLOB SERPL: 1.1 {RATIO} (ref 1.2–2.2)
ALP SERPL-CCNC: 122 IU/L (ref 39–117)
ALT SERPL-CCNC: 7 IU/L (ref 0–32)
AST SERPL-CCNC: 15 IU/L (ref 0–40)
BASOPHILS # BLD AUTO: 0 X10E3/UL (ref 0–0.2)
BASOPHILS NFR BLD AUTO: 0 %
BILIRUB SERPL-MCNC: 0.3 MG/DL (ref 0–1.2)
BUN SERPL-MCNC: 8 MG/DL (ref 6–24)
BUN/CREAT SERPL: 14 (ref 9–23)
CALCIUM SERPL-MCNC: 9.9 MG/DL (ref 8.7–10.2)
CHLORIDE SERPL-SCNC: 100 MMOL/L (ref 96–106)
CO2 SERPL-SCNC: 22 MMOL/L (ref 20–29)
CREAT SERPL-MCNC: 0.58 MG/DL (ref 0.57–1)
EOSINOPHIL # BLD AUTO: 0 X10E3/UL (ref 0–0.4)
EOSINOPHIL NFR BLD AUTO: 1 %
ERYTHROCYTE [DISTWIDTH] IN BLOOD BY AUTOMATED COUNT: 14.1 % (ref 11.7–15.4)
GLOBULIN SER CALC-MCNC: 3.9 G/DL (ref 1.5–4.5)
GLUCOSE SERPL-MCNC: 84 MG/DL (ref 65–99)
HBA1C MFR BLD: 5.1 % (ref 4.8–5.6)
HCT VFR BLD AUTO: 39.8 % (ref 34–46.6)
HGB BLD-MCNC: 13.1 G/DL (ref 11.1–15.9)
IMM GRANULOCYTES # BLD AUTO: 0 X10E3/UL (ref 0–0.1)
IMM GRANULOCYTES NFR BLD AUTO: 0 %
IMMATURE CELLS  115398: NORMAL
LYMPHOCYTES # BLD AUTO: 1.7 X10E3/UL (ref 0.7–3.1)
LYMPHOCYTES NFR BLD AUTO: 43 %
MCH RBC QN AUTO: 28.3 PG (ref 26.6–33)
MCHC RBC AUTO-ENTMCNC: 32.9 G/DL (ref 31.5–35.7)
MCV RBC AUTO: 86 FL (ref 79–97)
MONOCYTES # BLD AUTO: 0.4 X10E3/UL (ref 0.1–0.9)
MONOCYTES NFR BLD AUTO: 10 %
MORPHOLOGY BLD-IMP: NORMAL
NEUTROPHILS # BLD AUTO: 1.8 X10E3/UL (ref 1.4–7)
NEUTROPHILS NFR BLD AUTO: 46 %
NRBC BLD AUTO-RTO: NORMAL %
PLATELET # BLD AUTO: 296 X10E3/UL (ref 150–450)
POTASSIUM SERPL-SCNC: 3.8 MMOL/L (ref 3.5–5.2)
PROT SERPL-MCNC: 8 G/DL (ref 6–8.5)
RBC # BLD AUTO: 4.63 X10E6/UL (ref 3.77–5.28)
SODIUM SERPL-SCNC: 138 MMOL/L (ref 134–144)
VIT B1 BLD-SCNC: 135.5 NMOL/L (ref 66.5–200)
VIT B12 SERPL-MCNC: >2000 PG/ML (ref 232–1245)
VIT B6 SERPL-MCNC: 53 UG/L (ref 2–32.8)
WBC # BLD AUTO: 4 X10E3/UL (ref 3.4–10.8)

## 2020-12-18 PROCEDURE — 99214 OFFICE O/P EST MOD 30 MIN: CPT | Mod: 95,CR | Performed by: FAMILY MEDICINE

## 2020-12-18 RX ORDER — OMEPRAZOLE 20 MG/1
20 CAPSULE, DELAYED RELEASE ORAL
COMMUNITY
Start: 2020-12-12 | End: 2020-12-17

## 2020-12-18 RX ORDER — ONDANSETRON 8 MG/1
TABLET, ORALLY DISINTEGRATING ORAL
COMMUNITY
Start: 2020-11-25 | End: 2020-12-17

## 2020-12-18 ASSESSMENT — FIBROSIS 4 INDEX: FIB4 SCORE: 1.76

## 2020-12-18 NOTE — PROGRESS NOTES
Chief Complaint   Patient presents with   • Lab Results     [facility]       HISTORY OF PRESENT ILLNESS: Patient is a 60 y.o. female established patient here today for the following concerns:    This encounter was conducted via Zoom .   Verbal consent was obtained. Patient's identity was verified.      This is a pleasant 60 year old female with hx of graves disease s/p thyroidectomy and post-surgical hypothyroidism who had sleeve gastrectomy and was having some lability in her thyroid levels causing anxiety, palpitations.  She is having less episodes.  She does not like take the lorazepam that was given. She did develop B6, B1 and B12 deficiencies.  Now back on B vitamins and replete by the last labs.  Anemia has resolved.      Past Medical, Social, and Family history reviewed and updated in EPIC    Allergies:Iodine, Morphine, and Shellfish allergy    Current Outpatient Medications   Medication Sig Dispense Refill   • SYNTHROID 50 MCG Tab TAKE 1 TABLET BY MOUTH FOR 6 DAYS OF THE WEEK AND 1 AND 1/2 FOR 1 DAY     • acetaminophen (TYLENOL) 500 MG Tab Take 1 Tab by mouth every four hours as needed. 30 Tab 0     No current facility-administered medications for this visit.          ROS:  Review of Systems   Constitutional: Negative for fever, chills, weight loss and malaise/fatigue.   HENT: Negative for ear pain, nosebleeds, congestion, sore throat and neck pain.    Eyes: Negative for blurred vision.   Respiratory: Negative for cough, sputum production, shortness of breath and wheezing.    Cardiovascular: Negative for chest pain, palpitations,  and leg swelling.   Gastrointestinal: Negative for heartburn, nausea, vomiting, diarrhea and abdominal pain.   Genitourinary: Negative for dysuria, urgency and frequency.   Musculoskeletal: Negative for myalgias, back pain and joint pain.   Skin: Negative for rash and itching.   Neurological: Negative for dizziness, tingling, tremors, sensory change, focal weakness and headaches.  "  Endo/Heme/Allergies: Does not bruise/bleed easily.   Psychiatric/Behavioral: Negative for depression, anxiety, suicidal ideas, insomnia and memory loss.      Exam:  /81 (BP Location: Left arm, Patient Position: Sitting, BP Cuff Size: Adult)   Pulse 94   Temp 36.9 °C (98.4 °F) (Temporal)   Ht 1.676 m (5' 6\")   Wt 85.7 kg (189 lb)   SpO2 97%     General:  Well nourished, well developed in NAD  Head is grossly normal.  Neck: Supple without JVD, Trachea midline, no thyromegaly noted  Pulmonary:  Normal effort. Speaking in full sentences  Psych: affect appropriate  Skin: no Jaundice noted or facial rashes    Please note that this dictation was created using voice recognition software. I have made every reasonable attempt to correct obvious errors, but I expect that there are errors of grammar and possibly content that I did not discover before finalizing the note.    Assessment/Plan:  1. History of Graves' disease  2. H/O total thyroidectomy  3. Hypothyroidism, postsurgical  Continue current meds, follow up with endo and test as they have ordered    4. History of sleeve gastrectomy  Continue vitamin supplementation.  May continue B complex, may d/c the B12 in addition    5. Vitamin B1 deficiency  Continue B1 and B complex.      Transitional care discussed.         "

## 2020-12-23 ENCOUNTER — APPOINTMENT (OUTPATIENT)
Dept: MEDICAL GROUP | Facility: PHYSICIAN GROUP | Age: 60
End: 2020-12-23
Payer: COMMERCIAL

## 2020-12-28 NOTE — ED NOTES
COVID swab collected & sent to lab;  
Med rec updated and complete  Allergies reviewed  Pt reports no antibiotics in the last 2 weeks  
None

## 2021-01-25 ENCOUNTER — TELEMEDICINE (OUTPATIENT)
Dept: CARDIOLOGY | Facility: MEDICAL CENTER | Age: 61
End: 2021-01-25
Payer: COMMERCIAL

## 2021-01-25 VITALS
BODY MASS INDEX: 30.22 KG/M2 | HEIGHT: 66 IN | TEMPERATURE: 97.6 F | WEIGHT: 188 LBS | SYSTOLIC BLOOD PRESSURE: 119 MMHG | DIASTOLIC BLOOD PRESSURE: 77 MMHG | OXYGEN SATURATION: 95 % | HEART RATE: 87 BPM

## 2021-01-25 DIAGNOSIS — R94.31 ABNORMAL EKG: Chronic | ICD-10-CM

## 2021-01-25 DIAGNOSIS — R00.2 PALPITATIONS: ICD-10-CM

## 2021-01-25 PROCEDURE — 99213 OFFICE O/P EST LOW 20 MIN: CPT | Mod: 95,CR | Performed by: INTERNAL MEDICINE

## 2021-01-25 RX ORDER — METHION/INOS/CHOL BT/B COM/LIV 110MG-86MG
100 CAPSULE ORAL DAILY
COMMUNITY

## 2021-01-25 RX ORDER — B-COMPLEX WITH VITAMIN C
5 TABLET ORAL
COMMUNITY

## 2021-01-25 RX ORDER — CHOLECALCIFEROL (VITAMIN D3) 25 MCG
2500 TABLET,CHEWABLE ORAL DAILY
COMMUNITY
End: 2021-03-12

## 2021-01-25 RX ORDER — CHOLECALCIFEROL (VITAMIN D3) 125 MCG
50 CAPSULE ORAL DAILY
COMMUNITY

## 2021-01-25 ASSESSMENT — ENCOUNTER SYMPTOMS
FALLS: 0
FEVER: 0
PND: 0
FOCAL WEAKNESS: 0
ABDOMINAL PAIN: 0
DIZZINESS: 0
NAUSEA: 0
CLAUDICATION: 0
BLURRED VISION: 0
BRUISES/BLEEDS EASILY: 0
CHILLS: 0
PALPITATIONS: 0
WEAKNESS: 0
SHORTNESS OF BREATH: 0
SORE THROAT: 0
COUGH: 0

## 2021-01-25 ASSESSMENT — FIBROSIS 4 INDEX: FIB4 SCORE: 1.15

## 2021-01-25 NOTE — PROGRESS NOTES
"Chief Complaint   Patient presents with   • Abnormal EKG       Subjective:   Eunice Mckenna is a 61 y.o. female who presents today with inferior Q wave underwent knee thyroid and bariatric surgery    Has episodes of palpations after surgery    Did have one episode of dumping or thought related to palpitations      Past Medical History:   Diagnosis Date   • Abnormal EKG - possible inferior infarct seen from 2014    • Acute nasopharyngitis 02/21/2020    Cold, productive cough, denies SOB.  Pt will monitor sxs and will call Dr. Madden is sxs worsen   • Allergy    • Anemia     Vaginal bleeding.   • Anesthesia     PONV   • Arrhythmia    • Arthritis     osteo-knees   • Cancer (HCC) 2011    colorectal   • Colorectal cancer (HCC) April 2011    Dr. Garcia, chemo and radiation.    • Colorectal cancer (HCC)    • Dyslipidemia 1/29/2020   • GERD (gastroesophageal reflux disease)    • Graves disease    • Heart burn    • History of anemia 02/2020    No an issue currently   • Hyperthyroidism 12/18/2018   • Indigestion    • Migraine    • Myocardial infarct (HCC)     \"Before 2014\"   • Pain 2020    knee right   • Palpitations    • Snoring 02/2020    No sleep study     Past Surgical History:   Procedure Laterality Date   • GASTROSCOPY N/A 9/10/2020    Procedure: GASTROSCOPY;  Surgeon: Joshua Hutchinson M.D.;  Location: SURGERY SAME DAY [facility];  Service: Gastroenterology   • PB LAP, ILEANA RESTRICT PROC, LONGITUDINAL GAS* N/A 7/28/2020    Procedure: GASTRECTOMY, SLEEVE, LAPAROSCOPIC;  Surgeon: John H Ganser, M.D.;  Location: [facility];  Service: General   • PB KNEE SCOPE,DIAGNOSTIC Right 3/5/2020    Procedure: ARTHROSCOPY, KNEE;  Surgeon: Abdias Madden M.D.;  Location: SURGERY [facility];  Service: Orthopedics   • MEDIAL MENISCECTOMY Right 3/5/2020    Procedure: MENISCECTOMY, KNEE, MEDIAL - PARTIAL LATERAL,;  Surgeon: Abdias Madden M.D.;  Location: SURGERY [facility];  Service: Orthopedics   • JOINT " INJECTION DIAGNOSTIC Left 3/5/2020    Procedure: INJECTION, JOINT, DIAGNOSTIC;  Surgeon: Abdias Madden M.D.;  Location: SURGERY [facility];  Service: Orthopedics   • THYROIDECTOMY TOTAL Bilateral 1/8/2020    Procedure: THYROIDECTOMY, TOTAL- W/ NIMS;  Surgeon: Ebony Meza M.D.;  Location: SURGERY SAME DAY [facility];  Service: General   • THYROIDECTOMY  01/08/2020   • JOINT INJECTION DIAGNOSTIC  3/8/2013    Performed by Kenji Meek M.D. at SURGERY [facility]   • KNEE ARTHROSCOPY  3/8/2013    Performed by Kenji Meek M.D. at [facility]   • MEDIAL MENISCECTOMY  3/8/2013    Performed by Kenji Meek M.D. at [facility]   • GASTRIC BANDING LAPAROSCOPIC  2005    removal of lapband 2016   • ABDOMINAL HYSTERECTOMY TOTAL  2003    For menorrhagia, no BSO   • HERNIA REPAIR      child   • OTHER ABDOMINAL SURGERY      lap band removal     Family History   Problem Relation Age of Onset   • Stroke Father         70yo   • Hypertension Father    • Heart Disease Father         68 yo   • Cancer Maternal Grandfather         Lung   • Diabetes Paternal Grandmother    • Hypertension Paternal Grandmother    • Stroke Paternal Grandmother         70 s   • Psychiatric Illness Sister         Schizophrenia   • Psychiatric Illness Brother         Schizophrenia   • Cancer Paternal Aunt         Bone, liver   • Psychiatric Illness Mother         dental / mental illness    • Lung Disease Mother         Copd    • Psychiatric Illness Sister         schizoprenia    • Diabetes Sister    • Hypertension Sister    • Psychiatric Illness Brother    • Heart Disease Brother    • Hypertension Brother    • Hypertension Brother    • Diabetes Brother      Social History     Socioeconomic History   • Marital status:      Spouse name: Not on file   • Number of children: Not on file   • Years of education: Not on file   • Highest education level: Not on file   Occupational History   •  Not on file   Social Needs   • Financial resource strain: Not on file   • Food insecurity     Worry: Not on file     Inability: Not on file   • Transportation needs     Medical: Not on file     Non-medical: Not on file   Tobacco Use   • Smoking status: Never Smoker   • Smokeless tobacco: Never Used   • Tobacco comment: significant 2nd hand exposure    Substance and Sexual Activity   • Alcohol use: No   • Drug use: No   • Sexual activity: Yes     Partners: Female   Lifestyle   • Physical activity     Days per week: Not on file     Minutes per session: Not on file   • Stress: Not on file   Relationships   • Social connections     Talks on phone: Not on file     Gets together: Not on file     Attends Mormonism service: Not on file     Active member of club or organization: Not on file     Attends meetings of clubs or organizations: Not on file     Relationship status: Not on file   • Intimate partner violence     Fear of current or ex partner: Not on file     Emotionally abused: Not on file     Physically abused: Not on file     Forced sexual activity: Not on file   Other Topics Concern   •  Service No   • Blood Transfusions No   • Caffeine Concern No   • Occupational Exposure No   • Hobby Hazards No   • Sleep Concern Yes   • Stress Concern No   • Weight Concern Yes   • Special Diet No   • Back Care No   • Exercise Yes   • Bike Helmet No   • Seat Belt Yes   • Self-Exams Yes   Social History Narrative    , no children.      Allergies   Allergen Reactions   • Iodine Itching   • Morphine Itching     blisters   • Shellfish Allergy Vomiting     Outpatient Encounter Medications as of 1/25/2021   Medication Sig Dispense Refill   • Thiamine HCl (B-1) 100 MG Tab Take 100 mg by mouth every day.     • Cyanocobalamin (B-12) 2500 MCG Tab Take 2,500 mcg by mouth every day.     • Zinc 100 MG Tab Take 5 mg by mouth.     • Cholecalciferol (VITAMIN D3) 50 MCG (2000 UT) Tab Take 50 mg by mouth every day.     • SYNTHROID 50  "MCG Tab TAKE 1 TABLET BY MOUTH FOR 6 DAYS OF THE WEEK AND 1 AND 1/2 FOR 1 DAY     • acetaminophen (TYLENOL) 500 MG Tab Take 1 Tab by mouth every four hours as needed. 30 Tab 0     No facility-administered encounter medications on file as of 1/25/2021.      Review of Systems   Constitutional: Negative for chills and fever.   HENT: Negative for sore throat.    Eyes: Negative for blurred vision.   Respiratory: Negative for cough and shortness of breath.    Cardiovascular: Negative for chest pain, palpitations, claudication, leg swelling and PND.   Gastrointestinal: Negative for abdominal pain and nausea.   Musculoskeletal: Negative for falls and joint pain.   Skin: Negative for rash.   Neurological: Negative for dizziness, focal weakness and weakness.   Endo/Heme/Allergies: Does not bruise/bleed easily.        Objective:   /77 (BP Location: Left arm, Patient Position: Sitting, BP Cuff Size: Adult)   Pulse 87   Temp 36.4 °C (97.6 °F) (Temporal)   Ht 1.676 m (5' 6\")   Wt 85.3 kg (188 lb)   SpO2 95%   BMI 30.34 kg/m²     Physical Exam  Vital signs are reported by the patient    General appears well  Eyes no icterus  Extremities no edema  Skin no apparent rashes    This encounter was conducted via Zoom  Video Virtual Visit.   Verbal consent was obtained. Patient's identity was verified.    Reviewed results from hospital    We reviewed the images of the CT scan no coronary artery calcium  Assessment:     1. Abnormal EKG - possible inferior infarct seen from 2014     2. Palpitations         Medical Decision Making:  Today's Assessment / Status / Plan:     It was my pleasure to meet with Ms. Mckenna.    Do a biotel for palpitation    Lipids are normal with no coronary artery calcium no need for statin    We will follow up with Ms. Mckenna on the results of the testing over the phone. We will determine further follow-up from there.    It is my pleasure to participate in the care of Ms. Mckenna.  Please do not hesitate to " contact me with questions or concerns.    Kenji Julien MD PhD [facility]  Cardiologist [facility]    Please note that this dictation was created using voice recognition software. There may be errors I did not discover before finalizing the note.     1/25/2021  9:13 AM

## 2021-01-26 ENCOUNTER — NON-PROVIDER VISIT (OUTPATIENT)
Dept: CARDIOLOGY | Facility: MEDICAL CENTER | Age: 61
End: 2021-01-26
Payer: COMMERCIAL

## 2021-01-26 ENCOUNTER — TELEPHONE (OUTPATIENT)
Dept: CARDIOLOGY | Facility: MEDICAL CENTER | Age: 61
End: 2021-01-26

## 2021-01-26 DIAGNOSIS — R00.2 PALPITATIONS: ICD-10-CM

## 2021-01-26 DIAGNOSIS — I47.29 NSVT (NONSUSTAINED VENTRICULAR TACHYCARDIA) (HCC): Chronic | ICD-10-CM

## 2021-01-26 PROCEDURE — 93268 ECG RECORD/REVIEW: CPT | Performed by: INTERNAL MEDICINE

## 2021-01-26 NOTE — TELEPHONE ENCOUNTER
Patient enrolled in the 30 day Bio-Tel [facility] [place] program per Kenji Julien MD.  Monitor to be shipped to patient by [employer].  >Pending Baseline.  >Pending EOS.

## 2021-01-27 ENCOUNTER — HOSPITAL ENCOUNTER (OUTPATIENT)
Dept: CARDIOLOGY | Facility: MEDICAL CENTER | Age: 61
End: 2021-01-27
Attending: INTERNAL MEDICINE
Payer: COMMERCIAL

## 2021-01-27 DIAGNOSIS — R00.2 PALPITATIONS: ICD-10-CM

## 2021-01-27 DIAGNOSIS — R94.31 ABNORMAL EKG: Chronic | ICD-10-CM

## 2021-01-27 LAB
LV EJECT FRACT  99904: 55
LV EJECT FRACT MOD 2C 99903: 59.99
LV EJECT FRACT MOD 4C 99902: 50.22
LV EJECT FRACT MOD BP 99901: 56.02

## 2021-01-27 PROCEDURE — 93306 TTE W/DOPPLER COMPLETE: CPT

## 2021-01-27 PROCEDURE — 93306 TTE W/DOPPLER COMPLETE: CPT | Mod: 26 | Performed by: INTERNAL MEDICINE

## 2021-01-28 ENCOUNTER — PATIENT MESSAGE (OUTPATIENT)
Dept: CARDIOLOGY | Facility: MEDICAL CENTER | Age: 61
End: 2021-01-28

## 2021-02-01 ENCOUNTER — TELEPHONE (OUTPATIENT)
Dept: CARDIOLOGY | Facility: MEDICAL CENTER | Age: 61
End: 2021-02-01

## 2021-02-02 NOTE — TELEPHONE ENCOUNTER
----- Message from Kenji Julien M.D. sent at 1/27/2021  6:12 PM PST -----  The echo looks good, please let her know     Thank you

## 2021-03-02 DIAGNOSIS — R94.31 ABNORMAL EKG: Chronic | ICD-10-CM

## 2021-03-02 DIAGNOSIS — R00.2 PALPITATIONS: ICD-10-CM

## 2021-03-03 PROBLEM — I47.29 NSVT (NONSUSTAINED VENTRICULAR TACHYCARDIA) (HCC): Chronic | Status: ACTIVE | Noted: 2021-03-03

## 2021-03-04 ENCOUNTER — PATIENT MESSAGE (OUTPATIENT)
Dept: CARDIOLOGY | Facility: MEDICAL CENTER | Age: 61
End: 2021-03-04

## 2021-03-05 ENCOUNTER — TELEPHONE (OUTPATIENT)
Dept: CARDIOLOGY | Facility: MEDICAL CENTER | Age: 61
End: 2021-03-05

## 2021-03-05 ENCOUNTER — PATIENT MESSAGE (OUTPATIENT)
Dept: CARDIOLOGY | Facility: MEDICAL CENTER | Age: 61
End: 2021-03-05

## 2021-03-05 NOTE — TELEPHONE ENCOUNTER
----- Message from Kenji Julien M.D. sent at 3/3/2021  6:44 PM PST -----  The monitor did confirm her palpitations were consistent with arrhythmia which was very brief.  This can be monitored conservatively or she could choose medications to try to suppress it that if it really bothers her.  Certainly if she had passout spells or near passout spells we may need to do more testing and she absolutely needs to let us know    Thank you

## 2021-03-08 ENCOUNTER — PATIENT MESSAGE (OUTPATIENT)
Dept: CARDIOLOGY | Facility: MEDICAL CENTER | Age: 61
End: 2021-03-08

## 2021-03-09 ENCOUNTER — HOSPITAL ENCOUNTER (OUTPATIENT)
Dept: LAB | Facility: MEDICAL CENTER | Age: 61
End: 2021-03-09
Attending: CLINICAL NURSE SPECIALIST
Payer: COMMERCIAL

## 2021-03-09 LAB
25(OH)D3 SERPL-MCNC: 90 NG/ML (ref 30–100)
ALBUMIN SERPL BCP-MCNC: 3.9 G/DL (ref 3.2–4.9)
ALBUMIN/GLOB SERPL: 0.9 G/DL
ALP SERPL-CCNC: 136 U/L (ref 30–99)
ALT SERPL-CCNC: 8 U/L (ref 2–50)
ANION GAP SERPL CALC-SCNC: 9 MMOL/L (ref 7–16)
AST SERPL-CCNC: 14 U/L (ref 12–45)
BASOPHILS # BLD AUTO: 0.3 % (ref 0–1.8)
BASOPHILS # BLD: 0.01 K/UL (ref 0–0.12)
BILIRUB SERPL-MCNC: 0.3 MG/DL (ref 0.1–1.5)
BUN SERPL-MCNC: 10 MG/DL (ref 8–22)
CALCIUM SERPL-MCNC: 9.9 MG/DL (ref 8.5–10.5)
CHLORIDE SERPL-SCNC: 101 MMOL/L (ref 96–112)
CO2 SERPL-SCNC: 27 MMOL/L (ref 20–33)
CREAT SERPL-MCNC: 0.6 MG/DL (ref 0.5–1.4)
EOSINOPHIL # BLD AUTO: 0.05 K/UL (ref 0–0.51)
EOSINOPHIL NFR BLD: 1.7 % (ref 0–6.9)
ERYTHROCYTE [DISTWIDTH] IN BLOOD BY AUTOMATED COUNT: 47.5 FL (ref 35.9–50)
GLOBULIN SER CALC-MCNC: 4.4 G/DL (ref 1.9–3.5)
GLUCOSE SERPL-MCNC: 81 MG/DL (ref 65–99)
HCT VFR BLD AUTO: 39.8 % (ref 37–47)
HGB BLD-MCNC: 12.4 G/DL (ref 12–16)
IMM GRANULOCYTES # BLD AUTO: 0 K/UL (ref 0–0.11)
IMM GRANULOCYTES NFR BLD AUTO: 0 % (ref 0–0.9)
IRON SATN MFR SERPL: 29 % (ref 15–55)
IRON SERPL-MCNC: 75 UG/DL (ref 40–170)
LYMPHOCYTES # BLD AUTO: 1.34 K/UL (ref 1–4.8)
LYMPHOCYTES NFR BLD: 46.7 % (ref 22–41)
MCH RBC QN AUTO: 28.2 PG (ref 27–33)
MCHC RBC AUTO-ENTMCNC: 31.2 G/DL (ref 33.6–35)
MCV RBC AUTO: 90.5 FL (ref 81.4–97.8)
MONOCYTES # BLD AUTO: 0.33 K/UL (ref 0–0.85)
MONOCYTES NFR BLD AUTO: 11.5 % (ref 0–13.4)
NEUTROPHILS # BLD AUTO: 1.14 K/UL (ref 2–7.15)
NEUTROPHILS NFR BLD: 39.8 % (ref 44–72)
NRBC # BLD AUTO: 0 K/UL
NRBC BLD-RTO: 0 /100 WBC
PLATELET # BLD AUTO: 241 K/UL (ref 164–446)
PMV BLD AUTO: 9.9 FL (ref 9–12.9)
POTASSIUM SERPL-SCNC: 4.2 MMOL/L (ref 3.6–5.5)
PROT SERPL-MCNC: 8.3 G/DL (ref 6–8.2)
RBC # BLD AUTO: 4.4 M/UL (ref 4.2–5.4)
SODIUM SERPL-SCNC: 137 MMOL/L (ref 135–145)
TIBC SERPL-MCNC: 259 UG/DL (ref 250–450)
TSH SERPL DL<=0.005 MIU/L-ACNC: 3.38 UIU/ML (ref 0.38–5.33)
UIBC SERPL-MCNC: 184 UG/DL (ref 110–370)
VIT B12 SERPL-MCNC: 1630 PG/ML (ref 211–911)
WBC # BLD AUTO: 2.9 K/UL (ref 4.8–10.8)

## 2021-03-09 PROCEDURE — 83540 ASSAY OF IRON: CPT

## 2021-03-09 PROCEDURE — 82306 VITAMIN D 25 HYDROXY: CPT

## 2021-03-09 PROCEDURE — 80053 COMPREHEN METABOLIC PANEL: CPT

## 2021-03-09 PROCEDURE — 82607 VITAMIN B-12: CPT

## 2021-03-09 PROCEDURE — 84425 ASSAY OF VITAMIN B-1: CPT

## 2021-03-09 PROCEDURE — 85025 COMPLETE CBC W/AUTO DIFF WBC: CPT

## 2021-03-09 PROCEDURE — 84207 ASSAY OF VITAMIN B-6: CPT

## 2021-03-09 PROCEDURE — 83550 IRON BINDING TEST: CPT

## 2021-03-09 PROCEDURE — 84443 ASSAY THYROID STIM HORMONE: CPT

## 2021-03-09 PROCEDURE — 36415 COLL VENOUS BLD VENIPUNCTURE: CPT

## 2021-03-10 NOTE — PROGRESS NOTES
Nothing is alarming which is good news, I'm happy to see her in person or virtual appointment, but thus far her testing is reassuring and healthy

## 2021-03-11 LAB — VIT B6 SERPL-MCNC: 101.4 NMOL/L (ref 20–125)

## 2021-03-12 ENCOUNTER — TELEMEDICINE (OUTPATIENT)
Dept: CARDIOLOGY | Facility: MEDICAL CENTER | Age: 61
End: 2021-03-12
Payer: COMMERCIAL

## 2021-03-12 VITALS
HEIGHT: 66 IN | OXYGEN SATURATION: 98 % | HEART RATE: 84 BPM | SYSTOLIC BLOOD PRESSURE: 117 MMHG | WEIGHT: 190.5 LBS | TEMPERATURE: 97.2 F | BODY MASS INDEX: 30.62 KG/M2 | DIASTOLIC BLOOD PRESSURE: 78 MMHG

## 2021-03-12 DIAGNOSIS — I47.29 NSVT (NONSUSTAINED VENTRICULAR TACHYCARDIA) (HCC): Chronic | ICD-10-CM

## 2021-03-12 DIAGNOSIS — R94.31 ABNORMAL EKG: Chronic | ICD-10-CM

## 2021-03-12 LAB — VIT B1 BLD-MCNC: 110 NMOL/L (ref 70–180)

## 2021-03-12 PROCEDURE — 99213 OFFICE O/P EST LOW 20 MIN: CPT | Mod: 95,CR | Performed by: INTERNAL MEDICINE

## 2021-03-12 RX ORDER — LEVOTHYROXINE SODIUM 75 MCG
75 TABLET ORAL
COMMUNITY
Start: 2021-02-28 | End: 2022-06-07

## 2021-03-12 RX ORDER — PROPRANOLOL HYDROCHLORIDE 20 MG/1
20 TABLET ORAL 3 TIMES DAILY PRN
Qty: 30 TABLET | Refills: 11 | Status: SHIPPED | OUTPATIENT
Start: 2021-03-12 | End: 2022-06-07

## 2021-03-12 ASSESSMENT — ENCOUNTER SYMPTOMS
FALLS: 0
PALPITATIONS: 0
FOCAL WEAKNESS: 0
COUGH: 0
SHORTNESS OF BREATH: 0
PND: 0
DIZZINESS: 0
SORE THROAT: 0
FEVER: 0
BLURRED VISION: 0
CHILLS: 0
ABDOMINAL PAIN: 0
WEAKNESS: 0
BRUISES/BLEEDS EASILY: 0
CLAUDICATION: 0
NAUSEA: 0

## 2021-03-12 ASSESSMENT — FIBROSIS 4 INDEX: FIB4 SCORE: 1.23

## 2021-03-12 NOTE — PROGRESS NOTES
"Chief Complaint   Patient presents with   • Abnormal EKG     F/V   • Ventricular Tachycardia     F/V: Dx: NSVT on Biotel 2/2021       Subjective:   Eunice Mckenna is a 61 y.o. female who presents today for follow-up of her history of abnormal EKG and palpitations we did an echocardiogram which found normal functionthe suggest that she has not had prior infarction and we did a monitor which showed a run of PVCs and a run of PACs all 4 beats which correlate with her palpitations    Past Medical History:   Diagnosis Date   • Abnormal EKG - possible inferior infarct seen from 2014    • Acute nasopharyngitis 02/21/2020    Cold, productive cough, denies SOB.  Pt will monitor sxs and will call Dr. Madden is sxs worsen   • Allergy    • Anemia     Vaginal bleeding.   • Anesthesia     PONV   • Arrhythmia    • Arthritis     osteo-knees   • Cancer (HCC) 2011    colorectal   • Colorectal cancer (HCC) April 2011    Dr. Garcia, chemo and radiation.    • Colorectal cancer (HCC)    • Dyslipidemia 1/29/2020   • GERD (gastroesophageal reflux disease)    • Graves disease    • Heart burn    • History of anemia 02/2020    No an issue currently   • Hyperthyroidism 12/18/2018   • Indigestion    • Migraine    • Myocardial infarct (HCC)     \"Before 2014\"   • NSVT (nonsustained ventricular tachycardia) (HCC) - on BIOTEL 2/2021 3/3/2021   • Pain 2020    knee right   • Palpitations    • Snoring 02/2020    No sleep study     Past Surgical History:   Procedure Laterality Date   • GASTROSCOPY N/A 9/10/2020    Procedure: GASTROSCOPY;  Surgeon: Joshua Hutchinson M.D.;  Location: SURGERY SAME DAY [facility];  Service: Gastroenterology   • PB LAP, ILEANA RESTRICT PROC, LONGITUDINAL GAS* N/A 7/28/2020    Procedure: GASTRECTOMY, SLEEVE, LAPAROSCOPIC;  Surgeon: John H Ganser, M.D.;  Location: SURGERY [facility];  Service: General   • PB KNEE SCOPE,DIAGNOSTIC Right 3/5/2020    Procedure: ARTHROSCOPY, KNEE;  Surgeon: Abdias Madden M.D.;  Location: " SURGERY [facility];  Service: Orthopedics   • MEDIAL MENISCECTOMY Right 3/5/2020    Procedure: MENISCECTOMY, KNEE, MEDIAL - PARTIAL LATERAL,;  Surgeon: Abdias Madden M.D.;  Location: SURGERY [facility];  Service: Orthopedics   • JOINT INJECTION DIAGNOSTIC Left 3/5/2020    Procedure: INJECTION, JOINT, DIAGNOSTIC;  Surgeon: Abdias Madden M.D.;  Location: SURGERY [facility];  Service: Orthopedics   • THYROIDECTOMY TOTAL Bilateral 1/8/2020    Procedure: THYROIDECTOMY, TOTAL- W/ NIMS;  Surgeon: Ebony Meza M.D.;  Location: SURGERY SAME DAY [facility];  Service: General   • THYROIDECTOMY  01/08/2020   • JOINT INJECTION DIAGNOSTIC  3/8/2013    Performed by Kenji Meek M.D. at [facility]   • KNEE ARTHROSCOPY  3/8/2013    Performed by Kenji Meek M.D. at [facility]   • MEDIAL MENISCECTOMY  3/8/2013    Performed by Kenji Meek M.D. at [facility]   • GASTRIC BANDING LAPAROSCOPIC  2005    removal of lapband 2016   • ABDOMINAL HYSTERECTOMY TOTAL  2003    For menorrhagia, no BSO   • HERNIA REPAIR      child   • OTHER ABDOMINAL SURGERY      lap band removal     Family History   Problem Relation Age of Onset   • Stroke Father         70yo   • Hypertension Father    • Heart Disease Father         70 yo   • Cancer Maternal Grandfather         Lung   • Diabetes Paternal Grandmother    • Hypertension Paternal Grandmother    • Stroke Paternal Grandmother         70 s   • Psychiatric Illness Sister         Schizophrenia   • Psychiatric Illness Brother         Schizophrenia   • Cancer Paternal Aunt         Bone, liver   • Psychiatric Illness Mother         dental / mental illness    • Lung Disease Mother         Copd    • Psychiatric Illness Sister         schizoprenia    • Diabetes Sister    • Hypertension Sister    • Psychiatric Illness Brother    • Heart Disease Brother    • Hypertension Brother    • Hypertension Brother    • Diabetes  Brother      Social History     Socioeconomic History   • Marital status:      Spouse name: Not on file   • Number of children: Not on file   • Years of education: Not on file   • Highest education level: Not on file   Occupational History   • Not on file   Tobacco Use   • Smoking status: Never Smoker   • Smokeless tobacco: Never Used   • Tobacco comment: significant 2nd hand exposure    Substance and Sexual Activity   • Alcohol use: No   • Drug use: No   • Sexual activity: Yes     Partners: Female   Other Topics Concern   •  Service No   • Blood Transfusions No   • Caffeine Concern No   • Occupational Exposure No   • Hobby Hazards No   • Sleep Concern Yes   • Stress Concern No   • Weight Concern Yes   • Special Diet No   • Back Care No   • Exercise Yes   • Bike Helmet No   • Seat Belt Yes   • Self-Exams Yes   Social History Narrative    , no children.      Social Determinants of Health     Financial Resource Strain:    • Difficulty of Paying Living Expenses:    Food Insecurity:    • Worried About Running Out of Food in the Last Year:    • Ran Out of Food in the Last Year:    Transportation Needs:    • Lack of Transportation (Medical):    • Lack of Transportation (Non-Medical):    Physical Activity:    • Days of Exercise per Week:    • Minutes of Exercise per Session:    Stress:    • Feeling of Stress :    Social Connections:    • Frequency of Communication with Friends and Family:    • Frequency of Social Gatherings with Friends and Family:    • Attends Taoist Services:    • Active Member of Clubs or Organizations:    • Attends Club or Organization Meetings:    • Marital Status:    Intimate Partner Violence:    • Fear of Current or Ex-Partner:    • Emotionally Abused:    • Physically Abused:    • Sexually Abused:      Allergies   Allergen Reactions   • Iodine Itching   • Morphine Itching     blisters   • Shellfish Allergy Vomiting     Outpatient Encounter Medications as of 3/12/2021  "  Medication Sig Dispense Refill   • SYNTHROID 75 MCG Tab Take 75 mcg by mouth every day.     • propranolol (INDERAL) 20 MG Tab Take 1 tablet by mouth 3 times a day as needed. 30 tablet 11   • Thiamine HCl (B-1) 100 MG Tab Take 100 mg by mouth every day.     • Zinc 100 MG Tab Take 5 mg by mouth.     • Cholecalciferol (VITAMIN D3) 50 MCG (2000 UT) Tab Take 50 mg by mouth every day.     • acetaminophen (TYLENOL) 500 MG Tab Take 1 Tab by mouth every four hours as needed. 30 Tab 0   • [DISCONTINUED] Cyanocobalamin (B-12) 2500 MCG Tab Take 2,500 mcg by mouth every day.     • [DISCONTINUED] SYNTHROID 50 MCG Tab TAKE 1 TABLET BY MOUTH FOR 6 DAYS OF THE WEEK AND 1 AND 1/2 FOR 1 DAY       No facility-administered encounter medications on file as of 3/12/2021.     Review of Systems   Constitutional: Negative for chills and fever.   HENT: Negative for sore throat.    Eyes: Negative for blurred vision.   Respiratory: Negative for cough and shortness of breath.    Cardiovascular: Negative for chest pain, palpitations, claudication, leg swelling and PND.   Gastrointestinal: Negative for abdominal pain and nausea.   Musculoskeletal: Negative for falls and joint pain.   Skin: Negative for rash.   Neurological: Negative for dizziness, focal weakness and weakness.   Endo/Heme/Allergies: Does not bruise/bleed easily.        Objective:   /78 (BP Location: Left arm, Patient Position: Sitting, BP Cuff Size: Adult)   Pulse 84   Temp 36.2 °C (97.2 °F)   Ht 1.676 m (5' 6\")   Wt 86.4 kg (190 lb 8 oz)   SpO2 98%   BMI 30.75 kg/m²     Physical Exam  Vital signs are reported by the patient    General appears well  Eyes no icterus  Extremities no edema  Skin no apparent rashes    This encounter was conducted via Zoom  Video Virtual Visit.   Verbal consent was obtained. Patient's identity was verified.    We reviewed the tracings and echocardiogram    Assessment:     1. Abnormal EKG - possible inferior infarct seen from 2014     2. " NSVT (nonsustained ventricular tachycardia) (HCC) - on BIOTEL 2/2021         Medical Decision Making:  Today's Assessment / Status / Plan:     It was my pleasure to meet with Ms. Mckenna.    She was able to detect the palpitations with the Biotel certainly she can try beta-blockers if she has flareups of palpitations otherwise I gave reassurance    Fortunately her echocardiogram was good which does not suggest prior infarction it is remotely possible that she did have a silent inferior infarction but there is no evidence of scar    I will see Ms. Mckenna back in 1 year time and encouraged her to follow up with us over the phone or electronically using my [employer] as issues arise.    It is my pleasure to participate in the care of Ms. Mckenna.  Please do not hesitate to contact me with questions or concerns.    Kenji Julien MD PhD [facility]  Cardiologist [facility]    Please note that this dictation was created using voice recognition software. There may be errors I did not discover before finalizing the note.

## 2021-03-15 DIAGNOSIS — Z23 NEED FOR VACCINATION: ICD-10-CM

## 2021-07-06 ENCOUNTER — HOSPITAL ENCOUNTER (OUTPATIENT)
Dept: RADIOLOGY | Facility: MEDICAL CENTER | Age: 61
End: 2021-07-06
Attending: FAMILY MEDICINE
Payer: COMMERCIAL

## 2021-07-06 DIAGNOSIS — Z12.31 VISIT FOR SCREENING MAMMOGRAM: ICD-10-CM

## 2021-07-06 PROCEDURE — 77063 BREAST TOMOSYNTHESIS BI: CPT

## 2021-07-13 ENCOUNTER — HOSPITAL ENCOUNTER (OUTPATIENT)
Dept: RADIOLOGY | Facility: MEDICAL CENTER | Age: 61
End: 2021-07-13
Attending: FAMILY MEDICINE
Payer: COMMERCIAL

## 2021-07-13 DIAGNOSIS — R92.8 ABNORMAL MAMMOGRAM: ICD-10-CM

## 2021-07-13 PROCEDURE — G0279 TOMOSYNTHESIS, MAMMO: HCPCS

## 2021-07-13 PROCEDURE — 76642 ULTRASOUND BREAST LIMITED: CPT | Mod: LT

## 2021-08-27 ENCOUNTER — HOSPITAL ENCOUNTER (OUTPATIENT)
Dept: LAB | Facility: MEDICAL CENTER | Age: 61
End: 2021-08-27
Attending: FAMILY MEDICINE
Payer: COMMERCIAL

## 2021-08-27 LAB
ALBUMIN SERPL BCP-MCNC: 3.9 G/DL (ref 3.2–4.9)
ALBUMIN/GLOB SERPL: 0.9 G/DL
ALP SERPL-CCNC: 129 U/L (ref 30–99)
ALT SERPL-CCNC: 10 U/L (ref 2–50)
ANION GAP SERPL CALC-SCNC: 8 MMOL/L (ref 7–16)
AST SERPL-CCNC: 15 U/L (ref 12–45)
BILIRUB SERPL-MCNC: 0.3 MG/DL (ref 0.1–1.5)
BUN SERPL-MCNC: 15 MG/DL (ref 8–22)
CALCIUM SERPL-MCNC: 9.7 MG/DL (ref 8.5–10.5)
CHLORIDE SERPL-SCNC: 103 MMOL/L (ref 96–112)
CO2 SERPL-SCNC: 26 MMOL/L (ref 20–33)
CREAT SERPL-MCNC: 0.49 MG/DL (ref 0.5–1.4)
ERYTHROCYTE [SEDIMENTATION RATE] IN BLOOD BY WESTERGREN METHOD: 36 MM/HOUR (ref 0–25)
GLOBULIN SER CALC-MCNC: 4.3 G/DL (ref 1.9–3.5)
GLUCOSE SERPL-MCNC: 79 MG/DL (ref 65–99)
POTASSIUM SERPL-SCNC: 4.2 MMOL/L (ref 3.6–5.5)
PROT SERPL-MCNC: 8.2 G/DL (ref 6–8.2)
SODIUM SERPL-SCNC: 137 MMOL/L (ref 135–145)

## 2021-08-27 PROCEDURE — 84080 ASSAY ALKALINE PHOSPHATASES: CPT

## 2021-08-27 PROCEDURE — 36415 COLL VENOUS BLD VENIPUNCTURE: CPT

## 2021-08-27 PROCEDURE — 85652 RBC SED RATE AUTOMATED: CPT

## 2021-08-27 PROCEDURE — 80053 COMPREHEN METABOLIC PANEL: CPT

## 2021-08-27 PROCEDURE — 84075 ASSAY ALKALINE PHOSPHATASE: CPT

## 2021-08-30 LAB
ALP BONE SERPL-CCNC: 51 U/L (ref 0–55)
ALP ISOS SERPL HS-CCNC: 0 U/L
ALP LIVER SERPL-CCNC: 90 U/L (ref 0–94)
ALP SERPL-CCNC: 141 U/L (ref 40–120)

## 2021-09-03 ENCOUNTER — HOSPITAL ENCOUNTER (OUTPATIENT)
Dept: LAB | Facility: MEDICAL CENTER | Age: 61
End: 2021-09-03
Attending: INTERNAL MEDICINE
Payer: COMMERCIAL

## 2021-09-03 LAB
25(OH)D3 SERPL-MCNC: 54 NG/ML (ref 30–100)
T3 SERPL-MCNC: 98.9 NG/DL (ref 60–181)
T3FREE SERPL-MCNC: 2.62 PG/ML (ref 2–4.4)
T4 FREE SERPL-MCNC: 1.54 NG/DL (ref 0.93–1.7)
TSH SERPL DL<=0.005 MIU/L-ACNC: 0.13 UIU/ML (ref 0.38–5.33)
VIT B12 SERPL-MCNC: 540 PG/ML (ref 211–911)

## 2021-09-03 PROCEDURE — 82607 VITAMIN B-12: CPT

## 2021-09-03 PROCEDURE — 36415 COLL VENOUS BLD VENIPUNCTURE: CPT

## 2021-09-03 PROCEDURE — 84480 ASSAY TRIIODOTHYRONINE (T3): CPT

## 2021-09-03 PROCEDURE — 82306 VITAMIN D 25 HYDROXY: CPT

## 2021-09-03 PROCEDURE — 84443 ASSAY THYROID STIM HORMONE: CPT

## 2021-09-03 PROCEDURE — 84439 ASSAY OF FREE THYROXINE: CPT

## 2021-09-03 PROCEDURE — 84481 FREE ASSAY (FT-3): CPT

## 2021-09-17 NOTE — PROGRESS NOTES
Chief Complaint   Patient presents with   • Follow-Up     Urgent care for heart palpitations and Blood pressure       HISTORY OF PRESENT ILLNESS: Patient is a 58 y.o. female established patient here today for the following concerns:    1. Palpitations  2. Essential hypertension  3. Hyperthyroidism  . Screening for cardiovascular condition      Evita is a 58-year-old female today here for follow-up from urgent care for heart palpitations and elevated blood pressure where she was found to be hyperthyroid.  She is here for follow-up.  She initially was started on some hydrochlorothiazide for blood pressure management as her blood pressure was 162/94 and thought to be related to her heart palpitations.  She does have an appointment with cardiology later this week but after uncovering the hyperthyroidism she may defer this visit.  She tells me that she has had a long-standing history of only her right lobe of the thyroid gland.  No history of thyroid dysfunction previously.  She has noticed some increased sweating the palpitations difficulty sleeping and excessive fatigue.  Some proptosis is possibly visualized today on exam.      Past Medical, Social, and Family history reviewed and updated in EPIC    Allergies:Morphine    Current Outpatient Prescriptions   Medication Sig Dispense Refill   • acetaminophen (TYLENOL) 325 MG Tab Take 650 mg by mouth every four hours as needed.     • hydroCHLOROthiazide (HYDRODIURIL) 25 MG Tab Take 1 Tab by mouth every day. 30 Tab 0   • azithromycin (ZITHROMAX) 250 MG Tab As directed 6 Tab 0   • alprazolam (XANAX) 0.25 MG Tab Take 1 Tab by mouth 2 times a day as needed for Sleep or Anxiety. 30 Tab 0     No current facility-administered medications for this visit.          ROS:  Review of Systems   Constitutional: Negative for fever, chills, weight loss and malaise/fatigue.   HENT: Negative for ear pain, nosebleeds, congestion, sore throat and neck pain.    Eyes: Negative for blurred vision.  "  Respiratory: Negative for cough, sputum production, shortness of breath and wheezing.    Cardiovascular: Negative for chest pain, palpitations,  and leg swelling.   Gastrointestinal: Negative for heartburn, nausea, vomiting, diarrhea and abdominal pain.   Genitourinary: Negative for dysuria, urgency and frequency.   Musculoskeletal: Negative for myalgias, back pain and joint pain.   Skin: Negative for rash and itching.   Neurological: Negative for dizziness, tingling, tremors, sensory change, focal weakness and headaches.   Endo/Heme/Allergies: Does not bruise/bleed easily.   Psychiatric/Behavioral: Negative for depression, anxiety, suicidal ideas, insomnia and memory loss.      Exam:  Blood pressure 130/88, pulse 80, temperature 35.8 °C (96.5 °F), resp. rate 16, height 1.689 m (5' 6.5\"), weight 111.1 kg (245 lb), SpO2 98 %.    General:  Well nourished, well developed in NAD  Head is grossly normal.  Neck: Supple without JVD   Pulmonary:  Normal effort.   Cardiovascular: Regular rate  Extremities: no clubbing, cyanosis, or edema.  Psych: affect appropriate      Please note that this dictation was created using voice recognition software. I have made every reasonable attempt to correct obvious errors, but I expect that there are errors of grammar and possibly content that I did not discover before finalizing the note.    Assessment/Plan:  1. Palpitations  Suspect secondary to hyperthyroidism    2. Essential hypertension  Suspect secondary to hyperthyroidism not currently treated blood pressure in a reasonable range at this time will defer use of hydrochlorothiazide until after treatment of the thyroid.  Consider beta-blocker if needed    3. Hyperthyroidism  Check the following for workup of Graves' disease  - TSH; Future  - FREE THYROXINE; Future  - TRIIDOTHYRONINE; Future  - THYROID PEROXIDASE  (TPO) AB; Future  - US-SOFT TISSUES OF HEAD - NECK; Future  - THYROGLOBULIN, QT; Future    4. Screening for " cardiovascular condition  - LIPID PROFILE; Future    Follow-up in 2 weeks for management, precautions given for worsening symptoms.  At this time no evidence for beta-blockade.  Will get additional lab testing and consider methimazole         DLBCL (diffuse large B cell lymphoma)

## 2021-12-01 ENCOUNTER — DOCUMENTATION (OUTPATIENT)
Dept: MEDICAL GROUP | Facility: PHYSICIAN GROUP | Age: 61
End: 2021-12-01

## 2021-12-02 ENCOUNTER — HOSPITAL ENCOUNTER (OUTPATIENT)
Dept: LAB | Facility: MEDICAL CENTER | Age: 61
End: 2021-12-02
Attending: FAMILY MEDICINE
Payer: COMMERCIAL

## 2021-12-02 LAB
ALBUMIN SERPL BCP-MCNC: 4.2 G/DL (ref 3.2–4.9)
ALBUMIN/GLOB SERPL: 1.2 G/DL
ALP SERPL-CCNC: 144 U/L (ref 30–99)
ALT SERPL-CCNC: 13 U/L (ref 2–50)
ANION GAP SERPL CALC-SCNC: 9 MMOL/L (ref 7–16)
AST SERPL-CCNC: 11 U/L (ref 12–45)
BASOPHILS # BLD AUTO: 0.3 % (ref 0–1.8)
BASOPHILS # BLD: 0.01 K/UL (ref 0–0.12)
BILIRUB SERPL-MCNC: 0.4 MG/DL (ref 0.1–1.5)
BUN SERPL-MCNC: 14 MG/DL (ref 8–22)
CALCIUM SERPL-MCNC: 9.5 MG/DL (ref 8.5–10.5)
CHLORIDE SERPL-SCNC: 103 MMOL/L (ref 96–112)
CHOLEST SERPL-MCNC: 189 MG/DL (ref 100–199)
CO2 SERPL-SCNC: 26 MMOL/L (ref 20–33)
CREAT SERPL-MCNC: 0.49 MG/DL (ref 0.5–1.4)
EOSINOPHIL # BLD AUTO: 0.05 K/UL (ref 0–0.51)
EOSINOPHIL NFR BLD: 1.3 % (ref 0–6.9)
ERYTHROCYTE [DISTWIDTH] IN BLOOD BY AUTOMATED COUNT: 47 FL (ref 35.9–50)
FASTING STATUS PATIENT QL REPORTED: NORMAL
FERRITIN SERPL-MCNC: 204 NG/ML (ref 10–291)
GLOBULIN SER CALC-MCNC: 3.4 G/DL (ref 1.9–3.5)
GLUCOSE SERPL-MCNC: 85 MG/DL (ref 65–99)
HCT VFR BLD AUTO: 37.5 % (ref 37–47)
HDLC SERPL-MCNC: 87 MG/DL
HGB BLD-MCNC: 11.7 G/DL (ref 12–16)
IMM GRANULOCYTES # BLD AUTO: 0.01 K/UL (ref 0–0.11)
IMM GRANULOCYTES NFR BLD AUTO: 0.3 % (ref 0–0.9)
LDLC SERPL CALC-MCNC: 95 MG/DL
LYMPHOCYTES # BLD AUTO: 1.4 K/UL (ref 1–4.8)
LYMPHOCYTES NFR BLD: 36 % (ref 22–41)
MCH RBC QN AUTO: 27.2 PG (ref 27–33)
MCHC RBC AUTO-ENTMCNC: 31.2 G/DL (ref 33.6–35)
MCV RBC AUTO: 87.2 FL (ref 81.4–97.8)
MONOCYTES # BLD AUTO: 0.47 K/UL (ref 0–0.85)
MONOCYTES NFR BLD AUTO: 12.1 % (ref 0–13.4)
NEUTROPHILS # BLD AUTO: 1.95 K/UL (ref 2–7.15)
NEUTROPHILS NFR BLD: 50 % (ref 44–72)
NRBC # BLD AUTO: 0 K/UL
NRBC BLD-RTO: 0 /100 WBC
PLATELET # BLD AUTO: 267 K/UL (ref 164–446)
PMV BLD AUTO: 9.5 FL (ref 9–12.9)
POTASSIUM SERPL-SCNC: 3.6 MMOL/L (ref 3.6–5.5)
PROT SERPL-MCNC: 7.6 G/DL (ref 6–8.2)
RBC # BLD AUTO: 4.3 M/UL (ref 4.2–5.4)
SODIUM SERPL-SCNC: 138 MMOL/L (ref 135–145)
T3 SERPL-MCNC: 128 NG/DL (ref 60–181)
T4 FREE SERPL-MCNC: 1.46 NG/DL (ref 0.93–1.7)
TRIGL SERPL-MCNC: 33 MG/DL (ref 0–149)
TSH SERPL DL<=0.005 MIU/L-ACNC: 0.36 UIU/ML (ref 0.38–5.33)
VIT B12 SERPL-MCNC: 543 PG/ML (ref 211–911)
WBC # BLD AUTO: 3.9 K/UL (ref 4.8–10.8)

## 2021-12-02 PROCEDURE — 36415 COLL VENOUS BLD VENIPUNCTURE: CPT

## 2021-12-02 PROCEDURE — 84480 ASSAY TRIIODOTHYRONINE (T3): CPT

## 2021-12-02 PROCEDURE — 84443 ASSAY THYROID STIM HORMONE: CPT

## 2021-12-02 PROCEDURE — 84439 ASSAY OF FREE THYROXINE: CPT

## 2021-12-02 PROCEDURE — 80061 LIPID PANEL: CPT

## 2021-12-02 PROCEDURE — 82306 VITAMIN D 25 HYDROXY: CPT

## 2021-12-02 PROCEDURE — 85025 COMPLETE CBC W/AUTO DIFF WBC: CPT

## 2021-12-02 PROCEDURE — 82728 ASSAY OF FERRITIN: CPT

## 2021-12-02 PROCEDURE — 80053 COMPREHEN METABOLIC PANEL: CPT

## 2021-12-02 PROCEDURE — 82607 VITAMIN B-12: CPT

## 2021-12-04 LAB — 25(OH)D3 SERPL-MCNC: 58 NG/ML (ref 30–80)

## 2022-03-07 ENCOUNTER — HOSPITAL ENCOUNTER (OUTPATIENT)
Dept: LAB | Facility: MEDICAL CENTER | Age: 62
End: 2022-03-07
Attending: INTERNAL MEDICINE
Payer: COMMERCIAL

## 2022-03-07 LAB
25(OH)D3 SERPL-MCNC: 48 NG/ML (ref 30–100)
T3FREE SERPL-MCNC: 3.22 PG/ML (ref 2–4.4)
T4 FREE SERPL-MCNC: 1.44 NG/DL (ref 0.93–1.7)
TSH SERPL DL<=0.005 MIU/L-ACNC: 0.26 UIU/ML (ref 0.38–5.33)
VIT B12 SERPL-MCNC: 929 PG/ML (ref 211–911)

## 2022-03-07 PROCEDURE — 84481 FREE ASSAY (FT-3): CPT

## 2022-03-07 PROCEDURE — 82607 VITAMIN B-12: CPT

## 2022-03-07 PROCEDURE — 36415 COLL VENOUS BLD VENIPUNCTURE: CPT

## 2022-03-07 PROCEDURE — 82306 VITAMIN D 25 HYDROXY: CPT

## 2022-03-07 PROCEDURE — 84439 ASSAY OF FREE THYROXINE: CPT

## 2022-03-07 PROCEDURE — 84443 ASSAY THYROID STIM HORMONE: CPT

## 2022-06-07 ENCOUNTER — OFFICE VISIT (OUTPATIENT)
Dept: CARDIOLOGY | Facility: MEDICAL CENTER | Age: 62
End: 2022-06-07
Payer: COMMERCIAL

## 2022-06-07 VITALS
RESPIRATION RATE: 16 BRPM | SYSTOLIC BLOOD PRESSURE: 118 MMHG | OXYGEN SATURATION: 98 % | HEART RATE: 75 BPM | BODY MASS INDEX: 36.64 KG/M2 | HEIGHT: 66 IN | DIASTOLIC BLOOD PRESSURE: 78 MMHG | WEIGHT: 228 LBS

## 2022-06-07 DIAGNOSIS — Z85.048 HISTORY OF COLORECTAL CANCER: ICD-10-CM

## 2022-06-07 DIAGNOSIS — R00.2 PALPITATIONS: ICD-10-CM

## 2022-06-07 DIAGNOSIS — Z86.2 HISTORY OF ANEMIA: ICD-10-CM

## 2022-06-07 DIAGNOSIS — E78.2 MIXED HYPERLIPIDEMIA: ICD-10-CM

## 2022-06-07 DIAGNOSIS — R94.31 ABNORMAL EKG: Chronic | ICD-10-CM

## 2022-06-07 DIAGNOSIS — K21.9 GASTROESOPHAGEAL REFLUX DISEASE WITHOUT ESOPHAGITIS: ICD-10-CM

## 2022-06-07 PROBLEM — E86.0 DEHYDRATION: Status: RESOLVED | Noted: 2020-10-01 | Resolved: 2022-06-07

## 2022-06-07 PROBLEM — F41.9 ANXIETY: Status: ACTIVE | Noted: 2021-04-15

## 2022-06-07 PROBLEM — R11.0 NAUSEA: Status: RESOLVED | Noted: 2020-10-01 | Resolved: 2022-06-07

## 2022-06-07 PROCEDURE — 99214 OFFICE O/P EST MOD 30 MIN: CPT | Performed by: NURSE PRACTITIONER

## 2022-06-07 RX ORDER — UBIDECARENONE 75 MG
100 CAPSULE ORAL DAILY
COMMUNITY

## 2022-06-07 RX ORDER — LEVOTHYROXINE SODIUM 100 MCG
TABLET ORAL
COMMUNITY
Start: 2022-03-23

## 2022-06-07 RX ORDER — ERGOCALCIFEROL 1.25 MG/1
CAPSULE ORAL
COMMUNITY
End: 2022-11-02

## 2022-06-07 RX ORDER — ESTRADIOL 1 MG/1
1 TABLET ORAL
COMMUNITY
Start: 2022-03-22

## 2022-06-07 RX ORDER — ASCORBIC ACID 100 MG
TABLET,CHEWABLE ORAL
COMMUNITY
Start: 2020-01-06

## 2022-06-07 ASSESSMENT — ENCOUNTER SYMPTOMS
PALPITATIONS: 0
LOSS OF CONSCIOUSNESS: 0
ORTHOPNEA: 0
DIZZINESS: 0
NAUSEA: 0
CHILLS: 0
COUGH: 0
FEVER: 0
PND: 0
SHORTNESS OF BREATH: 0
MYALGIAS: 0
INSOMNIA: 0
BRUISES/BLEEDS EASILY: 0
HEADACHES: 0
ABDOMINAL PAIN: 0

## 2022-06-07 ASSESSMENT — FIBROSIS 4 INDEX: FIB4 SCORE: 0.7

## 2022-06-07 NOTE — PROGRESS NOTES
"Chief Complaint   Patient presents with   • Follow-Up   • Abnormal EKG   • Palpitations   • Gastrophageal Reflux   • Anemia       Subjective     Evita Mckenna is a 62 y.o. female who presents today for annual follow-up of abnormal EKG and palpitations.    Evita is a 62 year old female with history of abnormal EKG and palpitations, with normal echocardiogram in January 2021, and normal holter monitor, with brief 4 beat NSVT episodes and PACs/PVCs <1% of total time. She was given Propanolol to use PRN, but she does not use this, as it causes her to feel \"sluggish.\"    She is here today for annual follow-up. She has been working on trying to lose weight, and has changed her diet. She occasionally get some chest tightness, mostly with rest, relieved with getting up and walking; no sustained palpitations, but occasional skipped beats. No shortness of breath, orthopnea or PND: no dizziness or syncope; no LE edema. BP is stable.    Past Medical History:   Diagnosis Date   • Abnormal EKG - possible inferior infarct seen from 2014 01/2021    Echocardiogram with normal LV size, LVEF 70%. No valvular abnormalities.   • Allergy    • Anemia     Vaginal bleeding.   • Anesthesia     PONV   • Arrhythmia    • Arthritis     osteo-knees   • Colorectal cancer (HCC) 04/2011    Dr. Garcia, chemo and radiation.    • Dyslipidemia    • GERD (gastroesophageal reflux disease)    • Graves disease    • History of anemia 02/2020    No an issue currently   • Hyperthyroidism 12/18/2018   • Migraine    • Myocardial infarct (HCC)     \"Before 2014\"   • NSVT (nonsustained ventricular tachycardia) (HCC) - on BIOTEL 2/2021 03/03/2021   • Pain 2020    knee right   • Palpitations    • Snoring 02/2020    No sleep study     Past Surgical History:   Procedure Laterality Date   • GASTROSCOPY N/A 9/10/2020    Procedure: GASTROSCOPY;  Surgeon: Joshua Hutchinson M.D.;  Location: SURGERY SAME DAY [facility];  Service: Gastroenterology   • VA LAP, ILEANA RESTRICT " PROC, LONGITUDINAL GAS* N/A 7/28/2020    Procedure: GASTRECTOMY, SLEEVE, LAPAROSCOPIC;  Surgeon: John H Ganser, M.D.;  Location: SURGERY [facility];  Service: General   • PB KNEE SCOPE,DIAGNOSTIC Right 3/5/2020    Procedure: ARTHROSCOPY, KNEE;  Surgeon: Abdias Madden M.D.;  Location: SURGERY [facility];  Service: Orthopedics   • MENISCECTOMY, KNEE, MEDIAL Right 3/5/2020    Procedure: MENISCECTOMY, KNEE, MEDIAL - PARTIAL LATERAL,;  Surgeon: Abdias Madden M.D.;  Location: SURGERY [facility];  Service: Orthopedics   • JOINT INJECTION DIAGNOSTIC Left 3/5/2020    Procedure: INJECTION, JOINT, DIAGNOSTIC;  Surgeon: Abdias Madden M.D.;  Location: [facility];  Service: Orthopedics   • THYROIDECTOMY TOTAL Bilateral 1/8/2020    Procedure: THYROIDECTOMY, TOTAL- W/ NIMS;  Surgeon: Ebony Meza M.D.;  Location: SURGERY SAME DAY [facility];  Service: General   • THYROIDECTOMY  01/08/2020   • JOINT INJECTION DIAGNOSTIC  3/8/2013    Performed by Kenji Meek M.D. at [facility]   • KNEE ARTHROSCOPY  3/8/2013    Performed by Kenji Meek M.D. at [facility]   • MENISCECTOMY, KNEE, MEDIAL  3/8/2013    Performed by Kenji Meek M.D. at [facility]   • GASTRIC BANDING LAPAROSCOPIC  2005    removal of lapband 2016   • ABDOMINAL HYSTERECTOMY TOTAL  2003    For menorrhagia, no BSO   • HERNIA REPAIR      child   • OTHER ABDOMINAL SURGERY      lap band removal     Family History   Problem Relation Age of Onset   • Stroke Father         70yo   • Hypertension Father    • Heart Disease Father         70 yo   • Cancer Maternal Grandfather         Lung   • Diabetes Paternal Grandmother    • Hypertension Paternal Grandmother    • Stroke Paternal Grandmother         70 s   • Psychiatric Illness Sister         Schizophrenia   • Psychiatric Illness Brother         Schizophrenia   • Cancer Paternal Aunt         Bone, liver   • Psychiatric Illness  Mother         dental / mental illness    • Lung Disease Mother         Copd    • Psychiatric Illness Sister         schizoprenia    • Diabetes Sister    • Hypertension Sister    • Psychiatric Illness Brother    • Heart Disease Brother    • Hypertension Brother    • Hypertension Brother    • Diabetes Brother      Social History     Socioeconomic History   • Marital status:      Spouse name: Not on file   • Number of children: Not on file   • Years of education: Not on file   • Highest education level: Not on file   Occupational History   • Not on file   Tobacco Use   • Smoking status: Never Smoker   • Smokeless tobacco: Never Used   • Tobacco comment: significant 2nd hand exposure    Vaping Use   • Vaping Use: Never used   Substance and Sexual Activity   • Alcohol use: No   • Drug use: No   • Sexual activity: Yes     Partners: Female   Other Topics Concern   •  Service No   • Blood Transfusions No   • Caffeine Concern No   • Occupational Exposure No   • Hobby Hazards No   • Sleep Concern Yes   • Stress Concern No   • Weight Concern Yes   • Special Diet No   • Back Care No   • Exercise Yes   • Bike Helmet No   • Seat Belt Yes   • Self-Exams Yes   Social History Narrative    , no children.      Social Determinants of Health     Financial Resource Strain: Not on file   Food Insecurity: Not on file   Transportation Needs: Not on file   Physical Activity: Not on file   Stress: Not on file   Social Connections: Not on file   Intimate Partner Violence: Not on file   Housing Stability: Not on file     Allergies   Allergen Reactions   • Iodine Itching   • Morphine Itching     blisters   • Shellfish Allergy Vomiting   • Reglan [Kdc:Yellow Dye+Ci Pigment Blue 63+Metoclopramide]      Outpatient Encounter Medications as of 6/7/2022   Medication Sig Dispense Refill   • Ascorbic Acid (VITAMIN C) 100 MG Chew Tab      • vitamin D2, Ergocalciferol, (DRISDOL) 1.25 MG (71525 UT) Cap capsule Take  by mouth every 7  "days.     • SYNTHROID 100 MCG Tab TAKE 1 TABLET BY MOUTH EVERY DAY IN THE MORNING ON EMPTY STOMACH FOR 90 DAYS     • estradiol (ESTRACE) 1 MG Tab Take 1 mg by mouth every day.     • cyanocobalamin (VITAMIN B-12) 100 MCG Tab Take 100 mcg by mouth every day.     • Multiple Vitamins-Minerals (MULTIVITAMIN WOMEN PO) Take  by mouth.     • Thiamine HCl (B-1) 100 MG Tab Take 100 mg by mouth every day.     • Zinc 100 MG Tab Take 5 mg by mouth.     • Cholecalciferol (VITAMIN D3) 50 MCG (2000 UT) Tab Take 50 mg by mouth every day.     • acetaminophen (TYLENOL) 500 MG Tab Take 1 Tab by mouth every four hours as needed. 30 Tab 0   • [DISCONTINUED] SYNTHROID 75 MCG Tab Take 75 mcg by mouth every day.     • [DISCONTINUED] propranolol (INDERAL) 20 MG Tab Take 1 tablet by mouth 3 times a day as needed. 30 tablet 11     No facility-administered encounter medications on file as of 6/7/2022.     Review of Systems   Constitutional: Negative for chills and fever.   HENT: Negative for congestion.    Respiratory: Negative for cough and shortness of breath.    Cardiovascular: Negative for chest pain, palpitations, orthopnea, leg swelling and PND.   Gastrointestinal: Negative for abdominal pain and nausea.   Musculoskeletal: Negative for myalgias.   Skin: Negative for rash.   Neurological: Negative for dizziness, loss of consciousness and headaches.   Endo/Heme/Allergies: Does not bruise/bleed easily.   Psychiatric/Behavioral: The patient does not have insomnia.               Objective     /78 (BP Location: Left arm, Patient Position: Sitting, BP Cuff Size: Adult)   Pulse 75   Resp 16   Ht 1.676 m (5' 6\")   Wt 103 kg (228 lb)   SpO2 98%   BMI 36.80 kg/m²     Physical Exam  Constitutional:       Appearance: She is well-developed.   HENT:      Head: Normocephalic.   Neck:      Vascular: No JVD.   Cardiovascular:      Rate and Rhythm: Normal rate and regular rhythm.      Heart sounds: Normal heart sounds.   Pulmonary:      Effort: " Pulmonary effort is normal. No respiratory distress.      Breath sounds: Normal breath sounds. No wheezing or rales.   Abdominal:      General: Bowel sounds are normal. There is no distension.      Palpations: Abdomen is soft.      Tenderness: There is no abdominal tenderness.   Musculoskeletal:         General: Normal range of motion.      Cervical back: Normal range of motion and neck supple.   Skin:     General: Skin is warm and dry.      Findings: No rash.   Neurological:      Mental Status: She is alert and oriented to person, place, and time.     CONCLUSIONS OF ECHOCARDIOGRAM OF 1/27/2021:  Compared to the images of the prior study done 4/1/2014 - there is no   significant change.  Normal transthoracic echocardiogram.     SUMMARY OF HOLTER MONITOR OF 3/3/2021 (24 days):  24 days of monitoring demonstrated sinus rhythm with rare ectopy including 4 beats of nonsustained VT.  Symptoms correlated with the episode of nonsustained VT as well as atrial ectopy.     Component      Latest Ref Rng & Units 3/7/2022 3/7/2022 3/7/2022 3/7/2022           8:11 AM  8:11 AM  8:11 AM  8:11 AM   Vitamin B12 -True Cobalamin      211 - 911 pg/mL 929 (H)      25-Hydroxy   Vitamin D 25      30 - 100 ng/mL  48     TSH      0.380 - 5.330 uIU/mL   0.260 (L)    Free T-4      0.93 - 1.70 ng/dL    1.44   T3,Free      2.00 - 4.40 pg/mL         Component      Latest Ref Rng & Units 3/7/2022           8:11 AM   Vitamin B12 -True Cobalamin      211 - 911 pg/mL    25-Hydroxy   Vitamin D 25      30 - 100 ng/mL    TSH      0.380 - 5.330 uIU/mL    Free T-4      0.93 - 1.70 ng/dL    T3,Free      2.00 - 4.40 pg/mL 3.22         Lab Results   Component Value Date/Time    CHOLSTRLTOT 189 12/02/2021 09:22 AM    LDL 95 12/02/2021 09:22 AM    HDL 87 12/02/2021 09:22 AM    TRIGLYCERIDE 33 12/02/2021 09:22 AM       Lab Results   Component Value Date/Time    SODIUM 138 12/02/2021 09:22 AM    POTASSIUM 3.6 12/02/2021 09:22 AM    CHLORIDE 103 12/02/2021 09:22  AM    CO2 26 12/02/2021 09:22 AM    GLUCOSE 85 12/02/2021 09:22 AM    BUN 14 12/02/2021 09:22 AM    CREATININE 0.49 (L) 12/02/2021 09:22 AM    CREATININE 0.77 12/29/2012 10:58 AM    BUNCREATRAT 14 12/14/2020 07:49 AM    BUNCREATRAT 14 12/29/2012 10:58 AM    GLOMRATE >59 08/18/2010 08:26 AM     Lab Results   Component Value Date/Time    ALKPHOSPHAT 144 (H) 12/02/2021 09:22 AM    ASTSGOT 11 (L) 12/02/2021 09:22 AM    ALTSGPT 13 12/02/2021 09:22 AM    TBILIRUBIN 0.4 12/02/2021 09:22 AM          Assessment & Plan     1. Abnormal EKG - possible inferior infarct seen from 2014     2. Palpitations  Basic Metabolic Panel   3. Mixed hyperlipidemia     4. History of anemia  CBC WITH DIFFERENTIAL   5. Gastroesophageal reflux disease without esophagitis     6. History of colorectal cancer         Medical Decision Making: Today's Assessment/Status/Plan:      1. History of abnormal EKG, with normal echocardiogram in January 2021; we reviewed results together, and she is reassured.  Her chest tightness does not sound cardiac in etiology. Sounds like it could be more GI.    2. Palpitations, with PACs/PVCs <1% of total time on holter monitor. She can use Propanolol once daily PRN.    3. Hyperlipidemia, not currently on any therapy. Last lipid panel was good.    4. History of anemia, to repeat CBC.    5. GERD, treated/stable.    6. History of colorectal cancer, stable.    As above, to obtain CBC and BMP; keep follow-up with other providers. Follow-up with us annually, sooner if clinical condition changes.

## 2022-07-08 ENCOUNTER — HOSPITAL ENCOUNTER (OUTPATIENT)
Dept: RADIOLOGY | Facility: MEDICAL CENTER | Age: 62
End: 2022-07-08
Attending: FAMILY MEDICINE
Payer: COMMERCIAL

## 2022-07-08 DIAGNOSIS — Z12.31 VISIT FOR SCREENING MAMMOGRAM: ICD-10-CM

## 2022-07-08 PROCEDURE — 77063 BREAST TOMOSYNTHESIS BI: CPT

## 2022-08-05 ENCOUNTER — HOSPITAL ENCOUNTER (OUTPATIENT)
Dept: LAB | Facility: MEDICAL CENTER | Age: 62
End: 2022-08-05
Attending: FAMILY MEDICINE
Payer: COMMERCIAL

## 2022-08-05 LAB
ALBUMIN SERPL BCP-MCNC: 4 G/DL (ref 3.2–4.9)
ALBUMIN/GLOB SERPL: 1 G/DL
ALP SERPL-CCNC: 120 U/L (ref 30–99)
ALT SERPL-CCNC: 7 U/L (ref 2–50)
ANION GAP SERPL CALC-SCNC: 11 MMOL/L (ref 7–16)
AST SERPL-CCNC: 16 U/L (ref 12–45)
BASOPHILS # BLD AUTO: 0.3 % (ref 0–1.8)
BASOPHILS # BLD: 0.01 K/UL (ref 0–0.12)
BILIRUB SERPL-MCNC: 0.3 MG/DL (ref 0.1–1.5)
BUN SERPL-MCNC: 11 MG/DL (ref 8–22)
CALCIUM SERPL-MCNC: 9.2 MG/DL (ref 8.5–10.5)
CHLORIDE SERPL-SCNC: 102 MMOL/L (ref 96–112)
CO2 SERPL-SCNC: 23 MMOL/L (ref 20–33)
CREAT SERPL-MCNC: 0.61 MG/DL (ref 0.5–1.4)
EOSINOPHIL # BLD AUTO: 0.03 K/UL (ref 0–0.51)
EOSINOPHIL NFR BLD: 1 % (ref 0–6.9)
ERYTHROCYTE [DISTWIDTH] IN BLOOD BY AUTOMATED COUNT: 47 FL (ref 35.9–50)
EST. AVERAGE GLUCOSE BLD GHB EST-MCNC: 114 MG/DL
FASTING STATUS PATIENT QL REPORTED: NORMAL
GFR SERPLBLD CREATININE-BSD FMLA CKD-EPI: 101 ML/MIN/1.73 M 2
GLOBULIN SER CALC-MCNC: 4.2 G/DL (ref 1.9–3.5)
GLUCOSE SERPL-MCNC: 79 MG/DL (ref 65–99)
HBA1C MFR BLD: 5.6 % (ref 4–5.6)
HCT VFR BLD AUTO: 39.1 % (ref 37–47)
HGB BLD-MCNC: 12 G/DL (ref 12–16)
IMM GRANULOCYTES # BLD AUTO: 0 K/UL (ref 0–0.11)
IMM GRANULOCYTES NFR BLD AUTO: 0 % (ref 0–0.9)
IRON SATN MFR SERPL: 27 % (ref 15–55)
IRON SERPL-MCNC: 66 UG/DL (ref 40–170)
LYMPHOCYTES # BLD AUTO: 1.42 K/UL (ref 1–4.8)
LYMPHOCYTES NFR BLD: 49.3 % (ref 22–41)
MCH RBC QN AUTO: 26.5 PG (ref 27–33)
MCHC RBC AUTO-ENTMCNC: 30.7 G/DL (ref 33.6–35)
MCV RBC AUTO: 86.5 FL (ref 81.4–97.8)
MONOCYTES # BLD AUTO: 0.37 K/UL (ref 0–0.85)
MONOCYTES NFR BLD AUTO: 12.8 % (ref 0–13.4)
NEUTROPHILS # BLD AUTO: 1.05 K/UL (ref 2–7.15)
NEUTROPHILS NFR BLD: 36.6 % (ref 44–72)
NRBC # BLD AUTO: 0 K/UL
NRBC BLD-RTO: 0 /100 WBC
PLATELET # BLD AUTO: 256 K/UL (ref 164–446)
PMV BLD AUTO: 9.4 FL (ref 9–12.9)
POTASSIUM SERPL-SCNC: 4.2 MMOL/L (ref 3.6–5.5)
PROT SERPL-MCNC: 8.2 G/DL (ref 6–8.2)
RBC # BLD AUTO: 4.52 M/UL (ref 4.2–5.4)
SODIUM SERPL-SCNC: 136 MMOL/L (ref 135–145)
TIBC SERPL-MCNC: 242 UG/DL (ref 250–450)
UIBC SERPL-MCNC: 176 UG/DL (ref 110–370)
WBC # BLD AUTO: 2.9 K/UL (ref 4.8–10.8)

## 2022-08-05 PROCEDURE — 82728 ASSAY OF FERRITIN: CPT

## 2022-08-05 PROCEDURE — 82607 VITAMIN B-12: CPT

## 2022-08-05 PROCEDURE — 84425 ASSAY OF VITAMIN B-1: CPT

## 2022-08-05 PROCEDURE — 36415 COLL VENOUS BLD VENIPUNCTURE: CPT

## 2022-08-05 PROCEDURE — 85025 COMPLETE CBC W/AUTO DIFF WBC: CPT

## 2022-08-05 PROCEDURE — 80053 COMPREHEN METABOLIC PANEL: CPT

## 2022-08-05 PROCEDURE — 83036 HEMOGLOBIN GLYCOSYLATED A1C: CPT

## 2022-08-05 PROCEDURE — 82306 VITAMIN D 25 HYDROXY: CPT

## 2022-08-05 PROCEDURE — 83540 ASSAY OF IRON: CPT

## 2022-08-05 PROCEDURE — 83550 IRON BINDING TEST: CPT

## 2022-08-05 PROCEDURE — 84443 ASSAY THYROID STIM HORMONE: CPT

## 2022-08-05 PROCEDURE — 84480 ASSAY TRIIODOTHYRONINE (T3): CPT

## 2022-08-05 PROCEDURE — 84207 ASSAY OF VITAMIN B-6: CPT

## 2022-08-05 PROCEDURE — 84255 ASSAY OF SELENIUM: CPT

## 2022-08-05 PROCEDURE — 84439 ASSAY OF FREE THYROXINE: CPT

## 2022-08-05 PROCEDURE — 82525 ASSAY OF COPPER: CPT

## 2022-08-06 LAB
25(OH)D3 SERPL-MCNC: 69 NG/ML (ref 30–100)
FERRITIN SERPL-MCNC: 198 NG/ML (ref 10–291)
T3 SERPL-MCNC: 133 NG/DL (ref 60–181)
T4 FREE SERPL-MCNC: 1.53 NG/DL (ref 0.93–1.7)
TSH SERPL DL<=0.005 MIU/L-ACNC: 0.07 UIU/ML (ref 0.38–5.33)
VIT B12 SERPL-MCNC: 866 PG/ML (ref 211–911)

## 2022-08-10 LAB
COPPER SERPL-MCNC: 168.2 UG/DL (ref 80–155)
SELENIUM SERPL-MCNC: 124.4 UG/L (ref 23–190)
VIT B1 BLD-MCNC: 165 NMOL/L (ref 70–180)
VIT B6 SERPL-MCNC: 26 NMOL/L (ref 20–125)

## 2022-10-18 ENCOUNTER — TELEPHONE (OUTPATIENT)
Dept: MEDICAL GROUP | Facility: PHYSICIAN GROUP | Age: 62
End: 2022-10-18
Payer: COMMERCIAL

## 2022-10-18 NOTE — TELEPHONE ENCOUNTER
I received a call on the RN Consulting line from the patient.  She C/O palpitations which were increasing in frequency and, during which she experienced dizziness.  She was seen in June by Cardiology for same and told it was not of  cardiac etiology, per the patient.  The patient was told that if the palpitations have increased and she is symptomatic that she needs to go to the emergency room.

## 2022-10-20 ENCOUNTER — APPOINTMENT (OUTPATIENT)
Dept: RADIOLOGY | Facility: MEDICAL CENTER | Age: 62
End: 2022-10-20
Attending: EMERGENCY MEDICINE
Payer: COMMERCIAL

## 2022-10-20 ENCOUNTER — HOSPITAL ENCOUNTER (EMERGENCY)
Facility: MEDICAL CENTER | Age: 62
End: 2022-10-20
Attending: EMERGENCY MEDICINE
Payer: COMMERCIAL

## 2022-10-20 VITALS
TEMPERATURE: 98 F | RESPIRATION RATE: 16 BRPM | DIASTOLIC BLOOD PRESSURE: 70 MMHG | WEIGHT: 220 LBS | HEIGHT: 66 IN | OXYGEN SATURATION: 96 % | SYSTOLIC BLOOD PRESSURE: 119 MMHG | BODY MASS INDEX: 35.36 KG/M2 | HEART RATE: 77 BPM

## 2022-10-20 DIAGNOSIS — R74.8 ELEVATED ALKALINE PHOSPHATASE LEVEL: ICD-10-CM

## 2022-10-20 DIAGNOSIS — R00.2 PALPITATIONS: ICD-10-CM

## 2022-10-20 LAB
ALBUMIN SERPL BCP-MCNC: 4.1 G/DL (ref 3.2–4.9)
ALBUMIN/GLOB SERPL: 0.9 G/DL
ALP SERPL-CCNC: 139 U/L (ref 30–99)
ALT SERPL-CCNC: 12 U/L (ref 2–50)
ANION GAP SERPL CALC-SCNC: 10 MMOL/L (ref 7–16)
AST SERPL-CCNC: 17 U/L (ref 12–45)
BASOPHILS # BLD AUTO: 0.3 % (ref 0–1.8)
BASOPHILS # BLD: 0.01 K/UL (ref 0–0.12)
BILIRUB SERPL-MCNC: 0.3 MG/DL (ref 0.1–1.5)
BUN SERPL-MCNC: 17 MG/DL (ref 8–22)
CALCIUM SERPL-MCNC: 9.7 MG/DL (ref 8.5–10.5)
CHLORIDE SERPL-SCNC: 100 MMOL/L (ref 96–112)
CO2 SERPL-SCNC: 26 MMOL/L (ref 20–33)
CREAT SERPL-MCNC: 0.67 MG/DL (ref 0.5–1.4)
EKG IMPRESSION: NORMAL
EOSINOPHIL # BLD AUTO: 0.01 K/UL (ref 0–0.51)
EOSINOPHIL NFR BLD: 0.3 % (ref 0–6.9)
ERYTHROCYTE [DISTWIDTH] IN BLOOD BY AUTOMATED COUNT: 44.9 FL (ref 35.9–50)
GFR SERPLBLD CREATININE-BSD FMLA CKD-EPI: 99 ML/MIN/1.73 M 2
GLOBULIN SER CALC-MCNC: 4.4 G/DL (ref 1.9–3.5)
GLUCOSE SERPL-MCNC: 86 MG/DL (ref 65–99)
HCT VFR BLD AUTO: 40.4 % (ref 37–47)
HGB BLD-MCNC: 12.7 G/DL (ref 12–16)
IMM GRANULOCYTES # BLD AUTO: 0.01 K/UL (ref 0–0.11)
IMM GRANULOCYTES NFR BLD AUTO: 0.3 % (ref 0–0.9)
LYMPHOCYTES # BLD AUTO: 0.91 K/UL (ref 1–4.8)
LYMPHOCYTES NFR BLD: 24.5 % (ref 22–41)
MCH RBC QN AUTO: 27 PG (ref 27–33)
MCHC RBC AUTO-ENTMCNC: 31.4 G/DL (ref 33.6–35)
MCV RBC AUTO: 86 FL (ref 81.4–97.8)
MONOCYTES # BLD AUTO: 0.57 K/UL (ref 0–0.85)
MONOCYTES NFR BLD AUTO: 15.3 % (ref 0–13.4)
NEUTROPHILS # BLD AUTO: 2.21 K/UL (ref 2–7.15)
NEUTROPHILS NFR BLD: 59.3 % (ref 44–72)
NRBC # BLD AUTO: 0 K/UL
NRBC BLD-RTO: 0 /100 WBC
PLATELET # BLD AUTO: 268 K/UL (ref 164–446)
PMV BLD AUTO: 9.2 FL (ref 9–12.9)
POTASSIUM SERPL-SCNC: 4.3 MMOL/L (ref 3.6–5.5)
PROT SERPL-MCNC: 8.5 G/DL (ref 6–8.2)
RBC # BLD AUTO: 4.7 M/UL (ref 4.2–5.4)
SODIUM SERPL-SCNC: 136 MMOL/L (ref 135–145)
T4 FREE SERPL-MCNC: 1.54 NG/DL (ref 0.93–1.7)
TROPONIN T SERPL-MCNC: <6 NG/L (ref 6–19)
TSH SERPL DL<=0.005 MIU/L-ACNC: 0.07 UIU/ML (ref 0.38–5.33)
WBC # BLD AUTO: 3.7 K/UL (ref 4.8–10.8)

## 2022-10-20 PROCEDURE — 99283 EMERGENCY DEPT VISIT LOW MDM: CPT

## 2022-10-20 PROCEDURE — 36415 COLL VENOUS BLD VENIPUNCTURE: CPT

## 2022-10-20 PROCEDURE — 71045 X-RAY EXAM CHEST 1 VIEW: CPT

## 2022-10-20 PROCEDURE — 93005 ELECTROCARDIOGRAM TRACING: CPT | Performed by: EMERGENCY MEDICINE

## 2022-10-20 PROCEDURE — 80053 COMPREHEN METABOLIC PANEL: CPT

## 2022-10-20 PROCEDURE — 93005 ELECTROCARDIOGRAM TRACING: CPT

## 2022-10-20 PROCEDURE — 84484 ASSAY OF TROPONIN QUANT: CPT

## 2022-10-20 PROCEDURE — 84439 ASSAY OF FREE THYROXINE: CPT

## 2022-10-20 PROCEDURE — 85025 COMPLETE CBC W/AUTO DIFF WBC: CPT

## 2022-10-20 PROCEDURE — 84443 ASSAY THYROID STIM HORMONE: CPT

## 2022-10-20 ASSESSMENT — FIBROSIS 4 INDEX: FIB4 SCORE: 1.44

## 2022-10-20 NOTE — ED TRIAGE NOTES
Pt ambulatory to triage c/o palpitations increasing in frequency over last month. Pt states also having lightheadedness and nausea with every episode. Nad. vss

## 2022-10-20 NOTE — ED NOTES
Discharge teaching and paperwork provided regarding palpitations and all questions/concerns answered. VSS, cardiac assessment stable. Given information regarding home care and reasons to follow up with ED or primary MD. Patient discharged and ambulatory out of the ED.

## 2022-10-20 NOTE — ED PROVIDER NOTES
ED Provider Note        Primary care provider: Sofie Mathew M.D.    I verified that the patient was wearing a mask and I was wearing appropriate PPE every time I entered the room. The patient's mask was on the patient at all times during my encounter except for a brief view of the oropharynx.      CHIEF COMPLAINT  Chief Complaint   Patient presents with    Palpitations       HPI  Eunice Mckenna is a 62 y.o. female who presents to the Emergency Department with chief complaint of potation's.  Patient reports that she is having increasing frequency of palpitations.  She states that she has been suffering from this for multiple months however over the last couple weeks they have been getting increasingly severe and increasingly frequent.  She reports she feels her heart fluttering in her chest and then feel somewhat lightheaded and somewhat dizzy.  She reports that she has had evaluation for this including echocardiogram and Holter monitor by her cardiologist she is also seen her endocrinologist that she is status post thyroidectomy and does supplement with Synthroid.  No headache no altered mental status she is had no fevers no chills no shortness of breath no abdominal pain no problems with urination or bowel movements no other acute symptom changes or concerns at this time.    REVIEW OF SYSTEMS  10 systems reviewed and otherwise negative, pertinent positives and negatives listed in the history of present illness.    PAST MEDICAL HISTORY   has a past medical history of Abnormal EKG - possible inferior infarct seen from 2014 (01/2021), Allergy, Anemia, Anesthesia, Anxiety (4/15/2021), Arrhythmia, Arthritis, Colorectal cancer (HCC) (04/2011), Dyslipidemia, GERD (gastroesophageal reflux disease), Graves disease, History of anemia (02/2020), Hyperthyroidism (12/18/2018), Migraine, Myocardial infarct ([facility]), NSVT (nonsustained ventricular tachycardia) ([facility]) - on BIOTEL 2/2021 (03/03/2021), Pain (2020), Palpitations,  and Snoring (02/2020).    SURGICAL HISTORY   has a past surgical history that includes gastric banding laparoscopic (2005); joint injection diagnostic (3/8/2013); other abdominal surgery; knee arthroscopy (3/8/2013); meniscectomy, knee, medial (3/8/2013); thyroidectomy total (Bilateral, 1/8/2020); hernia repair; abdominal hysterectomy total (2003); thyroidectomy (01/08/2020); knee scope,diagnostic (Right, 3/5/2020); meniscectomy, knee, medial (Right, 3/5/2020); joint injection diagnostic (Left, 3/5/2020); lap, rose mary restrict proc, longitudinal gas* (N/A, 7/28/2020); and gastroscopy (N/A, 9/10/2020).    SOCIAL HISTORY  Social History     Tobacco Use    Smoking status: Never    Smokeless tobacco: Never    Tobacco comments:     significant 2nd hand exposure    Vaping Use    Vaping Use: Never used   Substance Use Topics    Alcohol use: No    Drug use: No      Social History     Substance and Sexual Activity   Drug Use No       FAMILY HISTORY  Non-Contributory    CURRENT MEDICATIONS  Home Medications       Reviewed by Nica Altamirano R.N. (Registered Nurse) on 10/20/22 at 1220  Med List Status: Partial     Medication Last Dose Status   acetaminophen (TYLENOL) 500 MG Tab  Active   Ascorbic Acid (VITAMIN C) 100 MG Chew Tab  Active   Cholecalciferol (VITAMIN D3) 50 MCG (2000 UT) Tab  Active   cyanocobalamin (VITAMIN B-12) 100 MCG Tab  Active   estradiol (ESTRACE) 1 MG Tab  Active   methocarbamol (ROBAXIN-750) 750 MG Tab  Active   methylPREDNISolone (MEDROL DOSEPAK) 4 MG Tablet Therapy Pack  Active   Multiple Vitamins-Minerals (MULTIVITAMIN WOMEN PO)  Active   SYNTHROID 100 MCG Tab  Active   Thiamine HCl (B-1) 100 MG Tab  Active   vitamin D2, Ergocalciferol, (DRISDOL) 1.25 MG (09099 UT) Cap capsule  Active   Zinc 100 MG Tab  Active                    ALLERGIES  Allergies   Allergen Reactions    Iodine Itching    Morphine Itching     blisters    Shellfish Allergy Vomiting    Reglan [Kdc:Yellow Dye+Ci Pigment Blue  "63+Metoclopramide]        PHYSICAL EXAM  VITAL SIGNS: /78   Pulse 99   Temp 36.9 °C (98.5 °F) (Temporal)   Resp 16   Ht 1.676 m (5' 6\")   Wt 99.8 kg (220 lb)   SpO2 97%   BMI 35.51 kg/m²   Pulse ox interpretation: I interpret this pulse ox as normal.  Constitutional: Alert and oriented x 3, no acute Distress  HEENT: Atraumatic normocephalic, pupils are equal round, extraocular movements are intact. The nares is clear, external ears are normal, mouth shows moist mucous membranes  Neck: no obvious JVD or tracheal deviation  Cardiovascular: Regular rate and rhythm no murmur rub or gallop   Thorax & Lungs: No respiratory distress, no wheezes rales or rhonchi, No chest tenderness.   GI: Soft nontender nondistended positive bowel sounds, no peritoneal signs  Skin: Warm dry no obvious acute rash or lesion  Musculoskeletal: Moving all extremities with normal range strength, no acute  deformity  Neurologic: Cranial nerves III through XII are grossly intact, no sensory deficit, no cerebellar dysfunction   Psychiatric: Appropriate affect for situation at this time      DIAGNOSTIC STUDIES / PROCEDURES  LABS      Results for orders placed or performed during the hospital encounter of 10/20/22   CBC with Differential   Result Value Ref Range    WBC 3.7 (L) 4.8 - 10.8 K/uL    RBC 4.70 4.20 - 5.40 M/uL    Hemoglobin 12.7 12.0 - 16.0 g/dL    Hematocrit 40.4 37.0 - 47.0 %    MCV 86.0 81.4 - 97.8 fL    MCH 27.0 27.0 - 33.0 pg    MCHC 31.4 (L) 33.6 - 35.0 g/dL    RDW 44.9 35.9 - 50.0 fL    Platelet Count 268 164 - 446 K/uL    MPV 9.2 9.0 - 12.9 fL    Neutrophils-Polys 59.30 44.00 - 72.00 %    Lymphocytes 24.50 22.00 - 41.00 %    Monocytes 15.30 (H) 0.00 - 13.40 %    Eosinophils 0.30 0.00 - 6.90 %    Basophils 0.30 0.00 - 1.80 %    Immature Granulocytes 0.30 0.00 - 0.90 %    Nucleated RBC 0.00 /100 WBC    Neutrophils (Absolute) 2.21 2.00 - 7.15 K/uL    Lymphs (Absolute) 0.91 (L) 1.00 - 4.80 K/uL    Monos (Absolute) 0.57 " 0.00 - 0.85 K/uL    Eos (Absolute) 0.01 0.00 - 0.51 K/uL    Baso (Absolute) 0.01 0.00 - 0.12 K/uL    Immature Granulocytes (abs) 0.01 0.00 - 0.11 K/uL    NRBC (Absolute) 0.00 K/uL   Complete Metabolic Panel (CMP)   Result Value Ref Range    Sodium 136 135 - 145 mmol/L    Potassium 4.3 3.6 - 5.5 mmol/L    Chloride 100 96 - 112 mmol/L    Co2 26 20 - 33 mmol/L    Anion Gap 10.0 7.0 - 16.0    Glucose 86 65 - 99 mg/dL    Bun 17 8 - 22 mg/dL    Creatinine 0.67 0.50 - 1.40 mg/dL    Calcium 9.7 8.5 - 10.5 mg/dL    AST(SGOT) 17 12 - 45 U/L    ALT(SGPT) 12 2 - 50 U/L    Alkaline Phosphatase 139 (H) 30 - 99 U/L    Total Bilirubin 0.3 0.1 - 1.5 mg/dL    Albumin 4.1 3.2 - 4.9 g/dL    Total Protein 8.5 (H) 6.0 - 8.2 g/dL    Globulin 4.4 (H) 1.9 - 3.5 g/dL    A-G Ratio 0.9 g/dL   Troponins NOW   Result Value Ref Range    Troponin T <6 6 - 19 ng/L   FREE THYROXINE   Result Value Ref Range    Free T-4 1.54 0.93 - 1.70 ng/dL   TSH   Result Value Ref Range    TSH 0.070 (L) 0.380 - 5.330 uIU/mL   ESTIMATED GFR   Result Value Ref Range    GFR (CKD-EPI) 99 >60 mL/min/1.73 m 2   EKG   Result Value Ref Range    Report       [facility] Emergency Dept.    Test Date:  2022-10-20  Pt Name:    ZECHARIAH GALLOWAY                    Department: ER  MRN:        [id number]                      Room:  Gender:     Female                       Technician: [id number]  :        1960                   Requested By:ER TRIAGE PROTOCOL  Order #:    [id number]                    Reading MD: JOURDAN HARLEY MD    Measurements  Intervals                                Axis  Rate:       92                           P:          59  NC:         196                          QRS:        19  QRSD:       104                          T:          4  QT:         359  QTc:        445    Interpretive Statements  Sinus rhythmNo ST elevation no ST depression T wave inversion limited to lead  III  Abnormal R-wave progression, early transition  Probable left  "ventricular hypertrophy  Compared to ECG 08/25/2020 23:17:31  Electronically Signed On 10- 13:04:13 PDT by JOURDAN HARLEY MD         All labs reviewed by me.      RADIOLOGY  DX-CHEST-PORTABLE (1 VIEW)   Final Result      No acute cardiopulmonary disease evident.            COURSE & MEDICAL DECISION MAKING  Pertinent Labs & Imaging studies reviewed. (See chart for details)      Medical Decision Making: Patient's work-up fairly unremarkable here her TSH is slightly depressed and her alkaline phosphatase is slightly elevated.  Patient does have a previous history of colorectal cancer.  I discussed the importance of following up with her primary care physician for reevaluation of her alkaline phosphatase.  She does have history of arthritic changes in bilateral knees and states no other symptoms at would be concerning for recurrence of any cancer.  Patient has had multiple PVCs on telemetry monitoring here but has had no other arrhythmia her troponin is negative her chest x-ray is clear I think that she is likely experiencing benign PVCs but I have discussed follow-up with both her endocrinologist and her cardiologist for further evaluation and treatment.  She will return here should she have worsening palpitations worsening dizziness chest pain shortness of breath any other acute symptom change or concern otherwise discharged in stable and improved condition.    /70   Pulse 77   Temp 36.7 °C (98 °F) (Temporal)   Resp 16   Ht 1.676 m (5' 6\")   Wt 99.8 kg (220 lb)   SpO2 96%   BMI 35.51 kg/m²     Kenji Julien M.D.  [address]  [address]  [facility] [address] [phone]    Schedule an appointment as soon as possible for a visit       [facility], Emergency Dept  [address] [facility] [address] [phone]    in 12-24 hours if symptoms persist, immediately If symptoms worsen, or if you develop any other symptoms or concerns    Sofie Mathew M.D.  [address]  [address]  [facility] [address]  [phone]      fo re-check of your alkaline phosphatase    Discharge Medication List as of 10/20/2022  3:37 PM          FINAL IMPRESSION  1. Palpitations Active   2. Elevated alkaline phosphatase level Active          This dictation has been created using voice recognition software and/or scribes. The accuracy of the dictation is limited by the abilities of the software and the expertise of the scribes. I expect there may be some errors of grammar and possibly content. I made every attempt to manually correct the errors within my dictation. However, errors related to voice recognition software and/or scribes may still exist and should be interpreted within the appropriate context.

## 2022-11-02 ENCOUNTER — HOSPITAL ENCOUNTER (OUTPATIENT)
Dept: CARDIOLOGY | Facility: MEDICAL CENTER | Age: 62
End: 2022-11-02
Attending: NURSE PRACTITIONER
Payer: COMMERCIAL

## 2022-11-02 ENCOUNTER — TELEMEDICINE (OUTPATIENT)
Dept: CARDIOLOGY | Facility: MEDICAL CENTER | Age: 62
End: 2022-11-02
Payer: COMMERCIAL

## 2022-11-02 VITALS
WEIGHT: 224 LBS | BODY MASS INDEX: 36 KG/M2 | SYSTOLIC BLOOD PRESSURE: 117 MMHG | HEIGHT: 66 IN | HEART RATE: 86 BPM | DIASTOLIC BLOOD PRESSURE: 67 MMHG | OXYGEN SATURATION: 97 %

## 2022-11-02 DIAGNOSIS — R06.09 DOE (DYSPNEA ON EXERTION): ICD-10-CM

## 2022-11-02 DIAGNOSIS — R00.2 PALPITATIONS: ICD-10-CM

## 2022-11-02 DIAGNOSIS — R06.02 SOB (SHORTNESS OF BREATH) ON EXERTION: ICD-10-CM

## 2022-11-02 DIAGNOSIS — R53.83 OTHER FATIGUE: ICD-10-CM

## 2022-11-02 LAB
LV EJECT FRACT  99904: 60
LV EJECT FRACT MOD 2C 99903: 61.05
LV EJECT FRACT MOD 4C 99902: 61.55
LV EJECT FRACT MOD BP 99901: 59.46

## 2022-11-02 PROCEDURE — 93306 TTE W/DOPPLER COMPLETE: CPT

## 2022-11-02 PROCEDURE — 93306 TTE W/DOPPLER COMPLETE: CPT | Mod: 26 | Performed by: INTERNAL MEDICINE

## 2022-11-02 PROCEDURE — 99213 OFFICE O/P EST LOW 20 MIN: CPT | Performed by: NURSE PRACTITIONER

## 2022-11-02 ASSESSMENT — FIBROSIS 4 INDEX: FIB4 SCORE: 1.12

## 2022-11-02 NOTE — PROGRESS NOTES
Virtual Visit: Established Patient   This visit was conducted via Zoom using secure and encrypted videoconferencing technology.   The patient was in a private location in their home in the state of [place].    The patient's identity was confirmed and verbal consent was obtained for this virtual visit.     Subjective:   CC:   Chief Complaint   Patient presents with    Palpitations     Virtual follow up        Eunice Mckenna is a 62 y.o. female presenting for evaluation and management after a recent Ed visit for palpitation, rapid HR    She was experiencing palpitations,skipping and fluttering heart rate, also having dizziness and fatigue generalized, worse the past few months.  Her TSH has been rather low on synthroid that past few months as well. Stepping down synthroid medication currently.  Wears apple watch her HR was 130-140's, felt chest pressure as well which prompted visit to ED on 10/20/22.     Symptoms can happen just while sitting on the couch. Occurring during the past few months. She has pressure in her chest afterwards. Also SOB and RUTLEDGE that she feels has gotten worse this past 2 months.     She denies syncopal episodes, orthopnea, PND, lower extremity swelling, and recent weight gain.     ROS   See above all other ROS is negative.     Current medicines (including changes today)  Current Outpatient Medications   Medication Sig Dispense Refill    Ascorbic Acid (VITAMIN C) 100 MG Chew Tab       SYNTHROID 100 MCG Tab TAKE 1 TABLET BY MOUTH EVERY DAY IN THE MORNING ON EMPTY STOMACH FOR 90 DAYS      estradiol (ESTRACE) 1 MG Tab Take 1 mg by mouth every day.      cyanocobalamin (VITAMIN B-12) 100 MCG Tab Take 100 mcg by mouth every day.      Multiple Vitamins-Minerals (MULTIVITAMIN WOMEN PO) Take  by mouth.      Thiamine HCl (B-1) 100 MG Tab Take 100 mg by mouth every day.      Zinc 100 MG Tab Take 5 mg by mouth.      Cholecalciferol (VITAMIN D3) 50 MCG (2000 UT) Tab Take 50 mg by mouth every day.       "acetaminophen (TYLENOL) 500 MG Tab Take 1 Tab by mouth every four hours as needed. 30 Tab 0     No current facility-administered medications for this visit.       Patient Active Problem List    Diagnosis Date Noted    Mixed hyperlipidemia 06/07/2022     Priority: Medium    GERD (gastroesophageal reflux disease) 06/07/2022     Priority: Medium    NSVT (nonsustained ventricular tachycardia) (HCC) - on BIOTEL 2/2021 03/03/2021     Priority: Medium    H/O total thyroidectomy 01/29/2020     Priority: Medium    Abnormal EKG - possible inferior infarct seen from 2014      Priority: Medium    Hypothyroidism, postsurgical 01/10/2020     Priority: Medium    History of anemia 08/05/2010     Priority: Medium    Anxiety 04/15/2021    Vitamin B1 deficiency 11/24/2020    History of sleeve gastrectomy 10/01/2020    History of Graves' disease 05/19/2020    BMI 38.0-38.9,adult 01/29/2020    History of colorectal cancer 03/02/2016    Chronic pain of right knee 03/08/2013    Internal hemorrhoid 03/31/2011    Vitamin D deficiency 08/05/2010    Proteinuria 08/05/2010        Objective:   /67 (BP Location: Left arm, Patient Position: Sitting)   Pulse 86   Ht 1.676 m (5' 6\")   Wt 102 kg (224 lb)   SpO2 97%   BMI 36.15 kg/m²     Physical Exam:  Constitutional: Alert, no distress, well-groomed.  Skin: No rashes in visible areas.  Eye: Round. Conjunctiva clear, lids normal. No icterus.   ENMT: Lips pink without lesions, good dentition, moist mucous membranes. Phonation normal.  Neck: No masses, no thyromegaly. Moves freely without pain.  Respiratory: Unlabored respiratory effort, no cough or audible wheeze  Psych: Alert and oriented x3, normal affect and mood.     Assessment and Plan:   The following treatment plan was discussed:     1. SOB (shortness of breath) on exertion  - EC-ECHOCARDIOGRAM COMPLETE W/O CONT; Future  - Holter Monitor Study    2. RUTLEDGE (dyspnea on exertion)  - EC-ECHOCARDIOGRAM COMPLETE W/O CONT; Future  - Holter " Monitor Study    3. Other fatigue  - EC-ECHOCARDIOGRAM COMPLETE W/O CONT; Future  - Holter Monitor Study    4. Palpitations  - EC-ECHOCARDIOGRAM COMPLETE W/O CONT; Future  - Holter Monitor Study    Follow-up: Return in about 2 months (around 1/2/2023).         -We will obtain 30-day bio tell, patient prefers this be sent to her in the mail, will follow-up through another telemedicine visit to discuss these results  -Potentially decreasing her dose of Synthroid could also help alleviate some of the symptoms she has been feeling  -Obtain echocardiogram as patient is very concerned that her heart function might have been affected with her recent elevated heart rates and her current symptoms    Mayda Kruger A.P.R.N.   [facility]  [phone]    Collaborating MD: Dr. Muñiz

## 2022-11-04 ENCOUNTER — NON-PROVIDER VISIT (OUTPATIENT)
Dept: CARDIOLOGY | Facility: MEDICAL CENTER | Age: 62
End: 2022-11-04
Payer: COMMERCIAL

## 2022-11-04 DIAGNOSIS — I49.3 PVCS (PREMATURE VENTRICULAR CONTRACTIONS): ICD-10-CM

## 2022-11-04 NOTE — PROGRESS NOTES
Patient enrolled in the 30 day Bio-Tel [facility] monitoring program per LITTLE Elizondo..  >Office hook-up with Baseline transmitted, serial # [id number]..  >Pending EOS

## 2022-12-02 ENCOUNTER — HOSPITAL ENCOUNTER (OUTPATIENT)
Dept: LAB | Facility: MEDICAL CENTER | Age: 62
End: 2022-12-02
Attending: INTERNAL MEDICINE
Payer: COMMERCIAL

## 2022-12-02 PROCEDURE — 84481 FREE ASSAY (FT-3): CPT

## 2022-12-02 PROCEDURE — 84443 ASSAY THYROID STIM HORMONE: CPT

## 2022-12-02 PROCEDURE — 36415 COLL VENOUS BLD VENIPUNCTURE: CPT

## 2022-12-02 PROCEDURE — 84439 ASSAY OF FREE THYROXINE: CPT

## 2022-12-03 LAB
T3FREE SERPL-MCNC: 2.49 PG/ML (ref 2–4.4)
T4 FREE SERPL-MCNC: 1.16 NG/DL (ref 0.93–1.7)
TSH SERPL DL<=0.005 MIU/L-ACNC: 0.57 UIU/ML (ref 0.38–5.33)

## 2022-12-16 NOTE — PROGRESS NOTES
Contacted pt and BioTel no EOS report, pt returned monitor. Error on Gigi end, report will be out today 12/16/22

## 2022-12-19 ENCOUNTER — TELEPHONE (OUTPATIENT)
Dept: CARDIOLOGY | Facility: MEDICAL CENTER | Age: 62
End: 2022-12-19
Payer: COMMERCIAL

## 2022-12-19 PROCEDURE — 93268 ECG RECORD/REVIEW: CPT | Performed by: INTERNAL MEDICINE

## 2022-12-24 ENCOUNTER — HOSPITAL ENCOUNTER (EMERGENCY)
Facility: MEDICAL CENTER | Age: 62
End: 2022-12-24
Attending: EMERGENCY MEDICINE
Payer: COMMERCIAL

## 2022-12-24 ENCOUNTER — APPOINTMENT (OUTPATIENT)
Dept: RADIOLOGY | Facility: MEDICAL CENTER | Age: 62
End: 2022-12-24
Attending: EMERGENCY MEDICINE
Payer: COMMERCIAL

## 2022-12-24 VITALS
WEIGHT: 228 LBS | RESPIRATION RATE: 18 BRPM | HEIGHT: 66 IN | OXYGEN SATURATION: 96 % | HEART RATE: 94 BPM | DIASTOLIC BLOOD PRESSURE: 73 MMHG | TEMPERATURE: 98 F | BODY MASS INDEX: 36.64 KG/M2 | SYSTOLIC BLOOD PRESSURE: 165 MMHG

## 2022-12-24 DIAGNOSIS — R10.9 ABDOMINAL PAIN OF UNKNOWN ETIOLOGY: ICD-10-CM

## 2022-12-24 LAB
ALBUMIN SERPL BCP-MCNC: 3.9 G/DL (ref 3.2–4.9)
ALBUMIN/GLOB SERPL: 0.8 G/DL
ALP SERPL-CCNC: 131 U/L (ref 30–99)
ALT SERPL-CCNC: 16 U/L (ref 2–50)
ANION GAP SERPL CALC-SCNC: 9 MMOL/L (ref 7–16)
APPEARANCE UR: CLEAR
AST SERPL-CCNC: 19 U/L (ref 12–45)
BASOPHILS # BLD AUTO: 0.4 % (ref 0–1.8)
BASOPHILS # BLD: 0.02 K/UL (ref 0–0.12)
BILIRUB SERPL-MCNC: 0.2 MG/DL (ref 0.1–1.5)
BILIRUB UR QL STRIP.AUTO: NEGATIVE
BUN SERPL-MCNC: 15 MG/DL (ref 8–22)
CALCIUM ALBUM COR SERPL-MCNC: 9.7 MG/DL (ref 8.5–10.5)
CALCIUM SERPL-MCNC: 9.6 MG/DL (ref 8.5–10.5)
CHLORIDE SERPL-SCNC: 100 MMOL/L (ref 96–112)
CO2 SERPL-SCNC: 27 MMOL/L (ref 20–33)
COLOR UR: YELLOW
CREAT SERPL-MCNC: 0.66 MG/DL (ref 0.5–1.4)
EKG IMPRESSION: NORMAL
EOSINOPHIL # BLD AUTO: 0.04 K/UL (ref 0–0.51)
EOSINOPHIL NFR BLD: 0.8 % (ref 0–6.9)
ERYTHROCYTE [DISTWIDTH] IN BLOOD BY AUTOMATED COUNT: 46.9 FL (ref 35.9–50)
FLUAV RNA SPEC QL NAA+PROBE: NEGATIVE
FLUBV RNA SPEC QL NAA+PROBE: NEGATIVE
GFR SERPLBLD CREATININE-BSD FMLA CKD-EPI: 99 ML/MIN/1.73 M 2
GLOBULIN SER CALC-MCNC: 4.7 G/DL (ref 1.9–3.5)
GLUCOSE SERPL-MCNC: 97 MG/DL (ref 65–99)
GLUCOSE UR STRIP.AUTO-MCNC: NEGATIVE MG/DL
HCT VFR BLD AUTO: 39.7 % (ref 37–47)
HGB BLD-MCNC: 12.4 G/DL (ref 12–16)
IMM GRANULOCYTES # BLD AUTO: 0.02 K/UL (ref 0–0.11)
IMM GRANULOCYTES NFR BLD AUTO: 0.4 % (ref 0–0.9)
KETONES UR STRIP.AUTO-MCNC: NEGATIVE MG/DL
LEUKOCYTE ESTERASE UR QL STRIP.AUTO: NEGATIVE
LIPASE SERPL-CCNC: 28 U/L (ref 11–82)
LYMPHOCYTES # BLD AUTO: 1.45 K/UL (ref 1–4.8)
LYMPHOCYTES NFR BLD: 28.4 % (ref 22–41)
MCH RBC QN AUTO: 27.3 PG (ref 27–33)
MCHC RBC AUTO-ENTMCNC: 31.2 G/DL (ref 33.6–35)
MCV RBC AUTO: 87.3 FL (ref 81.4–97.8)
MICRO URNS: NORMAL
MONOCYTES # BLD AUTO: 0.53 K/UL (ref 0–0.85)
MONOCYTES NFR BLD AUTO: 10.4 % (ref 0–13.4)
NEUTROPHILS # BLD AUTO: 3.04 K/UL (ref 2–7.15)
NEUTROPHILS NFR BLD: 59.6 % (ref 44–72)
NITRITE UR QL STRIP.AUTO: NEGATIVE
NRBC # BLD AUTO: 0 K/UL
NRBC BLD-RTO: 0 /100 WBC
PH UR STRIP.AUTO: 6 [PH] (ref 5–8)
PLATELET # BLD AUTO: 269 K/UL (ref 164–446)
PMV BLD AUTO: 9.2 FL (ref 9–12.9)
POTASSIUM SERPL-SCNC: 3.7 MMOL/L (ref 3.6–5.5)
PROT SERPL-MCNC: 8.6 G/DL (ref 6–8.2)
PROT UR QL STRIP: NEGATIVE MG/DL
RBC # BLD AUTO: 4.55 M/UL (ref 4.2–5.4)
RBC UR QL AUTO: NEGATIVE
RSV RNA SPEC QL NAA+PROBE: NEGATIVE
SARS-COV-2 RNA RESP QL NAA+PROBE: DETECTED
SODIUM SERPL-SCNC: 136 MMOL/L (ref 135–145)
SP GR UR STRIP.AUTO: 1.02
SPECIMEN SOURCE: ABNORMAL
UROBILINOGEN UR STRIP.AUTO-MCNC: 0.2 MG/DL
WBC # BLD AUTO: 5.1 K/UL (ref 4.8–10.8)

## 2022-12-24 PROCEDURE — C9803 HOPD COVID-19 SPEC COLLECT: HCPCS | Performed by: EMERGENCY MEDICINE

## 2022-12-24 PROCEDURE — 93005 ELECTROCARDIOGRAM TRACING: CPT

## 2022-12-24 PROCEDURE — 80053 COMPREHEN METABOLIC PANEL: CPT

## 2022-12-24 PROCEDURE — 0241U HCHG SARS-COV-2 COVID-19 NFCT DS RESP RNA 4 TRGT MIC: CPT

## 2022-12-24 PROCEDURE — 83690 ASSAY OF LIPASE: CPT

## 2022-12-24 PROCEDURE — 71045 X-RAY EXAM CHEST 1 VIEW: CPT

## 2022-12-24 PROCEDURE — 93005 ELECTROCARDIOGRAM TRACING: CPT | Performed by: EMERGENCY MEDICINE

## 2022-12-24 PROCEDURE — 74177 CT ABD & PELVIS W/CONTRAST: CPT

## 2022-12-24 PROCEDURE — 85025 COMPLETE CBC W/AUTO DIFF WBC: CPT

## 2022-12-24 PROCEDURE — 700111 HCHG RX REV CODE 636 W/ 250 OVERRIDE (IP): Performed by: EMERGENCY MEDICINE

## 2022-12-24 PROCEDURE — 96374 THER/PROPH/DIAG INJ IV PUSH: CPT

## 2022-12-24 PROCEDURE — 36415 COLL VENOUS BLD VENIPUNCTURE: CPT

## 2022-12-24 PROCEDURE — 700117 HCHG RX CONTRAST REV CODE 255: Performed by: EMERGENCY MEDICINE

## 2022-12-24 PROCEDURE — 81003 URINALYSIS AUTO W/O SCOPE: CPT

## 2022-12-24 PROCEDURE — 99285 EMERGENCY DEPT VISIT HI MDM: CPT

## 2022-12-24 PROCEDURE — 96375 TX/PRO/DX INJ NEW DRUG ADDON: CPT

## 2022-12-24 PROCEDURE — A9270 NON-COVERED ITEM OR SERVICE: HCPCS | Performed by: EMERGENCY MEDICINE

## 2022-12-24 PROCEDURE — 700105 HCHG RX REV CODE 258: Performed by: EMERGENCY MEDICINE

## 2022-12-24 PROCEDURE — 700102 HCHG RX REV CODE 250 W/ 637 OVERRIDE(OP): Performed by: EMERGENCY MEDICINE

## 2022-12-24 RX ORDER — DEXAMETHASONE SODIUM PHOSPHATE 4 MG/ML
10 INJECTION, SOLUTION INTRA-ARTICULAR; INTRALESIONAL; INTRAMUSCULAR; INTRAVENOUS; SOFT TISSUE ONCE
Status: COMPLETED | OUTPATIENT
Start: 2022-12-24 | End: 2022-12-24

## 2022-12-24 RX ORDER — ONDANSETRON 2 MG/ML
4 INJECTION INTRAMUSCULAR; INTRAVENOUS ONCE
Status: COMPLETED | OUTPATIENT
Start: 2022-12-24 | End: 2022-12-24

## 2022-12-24 RX ORDER — POLYETHYLENE GLYCOL 3350 17 G/17G
1 POWDER, FOR SOLUTION ORAL ONCE
Status: COMPLETED | OUTPATIENT
Start: 2022-12-24 | End: 2022-12-24

## 2022-12-24 RX ORDER — DIPHENHYDRAMINE HYDROCHLORIDE 50 MG/ML
25 INJECTION INTRAMUSCULAR; INTRAVENOUS ONCE
Status: COMPLETED | OUTPATIENT
Start: 2022-12-24 | End: 2022-12-24

## 2022-12-24 RX ORDER — SODIUM CHLORIDE, SODIUM LACTATE, POTASSIUM CHLORIDE, CALCIUM CHLORIDE 600; 310; 30; 20 MG/100ML; MG/100ML; MG/100ML; MG/100ML
1000 INJECTION, SOLUTION INTRAVENOUS ONCE
Status: COMPLETED | OUTPATIENT
Start: 2022-12-24 | End: 2022-12-24

## 2022-12-24 RX ADMIN — ONDANSETRON 4 MG: 2 INJECTION INTRAMUSCULAR; INTRAVENOUS at 18:22

## 2022-12-24 RX ADMIN — DEXAMETHASONE SODIUM PHOSPHATE 10 MG: 4 INJECTION, SOLUTION INTRA-ARTICULAR; INTRALESIONAL; INTRAMUSCULAR; INTRAVENOUS; SOFT TISSUE at 18:20

## 2022-12-24 RX ADMIN — FENTANYL CITRATE 50 MCG: 50 INJECTION, SOLUTION INTRAMUSCULAR; INTRAVENOUS at 18:23

## 2022-12-24 RX ADMIN — POLYETHYLENE GLYCOL 3350 1 PACKET: 17 POWDER, FOR SOLUTION ORAL at 20:17

## 2022-12-24 RX ADMIN — DIPHENHYDRAMINE HYDROCHLORIDE 25 MG: 50 INJECTION INTRAMUSCULAR; INTRAVENOUS at 18:22

## 2022-12-24 RX ADMIN — SODIUM CHLORIDE, POTASSIUM CHLORIDE, SODIUM LACTATE AND CALCIUM CHLORIDE 1000 ML: 600; 310; 30; 20 INJECTION, SOLUTION INTRAVENOUS at 18:29

## 2022-12-24 RX ADMIN — IOHEXOL 2.5 ML: 350 INJECTION, SOLUTION INTRAVENOUS at 19:45

## 2022-12-24 ASSESSMENT — PAIN DESCRIPTION - PAIN TYPE
TYPE: ACUTE PAIN
TYPE: ACUTE PAIN

## 2022-12-24 ASSESSMENT — LIFESTYLE VARIABLES: DO YOU DRINK ALCOHOL: NO

## 2022-12-24 ASSESSMENT — FIBROSIS 4 INDEX: FIB4 SCORE: 1.12

## 2022-12-25 NOTE — DISCHARGE INSTRUCTIONS
If you have any symptoms tomorrow morning or have recurrent new or worsening symptoms return here for recheck.  You may try additional doses of over-the-counter MiraLAX if needed for constipation.  Call your doctor first thing Monday morning and arrange office recheck during the week

## 2022-12-25 NOTE — ED TRIAGE NOTES
Chief Complaint   Patient presents with    Abdominal Pain     LLQ pain radiating to back this evening.     Patient BIB EMS from home with above complaints.  Patient A&O.    PTA Zofran 4 mg PO administered  by EMS.  EKG in process.     Chart up for ERP.

## 2022-12-25 NOTE — ED PROVIDER NOTES
ED Provider Note    CHIEF COMPLAINT  Chief Complaint   Patient presents with    Abdominal Pain     LLQ pain radiating to back this evening.     HPI  Eunice Mckenna is a 62 y.o. female who presents to the emergency department complaining of nausea vomiting and left lower quadrant abdominal pain.  The patient says that symptoms began about an hour ago, throughout the day today however she has been constipated she feels like she has to have a bowel movement but is only passed small amounts of stool.  Initially the pain was sharp and in the left low back area but now it seems to be more in the left lower quadrant.  She does not recognize any specific precipitating events or exacerbating or alleviating factors.  The patient tested positive for COVID 10 days ago and would like to be retested.    Review of systems:  No chest pain no hemoptysis no shortness of breath no pain or discomfort with urination.  The patient's last bowel movement was yesterday.  All other systems negative    PAST MEDICAL HISTORY   has a past medical history of Abnormal EKG - possible inferior infarct seen from 2014 (01/2021), Allergy, Anemia, Anesthesia, Anxiety (4/15/2021), Arrhythmia, Arthritis, Colorectal cancer (HCC) (04/2011), Dyslipidemia, GERD (gastroesophageal reflux disease), Graves disease, History of anemia (02/2020), Hyperthyroidism (12/18/2018), Migraine, Myocardial infarct ([facility]), NSVT (nonsustained ventricular tachycardia) ([facility]) - on BIOTEL 2/2021 (03/03/2021), Pain (2020), Palpitations, and Snoring (02/2020).    SURGICAL HISTORY   has a past surgical history that includes gastric banding laparoscopic (2005); joint injection diagnostic (3/8/2013); other abdominal surgery; knee arthroscopy (3/8/2013); meniscectomy, knee, medial (3/8/2013); thyroidectomy total (Bilateral, 1/8/2020); hernia repair; abdominal hysterectomy total (2003); thyroidectomy (01/08/2020); knee scope,diagnostic (Right, 3/5/2020); meniscectomy, knee, medial  (Right, 3/5/2020); joint injection diagnostic (Left, 3/5/2020); lap, rose mary restrict proc, longitudinal gas* (N/A, 7/28/2020); and gastroscopy (N/A, 9/10/2020).    FAMILY HISTORY  Family History   Problem Relation Age of Onset    Stroke Father         68yo    Hypertension Father     Heart Disease Father         70 yo    Cancer Maternal Grandfather         Lung    Diabetes Paternal Grandmother     Hypertension Paternal Grandmother     Stroke Paternal Grandmother         70 s    Psychiatric Illness Sister         Schizophrenia    Psychiatric Illness Brother         Schizophrenia    Cancer Paternal Aunt         Bone, liver    Psychiatric Illness Mother         dental / mental illness     Lung Disease Mother         Copd     Psychiatric Illness Sister         schizoprenia     Diabetes Sister     Hypertension Sister     Psychiatric Illness Brother     Heart Disease Brother     Hypertension Brother     Hypertension Brother     Diabetes Brother        SOCIAL HISTORY  Social History     Tobacco Use    Smoking status: Never    Smokeless tobacco: Never    Tobacco comments:     significant 2nd hand exposure    Vaping Use    Vaping Use: Never used   Substance and Sexual Activity    Alcohol use: No    Drug use: No    Sexual activity: Yes     Partners: Female       CURRENT MEDICATIONS  Home Medications       Reviewed by Priscilla Samayoa R.N. (Registered Nurse) on 12/24/22 at 1740  Med List Status: Partial     Medication Last Dose Status   Ascorbic Acid (VITAMIN C) 100 MG Chew Tab  Active   Cholecalciferol (VITAMIN D3) 50 MCG (2000 UT) Tab  Active   cyanocobalamin (VITAMIN B-12) 100 MCG Tab  Active   estradiol (ESTRACE) 1 MG Tab  Active   Multiple Vitamins-Minerals (MULTIVITAMIN WOMEN PO)  Active   SYNTHROID 100 MCG Tab  Active   Thiamine HCl (B-1) 100 MG Tab  Active   Zinc 100 MG Tab  Active                    ALLERGIES  Allergies   Allergen Reactions    Iodine Itching    Morphine Itching     blisters    Shellfish Allergy Vomiting     "Reglan [Kdc:Yellow Dye+Ci Pigment Blue 63+Metoclopramide]        PHYSICAL EXAM  VITAL SIGNS: BP (!) 158/73   Pulse 90   Temp 36.8 °C (98.2 °F) (Temporal)   Resp 18   Ht 1.676 m (5' 6\")   Wt 103 kg (228 lb)   SpO2 97%   BMI 36.80 kg/m²    Constitutional: Awake lucid verbal nontoxic-appearing  Eyes: No erythema discharge or jaundice  Neck: Trachea midline no JVD  Cardiovascular: Regular rate and rhythm  Respiratory: Clear bilaterally with no apparent difficulty breathing  Abdomen: Moderately obese soft and nontender minimal if any tenderness with deep palpation of the left lower quadrant no rebound guarding or peritoneal findings  Musculoskeletal: No leg edema or calf tenderness  Neurologic: Awake lucid verbal moving all extremities without difficulty        DIAGNOSTIC STUDIES / PROCEDURES    LABS  CBC shows normal white blood cell count of 5.1 hemoglobin is adequate at 12.4 complete metabolic panel is unremarkable except for a slightly elevated alk phos of 131 lipase is normal at 28 urinalysis negative for nitrite leukocyte Estrace and blood.  The patient requested a COVID test and this was done and her test is still positive.    RADIOLOGY  CT-ABDOMEN-PELVIS WITH   Final Result      1.  No acute abnormalities are identified in the abdomen or pelvis.      DX-CHEST-PORTABLE (1 VIEW)   Final Result         No acute cardiac or pulmonary abnormality is identified.            COURSE & MEDICAL DECISION MAKING  In the emergency department an IV is established and the patient was placed on the cardiac monitor.  She received 50 mcg of intravenous fentanyl and also some Zofran and this has led to resolution of her symptoms.  I have reexamined the abdomen and it is completely soft and nontender and at this time she has a completely benign abdominal examination.  The patient is concerned about not having a bowel movement today and potentially constipation could be a contributor to her discomfort so she is started on " MiraLAX and given the first dose here in the emergency department.  Also the patient reported an iodine allergy causing a rash so before CT scanning she was given intravenous Decadron and Benadryl and there has been no evidence of adverse reaction to IV contrast at this time.  I have advised the patient and her family that the diagnosis tonight is abdominal pain of unknown etiology.  I think it is safe for her to go home if she is constipated I have recommended using over-the-counter MiraLAX.  The patient's COVID test is still positive so I have advised her she will need to continue quarantining at home until she has a negative test result.  The patient is to call her primary care doctor in the morning on Monday and arrange office recheck during the week and if she has any continuing symptoms or new or worsening symptoms she is to return here immediately in the morning for recheck    FINAL IMPRESSION  1. Abdominal pain of unknown etiology           Electronically signed by: Master Alvarado M.D., 12/24/2022 8:27 PM

## 2022-12-25 NOTE — ED NOTES
Patient and wife at beside educated on NPO status, verbalized understanding.   Medicated per ERP order, IVF infusing, see MAR. Patient awaiting imaging, no questions.

## 2022-12-25 NOTE — ED NOTES
Protocol initiated, PIV placed, blood sent to lab.    Patient reports Covid + 10 days ago - has not tested again since.     Appropriate precautions in place.

## 2023-01-19 ENCOUNTER — HOSPITAL ENCOUNTER (OUTPATIENT)
Dept: LAB | Facility: MEDICAL CENTER | Age: 63
End: 2023-01-19
Attending: INTERNAL MEDICINE
Payer: COMMERCIAL

## 2023-01-19 LAB
ESTRADIOL SERPL-MCNC: 49.3 PG/ML
FSH SERPL-ACNC: 17.8 MIU/ML
LH SERPL-ACNC: 20.8 IU/L

## 2023-01-19 PROCEDURE — 36415 COLL VENOUS BLD VENIPUNCTURE: CPT

## 2023-01-19 PROCEDURE — 84402 ASSAY OF FREE TESTOSTERONE: CPT

## 2023-01-19 PROCEDURE — 83002 ASSAY OF GONADOTROPIN (LH): CPT

## 2023-01-19 PROCEDURE — 82670 ASSAY OF TOTAL ESTRADIOL: CPT

## 2023-01-19 PROCEDURE — 84270 ASSAY OF SEX HORMONE GLOBUL: CPT

## 2023-01-19 PROCEDURE — 83001 ASSAY OF GONADOTROPIN (FSH): CPT

## 2023-01-19 PROCEDURE — 84403 ASSAY OF TOTAL TESTOSTERONE: CPT

## 2023-01-26 LAB
SHBG SERPL-SCNC: 101 NMOL/L (ref 17–125)
TESTOST FREE SERPL-MCNC: 2.6 PG/ML (ref 0.6–3.8)
TESTOST SERPL-MCNC: 33 NG/DL (ref 5–32)

## 2023-05-10 ENCOUNTER — HOSPITAL ENCOUNTER (OUTPATIENT)
Dept: LAB | Facility: MEDICAL CENTER | Age: 63
End: 2023-05-10
Attending: FAMILY MEDICINE
Payer: COMMERCIAL

## 2023-05-10 LAB
25(OH)D3 SERPL-MCNC: 65 NG/ML (ref 30–100)
BASOPHILS # BLD AUTO: 0.3 % (ref 0–1.8)
BASOPHILS # BLD: 0.01 K/UL (ref 0–0.12)
EOSINOPHIL # BLD AUTO: 0.04 K/UL (ref 0–0.51)
EOSINOPHIL NFR BLD: 1.1 % (ref 0–6.9)
ERYTHROCYTE [DISTWIDTH] IN BLOOD BY AUTOMATED COUNT: 47.3 FL (ref 35.9–50)
EST. AVERAGE GLUCOSE BLD GHB EST-MCNC: 111 MG/DL
FERRITIN SERPL-MCNC: 185 NG/ML (ref 10–291)
HBA1C MFR BLD: 5.5 % (ref 4–5.6)
HCT VFR BLD AUTO: 40.7 % (ref 37–47)
HGB BLD-MCNC: 12.8 G/DL (ref 12–16)
IMM GRANULOCYTES # BLD AUTO: 0.01 K/UL (ref 0–0.11)
IMM GRANULOCYTES NFR BLD AUTO: 0.3 % (ref 0–0.9)
LYMPHOCYTES # BLD AUTO: 1.3 K/UL (ref 1–4.8)
LYMPHOCYTES NFR BLD: 36 % (ref 22–41)
MCH RBC QN AUTO: 27.5 PG (ref 27–33)
MCHC RBC AUTO-ENTMCNC: 31.4 G/DL (ref 33.6–35)
MCV RBC AUTO: 87.5 FL (ref 81.4–97.8)
MONOCYTES # BLD AUTO: 0.52 K/UL (ref 0–0.85)
MONOCYTES NFR BLD AUTO: 14.4 % (ref 0–13.4)
NEUTROPHILS # BLD AUTO: 1.73 K/UL (ref 2–7.15)
NEUTROPHILS NFR BLD: 47.9 % (ref 44–72)
NRBC # BLD AUTO: 0 K/UL
NRBC BLD-RTO: 0 /100 WBC
PLATELET # BLD AUTO: 270 K/UL (ref 164–446)
PMV BLD AUTO: 9.7 FL (ref 9–12.9)
RBC # BLD AUTO: 4.65 M/UL (ref 4.2–5.4)
T3 SERPL-MCNC: 107 NG/DL (ref 60–181)
T4 FREE SERPL-MCNC: 1.03 NG/DL (ref 0.93–1.7)
TSH SERPL DL<=0.005 MIU/L-ACNC: 4.19 UIU/ML (ref 0.38–5.33)
WBC # BLD AUTO: 3.6 K/UL (ref 4.8–10.8)

## 2023-05-10 PROCEDURE — 82728 ASSAY OF FERRITIN: CPT

## 2023-05-10 PROCEDURE — 80053 COMPREHEN METABOLIC PANEL: CPT

## 2023-05-10 PROCEDURE — 36415 COLL VENOUS BLD VENIPUNCTURE: CPT

## 2023-05-10 PROCEDURE — 84480 ASSAY TRIIODOTHYRONINE (T3): CPT

## 2023-05-10 PROCEDURE — 84439 ASSAY OF FREE THYROXINE: CPT

## 2023-05-10 PROCEDURE — 80061 LIPID PANEL: CPT

## 2023-05-10 PROCEDURE — 85025 COMPLETE CBC W/AUTO DIFF WBC: CPT

## 2023-05-10 PROCEDURE — 82306 VITAMIN D 25 HYDROXY: CPT

## 2023-05-10 PROCEDURE — 84443 ASSAY THYROID STIM HORMONE: CPT

## 2023-05-10 PROCEDURE — 83036 HEMOGLOBIN GLYCOSYLATED A1C: CPT

## 2023-05-11 LAB
ALBUMIN SERPL BCP-MCNC: 3.8 G/DL (ref 3.2–4.9)
ALBUMIN/GLOB SERPL: 0.8 G/DL
ALP SERPL-CCNC: 127 U/L (ref 30–99)
ALT SERPL-CCNC: 12 U/L (ref 2–50)
ANION GAP SERPL CALC-SCNC: 13 MMOL/L (ref 7–16)
AST SERPL-CCNC: 20 U/L (ref 12–45)
BILIRUB SERPL-MCNC: 0.3 MG/DL (ref 0.1–1.5)
BUN SERPL-MCNC: 10 MG/DL (ref 8–22)
CALCIUM ALBUM COR SERPL-MCNC: 9.6 MG/DL (ref 8.5–10.5)
CALCIUM SERPL-MCNC: 9.4 MG/DL (ref 8.5–10.5)
CHLORIDE SERPL-SCNC: 104 MMOL/L (ref 96–112)
CHOLEST SERPL-MCNC: 194 MG/DL (ref 100–199)
CO2 SERPL-SCNC: 23 MMOL/L (ref 20–33)
CREAT SERPL-MCNC: 0.58 MG/DL (ref 0.5–1.4)
FASTING STATUS PATIENT QL REPORTED: NORMAL
GFR SERPLBLD CREATININE-BSD FMLA CKD-EPI: 102 ML/MIN/1.73 M 2
GLOBULIN SER CALC-MCNC: 4.7 G/DL (ref 1.9–3.5)
GLUCOSE SERPL-MCNC: 81 MG/DL (ref 65–99)
HDLC SERPL-MCNC: 87 MG/DL
LDLC SERPL CALC-MCNC: 98 MG/DL
POTASSIUM SERPL-SCNC: 4.4 MMOL/L (ref 3.6–5.5)
PROT SERPL-MCNC: 8.5 G/DL (ref 6–8.2)
SODIUM SERPL-SCNC: 140 MMOL/L (ref 135–145)
TRIGL SERPL-MCNC: 45 MG/DL (ref 0–149)

## 2023-05-19 ENCOUNTER — HOSPITAL ENCOUNTER (OUTPATIENT)
Dept: CARDIOLOGY | Facility: MEDICAL CENTER | Age: 63
End: 2023-05-19
Attending: FAMILY MEDICINE
Payer: COMMERCIAL

## 2023-05-19 DIAGNOSIS — R00.2 PALPITATIONS: ICD-10-CM

## 2023-05-19 LAB
LV EJECT FRACT  99904: 56
LV EJECT FRACT MOD 2C 99903: 61.49
LV EJECT FRACT MOD 4C 99902: 55.59
LV EJECT FRACT MOD BP 99901: 56.48

## 2023-05-19 PROCEDURE — 93306 TTE W/DOPPLER COMPLETE: CPT

## 2023-05-19 PROCEDURE — 93306 TTE W/DOPPLER COMPLETE: CPT | Mod: 26 | Performed by: INTERNAL MEDICINE

## 2023-06-06 ENCOUNTER — OFFICE VISIT (OUTPATIENT)
Dept: CARDIOLOGY | Facility: MEDICAL CENTER | Age: 63
End: 2023-06-06
Attending: NURSE PRACTITIONER
Payer: COMMERCIAL

## 2023-06-06 VITALS
HEIGHT: 66 IN | DIASTOLIC BLOOD PRESSURE: 78 MMHG | WEIGHT: 236 LBS | HEART RATE: 87 BPM | RESPIRATION RATE: 16 BRPM | BODY MASS INDEX: 37.93 KG/M2 | OXYGEN SATURATION: 97 % | SYSTOLIC BLOOD PRESSURE: 128 MMHG

## 2023-06-06 DIAGNOSIS — R00.2 PALPITATIONS: ICD-10-CM

## 2023-06-06 DIAGNOSIS — Z86.39 HISTORY OF GRAVES' DISEASE: ICD-10-CM

## 2023-06-06 DIAGNOSIS — I47.29 NSVT (NONSUSTAINED VENTRICULAR TACHYCARDIA) (HCC): Chronic | ICD-10-CM

## 2023-06-06 DIAGNOSIS — R94.31 ABNORMAL EKG: Chronic | ICD-10-CM

## 2023-06-06 PROCEDURE — 3078F DIAST BP <80 MM HG: CPT | Performed by: NURSE PRACTITIONER

## 2023-06-06 PROCEDURE — 3074F SYST BP LT 130 MM HG: CPT | Performed by: NURSE PRACTITIONER

## 2023-06-06 PROCEDURE — 99214 OFFICE O/P EST MOD 30 MIN: CPT | Performed by: NURSE PRACTITIONER

## 2023-06-06 PROCEDURE — 99212 OFFICE O/P EST SF 10 MIN: CPT | Performed by: NURSE PRACTITIONER

## 2023-06-06 ASSESSMENT — ENCOUNTER SYMPTOMS
FEVER: 0
PALPITATIONS: 1
ORTHOPNEA: 0
ABDOMINAL PAIN: 0
LOSS OF CONSCIOUSNESS: 0
BRUISES/BLEEDS EASILY: 0
DIZZINESS: 0
PND: 0
CHILLS: 0
MYALGIAS: 0
HEADACHES: 0
SHORTNESS OF BREATH: 0
INSOMNIA: 0
NAUSEA: 0
COUGH: 0

## 2023-06-06 ASSESSMENT — FIBROSIS 4 INDEX: FIB4 SCORE: 1.33

## 2023-06-06 NOTE — PROGRESS NOTES
"Chief Complaint   Patient presents with    Follow-Up    Palpitations    Abnormal EKG       Subjective     Eunice Mckenna is a 63 y.o. female who presents today for annual follow-up of palpitations and history of abnormal EKG.    Evita is a 63 year old female with history of abnormal EKG (2014) and palpitations, with normal echocardiogram in May 2023, and normal holter monitor, with brief 4 beat NSVT episodes and PACs/PVCs <1% of total time. She was given Propanolol to use PRN, but she does not use this, as it causes her to feel \"sluggish.\" She was last seen by LITTLE Bravo in December 2022 via TeleMedicine.     She is here today for annual follow-up. Generally, she is doing well. She is trying to walk 30-45 minutes every day, and she tolerates this well. No chest pain, pressure, tightness or discomfort. She does still occasionally gets some fluttering in heart, but it only last a few seconds (nonsustained). No shortness of breath, orthopnea or PND: no dizziness or syncope; no LE edema. BP is stable.    Past Medical History:   Diagnosis Date    Abnormal EKG - possible inferior infarct seen from 2014 05/2023    Echocardiogram with normal LV sizes, LVEF 55-60%. No valvular abnormalities.    Allergy     Anemia     Vaginal bleeding    Anesthesia     PONV    Anxiety     Arrhythmia     Arthritis     Knees    Colorectal cancer (HCC) 04/2011    Dr. Garcia, chemo and radiation.     Dyslipidemia     GERD (gastroesophageal reflux disease)     Graves disease     History of anemia 02/2020    Hyperthyroidism 12/18/2018    Migraine     Myocardial infarct (HCC)     \"Before 2014\"    NSVT (nonsustained ventricular tachycardia) ([facility]) - on BIOTEL 2/2021 03/03/2021    Pain 2020    Right knee    Palpitations     Snoring 02/2020    No sleep study     Past Surgical History:   Procedure Laterality Date    GASTROSCOPY N/A 9/10/2020    Procedure: GASTROSCOPY;  Surgeon: Joshua Hutchinson M.D.;  Location: SURGERY SAME DAY [facility];  " Service: Gastroenterology    DC LAP, ILEANA RESTRICT PROC, LONGITUDINAL GAS* N/A 7/28/2020    Procedure: GASTRECTOMY, SLEEVE, LAPAROSCOPIC;  Surgeon: John H Ganser, M.D.;  Location: SURGERY [facility];  Service: General    PB KNEE SCOPE,DIAGNOSTIC Right 3/5/2020    Procedure: ARTHROSCOPY, KNEE;  Surgeon: Abdias Madden M.D.;  Location: SURGERY [facility];  Service: Orthopedics    MENISCECTOMY, KNEE, MEDIAL Right 3/5/2020    Procedure: MENISCECTOMY, KNEE, MEDIAL - PARTIAL LATERAL,;  Surgeon: Abdias Madden M.D.;  Location: SURGERY [facility];  Service: Orthopedics    JOINT INJECTION DIAGNOSTIC Left 3/5/2020    Procedure: INJECTION, JOINT, DIAGNOSTIC;  Surgeon: Abdias Madden M.D.;  Location: [facility];  Service: Orthopedics    THYROIDECTOMY TOTAL Bilateral 1/8/2020    Procedure: THYROIDECTOMY, TOTAL- W/ NIMS;  Surgeon: Ebony Meza M.D.;  Location: SURGERY SAME DAY [facility];  Service: General    THYROIDECTOMY  01/08/2020    JOINT INJECTION DIAGNOSTIC  3/8/2013    Performed by Kenji Meek M.D. at [facility]    KNEE ARTHROSCOPY  3/8/2013    Performed by Kenji Meek M.D. at [facility]    MENISCECTOMY, KNEE, MEDIAL  3/8/2013    Performed by Kenji Meek M.D. at [facility]    GASTRIC BANDING LAPAROSCOPIC  2005    removal of lapband 2016    ABDOMINAL HYSTERECTOMY TOTAL  2003    For menorrhagia, no BSO    HERNIA REPAIR      child    OTHER ABDOMINAL SURGERY      lap band removal     Family History   Problem Relation Age of Onset    Stroke Father         68yo    Hypertension Father     Heart Disease Father         68 yo    Cancer Maternal Grandfather         Lung    Diabetes Paternal Grandmother     Hypertension Paternal Grandmother     Stroke Paternal Grandmother         70 s    Psychiatric Illness Sister         Schizophrenia    Psychiatric Illness Brother         Schizophrenia    Cancer Paternal Aunt         Bone,  liver    Psychiatric Illness Mother         dental / mental illness     Lung Disease Mother         Copd     Psychiatric Illness Sister         schizoprenia     Diabetes Sister     Hypertension Sister     Psychiatric Illness Brother     Heart Disease Brother     Hypertension Brother     Hypertension Brother     Diabetes Brother      Social History     Socioeconomic History    Marital status:      Spouse name: Not on file    Number of children: Not on file    Years of education: Not on file    Highest education level: Not on file   Occupational History    Not on file   Tobacco Use    Smoking status: Never    Smokeless tobacco: Never    Tobacco comments:     significant 2nd hand exposure    Vaping Use    Vaping Use: Never used   Substance and Sexual Activity    Alcohol use: No    Drug use: No    Sexual activity: Yes     Partners: Female   Other Topics Concern     Service No    Blood Transfusions No    Caffeine Concern No    Occupational Exposure No    Hobby Hazards No    Sleep Concern Yes    Stress Concern No    Weight Concern Yes    Special Diet No    Back Care No    Exercise Yes    Bike Helmet No    Seat Belt Yes    Self-Exams Yes   Social History Narrative    , no children.      Social Determinants of Health     Financial Resource Strain: Not on file   Food Insecurity: Not on file   Transportation Needs: Not on file   Physical Activity: Not on file   Stress: Not on file   Social Connections: Not on file   Intimate Partner Violence: Not on file   Housing Stability: Not on file     Allergies   Allergen Reactions    Iodine Itching    Morphine Itching     blisters    Shellfish Allergy Vomiting    Reglan [Kdc:Yellow Dye+Ci Pigment Blue 63+Metoclopramide]      Outpatient Encounter Medications as of 6/6/2023   Medication Sig Dispense Refill    Ascorbic Acid (VITAMIN C) 100 MG Chew Tab       SYNTHROID 100 MCG Tab TAKE 1 TABLET BY MOUTH EVERY DAY IN THE MORNING ON EMPTY STOMACH FOR 90 DAYS       "estradiol (ESTRACE) 1 MG Tab Take 1 mg by mouth every day.      cyanocobalamin (VITAMIN B-12) 100 MCG Tab Take 100 mcg by mouth every day.      Multiple Vitamins-Minerals (MULTIVITAMIN WOMEN PO) Take  by mouth.      Thiamine HCl (B-1) 100 MG Tab Take 100 mg by mouth every day.      Zinc 100 MG Tab Take 5 mg by mouth.      Cholecalciferol (VITAMIN D3) 50 MCG (2000 UT) Tab Take 50 mg by mouth every day.       No facility-administered encounter medications on file as of 6/6/2023.     Review of Systems   Constitutional:  Negative for chills and fever.   HENT:  Negative for congestion.    Respiratory:  Negative for cough and shortness of breath.    Cardiovascular:  Positive for palpitations. Negative for chest pain, orthopnea, leg swelling and PND.        Rare, nonsustained.   Gastrointestinal:  Negative for abdominal pain and nausea.   Musculoskeletal:  Positive for joint pain. Negative for myalgias.   Skin:  Negative for rash.   Neurological:  Negative for dizziness, loss of consciousness and headaches.   Endo/Heme/Allergies:  Does not bruise/bleed easily.   Psychiatric/Behavioral:  The patient does not have insomnia.               Objective     /78 (BP Location: Left arm, Patient Position: Sitting, BP Cuff Size: Adult)   Pulse 87   Resp 16   Ht 1.676 m (5' 6\")   Wt 107 kg (236 lb)   SpO2 97%   BMI 38.09 kg/m²     Physical Exam  Constitutional:       Appearance: She is well-developed.   HENT:      Head: Normocephalic.   Neck:      Vascular: No JVD.   Cardiovascular:      Rate and Rhythm: Normal rate and regular rhythm.      Heart sounds: Normal heart sounds.   Pulmonary:      Effort: Pulmonary effort is normal. No respiratory distress.      Breath sounds: Normal breath sounds. No wheezing or rales.   Abdominal:      General: Bowel sounds are normal. There is no distension.      Palpations: Abdomen is soft.      Tenderness: There is no abdominal tenderness.   Musculoskeletal:         General: Normal range of " motion.      Cervical back: Normal range of motion and neck supple.   Skin:     General: Skin is warm and dry.      Findings: No rash.   Neurological:      Mental Status: She is alert and oriented to person, place, and time.     CONCLUSIONS OF TTE OF 5/19/2023:  Compared to the prior study on 11/2/22, no significant changes  Normal transthoracic echocardiogram results  Normal estimated LVEF 55-60%    SUMMARY OF BIOTEL MONITOR OF 12/19/2022 (28 DAYS):  28 days of monitoring demonstrated sinus rhythm with rare ectopy.  Some symptoms correlated with PVCs.     CONCLUSIONS OF TTE OF 11/2/2022:  Compared to the images of the prior study 1/27/2021, there has been no significant change.   Normal left ventricular size and systolic function.  Left ventricular ejection fraction estimated to be 60%.  No hemodynamically significant valvular heart disease noted.  Estimated right ventricular systolic pressure 30 mmHg.    CONCLUSIONS OF ECHOCARDIOGRAM OF 1/27/2021:  Compared to the images of the prior study done 4/1/2014 - there is no significant change.  Normal transthoracic echocardiogram.      SUMMARY OF HOLTER MONITOR OF 3/3/2021 (24 days):  24 days of monitoring demonstrated sinus rhythm with rare ectopy including 4 beats of nonsustained VT.  Symptoms correlated with the episode of nonsustained VT as well as atrial ectopy.        Lab Results   Component Value Date/Time    CHOLSTRLTOT 194 05/10/2023 09:14 AM    LDL 98 05/10/2023 09:14 AM    HDL 87 05/10/2023 09:14 AM    TRIGLYCERIDE 45 05/10/2023 09:14 AM        Lab Results   Component Value Date/Time    SODIUM 140 05/10/2023 09:14 AM    POTASSIUM 4.4 05/10/2023 09:14 AM    CHLORIDE 104 05/10/2023 09:14 AM    CO2 23 05/10/2023 09:14 AM    GLUCOSE 81 05/10/2023 09:14 AM    BUN 10 05/10/2023 09:14 AM    CREATININE 0.58 05/10/2023 09:14 AM    CREATININE 0.77 12/29/2012 10:58 AM    BUNCREATRAT 14 12/14/2020 07:49 AM    BUNCREATRAT 14 12/29/2012 10:58 AM    GLOMRATE >59 08/18/2010  "08:26 AM      Lab Results   Component Value Date/Time    ASTSGOT 20 05/10/2023 09:14 AM    ALTSGPT 12 05/10/2023 09:14 AM       Lab Results   Component Value Date/Time    WBC 3.6 (L) 05/10/2023 09:14 AM    WBC 3.5 (L) 12/29/2012 10:58 AM    RBC 4.65 05/10/2023 09:14 AM    RBC 4.70 12/29/2012 10:58 AM    HEMOGLOBIN 12.8 05/10/2023 09:14 AM    HEMATOCRIT 40.7 05/10/2023 09:14 AM    MCV 87.5 05/10/2023 09:14 AM    MCV 80 12/29/2012 10:58 AM    MCH 27.5 05/10/2023 09:14 AM    MCH 26.6 12/29/2012 10:58 AM    MCHC 31.4 (L) 05/10/2023 09:14 AM    MPV 9.7 05/10/2023 09:14 AM           Assessment & Plan     1. Palpitations        2. NSVT (nonsustained ventricular tachycardia) (HCC) - on BIOTEL 2/2021        3. Abnormal EKG - possible inferior infarct seen from 2014        4. History of Graves' disease            Medical Decision Making: Today's Assessment/Status/Plan:      1. Palpitations, rare PACs/PVCs on BioTel monitor in December 2022. She has been offered beta blocker in the past, but has declined (\"makes her feel sluggish\"). Her symptoms are rare and nonsustained.    2. History of abnormal EKG in 2014, with normal echocardiogram in 2021, 2022 and 2023. Reviewed results with her and she is reassured.    3. History of Grave's disease, now on thyroid supplementation. Recent TSH was normal.    4. GERD, currently stable.    She is reassured regarding her BioTel and Echocardiogram results. No changes in medications. Keep working on lifestyle modifications. Follow-up annually, sooner if clinical condition changes.                  "

## 2023-06-15 ENCOUNTER — HOSPITAL ENCOUNTER (OUTPATIENT)
Dept: LAB | Facility: MEDICAL CENTER | Age: 63
End: 2023-06-15
Attending: INTERNAL MEDICINE
Payer: COMMERCIAL

## 2023-06-15 LAB
BASOPHILS # BLD AUTO: 0.3 % (ref 0–1.8)
BASOPHILS # BLD: 0.01 K/UL (ref 0–0.12)
EOSINOPHIL # BLD AUTO: 0.04 K/UL (ref 0–0.51)
EOSINOPHIL NFR BLD: 1.2 % (ref 0–6.9)
ERYTHROCYTE [DISTWIDTH] IN BLOOD BY AUTOMATED COUNT: 45.9 FL (ref 35.9–50)
ESTRADIOL SERPL-MCNC: 92.4 PG/ML
FSH SERPL-ACNC: 11.7 MIU/ML
HCT VFR BLD AUTO: 40.6 % (ref 37–47)
HGB BLD-MCNC: 12.8 G/DL (ref 12–16)
IMM GRANULOCYTES # BLD AUTO: 0.01 K/UL (ref 0–0.11)
IMM GRANULOCYTES NFR BLD AUTO: 0.3 % (ref 0–0.9)
LH SERPL-ACNC: 13.1 IU/L
LYMPHOCYTES # BLD AUTO: 1.29 K/UL (ref 1–4.8)
LYMPHOCYTES NFR BLD: 40.2 % (ref 22–41)
MCH RBC QN AUTO: 27.3 PG (ref 27–33)
MCHC RBC AUTO-ENTMCNC: 31.5 G/DL (ref 32.2–35.5)
MCV RBC AUTO: 86.6 FL (ref 81.4–97.8)
MONOCYTES # BLD AUTO: 0.56 K/UL (ref 0–0.85)
MONOCYTES NFR BLD AUTO: 17.4 % (ref 0–13.4)
NEUTROPHILS # BLD AUTO: 1.3 K/UL (ref 1.82–7.42)
NEUTROPHILS NFR BLD: 40.6 % (ref 44–72)
NRBC # BLD AUTO: 0 K/UL
NRBC BLD-RTO: 0 /100 WBC (ref 0–0.2)
PLATELET # BLD AUTO: 257 K/UL (ref 164–446)
PMV BLD AUTO: 9.3 FL (ref 9–12.9)
RBC # BLD AUTO: 4.69 M/UL (ref 4.2–5.4)
TESTOST SERPL-MCNC: 303 NG/DL (ref 9–75)
WBC # BLD AUTO: 3.2 K/UL (ref 4.8–10.8)

## 2023-06-15 PROCEDURE — 84270 ASSAY OF SEX HORMONE GLOBUL: CPT

## 2023-06-15 PROCEDURE — 36415 COLL VENOUS BLD VENIPUNCTURE: CPT

## 2023-06-15 PROCEDURE — 85025 COMPLETE CBC W/AUTO DIFF WBC: CPT

## 2023-06-15 PROCEDURE — 84403 ASSAY OF TOTAL TESTOSTERONE: CPT

## 2023-06-15 PROCEDURE — 82670 ASSAY OF TOTAL ESTRADIOL: CPT

## 2023-06-15 PROCEDURE — 83002 ASSAY OF GONADOTROPIN (LH): CPT

## 2023-06-15 PROCEDURE — 83001 ASSAY OF GONADOTROPIN (FSH): CPT

## 2023-06-17 LAB — SHBG SERPL-SCNC: 106 NMOL/L (ref 17–125)

## 2023-07-11 ENCOUNTER — HOSPITAL ENCOUNTER (OUTPATIENT)
Dept: RADIOLOGY | Facility: MEDICAL CENTER | Age: 63
End: 2023-07-11
Attending: FAMILY MEDICINE
Payer: COMMERCIAL

## 2023-07-11 DIAGNOSIS — Z12.31 VISIT FOR SCREENING MAMMOGRAM: ICD-10-CM

## 2023-07-11 PROCEDURE — 77063 BREAST TOMOSYNTHESIS BI: CPT

## 2023-07-31 ENCOUNTER — HOSPITAL ENCOUNTER (OUTPATIENT)
Dept: LAB | Facility: MEDICAL CENTER | Age: 63
End: 2023-07-31
Attending: INTERNAL MEDICINE
Payer: COMMERCIAL

## 2023-07-31 LAB
T3FREE SERPL-MCNC: 3.02 PG/ML (ref 2–4.4)
T4 FREE SERPL-MCNC: 1.42 NG/DL (ref 0.93–1.7)
TSH SERPL DL<=0.005 MIU/L-ACNC: 0.1 UIU/ML (ref 0.38–5.33)

## 2023-07-31 PROCEDURE — 84481 FREE ASSAY (FT-3): CPT

## 2023-07-31 PROCEDURE — 84443 ASSAY THYROID STIM HORMONE: CPT

## 2023-07-31 PROCEDURE — 36415 COLL VENOUS BLD VENIPUNCTURE: CPT

## 2023-07-31 PROCEDURE — 84439 ASSAY OF FREE THYROXINE: CPT

## 2023-10-12 ENCOUNTER — HOSPITAL ENCOUNTER (OUTPATIENT)
Dept: LAB | Facility: MEDICAL CENTER | Age: 63
End: 2023-10-12
Attending: PHYSICIAN ASSISTANT
Payer: COMMERCIAL

## 2023-10-12 LAB
BASOPHILS # BLD AUTO: 0.3 % (ref 0–1.8)
BASOPHILS # BLD: 0.01 K/UL (ref 0–0.12)
EOSINOPHIL # BLD AUTO: 0.03 K/UL (ref 0–0.51)
EOSINOPHIL NFR BLD: 0.8 % (ref 0–6.9)
ERYTHROCYTE [DISTWIDTH] IN BLOOD BY AUTOMATED COUNT: 44.4 FL (ref 35.9–50)
ESTRADIOL SERPL-MCNC: 88.9 PG/ML
FSH SERPL-ACNC: 10.2 MIU/ML
HCT VFR BLD AUTO: 41.3 % (ref 37–47)
HGB BLD-MCNC: 13 G/DL (ref 12–16)
IMM GRANULOCYTES # BLD AUTO: 0 K/UL (ref 0–0.11)
IMM GRANULOCYTES NFR BLD AUTO: 0 % (ref 0–0.9)
LH SERPL-ACNC: 13.3 IU/L
LYMPHOCYTES # BLD AUTO: 1.41 K/UL (ref 1–4.8)
LYMPHOCYTES NFR BLD: 38 % (ref 22–41)
MCH RBC QN AUTO: 26.7 PG (ref 27–33)
MCHC RBC AUTO-ENTMCNC: 31.5 G/DL (ref 32.2–35.5)
MCV RBC AUTO: 84.8 FL (ref 81.4–97.8)
MONOCYTES # BLD AUTO: 0.58 K/UL (ref 0–0.85)
MONOCYTES NFR BLD AUTO: 15.6 % (ref 0–13.4)
NEUTROPHILS # BLD AUTO: 1.68 K/UL (ref 1.82–7.42)
NEUTROPHILS NFR BLD: 45.3 % (ref 44–72)
NRBC # BLD AUTO: 0 K/UL
NRBC BLD-RTO: 0 /100 WBC (ref 0–0.2)
PLATELET # BLD AUTO: 283 K/UL (ref 164–446)
PMV BLD AUTO: 9.3 FL (ref 9–12.9)
RBC # BLD AUTO: 4.87 M/UL (ref 4.2–5.4)
TESTOST SERPL-MCNC: 358 NG/DL (ref 9–75)
WBC # BLD AUTO: 3.7 K/UL (ref 4.8–10.8)

## 2023-10-12 PROCEDURE — 82670 ASSAY OF TOTAL ESTRADIOL: CPT

## 2023-10-12 PROCEDURE — 83002 ASSAY OF GONADOTROPIN (LH): CPT

## 2023-10-12 PROCEDURE — 84270 ASSAY OF SEX HORMONE GLOBUL: CPT

## 2023-10-12 PROCEDURE — 84403 ASSAY OF TOTAL TESTOSTERONE: CPT

## 2023-10-12 PROCEDURE — 85025 COMPLETE CBC W/AUTO DIFF WBC: CPT

## 2023-10-12 PROCEDURE — 36415 COLL VENOUS BLD VENIPUNCTURE: CPT

## 2023-10-12 PROCEDURE — 83001 ASSAY OF GONADOTROPIN (FSH): CPT

## 2023-10-15 LAB — SHBG SERPL-SCNC: 105 NMOL/L (ref 17–125)

## 2023-12-08 ENCOUNTER — HOSPITAL ENCOUNTER (OUTPATIENT)
Dept: LAB | Facility: MEDICAL CENTER | Age: 63
End: 2023-12-08
Attending: FAMILY MEDICINE
Payer: COMMERCIAL

## 2023-12-08 LAB
25(OH)D3 SERPL-MCNC: 73 NG/ML (ref 30–100)
ALBUMIN SERPL BCP-MCNC: 3.8 G/DL (ref 3.2–4.9)
ALBUMIN/GLOB SERPL: 0.7 G/DL
ALP SERPL-CCNC: 140 U/L (ref 30–99)
ALT SERPL-CCNC: 6 U/L (ref 2–50)
ANION GAP SERPL CALC-SCNC: 10 MMOL/L (ref 7–16)
AST SERPL-CCNC: 10 U/L (ref 12–45)
BASOPHILS # BLD AUTO: 0.3 % (ref 0–1.8)
BASOPHILS # BLD: 0.01 K/UL (ref 0–0.12)
BILIRUB SERPL-MCNC: 0.4 MG/DL (ref 0.1–1.5)
BUN SERPL-MCNC: 10 MG/DL (ref 8–22)
CALCIUM ALBUM COR SERPL-MCNC: 9.9 MG/DL (ref 8.5–10.5)
CALCIUM SERPL-MCNC: 9.7 MG/DL (ref 8.5–10.5)
CHLORIDE SERPL-SCNC: 101 MMOL/L (ref 96–112)
CHOLEST SERPL-MCNC: 180 MG/DL (ref 100–199)
CO2 SERPL-SCNC: 25 MMOL/L (ref 20–33)
CREAT SERPL-MCNC: 0.51 MG/DL (ref 0.5–1.4)
EOSINOPHIL # BLD AUTO: 0.05 K/UL (ref 0–0.51)
EOSINOPHIL NFR BLD: 1.4 % (ref 0–6.9)
ERYTHROCYTE [DISTWIDTH] IN BLOOD BY AUTOMATED COUNT: 44.8 FL (ref 35.9–50)
EST. AVERAGE GLUCOSE BLD GHB EST-MCNC: 126 MG/DL
FASTING STATUS PATIENT QL REPORTED: NORMAL
GFR SERPLBLD CREATININE-BSD FMLA CKD-EPI: 105 ML/MIN/1.73 M 2
GLOBULIN SER CALC-MCNC: 5.1 G/DL (ref 1.9–3.5)
GLUCOSE SERPL-MCNC: 85 MG/DL (ref 65–99)
HBA1C MFR BLD: 6 % (ref 4–5.6)
HCT VFR BLD AUTO: 41.7 % (ref 37–47)
HDLC SERPL-MCNC: 67 MG/DL
HGB BLD-MCNC: 13.1 G/DL (ref 12–16)
IMM GRANULOCYTES # BLD AUTO: 0.01 K/UL (ref 0–0.11)
IMM GRANULOCYTES NFR BLD AUTO: 0.3 % (ref 0–0.9)
LDLC SERPL CALC-MCNC: 104 MG/DL
LYMPHOCYTES # BLD AUTO: 1.18 K/UL (ref 1–4.8)
LYMPHOCYTES NFR BLD: 33.9 % (ref 22–41)
MCH RBC QN AUTO: 26.9 PG (ref 27–33)
MCHC RBC AUTO-ENTMCNC: 31.4 G/DL (ref 32.2–35.5)
MCV RBC AUTO: 85.6 FL (ref 81.4–97.8)
MONOCYTES # BLD AUTO: 0.44 K/UL (ref 0–0.85)
MONOCYTES NFR BLD AUTO: 12.6 % (ref 0–13.4)
NEUTROPHILS # BLD AUTO: 1.79 K/UL (ref 1.82–7.42)
NEUTROPHILS NFR BLD: 51.5 % (ref 44–72)
NRBC # BLD AUTO: 0 K/UL
NRBC BLD-RTO: 0 /100 WBC (ref 0–0.2)
PLATELET # BLD AUTO: 288 K/UL (ref 164–446)
PMV BLD AUTO: 9.6 FL (ref 9–12.9)
POTASSIUM SERPL-SCNC: 3.8 MMOL/L (ref 3.6–5.5)
PROT SERPL-MCNC: 8.9 G/DL (ref 6–8.2)
RBC # BLD AUTO: 4.87 M/UL (ref 4.2–5.4)
SODIUM SERPL-SCNC: 136 MMOL/L (ref 135–145)
T3 SERPL-MCNC: 145 NG/DL (ref 60–181)
T4 FREE SERPL-MCNC: 1.52 NG/DL (ref 0.93–1.7)
TRIGL SERPL-MCNC: 46 MG/DL (ref 0–149)
TSH SERPL DL<=0.005 MIU/L-ACNC: 0.05 UIU/ML (ref 0.38–5.33)
VIT B12 SERPL-MCNC: 1771 PG/ML (ref 211–911)
WBC # BLD AUTO: 3.5 K/UL (ref 4.8–10.8)

## 2023-12-08 PROCEDURE — 80061 LIPID PANEL: CPT

## 2023-12-08 PROCEDURE — 82607 VITAMIN B-12: CPT

## 2023-12-08 PROCEDURE — 82306 VITAMIN D 25 HYDROXY: CPT

## 2023-12-08 PROCEDURE — 84439 ASSAY OF FREE THYROXINE: CPT

## 2023-12-08 PROCEDURE — 36415 COLL VENOUS BLD VENIPUNCTURE: CPT

## 2023-12-08 PROCEDURE — 85025 COMPLETE CBC W/AUTO DIFF WBC: CPT

## 2023-12-08 PROCEDURE — 83036 HEMOGLOBIN GLYCOSYLATED A1C: CPT

## 2023-12-08 PROCEDURE — 84480 ASSAY TRIIODOTHYRONINE (T3): CPT

## 2023-12-08 PROCEDURE — 84443 ASSAY THYROID STIM HORMONE: CPT

## 2023-12-08 PROCEDURE — 80053 COMPREHEN METABOLIC PANEL: CPT

## 2024-02-22 ENCOUNTER — APPOINTMENT (OUTPATIENT)
Dept: URGENT CARE | Facility: PHYSICIAN GROUP | Age: 64
End: 2024-02-22
Payer: COMMERCIAL

## 2024-03-05 ENCOUNTER — HOSPITAL ENCOUNTER (OUTPATIENT)
Dept: LAB | Facility: MEDICAL CENTER | Age: 64
End: 2024-03-05
Attending: PHYSICIAN ASSISTANT
Payer: COMMERCIAL

## 2024-03-05 LAB
BASOPHILS # BLD AUTO: 0 % (ref 0–1.8)
BASOPHILS # BLD: 0 K/UL (ref 0–0.12)
EOSINOPHIL # BLD AUTO: 0.04 K/UL (ref 0–0.51)
EOSINOPHIL NFR BLD: 1.1 % (ref 0–6.9)
ERYTHROCYTE [DISTWIDTH] IN BLOOD BY AUTOMATED COUNT: 45.4 FL (ref 35.9–50)
ESTRADIOL SERPL-MCNC: 93.4 PG/ML
FSH SERPL-ACNC: 14.4 MIU/ML
HCT VFR BLD AUTO: 39.7 % (ref 37–47)
HGB BLD-MCNC: 12.3 G/DL (ref 12–16)
IMM GRANULOCYTES # BLD AUTO: 0.01 K/UL (ref 0–0.11)
IMM GRANULOCYTES NFR BLD AUTO: 0.3 % (ref 0–0.9)
LH SERPL-ACNC: 16.7 IU/L
LYMPHOCYTES # BLD AUTO: 1.23 K/UL (ref 1–4.8)
LYMPHOCYTES NFR BLD: 33.6 % (ref 22–41)
MCH RBC QN AUTO: 26.6 PG (ref 27–33)
MCHC RBC AUTO-ENTMCNC: 31 G/DL (ref 32.2–35.5)
MCV RBC AUTO: 85.9 FL (ref 81.4–97.8)
MONOCYTES # BLD AUTO: 0.62 K/UL (ref 0–0.85)
MONOCYTES NFR BLD AUTO: 16.9 % (ref 0–13.4)
NEUTROPHILS # BLD AUTO: 1.76 K/UL (ref 1.82–7.42)
NEUTROPHILS NFR BLD: 48.1 % (ref 44–72)
NRBC # BLD AUTO: 0 K/UL
NRBC BLD-RTO: 0 /100 WBC (ref 0–0.2)
PLATELET # BLD AUTO: 260 K/UL (ref 164–446)
PMV BLD AUTO: 9.5 FL (ref 9–12.9)
RBC # BLD AUTO: 4.62 M/UL (ref 4.2–5.4)
TESTOST SERPL-MCNC: 356 NG/DL (ref 9–75)
WBC # BLD AUTO: 3.7 K/UL (ref 4.8–10.8)

## 2024-03-05 PROCEDURE — 84403 ASSAY OF TOTAL TESTOSTERONE: CPT

## 2024-03-05 PROCEDURE — 83001 ASSAY OF GONADOTROPIN (FSH): CPT

## 2024-03-05 PROCEDURE — 83002 ASSAY OF GONADOTROPIN (LH): CPT

## 2024-03-05 PROCEDURE — 36415 COLL VENOUS BLD VENIPUNCTURE: CPT

## 2024-03-05 PROCEDURE — 84270 ASSAY OF SEX HORMONE GLOBUL: CPT

## 2024-03-05 PROCEDURE — 85025 COMPLETE CBC W/AUTO DIFF WBC: CPT

## 2024-03-05 PROCEDURE — 82670 ASSAY OF TOTAL ESTRADIOL: CPT

## 2024-03-07 LAB — SHBG SERPL-SCNC: 135 NMOL/L (ref 17–125)

## 2024-03-11 ENCOUNTER — HOSPITAL ENCOUNTER (OUTPATIENT)
Dept: LAB | Facility: MEDICAL CENTER | Age: 64
End: 2024-03-11
Attending: FAMILY MEDICINE
Payer: COMMERCIAL

## 2024-03-11 LAB — D DIMER PPP IA.FEU-MCNC: 0.38 UG/ML (FEU) (ref 0–0.5)

## 2024-03-11 PROCEDURE — 36415 COLL VENOUS BLD VENIPUNCTURE: CPT

## 2024-03-11 PROCEDURE — 85379 FIBRIN DEGRADATION QUANT: CPT

## 2024-03-14 ENCOUNTER — HOSPITAL ENCOUNTER (OUTPATIENT)
Dept: RADIOLOGY | Facility: MEDICAL CENTER | Age: 64
End: 2024-03-14
Attending: PHYSICIAN ASSISTANT
Payer: COMMERCIAL

## 2024-03-14 DIAGNOSIS — M54.9 MID BACK PAIN: ICD-10-CM

## 2024-03-14 PROCEDURE — 72070 X-RAY EXAM THORAC SPINE 2VWS: CPT

## 2024-04-22 ENCOUNTER — HOSPITAL ENCOUNTER (OUTPATIENT)
Dept: RADIOLOGY | Facility: MEDICAL CENTER | Age: 64
End: 2024-04-22
Attending: FAMILY MEDICINE
Payer: COMMERCIAL

## 2024-04-22 DIAGNOSIS — M54.6 PAIN IN THORACIC SPINE: ICD-10-CM

## 2024-04-22 DIAGNOSIS — Z85.9 HISTORY OF MALIGNANT NEOPLASM: ICD-10-CM

## 2024-04-22 PROCEDURE — 72146 MRI CHEST SPINE W/O DYE: CPT

## 2024-05-16 ENCOUNTER — HOSPITAL ENCOUNTER (OUTPATIENT)
Dept: LAB | Facility: MEDICAL CENTER | Age: 64
End: 2024-05-16
Attending: NURSE PRACTITIONER
Payer: COMMERCIAL

## 2024-05-16 LAB — ESTRADIOL SERPL-MCNC: 184 PG/ML

## 2024-05-23 LAB
SHBG SERPL-SCNC: 140 NMOL/L (ref 17–125)
TESTOST FREE SERPL-MCNC: 7.9 PG/ML (ref 0.6–3.8)
TESTOST SERPL-MCNC: 130 NG/DL (ref 5–32)

## 2024-06-20 ENCOUNTER — OFFICE VISIT (OUTPATIENT)
Dept: CARDIOLOGY | Facility: MEDICAL CENTER | Age: 64
End: 2024-06-20
Attending: NURSE PRACTITIONER
Payer: COMMERCIAL

## 2024-06-20 VITALS
HEIGHT: 67 IN | BODY MASS INDEX: 34.53 KG/M2 | RESPIRATION RATE: 16 BRPM | OXYGEN SATURATION: 99 % | HEART RATE: 83 BPM | DIASTOLIC BLOOD PRESSURE: 70 MMHG | SYSTOLIC BLOOD PRESSURE: 110 MMHG | WEIGHT: 220 LBS

## 2024-06-20 DIAGNOSIS — R00.2 PALPITATIONS: ICD-10-CM

## 2024-06-20 DIAGNOSIS — R42 DIZZINESS: ICD-10-CM

## 2024-06-20 DIAGNOSIS — I49.3 PVCS (PREMATURE VENTRICULAR CONTRACTIONS): ICD-10-CM

## 2024-06-20 PROCEDURE — 3074F SYST BP LT 130 MM HG: CPT | Performed by: NURSE PRACTITIONER

## 2024-06-20 PROCEDURE — 99212 OFFICE O/P EST SF 10 MIN: CPT | Performed by: NURSE PRACTITIONER

## 2024-06-20 PROCEDURE — 3078F DIAST BP <80 MM HG: CPT | Performed by: NURSE PRACTITIONER

## 2024-06-20 PROCEDURE — 99214 OFFICE O/P EST MOD 30 MIN: CPT | Performed by: NURSE PRACTITIONER

## 2024-06-20 ASSESSMENT — ENCOUNTER SYMPTOMS
PND: 0
PALPITATIONS: 1
COUGH: 0
MYALGIAS: 0
NERVOUS/ANXIOUS: 1
ABDOMINAL PAIN: 0
SHORTNESS OF BREATH: 0
FEVER: 0
CLAUDICATION: 0
ORTHOPNEA: 0
DIZZINESS: 1

## 2024-06-20 ASSESSMENT — FIBROSIS 4 INDEX: FIB4 SCORE: 0.99

## 2024-06-20 NOTE — PROGRESS NOTES
"Chief Complaint   Patient presents with    Ventricular Tachycardia     F/v dx: NSVT (nonsustained ventricular tachycardia) (HCC) - on BIOTEL 2/2021    Hyperlipidemia       Subjective     Eunice Mckenna is a 64 y.o. female who presents today for follow up on her palpitations.    She is a patient of Judy Matos APRN.   Patient was last seen in clinic on 6/6/2023 with Judy.  During that visit, no changes were made.    Patient reports she had been doing well until recently when she started to have postprandial symptoms of dizziness and fluttering sensation.  She reports being previously diagnosed with reactive hypoglycemia and was told that likely this is the cause of her symptoms.  She was recommended to eat smaller meals.      But patient reports the dizziness symptom is new and she is concerned about this.  Patient has had palpitations in the past, with workup that was inconclusive.    Patient recently was placed on Wegovy and states she did stop the medication due to some side effects.    Patient mentions she did go to [facility] last week for her symptoms.  She reports her workup was negative.    Additionally, patient has the following medical problems:     -Thyroid disease: Followed by endocrinology    -Gastric sleeve in 2020    -GERD    Past Medical History:   Diagnosis Date    Abnormal EKG - possible inferior infarct seen from 2014 05/2023    Echocardiogram with normal LV sizes, LVEF 55-60%. No valvular abnormalities.    Allergy     Anemia     Vaginal bleeding    Anesthesia     PONV    Anxiety     Arrhythmia     Arthritis     Knees    Colorectal cancer (HCC) 04/2011    Dr. Garcia, chemo and radiation.     Dyslipidemia     GERD (gastroesophageal reflux disease)     Graves disease     History of anemia 02/2020    Hyperthyroidism 12/18/2018    Migraine     Myocardial infarct (HCC)     \"Before 2014\"    NSVT (nonsustained ventricular tachycardia) (HCC) - on BIOTEL 2/2021 03/03/2021 "    Pain 2020    Right knee    Palpitations     Snoring 02/2020    No sleep study     Past Surgical History:   Procedure Laterality Date    GASTROSCOPY N/A 9/10/2020    Procedure: GASTROSCOPY;  Surgeon: Joshua Hutchinson M.D.;  Location: SURGERY SAME DAY [facility];  Service: Gastroenterology    FL LAP, ILEANA RESTRICT PROC, LONGITUDINAL GAS* N/A 7/28/2020    Procedure: GASTRECTOMY, SLEEVE, LAPAROSCOPIC;  Surgeon: John H Ganser, M.D.;  Location: SURGERY [facility];  Service: General    PB KNEE SCOPE,DIAGNOSTIC Right 3/5/2020    Procedure: ARTHROSCOPY, KNEE;  Surgeon: Abdias Madden M.D.;  Location: SURGERY [facility];  Service: Orthopedics    MENISCECTOMY, KNEE, MEDIAL Right 3/5/2020    Procedure: MENISCECTOMY, KNEE, MEDIAL - PARTIAL LATERAL,;  Surgeon: Abdias Madden M.D.;  Location: [facility];  Service: Orthopedics    JOINT INJECTION DIAGNOSTIC Left 3/5/2020    Procedure: INJECTION, JOINT, DIAGNOSTIC;  Surgeon: Abdias Madden M.D.;  Location: [facility];  Service: Orthopedics    THYROIDECTOMY TOTAL Bilateral 1/8/2020    Procedure: THYROIDECTOMY, TOTAL- W/ NIMS;  Surgeon: Ebony Meza M.D.;  Location: SURGERY SAME DAY [facility];  Service: General    THYROIDECTOMY  01/08/2020    JOINT INJECTION DIAGNOSTIC  3/8/2013    Performed by Kenji Meek M.D. at [facility]    KNEE ARTHROSCOPY  3/8/2013    Performed by Kenji Meek M.D. at [facility]    MENISCECTOMY, KNEE, MEDIAL  3/8/2013    Performed by Kenji Meek M.D. at [facility]    GASTRIC BANDING LAPAROSCOPIC  2005    removal of lapband 2016    ABDOMINAL HYSTERECTOMY TOTAL  2003    For menorrhagia, no BSO    HERNIA REPAIR      child    OTHER ABDOMINAL SURGERY      lap band removal     Family History   Problem Relation Age of Onset    Stroke Father         70yo    Hypertension Father     Heart Disease Father         70 yo    Cancer Maternal Grandfather          Lung    Diabetes Paternal Grandmother     Hypertension Paternal Grandmother     Stroke Paternal Grandmother         70 s    Psychiatric Illness Sister         Schizophrenia    Psychiatric Illness Brother         Schizophrenia    Cancer Paternal Aunt         Bone, liver    Psychiatric Illness Mother         dental / mental illness     Lung Disease Mother         Copd     Psychiatric Illness Sister         schizoprenia     Diabetes Sister     Hypertension Sister     Psychiatric Illness Brother     Heart Disease Brother     Hypertension Brother     Hypertension Brother     Diabetes Brother      Social History     Socioeconomic History    Marital status:      Spouse name: Not on file    Number of children: Not on file    Years of education: Not on file    Highest education level: Not on file   Occupational History    Not on file   Tobacco Use    Smoking status: Never    Smokeless tobacco: Never    Tobacco comments:     significant 2nd hand exposure    Vaping Use    Vaping status: Never Used   Substance and Sexual Activity    Alcohol use: No    Drug use: No    Sexual activity: Yes     Partners: Female   Other Topics Concern     Service No    Blood Transfusions No    Caffeine Concern No    Occupational Exposure No    Hobby Hazards No    Sleep Concern Yes    Stress Concern No    Weight Concern Yes    Special Diet No    Back Care No    Exercise Yes    Bike Helmet No    Seat Belt Yes    Self-Exams Yes   Social History Narrative    , no children.      Social Determinants of Health     Financial Resource Strain: Not on file   Food Insecurity: Not on file   Transportation Needs: Not on file   Physical Activity: Not on file   Stress: Not on file   Social Connections: Not on file   Intimate Partner Violence: Not on file   Housing Stability: Not on file     Allergies   Allergen Reactions    Iodine Itching    Morphine Itching     blisters    Shellfish Allergy Vomiting    Reglan [Kdc:Yellow Dye+Ci Pigment Blue  "63+Metoclopramide]      Outpatient Encounter Medications as of 6/20/2024   Medication Sig Dispense Refill    Docusate Calcium (STOOL SOFTENER PO) Take  by mouth.      Ascorbic Acid (VITAMIN C) 100 MG Chew Tab       SYNTHROID 100 MCG Tab TAKE 1 TABLET BY MOUTH EVERY DAY IN THE MORNING ON EMPTY STOMACH FOR 90 DAYS      estradiol (ESTRACE) 1 MG Tab Take 1 mg by mouth every day.      cyanocobalamin (VITAMIN B-12) 100 MCG Tab Take 100 mcg by mouth every day.      Multiple Vitamins-Minerals (MULTIVITAMIN WOMEN PO) Take  by mouth.      Thiamine HCl (B-1) 100 MG Tab Take 100 mg by mouth every day.      Zinc 100 MG Tab Take 5 mg by mouth.      Cholecalciferol (VITAMIN D3) 50 MCG (2000 UT) Tab Take 50 mg by mouth every day.      methylPREDNISolone (MEDROL DOSEPAK) 4 MG Tablet Therapy Pack Follow schedule on package instructions. 21 Tablet 0     No facility-administered encounter medications on file as of 6/20/2024.     Review of Systems   Constitutional:  Negative for fever and malaise/fatigue.   Respiratory:  Negative for cough and shortness of breath.    Cardiovascular:  Positive for palpitations. Negative for chest pain, orthopnea, claudication, leg swelling and PND.   Gastrointestinal:  Negative for abdominal pain.   Musculoskeletal:  Negative for myalgias.   Neurological:  Positive for dizziness.   Psychiatric/Behavioral:  The patient is nervous/anxious.    All other systems reviewed and are negative.             Objective     /70 (BP Location: Left arm, Patient Position: Sitting, BP Cuff Size: Adult)   Pulse 83   Resp 16   Ht 1.702 m (5' 7\")   Wt 99.8 kg (220 lb)   SpO2 99%   BMI 34.46 kg/m²     Physical Exam  Vitals reviewed.   Constitutional:       Appearance: She is well-developed.   HENT:      Head: Normocephalic and atraumatic.   Eyes:      Pupils: Pupils are equal, round, and reactive to light.   Neck:      Vascular: No JVD.   Cardiovascular:      Rate and Rhythm: Normal rate and regular rhythm.      " Heart sounds: Normal heart sounds.   Pulmonary:      Effort: Pulmonary effort is normal. No respiratory distress.      Breath sounds: Normal breath sounds. No wheezing or rales.   Abdominal:      General: Bowel sounds are normal.      Palpations: Abdomen is soft.   Musculoskeletal:         General: Normal range of motion.      Cervical back: Normal range of motion and neck supple.      Right lower leg: No edema.      Left lower leg: No edema.   Skin:     General: Skin is warm and dry.   Neurological:      General: No focal deficit present.      Mental Status: She is alert and oriented to person, place, and time.   Psychiatric:         Behavior: Behavior normal.       Lab Results   Component Value Date/Time    CHOLSTRLTOT 180 12/08/2023 08:46 AM     (H) 12/08/2023 08:46 AM    HDL 67 12/08/2023 08:46 AM    TRIGLYCERIDE 46 12/08/2023 08:46 AM       Lab Results   Component Value Date/Time    SODIUM 136 12/08/2023 08:46 AM    POTASSIUM 3.8 12/08/2023 08:46 AM    CHLORIDE 101 12/08/2023 08:46 AM    CO2 25 12/08/2023 08:46 AM    GLUCOSE 85 12/08/2023 08:46 AM    BUN 10 12/08/2023 08:46 AM    CREATININE 0.51 12/08/2023 08:46 AM    CREATININE 0.77 12/29/2012 10:58 AM    BUNCREATRAT 14 12/14/2020 07:49 AM    BUNCREATRAT 14 12/29/2012 10:58 AM    GLOMRATE >59 08/18/2010 08:26 AM     Lab Results   Component Value Date/Time    ALKPHOSPHAT 140 (H) 12/08/2023 08:46 AM    ASTSGOT 10 (L) 12/08/2023 08:46 AM    ALTSGPT 6 12/08/2023 08:46 AM    TBILIRUBIN 0.4 12/08/2023 08:46 AM      Echo 4/1/20214  CONCLUSIONS   Technically difficult due to obesity.   Normal echo    Transthoracic Echo Report 1/27/2021  Compared to the images of the prior study done 4/1/2014 - there is no   significant change.  Normal transthoracic echocardiogram.      Biotel 1/30/2021 through 2/28/2021  SUMMARY   24 days of monitoring demonstrated sinus rhythm with rare ectopy including 4 beats of nonsustained VT.  Symptoms correlated with the episode of  nonsustained VT as well as atrial ectopy.    Transthoracic Echo Report 11/2/2022  Compared to the images of the prior study 1/27/2021, there has been no   significant change.   Normal left ventricular size and systolic function.  Left ventricular ejection fraction estimated to be 60%.  No hemodynamically significant valvular heart disease noted.  Estimated right ventricular systolic pressure 30 mmHg.     Biotel 11/4/2022 through 12/3/2022  SUMMARY   28 days of monitoring demonstrated sinus rhythm with rare ectopy.  Some symptoms correlated with PVCs.     Transthoracic Echo Report 5/19/2023  Compared to the prior study on 11/2/22, no significant changes  Normal transthoracic echocardiogram results  Normal estimated LVEF 55-60%       Assessment & Plan     1. PVCs (premature ventricular contractions)  Cardiac Event Monitor      2. Palpitations  Cardiac Event Monitor      3. Dizziness            Medical Decision Making: Today's Assessment/Status/Plan:        Palpitations and dizziness:  -Patient did go to the ER after recent symptoms, she reports her workup was inconclusive, will request records [facility]  -Patient will be following up with endocrinology to discuss thyroid and reactive hypoglycemia  -Recommended patient to continue with small meals and increase hydration and sodium intake.  She reports she does have a very low sodium diet  -Did reintroduce event monitor, she is agreeable to pursue 2-week event monitor    ER precautions for worsening symptoms    FU in clinic in 1 months with LITTLE Garcia as scheduled with event monitor results. Sooner if needed.    Patient verbalizes understanding and agrees with the plan of care.     PLEASE NOTE: This Note was created using voice recognition Software. I have made every reasonable attempt to correct obvious errors, but I expect that there are errors of grammar and possibly content that I did not discover before finalizing the  note

## 2024-06-24 ENCOUNTER — HOSPITAL ENCOUNTER (OUTPATIENT)
Dept: LAB | Facility: MEDICAL CENTER | Age: 64
End: 2024-06-24
Attending: INTERNAL MEDICINE
Payer: COMMERCIAL

## 2024-06-24 ENCOUNTER — NON-PROVIDER VISIT (OUTPATIENT)
Dept: CARDIOLOGY | Facility: MEDICAL CENTER | Age: 64
End: 2024-06-24
Attending: NURSE PRACTITIONER
Payer: COMMERCIAL

## 2024-06-24 ENCOUNTER — HOSPITAL ENCOUNTER (OUTPATIENT)
Dept: LAB | Facility: MEDICAL CENTER | Age: 64
End: 2024-06-24
Attending: CLINICAL NURSE SPECIALIST
Payer: COMMERCIAL

## 2024-06-24 DIAGNOSIS — R00.2 PALPITATIONS: ICD-10-CM

## 2024-06-24 DIAGNOSIS — I49.3 PVCS (PREMATURE VENTRICULAR CONTRACTIONS): ICD-10-CM

## 2024-06-24 LAB
25(OH)D3 SERPL-MCNC: 68 NG/ML (ref 30–100)
ALBUMIN SERPL BCP-MCNC: 3.7 G/DL (ref 3.2–4.9)
ALBUMIN/GLOB SERPL: 0.9 G/DL
ALP SERPL-CCNC: 119 U/L (ref 30–99)
ALT SERPL-CCNC: 12 U/L (ref 2–50)
ANION GAP SERPL CALC-SCNC: 10 MMOL/L (ref 7–16)
AST SERPL-CCNC: 19 U/L (ref 12–45)
BASOPHILS # BLD AUTO: 0.3 % (ref 0–1.8)
BASOPHILS # BLD: 0.01 K/UL (ref 0–0.12)
BILIRUB SERPL-MCNC: 0.4 MG/DL (ref 0.1–1.5)
BUN SERPL-MCNC: 10 MG/DL (ref 8–22)
CALCIUM ALBUM COR SERPL-MCNC: 9.5 MG/DL (ref 8.5–10.5)
CALCIUM SERPL-MCNC: 9.3 MG/DL (ref 8.5–10.5)
CHLORIDE SERPL-SCNC: 102 MMOL/L (ref 96–112)
CHOLEST SERPL-MCNC: 171 MG/DL (ref 100–199)
CO2 SERPL-SCNC: 23 MMOL/L (ref 20–33)
CREAT SERPL-MCNC: 0.49 MG/DL (ref 0.5–1.4)
EOSINOPHIL # BLD AUTO: 0.03 K/UL (ref 0–0.51)
EOSINOPHIL NFR BLD: 0.9 % (ref 0–6.9)
ERYTHROCYTE [DISTWIDTH] IN BLOOD BY AUTOMATED COUNT: 44.3 FL (ref 35.9–50)
EST. AVERAGE GLUCOSE BLD GHB EST-MCNC: 108 MG/DL
ESTRADIOL SERPL-MCNC: 89.6 PG/ML
FASTING STATUS PATIENT QL REPORTED: NORMAL
FERRITIN SERPL-MCNC: 326 NG/ML (ref 10–291)
FOLATE SERPL-MCNC: 24.3 NG/ML
FSH SERPL-ACNC: 11.6 MIU/ML
GFR SERPLBLD CREATININE-BSD FMLA CKD-EPI: 106 ML/MIN/1.73 M 2
GLOBULIN SER CALC-MCNC: 4.3 G/DL (ref 1.9–3.5)
GLUCOSE SERPL-MCNC: 79 MG/DL (ref 65–99)
HBA1C MFR BLD: 5.4 % (ref 4–5.6)
HCT VFR BLD AUTO: 38.6 % (ref 37–47)
HDLC SERPL-MCNC: 63 MG/DL
HGB BLD-MCNC: 12.1 G/DL (ref 12–16)
IMM GRANULOCYTES # BLD AUTO: 0.01 K/UL (ref 0–0.11)
IMM GRANULOCYTES NFR BLD AUTO: 0.3 % (ref 0–0.9)
IRON SATN MFR SERPL: 25 % (ref 15–55)
IRON SERPL-MCNC: 49 UG/DL (ref 40–170)
LDLC SERPL CALC-MCNC: 99 MG/DL
LH SERPL-ACNC: 13.4 IU/L
LYMPHOCYTES # BLD AUTO: 1.21 K/UL (ref 1–4.8)
LYMPHOCYTES NFR BLD: 35.3 % (ref 22–41)
MCH RBC QN AUTO: 26.9 PG (ref 27–33)
MCHC RBC AUTO-ENTMCNC: 31.3 G/DL (ref 32.2–35.5)
MCV RBC AUTO: 85.8 FL (ref 81.4–97.8)
MONOCYTES # BLD AUTO: 0.5 K/UL (ref 0–0.85)
MONOCYTES NFR BLD AUTO: 14.6 % (ref 0–13.4)
NEUTROPHILS # BLD AUTO: 1.67 K/UL (ref 1.82–7.42)
NEUTROPHILS NFR BLD: 48.6 % (ref 44–72)
NRBC # BLD AUTO: 0 K/UL
NRBC BLD-RTO: 0 /100 WBC (ref 0–0.2)
PLATELET # BLD AUTO: 264 K/UL (ref 164–446)
PMV BLD AUTO: 9.9 FL (ref 9–12.9)
POTASSIUM SERPL-SCNC: 4 MMOL/L (ref 3.6–5.5)
PROT SERPL-MCNC: 8 G/DL (ref 6–8.2)
RBC # BLD AUTO: 4.5 M/UL (ref 4.2–5.4)
SODIUM SERPL-SCNC: 135 MMOL/L (ref 135–145)
T3FREE SERPL-MCNC: 2.98 PG/ML (ref 2–4.4)
T4 FREE SERPL-MCNC: 1.59 NG/DL (ref 0.93–1.7)
TESTOST SERPL-MCNC: 110 NG/DL (ref 9–75)
TIBC SERPL-MCNC: 200 UG/DL (ref 250–450)
TRIGL SERPL-MCNC: 43 MG/DL (ref 0–149)
TSH SERPL DL<=0.005 MIU/L-ACNC: 0.01 UIU/ML (ref 0.38–5.33)
UIBC SERPL-MCNC: 151 UG/DL (ref 110–370)
VIT B12 SERPL-MCNC: 2357 PG/ML (ref 211–911)
WBC # BLD AUTO: 3.4 K/UL (ref 4.8–10.8)

## 2024-06-24 PROCEDURE — 84425 ASSAY OF VITAMIN B-1: CPT

## 2024-06-24 PROCEDURE — 83001 ASSAY OF GONADOTROPIN (FSH): CPT

## 2024-06-24 PROCEDURE — 83002 ASSAY OF GONADOTROPIN (LH): CPT

## 2024-06-24 PROCEDURE — 84443 ASSAY THYROID STIM HORMONE: CPT

## 2024-06-24 PROCEDURE — 36415 COLL VENOUS BLD VENIPUNCTURE: CPT

## 2024-06-24 PROCEDURE — 82670 ASSAY OF TOTAL ESTRADIOL: CPT

## 2024-06-24 PROCEDURE — 83550 IRON BINDING TEST: CPT

## 2024-06-24 PROCEDURE — 85025 COMPLETE CBC W/AUTO DIFF WBC: CPT

## 2024-06-24 PROCEDURE — 80061 LIPID PANEL: CPT

## 2024-06-24 PROCEDURE — 83540 ASSAY OF IRON: CPT

## 2024-06-24 PROCEDURE — 82607 VITAMIN B-12: CPT

## 2024-06-24 PROCEDURE — 93246 EXT ECG>7D<15D RECORDING: CPT

## 2024-06-24 PROCEDURE — 84439 ASSAY OF FREE THYROXINE: CPT

## 2024-06-24 PROCEDURE — 84270 ASSAY OF SEX HORMONE GLOBUL: CPT

## 2024-06-24 PROCEDURE — 84403 ASSAY OF TOTAL TESTOSTERONE: CPT

## 2024-06-24 PROCEDURE — 82306 VITAMIN D 25 HYDROXY: CPT

## 2024-06-24 PROCEDURE — 82746 ASSAY OF FOLIC ACID SERUM: CPT

## 2024-06-24 PROCEDURE — 83036 HEMOGLOBIN GLYCOSYLATED A1C: CPT

## 2024-06-24 PROCEDURE — 82728 ASSAY OF FERRITIN: CPT

## 2024-06-24 PROCEDURE — 80053 COMPREHEN METABOLIC PANEL: CPT

## 2024-06-24 PROCEDURE — 84481 FREE ASSAY (FT-3): CPT

## 2024-06-24 NOTE — PROGRESS NOTES
Patient enrolled in the 14 day Zio Holter monitor per LITTLE Bryson.  In clinic hook up, monitor s/n [id number]. Pending EOS.

## 2024-06-26 LAB — SHBG SERPL-SCNC: 153 NMOL/L (ref 17–125)

## 2024-07-01 LAB — VIT B1 BLD-MCNC: 126 NMOL/L (ref 70–180)

## 2024-07-11 ENCOUNTER — TELEPHONE (OUTPATIENT)
Dept: CARDIOLOGY | Facility: MEDICAL CENTER | Age: 64
End: 2024-07-11
Payer: COMMERCIAL

## 2024-07-23 ENCOUNTER — TELEPHONE (OUTPATIENT)
Dept: CARDIOLOGY | Facility: MEDICAL CENTER | Age: 64
End: 2024-07-23
Payer: COMMERCIAL

## 2024-07-28 ENCOUNTER — OFFICE VISIT (OUTPATIENT)
Dept: URGENT CARE | Facility: PHYSICIAN GROUP | Age: 64
End: 2024-07-28
Payer: COMMERCIAL

## 2024-07-28 VITALS
OXYGEN SATURATION: 99 % | WEIGHT: 214 LBS | DIASTOLIC BLOOD PRESSURE: 80 MMHG | SYSTOLIC BLOOD PRESSURE: 124 MMHG | HEART RATE: 81 BPM | BODY MASS INDEX: 33.52 KG/M2 | TEMPERATURE: 97.6 F | RESPIRATION RATE: 14 BRPM

## 2024-07-28 DIAGNOSIS — J98.8 RESPIRATORY TRACT INFECTION: ICD-10-CM
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
## 2024-07-28 PROBLEM — M54.50 CHRONIC LOW BACK PAIN: Status: ACTIVE | Noted: 2024-06-19

## 2024-07-28 PROBLEM — R63.8: Status: ACTIVE | Noted: 2024-07-28

## 2024-07-28 PROBLEM — R63.2 DISORDER OF HYPERALIMENTATION: Status: ACTIVE | Noted: 2024-07-28

## 2024-07-28 PROBLEM — N97.9 FEMALE INFERTILITY: Status: ACTIVE | Noted: 2024-07-28

## 2024-07-28 PROBLEM — K59.00 CONSTIPATION: Status: ACTIVE | Noted: 2024-07-28

## 2024-07-28 PROBLEM — G89.29 CHRONIC LOW BACK PAIN: Status: ACTIVE | Noted: 2024-06-19

## 2024-07-28 PROBLEM — M25.50 JOINT PAIN: Status: ACTIVE | Noted: 2024-07-28

## 2024-07-28 PROBLEM — K92.9 DIGESTIVE SYSTEM DISORDER: Status: ACTIVE | Noted: 2024-07-28

## 2024-07-28 PROBLEM — E05.90 THYROTOXICOSIS: Status: ACTIVE | Noted: 2024-07-28

## 2024-07-28 PROBLEM — E66.9 OBESITY: Status: ACTIVE | Noted: 2024-07-28

## 2024-07-28 PROCEDURE — 3074F SYST BP LT 130 MM HG: CPT | Performed by: NURSE PRACTITIONER

## 2024-07-28 PROCEDURE — 3079F DIAST BP 80-89 MM HG: CPT | Performed by: NURSE PRACTITIONER

## 2024-07-28 PROCEDURE — 99204 OFFICE O/P NEW MOD 45 MIN: CPT | Performed by: NURSE PRACTITIONER

## 2024-07-28 RX ORDER — FLUTICASONE PROPIONATE 50 MCG
2 SPRAY, SUSPENSION (ML) NASAL
COMMUNITY
End: 2024-07-28

## 2024-07-28 RX ORDER — AMOXICILLIN AND CLAVULANATE POTASSIUM 875; 125 MG/1; MG/1
1 TABLET, FILM COATED ORAL 2 TIMES DAILY
Qty: 14 TABLET | Refills: 0 | Status: SHIPPED | OUTPATIENT
Start: 2024-07-28 | End: 2024-08-04

## 2024-07-28 RX ORDER — LIDOCAINE 50 MG/G
PATCH TOPICAL
COMMUNITY
End: 2024-07-28

## 2024-07-28 RX ORDER — SUMATRIPTAN 50 MG/1
TABLET, FILM COATED ORAL
COMMUNITY
End: 2024-07-28

## 2024-07-28 RX ORDER — BENZONATATE 100 MG/1
CAPSULE ORAL
COMMUNITY
Start: 2024-07-23 | End: 2024-07-28

## 2024-07-28 RX ORDER — DOXYCYCLINE HYCLATE 100 MG
100 TABLET ORAL 2 TIMES DAILY
Qty: 20 TABLET | Refills: 0 | Status: SHIPPED | OUTPATIENT
Start: 2024-07-28 | End: 2024-08-07

## 2024-07-28 RX ORDER — SEMAGLUTIDE 0.25 MG/.5ML
INJECTION, SOLUTION SUBCUTANEOUS
COMMUNITY
End: 2024-07-28

## 2024-07-28 RX ORDER — ESTRADIOL 1 MG/1
1 TABLET ORAL DAILY
COMMUNITY

## 2024-07-28 ASSESSMENT — FIBROSIS 4 INDEX: FIB4 SCORE: 1.31

## 2024-07-29 ENCOUNTER — TELEMEDICINE (OUTPATIENT)
Dept: CARDIOLOGY | Facility: MEDICAL CENTER | Age: 64
End: 2024-07-29
Attending: NURSE PRACTITIONER
Payer: COMMERCIAL

## 2024-07-29 VITALS
WEIGHT: 214 LBS | HEIGHT: 67 IN | DIASTOLIC BLOOD PRESSURE: 70 MMHG | BODY MASS INDEX: 33.59 KG/M2 | HEART RATE: 80 BPM | SYSTOLIC BLOOD PRESSURE: 124 MMHG

## 2024-07-29 DIAGNOSIS — R00.2 PALPITATIONS: ICD-10-CM

## 2024-07-29 DIAGNOSIS — E89.0 HYPOTHYROIDISM, POSTSURGICAL: ICD-10-CM

## 2024-07-29 DIAGNOSIS — E78.2 MIXED HYPERLIPIDEMIA: ICD-10-CM

## 2024-07-29 DIAGNOSIS — I47.29 NSVT (NONSUSTAINED VENTRICULAR TACHYCARDIA) (HCC): Chronic | ICD-10-CM

## 2024-07-29 DIAGNOSIS — K21.9 GASTROESOPHAGEAL REFLUX DISEASE WITHOUT ESOPHAGITIS: ICD-10-CM

## 2024-07-29 DIAGNOSIS — E89.0 H/O TOTAL THYROIDECTOMY: ICD-10-CM

## 2024-07-29 RX ORDER — THIAMINE HCL 50 MG
100 TABLET ORAL DAILY
COMMUNITY

## 2024-07-29 ASSESSMENT — ENCOUNTER SYMPTOMS
PND: 0
COUGH: 1
ORTHOPNEA: 0
ABDOMINAL PAIN: 0
NAUSEA: 0
MYALGIAS: 0
FEVER: 0
INSOMNIA: 0
HEADACHES: 0
BRUISES/BLEEDS EASILY: 0
LOSS OF CONSCIOUSNESS: 0
PALPITATIONS: 1
DIZZINESS: 0
CHILLS: 0
SHORTNESS OF BREATH: 0

## 2024-07-29 ASSESSMENT — FIBROSIS 4 INDEX: FIB4 SCORE: 1.31

## 2024-08-05 ENCOUNTER — HOSPITAL ENCOUNTER (OUTPATIENT)
Dept: RADIOLOGY | Facility: MEDICAL CENTER | Age: 64
End: 2024-08-05
Attending: CLINICAL NURSE SPECIALIST
Payer: COMMERCIAL

## 2024-08-05 DIAGNOSIS — K21.9 GASTROESOPHAGEAL REFLUX DISEASE, UNSPECIFIED WHETHER ESOPHAGITIS PRESENT: ICD-10-CM

## 2024-08-05 PROCEDURE — 74240 X-RAY XM UPR GI TRC 1CNTRST: CPT

## 2024-08-30 ENCOUNTER — HOSPITAL ENCOUNTER (OUTPATIENT)
Dept: RADIOLOGY | Facility: MEDICAL CENTER | Age: 64
End: 2024-08-30
Attending: FAMILY MEDICINE
Payer: COMMERCIAL

## 2024-08-30 DIAGNOSIS — Z12.31 VISIT FOR SCREENING MAMMOGRAM: ICD-10-CM

## 2024-08-30 PROCEDURE — 77067 SCR MAMMO BI INCL CAD: CPT

## 2024-09-16 ENCOUNTER — HOSPITAL ENCOUNTER (OUTPATIENT)
Dept: LAB | Facility: MEDICAL CENTER | Age: 64
End: 2024-09-16
Attending: FAMILY MEDICINE
Payer: COMMERCIAL

## 2024-09-16 LAB
25(OH)D3 SERPL-MCNC: 71 NG/ML (ref 30–100)
ALBUMIN SERPL BCP-MCNC: 3.7 G/DL (ref 3.2–4.9)
ALBUMIN/GLOB SERPL: 0.9 G/DL
ALP SERPL-CCNC: 140 U/L (ref 30–99)
ALT SERPL-CCNC: 14 U/L (ref 2–50)
ANION GAP SERPL CALC-SCNC: 12 MMOL/L (ref 7–16)
AST SERPL-CCNC: 20 U/L (ref 12–45)
BASOPHILS # BLD AUTO: 0.5 % (ref 0–1.8)
BASOPHILS # BLD: 0.02 K/UL (ref 0–0.12)
BILIRUB SERPL-MCNC: 0.3 MG/DL (ref 0.1–1.5)
BUN SERPL-MCNC: 9 MG/DL (ref 8–22)
CALCIUM ALBUM COR SERPL-MCNC: 9.1 MG/DL (ref 8.5–10.5)
CALCIUM SERPL-MCNC: 8.9 MG/DL (ref 8.5–10.5)
CHLORIDE SERPL-SCNC: 101 MMOL/L (ref 96–112)
CHOLEST SERPL-MCNC: 179 MG/DL (ref 100–199)
CO2 SERPL-SCNC: 24 MMOL/L (ref 20–33)
CREAT SERPL-MCNC: 0.56 MG/DL (ref 0.5–1.4)
EOSINOPHIL # BLD AUTO: 0.05 K/UL (ref 0–0.51)
EOSINOPHIL NFR BLD: 1.2 % (ref 0–6.9)
ERYTHROCYTE [DISTWIDTH] IN BLOOD BY AUTOMATED COUNT: 48.1 FL (ref 35.9–50)
EST. AVERAGE GLUCOSE BLD GHB EST-MCNC: 108 MG/DL
FASTING STATUS PATIENT QL REPORTED: NORMAL
GFR SERPLBLD CREATININE-BSD FMLA CKD-EPI: 102 ML/MIN/1.73 M 2
GLOBULIN SER CALC-MCNC: 4.3 G/DL (ref 1.9–3.5)
GLUCOSE SERPL-MCNC: 82 MG/DL (ref 65–99)
HBA1C MFR BLD: 5.4 % (ref 4–5.6)
HCT VFR BLD AUTO: 38.9 % (ref 37–47)
HDLC SERPL-MCNC: 71 MG/DL
HGB BLD-MCNC: 12.1 G/DL (ref 12–16)
IMM GRANULOCYTES # BLD AUTO: 0.01 K/UL (ref 0–0.11)
IMM GRANULOCYTES NFR BLD AUTO: 0.2 % (ref 0–0.9)
LDLC SERPL CALC-MCNC: 94 MG/DL
LYMPHOCYTES # BLD AUTO: 1.74 K/UL (ref 1–4.8)
LYMPHOCYTES NFR BLD: 40.3 % (ref 22–41)
MCH RBC QN AUTO: 27.3 PG (ref 27–33)
MCHC RBC AUTO-ENTMCNC: 31.1 G/DL (ref 32.2–35.5)
MCV RBC AUTO: 87.8 FL (ref 81.4–97.8)
MONOCYTES # BLD AUTO: 0.55 K/UL (ref 0–0.85)
MONOCYTES NFR BLD AUTO: 12.7 % (ref 0–13.4)
NEUTROPHILS # BLD AUTO: 1.95 K/UL (ref 1.82–7.42)
NEUTROPHILS NFR BLD: 45.1 % (ref 44–72)
NRBC # BLD AUTO: 0 K/UL
NRBC BLD-RTO: 0 /100 WBC (ref 0–0.2)
PLATELET # BLD AUTO: 284 K/UL (ref 164–446)
PMV BLD AUTO: 9.7 FL (ref 9–12.9)
POTASSIUM SERPL-SCNC: 3.6 MMOL/L (ref 3.6–5.5)
PROT SERPL-MCNC: 8 G/DL (ref 6–8.2)
RBC # BLD AUTO: 4.43 M/UL (ref 4.2–5.4)
SODIUM SERPL-SCNC: 137 MMOL/L (ref 135–145)
T3 SERPL-MCNC: 123 NG/DL (ref 60–181)
T4 FREE SERPL-MCNC: 1.2 NG/DL (ref 0.93–1.7)
TRIGL SERPL-MCNC: 68 MG/DL (ref 0–149)
TSH SERPL-ACNC: 0.1 UIU/ML (ref 0.35–5.5)
WBC # BLD AUTO: 4.3 K/UL (ref 4.8–10.8)

## 2024-09-16 PROCEDURE — 80061 LIPID PANEL: CPT

## 2024-09-16 PROCEDURE — 84443 ASSAY THYROID STIM HORMONE: CPT

## 2024-09-16 PROCEDURE — 80053 COMPREHEN METABOLIC PANEL: CPT

## 2024-09-16 PROCEDURE — 85025 COMPLETE CBC W/AUTO DIFF WBC: CPT

## 2024-09-16 PROCEDURE — 82043 UR ALBUMIN QUANTITATIVE: CPT

## 2024-09-16 PROCEDURE — 83036 HEMOGLOBIN GLYCOSYLATED A1C: CPT

## 2024-09-16 PROCEDURE — 82306 VITAMIN D 25 HYDROXY: CPT

## 2024-09-16 PROCEDURE — 36415 COLL VENOUS BLD VENIPUNCTURE: CPT

## 2024-09-16 PROCEDURE — 84439 ASSAY OF FREE THYROXINE: CPT

## 2024-09-16 PROCEDURE — 82570 ASSAY OF URINE CREATININE: CPT

## 2024-09-16 PROCEDURE — 84480 ASSAY TRIIODOTHYRONINE (T3): CPT

## 2024-09-17 LAB
CREAT UR-MCNC: 69.8 MG/DL
MICROALBUMIN UR-MCNC: <1.2 MG/DL
MICROALBUMIN/CREAT UR: NORMAL MG/G (ref 0–30)

## 2024-10-22 NOTE — CARE PLAN
Problem: Communication  Goal: The ability to communicate needs accurately and effectively will improve  Outcome: PROGRESSING AS EXPECTED   Pt communicating needs and concerns   Problem: Safety  Goal: Will remain free from falls  Outcome: PROGRESSING AS EXPECTED   Room free of clutter, bed locked in lowest position, call light within reach, pt calls for assistance    None known

## 2024-12-05 NOTE — TELEPHONE ENCOUNTER
OK I am going to send a referral to physiatry for consultation on the leg pain.  Please inform patient that insurance denied the MRI.  I am going to refer her to a specialist on musculoskeletal problems.      Detail Level: Zone Photo Preface (Leave Blank If You Do Not Want): Photographs were obtained today

## 2024-12-13 ENCOUNTER — HOSPITAL ENCOUNTER (OUTPATIENT)
Dept: LAB | Facility: MEDICAL CENTER | Age: 64
End: 2024-12-13
Attending: INTERNAL MEDICINE
Payer: COMMERCIAL

## 2024-12-13 ENCOUNTER — HOSPITAL ENCOUNTER (OUTPATIENT)
Dept: LAB | Facility: MEDICAL CENTER | Age: 64
End: 2024-12-13
Attending: NURSE PRACTITIONER
Payer: COMMERCIAL

## 2024-12-13 PROCEDURE — 84439 ASSAY OF FREE THYROXINE: CPT

## 2024-12-13 PROCEDURE — 84481 FREE ASSAY (FT-3): CPT

## 2024-12-13 PROCEDURE — 84402 ASSAY OF FREE TESTOSTERONE: CPT

## 2024-12-13 PROCEDURE — 36415 COLL VENOUS BLD VENIPUNCTURE: CPT

## 2024-12-13 PROCEDURE — 84403 ASSAY OF TOTAL TESTOSTERONE: CPT

## 2024-12-13 PROCEDURE — 84443 ASSAY THYROID STIM HORMONE: CPT

## 2024-12-13 PROCEDURE — 84270 ASSAY OF SEX HORMONE GLOBUL: CPT

## 2024-12-13 PROCEDURE — 82670 ASSAY OF TOTAL ESTRADIOL: CPT

## 2024-12-14 LAB
ESTRADIOL SERPL-MCNC: 70.3 PG/ML
T3FREE SERPL-MCNC: 2.56 PG/ML (ref 2–4.4)
T4 FREE SERPL-MCNC: 1.11 NG/DL (ref 0.93–1.7)
TSH SERPL-ACNC: 0.29 UIU/ML (ref 0.35–5.5)

## 2024-12-18 LAB
SHBG SERPL-SCNC: 148 NMOL/L (ref 17–125)
TESTOST FREE SERPL-MCNC: 1.8 PG/ML (ref 0.6–3.8)
TESTOST SERPL-MCNC: 32 NG/DL (ref 5–32)

## 2024-12-23 ENCOUNTER — HOSPITAL ENCOUNTER (OUTPATIENT)
Dept: RADIOLOGY | Facility: MEDICAL CENTER | Age: 64
End: 2024-12-23
Payer: COMMERCIAL

## 2024-12-23 ENCOUNTER — OFFICE VISIT (OUTPATIENT)
Dept: URGENT CARE | Facility: PHYSICIAN GROUP | Age: 64
End: 2024-12-23
Payer: COMMERCIAL

## 2024-12-23 VITALS
SYSTOLIC BLOOD PRESSURE: 114 MMHG | TEMPERATURE: 97.3 F | DIASTOLIC BLOOD PRESSURE: 78 MMHG | HEART RATE: 82 BPM | RESPIRATION RATE: 20 BRPM | WEIGHT: 209 LBS | OXYGEN SATURATION: 100 % | BODY MASS INDEX: 32.8 KG/M2 | HEIGHT: 67 IN

## 2024-12-23 DIAGNOSIS — M25.561 ACUTE PAIN OF RIGHT KNEE: ICD-10-CM

## 2024-12-23 DIAGNOSIS — M79.661 PAIN IN RIGHT LOWER LEG: ICD-10-CM

## 2024-12-23 DIAGNOSIS — M79.671 RIGHT FOOT PAIN: ICD-10-CM

## 2024-12-23 DIAGNOSIS — M17.11 TRICOMPARTMENT OSTEOARTHRITIS OF RIGHT KNEE: ICD-10-CM

## 2024-12-23 PROCEDURE — 73630 X-RAY EXAM OF FOOT: CPT | Mod: RT

## 2024-12-23 PROCEDURE — 3074F SYST BP LT 130 MM HG: CPT

## 2024-12-23 PROCEDURE — 73562 X-RAY EXAM OF KNEE 3: CPT | Mod: RT

## 2024-12-23 PROCEDURE — 99214 OFFICE O/P EST MOD 30 MIN: CPT

## 2024-12-23 PROCEDURE — 73610 X-RAY EXAM OF ANKLE: CPT | Mod: RT

## 2024-12-23 PROCEDURE — 3078F DIAST BP <80 MM HG: CPT

## 2024-12-23 RX ORDER — LIDOCAINE 50 MG/G
1 PATCH TOPICAL EVERY 24 HOURS
Qty: 10 PATCH | Refills: 0 | Status: SHIPPED | OUTPATIENT
Start: 2024-12-23

## 2024-12-23 RX ORDER — MELOXICAM 7.5 MG/1
7.5 TABLET ORAL DAILY
Qty: 30 TABLET | Refills: 0 | Status: SHIPPED | OUTPATIENT
Start: 2024-12-23

## 2024-12-23 ASSESSMENT — ENCOUNTER SYMPTOMS
FEVER: 0
FALLS: 0
CHILLS: 0

## 2024-12-23 ASSESSMENT — FIBROSIS 4 INDEX: FIB4 SCORE: 1.19

## 2024-12-23 NOTE — PROGRESS NOTES
CHIEF COMPLAINT  Chief Complaint   Patient presents with    Leg Pain     R leg pain when she walks, from knee down to her ankle      Subjective:   Eunice Mckenna is a 64 y.o. female who presents to urgent care with concerns for right leg pain when she walks x 2 months.  Patient reports pain originated in her right knee and radiates from the front to the back.  She reports that over the last month she has began experiencing pain to her right ankle and foot.  Patient reports she was previously seen at [facility] emergency room for symptoms of right leg pain on November 20th, at which point she had an ultrasound to rule out a DVT.  She denies any symptoms of swelling or redness.  Denies any direct trauma or injury.  No preceding event that she can attribute to pain.  Denies any symptoms of fever or chills. Patient denies any numbness or tingling. No loss of sensation or strength. Report follow up scheduled with orthopedics in January.       Review of Systems   Constitutional:  Negative for chills and fever.   Musculoskeletal:  Positive for joint pain. Negative for falls.       PAST MEDICAL HISTORY  Patient Active Problem List    Diagnosis Date Noted    Joint pain 07/28/2024    Hypometabolism 07/28/2024    Female infertility 07/28/2024    Disorder of hyperalimentation 07/28/2024    Digestive system disorder 07/28/2024    Thyrotoxicosis 07/28/2024    Obesity 07/28/2024    Constipation 07/28/2024    Chronic low back pain 06/19/2024    Mixed hyperlipidemia 06/07/2022    GERD (gastroesophageal reflux disease) 06/07/2022    Anxiety 04/15/2021    NSVT (nonsustained ventricular tachycardia) (HCC) - on BIOTEL 2/2021 03/03/2021    Vitamin B1 deficiency 11/24/2020    History of sleeve gastrectomy 10/01/2020    History of Graves' disease 05/19/2020    Palpitations     H/O total thyroidectomy 01/29/2020    BMI 38.0-38.9,adult 01/29/2020    Abnormal EKG - possible inferior infarct seen from 2014     Hypothyroidism,  postsurgical 01/10/2020    History of colorectal cancer 03/02/2016    Chronic pain of right knee 03/08/2013    Internal hemorrhoid 03/31/2011    Vitamin D deficiency 08/05/2010    History of anemia 08/05/2010    Proteinuria 08/05/2010       SURGICAL HISTORY   has a past surgical history that includes gastric banding laparoscopic (2005); joint injection diagnostic (3/8/2013); other abdominal surgery; knee arthroscopy (3/8/2013); meniscectomy, knee, medial (3/8/2013); thyroidectomy total (Bilateral, 1/8/2020); hernia repair; abdominal hysterectomy total (2003); thyroidectomy (01/08/2020); knee scope,diagnostic (Right, 3/5/2020); meniscectomy, knee, medial (Right, 3/5/2020); joint injection diagnostic (Left, 3/5/2020); lap rose mary restrict proc longitudinal gas* (N/A, 7/28/2020); and gastroscopy (N/A, 9/10/2020).    ALLERGIES  Allergies   Allergen Reactions    Iodine Itching    Morphine Itching     blisters    Shellfish Allergy Vomiting    Reglan [Kdc:Yellow Dye+Ci Pigment Blue 63+Metoclopramide]        CURRENT MEDICATIONS  Home Medications       Reviewed by Taco Girard Med Ass't (Medical Assistant) on 12/23/24 at 1445  Med List Status: <None>     Medication Last Dose Status   Ascorbic Acid (VITAMIN C) 100 MG Chew Tab Taking Active   Cholecalciferol (VITAMIN D3) 50 MCG (2000 UT) Tab Taking Active   estradiol (ESTRACE) 1 MG Tab Taking Active   SYNTHROID 100 MCG Tab Taking Active   thiamine (VITAMIN B-1) 50 MG Tab Taking Active   Zinc 100 MG Tab Taking Active                    SOCIAL HISTORY  Social History     Tobacco Use    Smoking status: Never    Smokeless tobacco: Never    Tobacco comments:     significant 2nd hand exposure    Vaping Use    Vaping status: Never Used   Substance and Sexual Activity    Alcohol use: No    Drug use: No    Sexual activity: Yes     Partners: Female       FAMILY HISTORY  Family History   Problem Relation Age of Onset    Stroke Father         68yo    Hypertension Father      "Heart Disease Father         70 yo    Cancer Maternal Grandfather         Lung    Diabetes Paternal Grandmother     Hypertension Paternal Grandmother     Stroke Paternal Grandmother         70 s    Psychiatric Illness Sister         Schizophrenia    Psychiatric Illness Brother         Schizophrenia    Cancer Paternal Aunt         Bone, liver    Psychiatric Illness Mother         dental / mental illness     Lung Disease Mother         Copd     Psychiatric Illness Sister         schizoprenia     Diabetes Sister     Hypertension Sister     Psychiatric Illness Brother     Heart Disease Brother     Hypertension Brother     Hypertension Brother     Diabetes Brother          Medications, Allergies, and current problem list reviewed today in Epic.     Objective:     /78   Pulse 82   Temp 36.3 °C (97.3 °F)   Resp 20   Ht 1.702 m (5' 7\")   Wt 94.8 kg (209 lb)   SpO2 100%     Physical Exam  Vitals reviewed.   Constitutional:       General: She is not in acute distress.     Appearance: Normal appearance. She is not ill-appearing or toxic-appearing.   HENT:      Head: Normocephalic.   Cardiovascular:      Rate and Rhythm: Normal rate and regular rhythm.      Pulses: Normal pulses.      Heart sounds: Normal heart sounds.   Pulmonary:      Effort: Pulmonary effort is normal.      Breath sounds: Normal breath sounds.   Musculoskeletal:      Cervical back: Normal range of motion.      Right knee: No swelling, deformity, effusion, erythema or crepitus. Tenderness present over the lateral joint line. Normal alignment. Normal pulse.      Right lower leg: No swelling, deformity or tenderness. No edema.      Right ankle: No swelling or deformity. Normal range of motion. Normal pulse.      Right foot: Normal capillary refill. No swelling, deformity or tenderness. Normal pulse.   Skin:     General: Skin is warm.      Capillary Refill: Capillary refill takes less than 2 seconds.   Neurological:      General: No focal deficit " present.      Mental Status: She is alert.   Psychiatric:         Mood and Affect: Mood normal.         RADIOLOGY RESULTS   DX-ANKLE 3+ VIEWS RIGHT    Result Date: 12/23/2024 12/23/2024 3:26 PM HISTORY/REASON FOR EXAM:  Atraumatic Pain/Swelling/Deformity. RIGHT ankle pain. TECHNIQUE/EXAM DESCRIPTION AND NUMBER OF VIEWS:  3 views of the RIGHT ankle. COMPARISON: 4/5/2019 FINDINGS: No focal soft tissue swelling. Mortise is congruent. No fracture or dislocation. Partially calcified linear foreign body in seen adjacent the lateral calcaneus, plantar aspect.     1.  No fracture or dislocation of RIGHT ankle. 2.  Partially calcified linear foreign body density adjacent the plantar aspect of calcaneus.    DX-FOOT-COMPLETE 3+ RIGHT    Result Date: 12/23/2024 12/23/2024 3:26 PM HISTORY/REASON FOR EXAM:  Atraumatic Pain/Swelling/Deformity RIGHT foot pain. TECHNIQUE/EXAM DESCRIPTION AND NUMBER OF VIEWS: 3 views of the RIGHT foot. COMPARISON:  RIGHT ankle 4/5/2019 FINDINGS: No focal soft tissue swelling. No fracture or dislocation. Linear foreign body associated calcifications within the soft tissues at the plantar aspect of RIGHT calcaneus, as seen previously.     1.  No fracture or dislocation of RIGHT foot. 2.  Partially calcified linear foreign body again seen adjacent the plantar calcaneus.    DX-KNEE 3 VIEWS Pain/Deformity Following Trauma    Result Date: 12/23/2024 12/23/2024 3:26 PM HISTORY/REASON FOR EXAM:  Pain/Deformity Following Trauma. RIGHT knee pain TECHNIQUE/EXAM DESCRIPTION AND NUMBER OF VIEWS:  3 views of the RIGHT knee. COMPARISON: 6/15/2019 FINDINGS: No focal soft tissue swelling or gross joint effusion. Loss of joint space and osteophyte formation involving all 3 compartments. No fracture or dislocation.     1.  No fracture or dislocation of RIGHT knee. 2.  Moderate tricompartment osteoarthritis, worse than prior.           Assessment/Plan:     Diagnosis and associated orders:     1. Pain in right lower  leg  DX-ANKLE 3+ VIEWS RIGHT      2. Right foot pain  DX-FOOT-COMPLETE 3+ RIGHT      3. Acute pain of right knee  DX-KNEE 3 VIEWS Pain/Deformity Following Trauma    lidocaine (LIDODERM) 5 % Patch      4. Tricompartment osteoarthritis of right knee  meloxicam (MOBIC) 7.5 MG Tab         Comments/MDM:     Patient presents urgent care with 2 months history of worsening right knee pain, and subsequent pain to the lateral aspect of her right foot and right ankle. No swelling and erythema. Denies history of direct trauma.   Upon exam patient has tenderness to lateral joint line.  No deformity or effusion noted.  No swelling.  No warmth or erythema to right lower extremity.  Neurovascular intact.  Moderate tricompartment osteoarthritis noted to right knee noted on xray. No other acute osseous abnormality. Discussed findings with patient.   Advised patient to follow-up with orthopedics as scheduled.  Prescription for meloxicam sent to preferred pharmacy for treatment of acute osteoarthritic pain.  Patient does report improvement with use of lidocaine patches that was previously prescribed at ER, refill sent.  Red flag signs and symptoms discussed.  Instructed to return to ER urgent care if symptoms worsen or fail to resolve.         Differential diagnosis, natural history, supportive care, and indications for immediate follow-up discussed.    Advised the patient to follow-up with the primary care physician for recheck, reevaluation, and consideration of further management.    Please note that this dictation was created using voice recognition software. I have made a reasonable attempt to correct obvious errors, but I expect that there are errors of grammar and possibly content that I did not discover before finalizing the note.    This note was electronically signed by PHOENIX Mata

## 2024-12-30 ENCOUNTER — HOSPITAL ENCOUNTER (OUTPATIENT)
Dept: LAB | Facility: MEDICAL CENTER | Age: 64
End: 2024-12-30
Attending: FAMILY MEDICINE
Payer: COMMERCIAL

## 2024-12-30 LAB
25(OH)D3 SERPL-MCNC: 72 NG/ML (ref 30–100)
ALBUMIN SERPL BCP-MCNC: 3.7 G/DL (ref 3.2–4.9)
ALBUMIN/GLOB SERPL: 0.9 G/DL
ALP SERPL-CCNC: 143 U/L (ref 30–99)
ALT SERPL-CCNC: 7 U/L (ref 2–50)
ANION GAP SERPL CALC-SCNC: 11 MMOL/L (ref 7–16)
AST SERPL-CCNC: 14 U/L (ref 12–45)
BASOPHILS # BLD AUTO: 0.3 % (ref 0–1.8)
BASOPHILS # BLD: 0.01 K/UL (ref 0–0.12)
BILIRUB SERPL-MCNC: 0.3 MG/DL (ref 0.1–1.5)
BUN SERPL-MCNC: 7 MG/DL (ref 8–22)
CALCIUM ALBUM COR SERPL-MCNC: 9.5 MG/DL (ref 8.5–10.5)
CALCIUM SERPL-MCNC: 9.3 MG/DL (ref 8.5–10.5)
CHLORIDE SERPL-SCNC: 101 MMOL/L (ref 96–112)
CHOLEST SERPL-MCNC: 182 MG/DL (ref 100–199)
CO2 SERPL-SCNC: 25 MMOL/L (ref 20–33)
CREAT SERPL-MCNC: 0.53 MG/DL (ref 0.5–1.4)
CREAT UR-MCNC: 83.49 MG/DL
EOSINOPHIL # BLD AUTO: 0.03 K/UL (ref 0–0.51)
EOSINOPHIL NFR BLD: 0.8 % (ref 0–6.9)
ERYTHROCYTE [DISTWIDTH] IN BLOOD BY AUTOMATED COUNT: 46.6 FL (ref 35.9–50)
EST. AVERAGE GLUCOSE BLD GHB EST-MCNC: 111 MG/DL
FASTING STATUS PATIENT QL REPORTED: NORMAL
GFR SERPLBLD CREATININE-BSD FMLA CKD-EPI: 103 ML/MIN/1.73 M 2
GLOBULIN SER CALC-MCNC: 4.2 G/DL (ref 1.9–3.5)
GLUCOSE SERPL-MCNC: 83 MG/DL (ref 65–99)
HBA1C MFR BLD: 5.5 % (ref 4–5.6)
HCT VFR BLD AUTO: 38.4 % (ref 37–47)
HDLC SERPL-MCNC: 79 MG/DL
HGB BLD-MCNC: 12 G/DL (ref 12–16)
IMM GRANULOCYTES # BLD AUTO: 0.01 K/UL (ref 0–0.11)
IMM GRANULOCYTES NFR BLD AUTO: 0.3 % (ref 0–0.9)
LDLC SERPL CALC-MCNC: 93 MG/DL
LYMPHOCYTES # BLD AUTO: 1.45 K/UL (ref 1–4.8)
LYMPHOCYTES NFR BLD: 37.8 % (ref 22–41)
MCH RBC QN AUTO: 27.1 PG (ref 27–33)
MCHC RBC AUTO-ENTMCNC: 31.3 G/DL (ref 32.2–35.5)
MCV RBC AUTO: 86.9 FL (ref 81.4–97.8)
MICROALBUMIN UR-MCNC: <1.2 MG/DL
MICROALBUMIN/CREAT UR: NORMAL MG/G (ref 0–30)
MONOCYTES # BLD AUTO: 0.5 K/UL (ref 0–0.85)
MONOCYTES NFR BLD AUTO: 13 % (ref 0–13.4)
NEUTROPHILS # BLD AUTO: 1.84 K/UL (ref 1.82–7.42)
NEUTROPHILS NFR BLD: 47.8 % (ref 44–72)
NRBC # BLD AUTO: 0 K/UL
NRBC BLD-RTO: 0 /100 WBC (ref 0–0.2)
PLATELET # BLD AUTO: 232 K/UL (ref 164–446)
PMV BLD AUTO: 9.4 FL (ref 9–12.9)
POTASSIUM SERPL-SCNC: 4.3 MMOL/L (ref 3.6–5.5)
PROT SERPL-MCNC: 7.9 G/DL (ref 6–8.2)
RBC # BLD AUTO: 4.42 M/UL (ref 4.2–5.4)
SODIUM SERPL-SCNC: 137 MMOL/L (ref 135–145)
T3 SERPL-MCNC: 102 NG/DL (ref 60–181)
T4 FREE SERPL-MCNC: 1.13 NG/DL (ref 0.93–1.7)
TRIGL SERPL-MCNC: 51 MG/DL (ref 0–149)
TSH SERPL-ACNC: 0.48 UIU/ML (ref 0.35–5.5)
WBC # BLD AUTO: 3.8 K/UL (ref 4.8–10.8)

## 2024-12-30 PROCEDURE — 80053 COMPREHEN METABOLIC PANEL: CPT

## 2024-12-30 PROCEDURE — 85025 COMPLETE CBC W/AUTO DIFF WBC: CPT

## 2024-12-30 PROCEDURE — 83036 HEMOGLOBIN GLYCOSYLATED A1C: CPT

## 2024-12-30 PROCEDURE — 84439 ASSAY OF FREE THYROXINE: CPT

## 2024-12-30 PROCEDURE — 80061 LIPID PANEL: CPT

## 2024-12-30 PROCEDURE — 36415 COLL VENOUS BLD VENIPUNCTURE: CPT

## 2024-12-30 PROCEDURE — 82306 VITAMIN D 25 HYDROXY: CPT

## 2024-12-30 PROCEDURE — 84480 ASSAY TRIIODOTHYRONINE (T3): CPT

## 2024-12-30 PROCEDURE — 82570 ASSAY OF URINE CREATININE: CPT

## 2024-12-30 PROCEDURE — 82043 UR ALBUMIN QUANTITATIVE: CPT

## 2024-12-30 PROCEDURE — 84443 ASSAY THYROID STIM HORMONE: CPT

## 2025-01-28 ENCOUNTER — OFFICE VISIT (OUTPATIENT)
Dept: CARDIOLOGY | Facility: MEDICAL CENTER | Age: 65
End: 2025-01-28
Attending: NURSE PRACTITIONER
Payer: COMMERCIAL

## 2025-01-28 VITALS
HEART RATE: 78 BPM | RESPIRATION RATE: 12 BRPM | BODY MASS INDEX: 32.8 KG/M2 | OXYGEN SATURATION: 98 % | WEIGHT: 209 LBS | SYSTOLIC BLOOD PRESSURE: 116 MMHG | DIASTOLIC BLOOD PRESSURE: 68 MMHG | HEIGHT: 67 IN

## 2025-01-28 DIAGNOSIS — I47.29 NSVT (NONSUSTAINED VENTRICULAR TACHYCARDIA) (HCC): Chronic | ICD-10-CM

## 2025-01-28 DIAGNOSIS — R00.2 PALPITATIONS: ICD-10-CM

## 2025-01-28 DIAGNOSIS — K21.9 GASTROESOPHAGEAL REFLUX DISEASE WITHOUT ESOPHAGITIS: ICD-10-CM

## 2025-01-28 DIAGNOSIS — Z86.39 HISTORY OF GRAVES' DISEASE: ICD-10-CM

## 2025-01-28 DIAGNOSIS — E89.0 H/O TOTAL THYROIDECTOMY: ICD-10-CM

## 2025-01-28 DIAGNOSIS — E78.2 MIXED HYPERLIPIDEMIA: ICD-10-CM

## 2025-01-28 DIAGNOSIS — E89.0 HYPOTHYROIDISM, POSTSURGICAL: ICD-10-CM

## 2025-01-28 PROCEDURE — 99212 OFFICE O/P EST SF 10 MIN: CPT | Performed by: NURSE PRACTITIONER

## 2025-01-28 PROCEDURE — 99214 OFFICE O/P EST MOD 30 MIN: CPT | Performed by: NURSE PRACTITIONER

## 2025-01-28 RX ORDER — METOPROLOL TARTRATE 25 MG/1
25 TABLET, FILM COATED ORAL
Qty: 30 TABLET | Refills: 1 | Status: SHIPPED | OUTPATIENT
Start: 2025-01-28

## 2025-01-28 ASSESSMENT — ENCOUNTER SYMPTOMS
PND: 0
LOSS OF CONSCIOUSNESS: 0
DIZZINESS: 0
ABDOMINAL PAIN: 0
INSOMNIA: 0
PALPITATIONS: 1
SHORTNESS OF BREATH: 0
HEADACHES: 0
BRUISES/BLEEDS EASILY: 0
FEVER: 0
ORTHOPNEA: 0
CHILLS: 0
NAUSEA: 0
MYALGIAS: 0

## 2025-01-28 ASSESSMENT — FIBROSIS 4 INDEX: FIB4 SCORE: 1.46

## 2025-01-28 NOTE — PROGRESS NOTES
"Chief Complaint   Patient presents with    Follow-Up    Palpitations    Hypothyroidism    Hyperlipidemia    Gastrophageal Reflux       Subjective     Eunice Mckenna is a 65 y.o. female who presents today for six month follow-up of palpitations, PACs/PVCs, hypothyroidism, hyperlipidemia, and GERD.    Evita is a 65 year old female with history of abnormal EKG (2014) and palpitations, with normal echocardiograms in January 2021 and May 2023, and normal holter monitor 2021, with brief 4 beat NSVT episodes and PACs/PVCs <1% of total time.      Repeat ZioPatch in July 2024 showed sinus rhythm with PACs/PVCs <1% of total time, and 3 brief SVT episodes (3-5 beats). She was previously trialed on Propanolol, but she didn't tolerate it well.    At follow-up with me in July 2024, her TSH had been low for over a year, and she was urged to FU with her endocrinologist. Synthroid dose was lowered, and most recent TSH is back in the normal range (although at the lower end of normal).    She is here today for six month follow-up. She does still have some occasional palpitations, but usually after eating, and she was told she might have \"dumping syndrome\" (especially after her gastric bypass surgery). GLP1a has been mentioned to assist with slowing gastric motility.     She denies any chest pain, pressure, tightness or discomfort.  No shortness of breath, orthopnea or PND: no dizziness or syncope; no LE edema. BP is stable.     Past Medical History:   Diagnosis Date    Abnormal EKG - possible inferior infarct seen from 2014 05/2023    Echocardiogram with normal LV sizes, LVEF 55-60%. No valvular abnormalities.    Allergy     Anemia     Vaginal bleeding    Anesthesia     PONV    Anxiety     Arrhythmia     Arthritis     Knees    Colorectal cancer (HCC) 04/2011    Dr. Garcia, chemo and radiation.     Dyslipidemia     GERD (gastroesophageal reflux disease)     Graves disease     History of anemia 02/2020    Hyperthyroidism 12/18/2018 " "   Migraine     Myocardial infarct (HCC)     \"Before 2014\"    NSVT (nonsustained ventricular tachycardia) (HCC) - on BIOTEL 2/2021 03/03/2021    Pain 2020    Right knee    Palpitations     Snoring 02/2020    No sleep study     Past Surgical History:   Procedure Laterality Date    GASTROSCOPY N/A 9/10/2020    Procedure: GASTROSCOPY;  Surgeon: Joshua Hutchinson M.D.;  Location: SURGERY SAME DAY [facility];  Service: Gastroenterology    ID LAP ILEANA RESTRICT PROC LONGITUDINAL GAS* N/A 7/28/2020    Procedure: GASTRECTOMY, SLEEVE, LAPAROSCOPIC;  Surgeon: John H Ganser, M.D.;  Location: SURGERY [facility];  Service: General    PB KNEE SCOPE,DIAGNOSTIC Right 3/5/2020    Procedure: ARTHROSCOPY, KNEE;  Surgeon: Abdias Madden M.D.;  Location: SURGERY [facility];  Service: Orthopedics    MENISCECTOMY, KNEE, MEDIAL Right 3/5/2020    Procedure: MENISCECTOMY, KNEE, MEDIAL - PARTIAL LATERAL,;  Surgeon: Abdias Madden M.D.;  Location: SURGERY [facility];  Service: Orthopedics    JOINT INJECTION DIAGNOSTIC Left 3/5/2020    Procedure: INJECTION, JOINT, DIAGNOSTIC;  Surgeon: Abdias Madden M.D.;  Location: SURGERY [facility];  Service: Orthopedics    THYROIDECTOMY TOTAL Bilateral 1/8/2020    Procedure: THYROIDECTOMY, TOTAL- W/ NIMS;  Surgeon: Ebony Meza M.D.;  Location: SURGERY SAME DAY [facility];  Service: General    THYROIDECTOMY  01/08/2020    JOINT INJECTION DIAGNOSTIC  3/8/2013    Performed by Kenji Meek M.D. at [facility]    KNEE ARTHROSCOPY  3/8/2013    Performed by Kenji Meek M.D. at [facility]    MENISCECTOMY, KNEE, MEDIAL  3/8/2013    Performed by Kenji Meek M.D. at [facility]    GASTRIC BANDING LAPAROSCOPIC  2005    removal of lapband 2016    ABDOMINAL HYSTERECTOMY TOTAL  2003    For menorrhagia, no BSO    HERNIA REPAIR      child    OTHER ABDOMINAL SURGERY      lap band removal     Family History   Problem Relation Age of " Onset    Stroke Father         68yo    Hypertension Father     Heart Disease Father         70 yo    Cancer Maternal Grandfather         Lung    Diabetes Paternal Grandmother     Hypertension Paternal Grandmother     Stroke Paternal Grandmother         70 s    Psychiatric Illness Sister         Schizophrenia    Psychiatric Illness Brother         Schizophrenia    Cancer Paternal Aunt         Bone, liver    Psychiatric Illness Mother         dental / mental illness     Lung Disease Mother         Copd     Psychiatric Illness Sister         schizoprenia     Diabetes Sister     Hypertension Sister     Psychiatric Illness Brother     Heart Disease Brother     Hypertension Brother     Hypertension Brother     Diabetes Brother      Social History     Socioeconomic History    Marital status:      Spouse name: Not on file    Number of children: Not on file    Years of education: Not on file    Highest education level: Not on file   Occupational History    Not on file   Tobacco Use    Smoking status: Never    Smokeless tobacco: Never    Tobacco comments:     significant 2nd hand exposure    Vaping Use    Vaping status: Never Used   Substance and Sexual Activity    Alcohol use: No    Drug use: No    Sexual activity: Yes     Partners: Female   Other Topics Concern     Service No    Blood Transfusions No    Caffeine Concern No    Occupational Exposure No    Hobby Hazards No    Sleep Concern Yes    Stress Concern No    Weight Concern Yes    Special Diet No    Back Care No    Exercise Yes    Bike Helmet No    Seat Belt Yes    Self-Exams Yes   Social History Narrative    , no children.      Social Drivers of Health     Financial Resource Strain: Not on file   Food Insecurity: Not on file   Transportation Needs: Not on file   Physical Activity: Not on file   Stress: Not on file   Social Connections: Not on file   Intimate Partner Violence: Not on file   Housing Stability: Not on file     Allergies   Allergen  "Reactions    Iodine Itching    Morphine Itching     blisters    Shellfish Allergy Vomiting    Reglan [Kdc:Yellow Dye+Ci Pigment Blue 63+Metoclopramide]      Outpatient Encounter Medications as of 1/28/2025   Medication Sig Dispense Refill    MAGNESIUM GLYCINATE PO Take  by mouth.      metoprolol tartrate (LOPRESSOR) 25 MG Tab Take 1 Tablet by mouth 1 time a day as needed (sustained palpitations). 30 Tablet 1    lidocaine (LIDODERM) 5 % Patch Place 1 Patch on the skin every 24 hours. 10 Patch 0    thiamine (VITAMIN B-1) 50 MG Tab Take 100 mg by mouth every day.      estradiol (ESTRACE) 1 MG Tab Take 1 Tablet by mouth every day.      Ascorbic Acid (VITAMIN C) 100 MG Chew Tab       SYNTHROID 100 MCG Tab TAKE 1 TABLET BY MOUTH EVERY DAY IN THE MORNING ON EMPTY STOMACH FOR 90 DAYS      Zinc 100 MG Tab Take 5 mg by mouth.      Cholecalciferol (VITAMIN D3) 50 MCG (2000 UT) Tab Take 50 mg by mouth every day.      [DISCONTINUED] meloxicam (MOBIC) 7.5 MG Tab Take 1 Tablet by mouth every day. 30 Tablet 0     No facility-administered encounter medications on file as of 1/28/2025.     Review of Systems   Constitutional:  Negative for chills and fever.   HENT:  Negative for congestion.    Respiratory:  Negative for shortness of breath.    Cardiovascular:  Positive for palpitations. Negative for chest pain, orthopnea, leg swelling and PND.        Rare, nonsustained.   Gastrointestinal:  Negative for abdominal pain and nausea.   Musculoskeletal:  Negative for myalgias.   Skin:  Negative for rash.   Neurological:  Negative for dizziness, loss of consciousness and headaches.   Endo/Heme/Allergies:  Does not bruise/bleed easily.   Psychiatric/Behavioral:  The patient does not have insomnia.               Objective     /68 (BP Location: Left arm, Patient Position: Sitting, BP Cuff Size: Adult)   Pulse 78   Resp 12   Ht 1.702 m (5' 7\")   Wt 94.8 kg (209 lb)   SpO2 98%   BMI 32.73 kg/m²     Physical Exam  Constitutional:      "  Appearance: She is well-developed.   HENT:      Head: Normocephalic.   Neck:      Vascular: No JVD.   Cardiovascular:      Rate and Rhythm: Normal rate and regular rhythm.      Heart sounds: Normal heart sounds.   Pulmonary:      Effort: Pulmonary effort is normal. No respiratory distress.      Breath sounds: Normal breath sounds. No wheezing or rales.   Abdominal:      General: Bowel sounds are normal. There is no distension.      Palpations: Abdomen is soft.      Tenderness: There is no abdominal tenderness.   Musculoskeletal:         General: Normal range of motion.      Cervical back: Normal range of motion and neck supple.   Skin:     General: Skin is warm and dry.      Findings: No rash.   Neurological:      Mental Status: She is alert and oriented to person, place, and time.   Psychiatric:         Mood and Affect: Mood normal.         Behavior: Behavior normal.       CARDIAC RHYTHM MONITOR: Ziopatch: 14 days, ending 7/8/2024.   1.  Predominant rhythm is sinus rhythm; average heart rate 89 bpm, range  bpm.   2.  Rare premature atrial complexes (PACs), < 1% occurrence.   3.  Rare premature ventricular complexes (PVCs), < 1% occurrence.   4.  3 episodes of supraventricular tachycardia; fastest 5 beats at 164 bpm, longest 6 beats at 108 bpm, asymptomatic   5.  No atrial fibrillation, significant pauses, heart block or sustained arrhythmias.   6.  13 symptomatic and/or patient triggered events were reported 2 episodes associated with isolated PVCs the remainder were associated with sinus rhythm with heart rates between 88 and 119 bpm      CONCLUSIONS OF TTE OF 5/19/2023:  Compared to the prior study on 11/2/22, no significant changes  Normal transthoracic echocardiogram results  Normal estimated LVEF 55-60%     SUMMARY OF BIOTEL MONITOR OF 12/19/2022 (28 DAYS):  28 days of monitoring demonstrated sinus rhythm with rare ectopy.  Some symptoms correlated with PVCs.      CONCLUSIONS OF TTE OF  11/2/2022:  Compared to the images of the prior study 1/27/2021, there has been no significant change.   Normal left ventricular size and systolic function.  Left ventricular ejection fraction estimated to be 60%.  No hemodynamically significant valvular heart disease noted.  Estimated right ventricular systolic pressure 30 mmHg.     CONCLUSIONS OF ECHOCARDIOGRAM OF 1/27/2021:  Compared to the images of the prior study done 4/1/2014 - there is no significant change.  Normal transthoracic echocardiogram.      SUMMARY OF HOLTER MONITOR OF 3/3/2021 (24 days):  24 days of monitoring demonstrated sinus rhythm with rare ectopy including 4 beats of nonsustained VT.  Symptoms correlated with the episode of nonsustained VT as well as atrial ectopy.      Lab Results   Component Value Date/Time    CHOLSTRLTOT 182 12/30/2024 09:40 AM    LDL 93 12/30/2024 09:40 AM    HDL 79 12/30/2024 09:40 AM    TRIGLYCERIDE 51 12/30/2024 09:40 AM        Lab Results   Component Value Date/Time    ASTSGOT 14 12/30/2024 09:40 AM    ALTSGPT 7 12/30/2024 09:40 AM       Lab Results   Component Value Date/Time    SODIUM 137 12/30/2024 09:40 AM    POTASSIUM 4.3 12/30/2024 09:40 AM    CHLORIDE 101 12/30/2024 09:40 AM    CO2 25 12/30/2024 09:40 AM    GLUCOSE 83 12/30/2024 09:40 AM    BUN 7 (L) 12/30/2024 09:40 AM    CREATININE 0.53 12/30/2024 09:40 AM    CREATININE 0.77 12/29/2012 10:58 AM    BUNCREATRAT 14 12/14/2020 07:49 AM    BUNCREATRAT 14 12/29/2012 10:58 AM    GLOMRATE >59 08/18/2010 08:26 AM      Lab Results   Component Value Date/Time    WBC 3.8 (L) 12/30/2024 09:40 AM    WBC 3.5 (L) 12/29/2012 10:58 AM    RBC 4.42 12/30/2024 09:40 AM    RBC 4.70 12/29/2012 10:58 AM    HEMOGLOBIN 12.0 12/30/2024 09:40 AM    HEMATOCRIT 38.4 12/30/2024 09:40 AM    MCV 86.9 12/30/2024 09:40 AM    MCV 80 12/29/2012 10:58 AM    MCH 27.1 12/30/2024 09:40 AM    MCH 26.6 12/29/2012 10:58 AM    MCHC 31.3 (L) 12/30/2024 09:40 AM    MPV 9.4 12/30/2024 09:40 AM          Assessment & Plan     1. Palpitations  metoprolol tartrate (LOPRESSOR) 25 MG Tab      2. NSVT (nonsustained ventricular tachycardia) (HCC) - on BIOTEL 2/2021        3. History of Graves' disease        4. H/O total thyroidectomy        5. Hypothyroidism, postsurgical        6. Mixed hyperlipidemia        7. Gastroesophageal reflux disease without esophagitis            Medical Decision Making: Today's Assessment/Status/Plan:        Palpitations, with rare PACs/PVCs/SVT on multiple holter monitor (<1% of total time), possibly due to over thyroid supplementation versus dumping syndrome. She has made some dietary changes, including cutting down on sugar intake. She can try Metoprolol tartrate 25mg PRN sustained palpitations; can use 25-50mg PRN).  History of Graves disease, status post thyroidectomy, with thyroid supplementation. Recent TSH was normal.  Hyperlipidemia, treated with diet alone. Last lipid panel was good.  GERD, related to previous GI surgery.    As above, we reviewed echo results from 2021 and 2023, as well as holter monitor.  She can using Metoprolol tartrate 25-50mg PRN sustained palpitations.   Keep follow-up with other providers.  Reviewed recent labs results.    Follow-up with me in 6 months to review response to beta blocker; follow-up sooner if clinical condition changes.

## 2025-02-05 ENCOUNTER — HOSPITAL ENCOUNTER (OUTPATIENT)
Dept: RADIOLOGY | Facility: MEDICAL CENTER | Age: 65
End: 2025-02-05
Attending: ORTHOPAEDIC SURGERY
Payer: COMMERCIAL

## 2025-02-05 DIAGNOSIS — M17.11 PRIMARY OSTEOARTHRITIS OF RIGHT KNEE: ICD-10-CM

## 2025-02-05 DIAGNOSIS — M17.12 PRIMARY OSTEOARTHRITIS OF LEFT KNEE: ICD-10-CM

## 2025-02-05 DIAGNOSIS — M17.9 OSTEOARTHRITIS OF KNEE, UNSPECIFIED: ICD-10-CM

## 2025-02-05 PROCEDURE — 73700 CT LOWER EXTREMITY W/O DYE: CPT | Mod: RT

## 2025-02-13 ENCOUNTER — TELEPHONE (OUTPATIENT)
Dept: CARDIOLOGY | Facility: MEDICAL CENTER | Age: 65
End: 2025-02-13
Payer: COMMERCIAL

## 2025-02-13 NOTE — LETTER
PROCEDURE/SURGERY CLEARANCE FORM      Encounter Date: 2/13/2025    Patient: Eunice Mckenna  YOB: 1960    CARDIOLOGIST: MOIRA Garcia.   REFERRING DOCTOR:  No ref. provider found    The following procedure/surgery:  Right TKA                                            PROCEDURE/SURGERY CLEARANCE FORM    Date: 2/13/2025   Patient Name: Eunice Mckenna    Dear Surgeon or Proceduralist,      Thank you for your request for cardiac stratification of our mutual patient Eunice Mckenna 1960. We have reviewed their [facility] records; and to the best of our understanding this patient has not had stenting, ablation, watchman, cardiothoracic surgery or hospitalization for cardiovascular reasons in the past 6 months.  Eunice Mckenna has been seen within the past 15 months and is considered to have non-modifiable cardiac risk for this low-risk procedure/surgery. They may proceed from a cardiovascular standpoint and may hold their antiplatelet/anticoagulation as briefly as possible. Please have patient resume this medication when hemodynamically stable to do so.     Aspirin or Prasugrel   - hold 7 days prior to procedure/surgery, resume when hemodynamically stable      Clopidrogrel or Ticagrelor  - hold 7 days for all neurological procedures, hold 5 days prior to all other procedure/surgery,  resume when hemodynamically stable     Warfarin - hold 7 days for all neurological procedures, hold 5 days prior to all other procedure/surgery and coordinate with [facility] ([phone]) INR testing and dose management.      Pradaxa/Xarelto/Eliquis/Savesya - hold 1 day prior to procedure for low bleeding risk procedure, 2 days for high bleeding risk procedure, or consider holding 3 days or longer for patients with reduced kidney function (CrCl <30mL/min) or spinal/cranial surgeries/procedures.      If they have a mechanical heart valve, please coordinate with [facility]  ([phone]) the proper management of their anticoagulant in the periprocedural or perioperative period.      Some patients have higher risk for cardiovascular complications or holding medication. If our patient has had prior complications of holding antiplatelet or anticoagulants in the past and we have seen them after these events, we have addressed these concerns with the patient. They are at an unknown degree of increased risk for recurrent complication.  You may hold anticoagulation/antiplatelets for the procedure or surgery if the benefits of the procedure or surgery outweigh this nonmodifiable risk.      If Eunice Mckenna 1960 has new symptoms of heart failure decompensation, unstable arrythmia, or angina please reach out and we will assess the patient.      If you have other patient-specific concerns, please feel free to reach out to the patient's cardiologist directly at [phone].     Thank you,       [facility]

## 2025-02-13 NOTE — TELEPHONE ENCOUNTER
"Last OV: 01/28/2025  Proposed Surgery: Right TKA   Surgery Date: 4/10/2025  Requesting Office Name: [facility]   Fax Number: [phone]  Preference of Location (default is surgery center unless specified by Cardiologist or [facility])  Prior Clearance Addressed: No    Is this a general clearance? YES   Anticoags/Antiplatelets: Other none   Anticoags/Antiplatelet managed by Cardiology? NA    Outstanding Cardiac Imaging : No  Ablation, Cardioversion, Stent, Cardiac Devices, Catheterization, Watchman: No  TAVR/Valve, Mitral Clip, Watchman (including open heart),: N/A   Recent Cardiac Hospitalization: No            When: N/A  History (cardiac history):   Past Medical History:   Diagnosis Date    Abnormal EKG - possible inferior infarct seen from 2014 05/2023    Echocardiogram with normal LV sizes, LVEF 55-60%. No valvular abnormalities.    Allergy     Anemia     Vaginal bleeding    Anesthesia     PONV    Anxiety     Arrhythmia     Arthritis     Knees    Colorectal cancer (HCC) 04/2011    Dr. Garcia, chemo and radiation.     Dyslipidemia     GERD (gastroesophageal reflux disease)     Graves disease     History of anemia 02/2020    Hyperthyroidism 12/18/2018    Migraine     Myocardial infarct (HCC)     \"Before 2014\"    NSVT (nonsustained ventricular tachycardia) ([facility]) - on BIOTEL 2/2021 03/03/2021    Pain 2020    Right knee    Palpitations     Snoring 02/2020    No sleep study           Is this a dental clearance? NO  Ablation, Cardioversion, Watchman, Stents, Cath, Devices within the last 3 months? No   If yes- Send dental wait letter, do not forward to provider for review.     TAVR / Valve, Mitral clip within the last 6 months? No  If yes- Send dental wait letter, do not forward to provider for review.     If completing a general clearance, continue per protocol.           Surgical Clearance Letter Sent: YES   **Scan clearance request letter into [employer]**   "

## 2025-02-27 ENCOUNTER — TELEPHONE (OUTPATIENT)
Dept: CARDIOLOGY | Facility: MEDICAL CENTER | Age: 65
End: 2025-02-27
Payer: COMMERCIAL

## 2025-02-27 DIAGNOSIS — R00.2 PALPITATIONS: ICD-10-CM

## 2025-02-27 RX ORDER — METOPROLOL TARTRATE 25 MG/1
25 TABLET, FILM COATED ORAL
Qty: 90 TABLET | Refills: 3 | OUTPATIENT
Start: 2025-02-27

## 2025-02-27 RX ORDER — METOPROLOL TARTRATE 25 MG/1
25 TABLET, FILM COATED ORAL
Qty: 90 TABLET | Refills: 0 | Status: SHIPPED | OUTPATIENT
Start: 2025-02-27

## 2025-02-27 NOTE — TELEPHONE ENCOUNTER
90-day refill sent for PRN only Rx of Metoprolol.  Patient has upcoming appointment on 7/28/25 for next evaluation.

## 2025-02-27 NOTE — TELEPHONE ENCOUNTER
Is the patient due for a refill? No    Was the patient seen the last 15 months? Yes    Date of last office visit: 1/28/2025    Does the patient have an upcoming appointment?  Yes   If yes, When? 7/28/2025    Provider to refill:AB    Does the patient have alf Plus and need 100-day supply? (This applies to ALL medications) Patient does not have SCP

## 2025-03-10 ENCOUNTER — HOSPITAL ENCOUNTER (OUTPATIENT)
Dept: LAB | Facility: MEDICAL CENTER | Age: 65
End: 2025-03-10
Attending: INTERNAL MEDICINE
Payer: COMMERCIAL

## 2025-03-10 LAB
ESTRADIOL SERPL-MCNC: 79.2 PG/ML
T3FREE SERPL-MCNC: 2.56 PG/ML (ref 2–4.4)
T4 FREE SERPL-MCNC: 1.25 NG/DL (ref 0.93–1.7)
TSH SERPL-ACNC: 0.49 UIU/ML (ref 0.35–5.5)

## 2025-03-10 PROCEDURE — 84439 ASSAY OF FREE THYROXINE: CPT

## 2025-03-10 PROCEDURE — 36415 COLL VENOUS BLD VENIPUNCTURE: CPT

## 2025-03-10 PROCEDURE — 84481 FREE ASSAY (FT-3): CPT

## 2025-03-10 PROCEDURE — 82670 ASSAY OF TOTAL ESTRADIOL: CPT

## 2025-03-10 PROCEDURE — 84443 ASSAY THYROID STIM HORMONE: CPT

## 2025-04-04 ENCOUNTER — TELEPHONE (OUTPATIENT)
Dept: CARDIOLOGY | Facility: MEDICAL CENTER | Age: 65
End: 2025-04-04
Payer: COMMERCIAL

## 2025-04-04 NOTE — TELEPHONE ENCOUNTER
Phone Number Called: [phone]    Call outcome: Spoke to patient regarding message below.    Message: Called to inform patient cardiac clearance was completed and faxed back to [facility] on 2/13, advised AB cleared pt for knee surgery. Pt was inquiring if she needed EKG prior, advised to reach out to [facility] to see if needed prior to procedure. Per pt, they did not think she needed an EKG prior as they had one on record from last year. Of note, pt had cardiac monitor study completed last year - see last OV note with AB 1/28/25

## 2025-04-04 NOTE — TELEPHONE ENCOUNTER
AB      Caller: Eunice Mckenna     Topic/issue: A medical Clearance was submitted to office on 2/13 (in media) by Dr Meek, pt has been scheduled for complete knee replacement nest Thursday. The surgical clearance has not been submitted and pt has not done any of the tests she normally does prior to clearance she is concerned - Is she ok to have surgery please advise?     Callback Number: [phone]    Thank You   Maryse MALDONADO

## 2025-06-21 ENCOUNTER — HOSPITAL ENCOUNTER (OUTPATIENT)
Dept: LAB | Facility: MEDICAL CENTER | Age: 65
End: 2025-06-21
Attending: CLINICAL NURSE SPECIALIST
Payer: COMMERCIAL

## 2025-06-21 LAB
25(OH)D3 SERPL-MCNC: 84 NG/ML (ref 30–100)
ALBUMIN SERPL BCP-MCNC: 4 G/DL (ref 3.2–4.9)
ALBUMIN/GLOB SERPL: 0.9 G/DL
ALP SERPL-CCNC: 134 U/L (ref 30–99)
ALT SERPL-CCNC: 11 U/L (ref 2–50)
ANION GAP SERPL CALC-SCNC: 12 MMOL/L (ref 7–16)
AST SERPL-CCNC: 18 U/L (ref 12–45)
BASOPHILS # BLD AUTO: 0.3 % (ref 0–1.8)
BASOPHILS # BLD: 0.01 K/UL (ref 0–0.12)
BILIRUB SERPL-MCNC: 0.3 MG/DL (ref 0.1–1.5)
BUN SERPL-MCNC: 13 MG/DL (ref 8–22)
CALCIUM ALBUM COR SERPL-MCNC: 9.6 MG/DL (ref 8.5–10.5)
CALCIUM SERPL-MCNC: 9.6 MG/DL (ref 8.5–10.5)
CHLORIDE SERPL-SCNC: 102 MMOL/L (ref 96–112)
CHOLEST SERPL-MCNC: 183 MG/DL (ref 100–199)
CO2 SERPL-SCNC: 23 MMOL/L (ref 20–33)
CREAT SERPL-MCNC: 0.57 MG/DL (ref 0.5–1.4)
EOSINOPHIL # BLD AUTO: 0.03 K/UL (ref 0–0.51)
EOSINOPHIL NFR BLD: 0.9 % (ref 0–6.9)
ERYTHROCYTE [DISTWIDTH] IN BLOOD BY AUTOMATED COUNT: 46.3 FL (ref 35.9–50)
EST. AVERAGE GLUCOSE BLD GHB EST-MCNC: 105 MG/DL
FASTING STATUS PATIENT QL REPORTED: NORMAL
FERRITIN SERPL-MCNC: 247 NG/ML (ref 10–291)
FOLATE SERPL-MCNC: 26.1 NG/ML
GFR SERPLBLD CREATININE-BSD FMLA CKD-EPI: 101 ML/MIN/1.73 M 2
GLOBULIN SER CALC-MCNC: 4.3 G/DL (ref 1.9–3.5)
GLUCOSE SERPL-MCNC: 79 MG/DL (ref 65–99)
HBA1C MFR BLD: 5.3 % (ref 4–5.6)
HCT VFR BLD AUTO: 39 % (ref 37–47)
HDLC SERPL-MCNC: 78 MG/DL
HGB BLD-MCNC: 12.4 G/DL (ref 12–16)
IMM GRANULOCYTES # BLD AUTO: 0 K/UL (ref 0–0.11)
IMM GRANULOCYTES NFR BLD AUTO: 0 % (ref 0–0.9)
IRON SATN MFR SERPL: 24 % (ref 15–55)
IRON SERPL-MCNC: 54 UG/DL (ref 40–170)
LDLC SERPL CALC-MCNC: 94 MG/DL
LYMPHOCYTES # BLD AUTO: 0.92 K/UL (ref 1–4.8)
LYMPHOCYTES NFR BLD: 28.8 % (ref 22–41)
MCH RBC QN AUTO: 27.6 PG (ref 27–33)
MCHC RBC AUTO-ENTMCNC: 31.8 G/DL (ref 32.2–35.5)
MCV RBC AUTO: 86.9 FL (ref 81.4–97.8)
MONOCYTES # BLD AUTO: 0.45 K/UL (ref 0–0.85)
MONOCYTES NFR BLD AUTO: 14.1 % (ref 0–13.4)
NEUTROPHILS # BLD AUTO: 1.78 K/UL (ref 1.82–7.42)
NEUTROPHILS NFR BLD: 55.9 % (ref 44–72)
NRBC # BLD AUTO: 0 K/UL
NRBC BLD-RTO: 0 /100 WBC (ref 0–0.2)
PLATELET # BLD AUTO: 267 K/UL (ref 164–446)
PMV BLD AUTO: 9.7 FL (ref 9–12.9)
POTASSIUM SERPL-SCNC: 3.8 MMOL/L (ref 3.6–5.5)
PROT SERPL-MCNC: 8.3 G/DL (ref 6–8.2)
RBC # BLD AUTO: 4.49 M/UL (ref 4.2–5.4)
SODIUM SERPL-SCNC: 137 MMOL/L (ref 135–145)
TIBC SERPL-MCNC: 228 UG/DL (ref 250–450)
TRIGL SERPL-MCNC: 54 MG/DL (ref 0–149)
UIBC SERPL-MCNC: 174 UG/DL (ref 110–370)
VIT B12 SERPL-MCNC: 495 PG/ML (ref 211–911)
WBC # BLD AUTO: 3.2 K/UL (ref 4.8–10.8)

## 2025-06-21 PROCEDURE — 84590 ASSAY OF VITAMIN A: CPT

## 2025-06-21 PROCEDURE — 85025 COMPLETE CBC W/AUTO DIFF WBC: CPT

## 2025-06-21 PROCEDURE — 80061 LIPID PANEL: CPT

## 2025-06-21 PROCEDURE — 82746 ASSAY OF FOLIC ACID SERUM: CPT

## 2025-06-21 PROCEDURE — 83036 HEMOGLOBIN GLYCOSYLATED A1C: CPT

## 2025-06-21 PROCEDURE — 84446 ASSAY OF VITAMIN E: CPT

## 2025-06-21 PROCEDURE — 82306 VITAMIN D 25 HYDROXY: CPT

## 2025-06-21 PROCEDURE — 82607 VITAMIN B-12: CPT

## 2025-06-21 PROCEDURE — 80053 COMPREHEN METABOLIC PANEL: CPT

## 2025-06-21 PROCEDURE — 83550 IRON BINDING TEST: CPT

## 2025-06-21 PROCEDURE — 82728 ASSAY OF FERRITIN: CPT

## 2025-06-21 PROCEDURE — 83540 ASSAY OF IRON: CPT

## 2025-06-21 PROCEDURE — 84425 ASSAY OF VITAMIN B-1: CPT

## 2025-06-21 PROCEDURE — 36415 COLL VENOUS BLD VENIPUNCTURE: CPT

## 2025-06-25 LAB — VIT B1 BLD-MCNC: 174 NMOL/L (ref 70–180)

## 2025-06-26 LAB
A-TOCOPHEROL VIT E SERPL-MCNC: 11 MG/L (ref 5.5–18)
ANNOTATION COMMENT IMP: NORMAL
BETA+GAMMA TOCOPHEROL SERPL-MCNC: 0.5 MG/L (ref 0–6)
RETINYL PALMITATE SERPL-MCNC: <0.02 MG/L (ref 0–0.1)
VIT A SERPL-MCNC: 0.54 MG/L (ref 0.3–1.2)

## 2025-07-28 ENCOUNTER — APPOINTMENT (OUTPATIENT)
Facility: MEDICAL CENTER | Age: 65
End: 2025-07-28
Attending: NURSE PRACTITIONER
Payer: COMMERCIAL

## 2025-08-12 ENCOUNTER — HOSPITAL ENCOUNTER (OUTPATIENT)
Dept: LAB | Facility: MEDICAL CENTER | Age: 65
End: 2025-08-12
Attending: INTERNAL MEDICINE
Payer: COMMERCIAL

## 2025-08-12 LAB
ESTRADIOL SERPL-MCNC: 40.7 PG/ML
T3FREE SERPL-MCNC: 2.37 PG/ML (ref 2–4.4)
T4 FREE SERPL-MCNC: 1.03 NG/DL (ref 0.93–1.7)
TSH SERPL-ACNC: 1.43 UIU/ML (ref 0.38–5.33)

## 2025-08-12 PROCEDURE — 82670 ASSAY OF TOTAL ESTRADIOL: CPT

## 2025-08-12 PROCEDURE — 84481 FREE ASSAY (FT-3): CPT

## 2025-08-12 PROCEDURE — 84443 ASSAY THYROID STIM HORMONE: CPT

## 2025-08-12 PROCEDURE — 84439 ASSAY OF FREE THYROXINE: CPT

## 2025-08-12 PROCEDURE — 36415 COLL VENOUS BLD VENIPUNCTURE: CPT

## 2025-08-18 ENCOUNTER — OFFICE VISIT (OUTPATIENT)
Facility: MEDICAL CENTER | Age: 65
End: 2025-08-18
Attending: NURSE PRACTITIONER
Payer: COMMERCIAL

## 2025-08-18 VITALS
RESPIRATION RATE: 16 BRPM | HEART RATE: 75 BPM | HEIGHT: 67 IN | BODY MASS INDEX: 32.8 KG/M2 | SYSTOLIC BLOOD PRESSURE: 108 MMHG | DIASTOLIC BLOOD PRESSURE: 64 MMHG | OXYGEN SATURATION: 99 % | WEIGHT: 209 LBS

## 2025-08-18 DIAGNOSIS — M25.561 CHRONIC PAIN OF RIGHT KNEE: ICD-10-CM

## 2025-08-18 DIAGNOSIS — Z98.890 H/O TOTAL THYROIDECTOMY: ICD-10-CM

## 2025-08-18 DIAGNOSIS — I47.29 NSVT (NONSUSTAINED VENTRICULAR TACHYCARDIA) (HCC): Primary | Chronic | ICD-10-CM

## 2025-08-18 DIAGNOSIS — E89.0 HYPOTHYROIDISM, POSTSURGICAL: ICD-10-CM

## 2025-08-18 DIAGNOSIS — G89.29 CHRONIC PAIN OF RIGHT KNEE: ICD-10-CM

## 2025-08-18 DIAGNOSIS — Z90.89 H/O TOTAL THYROIDECTOMY: ICD-10-CM

## 2025-08-18 DIAGNOSIS — R00.2 PALPITATIONS: ICD-10-CM

## 2025-08-18 DIAGNOSIS — E78.2 MIXED HYPERLIPIDEMIA: ICD-10-CM

## 2025-08-18 PROCEDURE — 99212 OFFICE O/P EST SF 10 MIN: CPT | Performed by: NURSE PRACTITIONER

## 2025-08-18 PROCEDURE — 3078F DIAST BP <80 MM HG: CPT | Performed by: NURSE PRACTITIONER

## 2025-08-18 PROCEDURE — 99214 OFFICE O/P EST MOD 30 MIN: CPT | Performed by: NURSE PRACTITIONER

## 2025-08-18 PROCEDURE — 3074F SYST BP LT 130 MM HG: CPT | Performed by: NURSE PRACTITIONER

## 2025-08-18 RX ORDER — ASPIRIN 81 MG/1
81 TABLET ORAL DAILY
COMMUNITY

## 2025-08-18 ASSESSMENT — ENCOUNTER SYMPTOMS
PND: 0
ABDOMINAL PAIN: 0
HEADACHES: 0
FEVER: 0
LOSS OF CONSCIOUSNESS: 0
DIZZINESS: 0
ORTHOPNEA: 0
INSOMNIA: 0
NAUSEA: 0
SHORTNESS OF BREATH: 0
MYALGIAS: 0
BRUISES/BLEEDS EASILY: 0
CHILLS: 0
PALPITATIONS: 1

## 2025-08-18 ASSESSMENT — FIBROSIS 4 INDEX: FIB4 SCORE: 1.3

## (undated) DEVICE — MASK ANESTHESIA ADULT  - (100/CA)

## (undated) DEVICE — SODIUM CHL IRRIGATION 0.9% 1000ML (12EA/CA)

## (undated) DEVICE — PACK MINOR BASIN - (2EA/CA)

## (undated) DEVICE — GLOVE BIOGEL INDICATOR SZ 6.5 SURGICAL PF LTX - (50PR/BX 4BX/CA)

## (undated) DEVICE — SET LEADWIRE 5 LEAD BEDSIDE DISPOSABLE ECG (1SET OF 5/EA)

## (undated) DEVICE — TUBING CLEARLINK DUO-VENT - C-FLO (48EA/CA)

## (undated) DEVICE — CUFF TOURNIQUET 44 X 4 ONE PORT DISP - STERILE (10/CA)

## (undated) DEVICE — ELECTRODE DUAL RETURN W/ CORD - (50/PK)

## (undated) DEVICE — TUBE CONNECTING SUCTION - CLEAR PLASTIC STERILE 72 IN (50EA/CA)

## (undated) DEVICE — FIBRILLAR SURGICEL 4X4 - 10/CA

## (undated) DEVICE — TUBING PATIENT W/CONNECTOR REDEUCE (1EA)

## (undated) DEVICE — NEPTUNE 4 PORT MANIFOLD - (20/PK)

## (undated) DEVICE — GLOVE BIOGEL SZ 8.5 SURGICAL PF LTX - (50PR/BX 4BX/CA)

## (undated) DEVICE — DRAPE LG. APERTURE 33 X 51" - (10EA/BX)"

## (undated) DEVICE — ELECTRODE 850 FOAM ADHESIVE - HYDROGEL RADIOTRNSPRNT (50/PK)

## (undated) DEVICE — CHLORAPREP 26 ML APPLICATOR - ORANGE TINT(25/CA)

## (undated) DEVICE — RELOAD WITH GRIPPING SURFACE TECHNOLOGY GOLD 60MM (12EA/BX)

## (undated) DEVICE — SUTURE 3-0 ETHILON FS-1 - (36/BX) 30 INCH

## (undated) DEVICE — KIT ROOM DECONTAMINATION

## (undated) DEVICE — KIT CUSTOM PROCEDURE SINGLE FOR ENDO  (15/CA)

## (undated) DEVICE — GLOVE BIOGEL M SZ 8 SURGICAL PF LTX - (50/BX 4BX/CA)

## (undated) DEVICE — SHAVER4.0 AGGRESSIVE + FORMLA (5EA/BX)

## (undated) DEVICE — GOWN WARMING X-LARGE FLEX - (20/CA)

## (undated) DEVICE — GLOVE BIOGEL SZ 7.5 SURGICAL PF LTX - (50PR/BX 4BX/CA)

## (undated) DEVICE — SENSOR SPO2 NEO LNCS ADHESIVE (20/BX) SEE USER NOTES

## (undated) DEVICE — BOVIE NEEDLE TIP 3CM COLORADO

## (undated) DEVICE — CATHETER IV 20 GA X 1-1/4 ---SURG.& SDS ONLY--- (50EA/BX)

## (undated) DEVICE — ENDOSTITCH LOAD UNIT 0 SURGI - 12/CA

## (undated) DEVICE — KIT ANESTHESIA W/CIRCUIT & 3/LT BAG W/FILTER (20EA/CA)

## (undated) DEVICE — GLOVE BIOGEL PI ORTHO SZ 8 PF LF (40PR/BX)

## (undated) DEVICE — TUBING PUMP WITH CONNECTOR REDEUCE (1EA)

## (undated) DEVICE — SET SUCTION/IRRIGATION WITH DISPOSABLE TIP (6/CA )PART #0250-070-520 IS A SUB

## (undated) DEVICE — PROTECTOR ULNA NERVE - (36PR/CA)

## (undated) DEVICE — PACK KNEE ARTHROSCOPY SM OR - (2EA/CA)

## (undated) DEVICE — SUTURE 3-0 VICRYL PLUS SH - 8X 18 INCH (12/BX)

## (undated) DEVICE — SUTURE GENERAL

## (undated) DEVICE — NEEDLE INSFL 120MM 14GA VRRS - (20/BX)

## (undated) DEVICE — SUCTION INSTRUMENT YANKAUER BULBOUS TIP W/O VENT (50EA/CA)

## (undated) DEVICE — SUTURE 3-0 VICRYL PLUS SH - 27 INCH (36/BX)

## (undated) DEVICE — SLEEVE, VASO, THIGH, MED

## (undated) DEVICE — SUTURE 3-0 ENDO STITCH ABSORBALE 8 20CM (6EA/CA)"

## (undated) DEVICE — LACTATED RINGERS INJ 1000 ML - (14EA/CA 60CA/PF)

## (undated) DEVICE — CLIP MED INTNL HRZN TI ESCP - (25/BX)

## (undated) DEVICE — SPONGE GAUZESTER. 2X2 4-PL - (2/PK 50PK/BX 30BX/CS)

## (undated) DEVICE — HEAD HOLDER JUNIOR/ADULT

## (undated) DEVICE — SEALER VESSEL HARMONIC ACE PLUS WITH ADVANCED HEMOSTASIS 36CM (1/EA)

## (undated) DEVICE — RELOAD WITH GRIPPING SURFACE TECHNOLOGY BLUE 60MM (12EA/BX)

## (undated) DEVICE — STAPLER POWERED 60MM (3EA/BX)

## (undated) DEVICE — GLOVE BIOGEL SZ 8 SURGICAL PF LTX - (50PR/BX 4BX/CA)

## (undated) DEVICE — CLIP SM INTNL HRZN TI ESCP LGT - (24EA/PK 25PK/BX)

## (undated) DEVICE — CANISTER SUCTION RIGID RED 1500CC (40EA/CA)

## (undated) DEVICE — MASK, LARYNGEAL AIRWAY #4

## (undated) DEVICE — CANISTER SUCTION 3000ML MECHANICAL FILTER AUTO SHUTOFF MEDI-VAC NONSTERILE LF DISP  (40EA/CA)

## (undated) DEVICE — WATER IRRIGATION STERILE 1000ML (12EA/CA)

## (undated) DEVICE — ARTHROWAND TURBOVAC 3.5/90 SCT

## (undated) DEVICE — GOWN SURGEONS X-LARGE - DISP. (30/CA)

## (undated) DEVICE — GOWN WARMING STANDARD FLEX - (30/CA)

## (undated) DEVICE — HUMID-VENT HEAT AND MOISTURE EXCHANGE- (50/BX)

## (undated) DEVICE — KIT  I.V. START (100EA/CA)

## (undated) DEVICE — ENDOSTITCH10MM SUTURING DEVIC - (3/CA)

## (undated) DEVICE — RELOAD WITH GRIPPING SURGACE TECHNOLOGY GREEN 60MM (12EA/BX)

## (undated) DEVICE — SHEET THYROID - (10EA/CA)

## (undated) DEVICE — GOWN SURGEONS LARGE - (32/CA)

## (undated) DEVICE — PACK GASTRIC BANDING OR - (1/CA)

## (undated) DEVICE — MAT PATIENT POSITIONING PREVALON

## (undated) DEVICE — PROBE PRASS STAND STIMULATING (5EA/PK)

## (undated) DEVICE — DRAPE MAGNETIC (INSTRA-MAG) - (30/CA)

## (undated) DEVICE — GLOVE BIOGEL INDICATOR SZ 8 SURGICAL PF LTX - (50/BX 4BX/CA)

## (undated) DEVICE — GLOVE BIOGEL PI INDICATOR SZ 8.0 SURGICAL PF LF -(50/BX 4BX/CA)

## (undated) DEVICE — SET EXTENSION WITH 2 PORTS (48EA/CA) ***PART #2C8610 IS A SUBSTITUTE*****

## (undated) DEVICE — SUTURE 4-0 VICRYL PLUS FS-2 - 27 INCH (36/BX)

## (undated) DEVICE — TROCAR Z THREAD12MM OPTICAL - NON BLADED (6/BX)

## (undated) DEVICE — GLOVE, LITE (PAIR)

## (undated) DEVICE — SUTURE 3-0 VICRYL PLUS RB-1 - 8 X 18 INCH (12/BX)

## (undated) DEVICE — SHEAR HS FOCUS 9CM CVD - (6/BX)

## (undated) DEVICE — FILM CASSETTE ENDO

## (undated) DEVICE — CANNULA W/SEAL 5X100 Z-THRE - ADED KII (12/BX)

## (undated) DEVICE — BAG SPONGE COUNT 10.25 X 32 - BLUE (250/CA)

## (undated) DEVICE — SPONGE GAUZESTER 4 X 4 4PLY - (128PK/CA)

## (undated) DEVICE — DRAPE LARGE 3 QUARTER - (20/CA)

## (undated) DEVICE — TROCAR 5X100 NON BLADED Z-TH - READ KII (6/BX)

## (undated) DEVICE — DRESSING TRANSPARENT FILM TEGADERM 2.375 X 2.75"  (100EA/BX)"

## (undated) DEVICE — SODIUM CHL. IRRIGATION 0.9% 3000ML (4EA/CA 65CA/PF)

## (undated) DEVICE — CONTAINER, SPECIMEN, STERILE

## (undated) DEVICE — SPONGE PEANUT - (5/PK 50PK/CA)

## (undated) DEVICE — BITE BLOCK ADULT 60FR (100EA/CA)